# Patient Record
Sex: FEMALE | Race: WHITE | NOT HISPANIC OR LATINO | Employment: OTHER | URBAN - METROPOLITAN AREA
[De-identification: names, ages, dates, MRNs, and addresses within clinical notes are randomized per-mention and may not be internally consistent; named-entity substitution may affect disease eponyms.]

---

## 2017-03-29 ENCOUNTER — HISTORICAL (OUTPATIENT)
Dept: ADMINISTRATIVE | Facility: HOSPITAL | Age: 67
End: 2017-03-29

## 2017-03-29 LAB
BASOPHILS NFR BLD: 0.1 K/UL (ref 0–0.2)
BASOPHILS NFR BLD: 1.1 %
EOSINOPHIL NFR BLD: 0.1 K/UL (ref 0–0.7)
EOSINOPHIL NFR BLD: 1.8 %
ERYTHROCYTE [DISTWIDTH] IN BLOOD BY AUTOMATED COUNT: 12.5 % (ref 11.7–14.9)
GRAN #: 3.9 K/UL (ref 1.4–6.5)
GRAN%: 69.5 %
HCT VFR BLD AUTO: 38.3 % (ref 36–48)
HGB BLD-MCNC: 13 G/DL (ref 12–15)
IMMATURE GRANS (ABS): 0 K/UL (ref 0–1)
IMMATURE GRANULOCYTES: 0.2 %
LYMPH #: 1.1 K/UL (ref 1.2–3.4)
LYMPH%: 20 %
MCH RBC QN AUTO: 32.9 PG (ref 25–35)
MCHC RBC AUTO-ENTMCNC: 33.9 G/DL (ref 31–36)
MCV RBC AUTO: 97 FL (ref 79–98)
MONO #: 0.4 K/UL (ref 0.1–0.6)
MONO%: 7.4 %
NUCLEATED RBCS: 0 %
PLATELET # BLD AUTO: 233 K/UL (ref 140–440)
PMV BLD AUTO: 9.7 FL (ref 8.8–12.7)
RBC # BLD AUTO: 3.95 M/UL (ref 3.5–5.5)
WBC # BLD AUTO: 5.6 K/UL (ref 5–10)

## 2017-05-15 RX ORDER — METHOTREXATE 2.5 MG/1
TABLET ORAL
Qty: 84 TABLET | Refills: 2 | Status: SHIPPED | OUTPATIENT
Start: 2017-05-15 | End: 2017-11-16 | Stop reason: SDUPTHER

## 2017-07-10 RX ORDER — HYDROXYCHLOROQUINE SULFATE 200 MG/1
TABLET, FILM COATED ORAL
Qty: 180 TABLET | Refills: 1 | Status: SHIPPED | OUTPATIENT
Start: 2017-07-10 | End: 2017-11-16 | Stop reason: SDUPTHER

## 2017-07-10 RX ORDER — SALSALATE 500 MG/1
TABLET, FILM COATED ORAL
Qty: 180 TABLET | Refills: 0 | Status: SHIPPED | OUTPATIENT
Start: 2017-07-10 | End: 2017-11-16 | Stop reason: SDUPTHER

## 2017-07-11 ENCOUNTER — OFFICE VISIT (OUTPATIENT)
Dept: RHEUMATOLOGY | Facility: CLINIC | Age: 67
End: 2017-07-11
Payer: COMMERCIAL

## 2017-07-11 VITALS
SYSTOLIC BLOOD PRESSURE: 121 MMHG | BODY MASS INDEX: 24.91 KG/M2 | HEIGHT: 66 IN | WEIGHT: 155 LBS | DIASTOLIC BLOOD PRESSURE: 63 MMHG

## 2017-07-11 DIAGNOSIS — M05.739 RHEUMATOID ARTHRITIS INVOLVING WRIST WITH POSITIVE RHEUMATOID FACTOR, UNSPECIFIED LATERALITY: Primary | ICD-10-CM

## 2017-07-11 LAB
ALBUMIN SERPL-MCNC: 4.2 G/DL (ref 3.1–4.7)
ALP SERPL-CCNC: 59 IU/L (ref 40–104)
ALT (SGPT): 31 IU/L (ref 3–33)
AST SERPL-CCNC: 30 IU/L (ref 10–40)
BASOPHILS NFR BLD: 0.1 K/UL (ref 0–0.2)
BASOPHILS NFR BLD: 1.3 %
BILIRUB SERPL-MCNC: 0.5 MG/DL (ref 0.3–1)
BUN SERPL-MCNC: 15 MG/DL (ref 8–20)
CALCIUM SERPL-MCNC: 9 MG/DL (ref 7.7–10.4)
CHLORIDE: 103 MMOL/L (ref 98–110)
CO2 SERPL-SCNC: 28 MMOL/L (ref 22.8–31.6)
CREATININE: 0.77 MG/DL (ref 0.6–1.4)
CRP SERPL-MCNC: 0.22 MG/DL (ref 0–1.4)
EOSINOPHIL NFR BLD: 0.1 K/UL (ref 0–0.7)
EOSINOPHIL NFR BLD: 2 %
ERYTHROCYTE [DISTWIDTH] IN BLOOD BY AUTOMATED COUNT: 12.5 % (ref 11.7–14.9)
GLUCOSE: 86 MG/DL (ref 70–99)
GRAN #: 4.2 K/UL (ref 1.4–6.5)
GRAN%: 70.6 %
HCT VFR BLD AUTO: 39.2 % (ref 36–48)
HGB BLD-MCNC: 13.1 G/DL (ref 12–15)
IMMATURE GRANS (ABS): 0 K/UL (ref 0–1)
IMMATURE GRANULOCYTES: 0.2 %
LYMPH #: 1.1 K/UL (ref 1.2–3.4)
LYMPH%: 18.5 %
MCH RBC QN AUTO: 32.3 PG (ref 25–35)
MCHC RBC AUTO-ENTMCNC: 33.4 G/DL (ref 31–36)
MCV RBC AUTO: 96.8 FL (ref 79–98)
MONO #: 0.4 K/UL (ref 0.1–0.6)
MONO%: 7.4 %
NUCLEATED RBCS: 0 %
PLATELET # BLD AUTO: 237 K/UL (ref 140–440)
PMV BLD AUTO: 10 FL (ref 8.8–12.7)
POTASSIUM SERPL-SCNC: 3.9 MMOL/L (ref 3.5–5)
PROT SERPL-MCNC: 8.1 G/DL (ref 6–8.2)
RBC # BLD AUTO: 4.05 M/UL (ref 3.5–5.5)
SODIUM: 140 MMOL/L (ref 134–144)
WBC # BLD AUTO: 5.9 K/UL (ref 5–10)

## 2017-07-11 PROCEDURE — 99213 OFFICE O/P EST LOW 20 MIN: CPT | Mod: ,,, | Performed by: INTERNAL MEDICINE

## 2017-07-11 RX ORDER — METHYLPREDNISOLONE 4 MG/1
TABLET ORAL
Qty: 1 PACKAGE | Refills: 0 | Status: SHIPPED | OUTPATIENT
Start: 2017-07-11 | End: 2017-08-01

## 2017-07-11 RX ORDER — OSELTAMIVIR PHOSPHATE 75 MG/1
75 CAPSULE ORAL 2 TIMES DAILY
Qty: 10 CAPSULE | Refills: 0 | Status: SHIPPED | OUTPATIENT
Start: 2017-07-11 | End: 2017-07-16

## 2017-07-11 NOTE — PROGRESS NOTES
[facility] RHEUMATOLOGY            PROGRESS NOTE      Subjective:       Patient ID:   NAME: Aurelia Marlow : 1950     66 y.o. female    Referring Doc: No ref. provider found  Other Physicians:    Chief Complaint:  Rheumatoid Arthritis (follow-up )      History of Present Illness:     Patient returns today for a regularly scheduled follow-up visit for Rheumatoid arthritis      The patient denies any prolonged morning stiffness or fatigue. No joint swelling. Occasional arthralgias relieved with medications.  No GI complaints. no chest pains, cough or shortness of breath. She recovered from an upper  respiratory infection a few weeks ago.            ROS:   GEN:  No  fever, night sweats . weight is stable   No fatigue  SKIN: no rashes, no bruising, no ulcerations, no Raynaud's  HEENT: no HA's, No visual changes, no mucosal ulcers, no sicca symptoms,  CV:   no CP, SOB, PND, CHAUDHRY, no orthopnea, no palpitations  PULM: normal with no SOB, cough, hemoptysis, sputum or pleuritic pain  GI:  no abdominal pain, nausea, vomiting, constipation, diarrhea, melanotic stools, BRBPR, hematemesis, no dysphagia  :   no dysuria  NEURO: no paresthesias, headaches, visual disturbances, muscle weakness  MUSCULOSKELETAL:no joint swelling, prolonged AM stiffness, no back pain, no muscle pain  Allergies:  Review of patient's allergies indicates:  No Known Allergies    Medications:    Current Outpatient Prescriptions:     BIFIDOBACTERIUM INFANTIS (ALIGN ORAL), Take by mouth., Disp: , Rfl:     folic acid (FOLVITE) 1 MG tablet, Take 1 mg by mouth once daily., Disp: , Rfl:     hydroxychloroquine (PLAQUENIL) 200 mg tablet, TAKE 1 TABLET TWICE DAILY, Disp: 180 tablet, Rfl: 1    levothyroxine (SYNTHROID) 100 MCG tablet, Take 100 mcg by mouth once daily., Disp: , Rfl:     methotrexate 15 MG tablet, Take 15 mg by mouth., Disp: , Rfl:     methotrexate 2.5 MG Tab, TAKE 7 TABLET BY MOUTH ONCE A WEEK, Disp: 84 tablet, Rfl: 2    naproxen  "(EC NAPROSYN) 500 MG EC tablet, Take 500 mg by mouth 2 (two) times daily., Disp: , Rfl:     omeprazole (PRILOSEC) 10 MG capsule, Take 10 mg by mouth once daily., Disp: , Rfl:     salsalate (DISALCID) 500 MG Tab, TAKE 2 TABLETS BY MOUTH 3 TIMES A DAY WITH FOOD OR 3 TABLETS TWICE DAILY WITH FOOD AS NEEDED, Disp: 180 tablet, Rfl: 0    methylPREDNISolone (MEDROL DOSEPACK) 4 mg tablet, use as directed, Disp: 1 Package, Rfl: 0    oseltamivir (TAMIFLU) 75 MG capsule, Take 1 capsule (75 mg total) by mouth 2 (two) times daily., Disp: 10 capsule, Rfl: 0    PMHx/PSHx Updates:  Last Eye Exam  UTD        Objective:     Vitals:  Blood pressure 121/63, height 5' 6" (1.676 m), weight 70.3 kg (155 lb).    Physical Examination:   GEN: no apparent distress, comfortable; AAOx3  SKIN: no rashes,no ulceration, no Raynaud's, no petechiae, no SQ nodules,  HEAD: normal  EYES: no pallor, no icterus  ENT:  ,no mucosal dryness or ulcerations  NECK: no masses, thyroid normal, trachea midline, no LAD/LN's, supple  CV: RRR with no murmur; l S1 and S2 reg. ,no gallop no rubs,   CHEST: Normal respiratory effort; CTAB; normal breath sounds; no wheeze or crackles  ABDOM: nontender and nondistended; soft; no masses; no rebound/guarding  MUSC/Skeletal: ROM normal; no crepitus; joints without synovitis,  no deformities  No joint swelling or tenderness of PIP, MCP, wrist, elbow, shoulder, or knee joints  EXTREM: no clubbing, cyanosis, no edema,normal  pulses   NEURO: grossly intact; motor WNL; AAOx3; ,PSYCH: normal mood, affect and behavior  LYMPH: normal cervical, supraclavicular          Labs:   Lab Results   Component Value Date    WBC 5.6 03/29/2017    HGB 13.0 03/29/2017    HCT 38.3 03/29/2017    MCV 97.0 03/29/2017     03/29/2017    CMP  @LAST[facility](NA,K,CL,CO2,GLU,BUN,Creatinine,Calcium,PROT,Albumin,Bilitot,Alkphos,AST,ALT,CRP,ESR,RF,CCP,SONJA,SSA,CPK,uric acid) )@  I have reviewed all available lab results and radiology " reports.    Radiology/Diagnostic Studies:        Assessment/Plan:   (1) 66 y.o. female with diagnosis of Rheumatoid arthritis. She is doing well.  She requests a prescription for Tamiflu as she has been exposed to her grandchild with influenza. She will take it if she starts with symptoms.    CBC, CMP, CRP        Discussion:     I have explained all of the above in detail and the patient understands all of the current recommendation(s). I have answered all questions to the best of my ability and to their complete satisfaction.       The patient is to continue with the current management plan         RTC in  3 months  Electronically signed by Adrián Hernandez MD

## 2017-10-17 ENCOUNTER — OFFICE VISIT (OUTPATIENT)
Dept: RHEUMATOLOGY | Facility: CLINIC | Age: 67
End: 2017-10-17
Payer: MEDICARE

## 2017-10-17 VITALS
BODY MASS INDEX: 24.96 KG/M2 | HEIGHT: 66 IN | WEIGHT: 155.31 LBS | DIASTOLIC BLOOD PRESSURE: 78 MMHG | SYSTOLIC BLOOD PRESSURE: 152 MMHG

## 2017-10-17 DIAGNOSIS — M05.79 RHEUMATOID ARTHRITIS INVOLVING MULTIPLE SITES WITH POSITIVE RHEUMATOID FACTOR: Primary | ICD-10-CM

## 2017-10-17 LAB
ALBUMIN SERPL-MCNC: 4.1 G/DL (ref 3.1–4.7)
ALP SERPL-CCNC: 64 IU/L (ref 40–104)
ALT (SGPT): 17 IU/L (ref 3–33)
AST SERPL-CCNC: 19 IU/L (ref 10–40)
BASOPHILS NFR BLD: 0.1 K/UL (ref 0–0.2)
BASOPHILS NFR BLD: 1.1 %
BILIRUB SERPL-MCNC: 0.5 MG/DL (ref 0.3–1)
BUN SERPL-MCNC: 13 MG/DL (ref 8–20)
CALCIUM SERPL-MCNC: 9 MG/DL (ref 7.7–10.4)
CHLORIDE: 102 MMOL/L (ref 98–110)
CO2 SERPL-SCNC: 27.9 MMOL/L (ref 22.8–31.6)
CREATININE: 0.65 MG/DL (ref 0.6–1.4)
CRP SERPL-MCNC: 0.28 MG/DL (ref 0–1.4)
EOSINOPHIL NFR BLD: 0.1 K/UL (ref 0–0.7)
EOSINOPHIL NFR BLD: 2.1 %
ERYTHROCYTE [DISTWIDTH] IN BLOOD BY AUTOMATED COUNT: 12.6 % (ref 11.7–14.9)
GLUCOSE: 91 MG/DL (ref 70–99)
GRAN #: 3.5 K/UL (ref 1.4–6.5)
GRAN%: 65.5 %
HCT VFR BLD AUTO: 37.6 % (ref 36–48)
HGB BLD-MCNC: 12.7 G/DL (ref 12–15)
IMMATURE GRANS (ABS): 0 K/UL (ref 0–1)
IMMATURE GRANULOCYTES: 0.2 %
LYMPH #: 1.1 K/UL (ref 1.2–3.4)
LYMPH%: 21.3 %
MCH RBC QN AUTO: 32.5 PG (ref 25–35)
MCHC RBC AUTO-ENTMCNC: 33.8 G/DL (ref 31–36)
MCV RBC AUTO: 96.2 FL (ref 79–98)
MONO #: 0.5 K/UL (ref 0.1–0.6)
MONO%: 9.8 %
NUCLEATED RBCS: 0 %
PLATELET # BLD AUTO: 224 K/UL (ref 140–440)
PMV BLD AUTO: 9.3 FL (ref 8.8–12.7)
POTASSIUM SERPL-SCNC: 4 MMOL/L (ref 3.5–5)
PROT SERPL-MCNC: 7.6 G/DL (ref 6–8.2)
RBC # BLD AUTO: 3.91 M/UL (ref 3.5–5.5)
SODIUM: 138 MMOL/L (ref 134–144)
WBC # BLD AUTO: 5.3 K/UL (ref 5–10)

## 2017-10-17 PROCEDURE — 99213 OFFICE O/P EST LOW 20 MIN: CPT | Mod: ,,, | Performed by: INTERNAL MEDICINE

## 2017-10-17 NOTE — PROGRESS NOTES
[facility] RHEUMATOLOGY            PROGRESS NOTE      Subjective:       Patient ID:   NAME: Aurelia Marlow : 1950     67 y.o. female    Referring Doc: No ref. provider found  Other Physicians:    Chief Complaint:  Rheumatoid Arthritis      History of Present Illness:     Patient returns today for a regularly scheduled follow-up visit for Rheumatoid arthritis      The patient has experienced flareups involving shoulders and knees within the past month. She was seen by orthopedic surgeon who did MRI of the left knee. Changes described by radiologist of diffuse marrow signal intensity in both min bilateral femurs extended from femoral heads to greater and lesser trochanters femoral neck intertrochanteric region and entire bilateral femoral diaphyseal shafts  Patient is concerned about this changes. She denies any fevers night sweats. No cough or shortness of breath. No weight loss.  Joints have felt better within the past few weeks.        ROS:   GEN:  No  fever, night sweats . weight is stable   some fatigue  SKIN: no rashes, no bruising, no ulcerations, no Raynaud's  HEENT: no HA's, No visual changes, no mucosal ulcers, no sicca symptoms,  CV:   no CP, SOB, PND, CHAUDHRY, no orthopnea, no palpitations  PULM: normal with no SOB, cough, hemoptysis, sputum or pleuritic pain  GI:  no abdominal pain, nausea, vomiting, constipation, diarrhea, melanotic stools, BRBPR, hematemesis, no dysphagia  :   no dysuria  NEURO: no paresthesias, headaches, visual disturbances, muscle weakness  MUSCULOSKELETAL:no joint swelling, prolonged AM stiffness, no back pain, no muscle pain  Allergies:  Review of patient's allergies indicates:  No Known Allergies    Medications:    Current Outpatient Prescriptions:     BIFIDOBACTERIUM INFANTIS (ALIGN ORAL), Take by mouth., Disp: , Rfl:     folic acid (FOLVITE) 1 MG tablet, Take 1 mg by mouth once daily., Disp: , Rfl:     hydroxychloroquine (PLAQUENIL) 200 mg tablet, TAKE 1 TABLET TWICE  "DAILY, Disp: 180 tablet, Rfl: 1    levothyroxine (SYNTHROID) 100 MCG tablet, Take 100 mcg by mouth once daily., Disp: , Rfl:     methotrexate 2.5 MG Tab, TAKE 7 TABLET BY MOUTH ONCE A WEEK (Patient taking differently: TAKE 6 TABLET BY MOUTH ONCE A WEEK), Disp: 84 tablet, Rfl: 2    omeprazole (PRILOSEC) 10 MG capsule, Take 10 mg by mouth once daily., Disp: , Rfl:     salsalate (DISALCID) 500 MG Tab, TAKE 2 TABLETS BY MOUTH 3 TIMES A DAY WITH FOOD OR 3 TABLETS TWICE DAILY WITH FOOD AS NEEDED, Disp: 180 tablet, Rfl: 0    naproxen (EC NAPROSYN) 500 MG EC tablet, Take 500 mg by mouth 2 (two) times daily., Disp: , Rfl:     PMHx/PSHx Updates:        Objective:     Vitals:  Blood pressure (!) 152/78, height 5' 6" (1.676 m), weight 70.4 kg (155 lb 4.8 oz).    Physical Examination:   GEN: no apparent distress, comfortable; AAOx3  SKIN: no rashes,no ulceration, no Raynaud's, no petechiae, no SQ nodules,  HEAD: normal  EYES: no pallor, no icterus,   NECK: no masses, thyroid normal, trachea midline, no LAD/LN's, supple  CV: RRR with no murmur; l S1 and S2 reg. ,no gallop no rubs,   CHEST: Normal respiratory effort; CTAB; normal breath sounds; no wheeze or crackles  ABDOM: nontender and nondistended; soft; no masses; no rebound/guarding  MUSC/Skeletal: ROM normal; no crepitus; joints without synovitis,  no deformities  No joint swelling or tenderness of PIP, MCP, wrist, elbow, shoulder, or knee joints  EXTREM: no clubbing, cyanosis, no edema,normal  pulses   NEURO: grossly intact; motor WNL; AAOx3;   PSYCH: normal mood, affect and behavior  LYMPH: normal cervical, supraclavicular          Labs:   Lab Results   Component Value Date    WBC 5.9 07/11/2017    HGB 13.1 07/11/2017    HCT 39.2 07/11/2017    MCV 96.8 07/11/2017     07/11/2017    CMP  @[facility](NA,K,CL,CO2,GLU,BUN,Creatinine,Calcium,PROT,Albumin,Bilitot,Alkphos,AST,ALT,CRP,ESR,RF,CCP,SONJA,SSA,CPK,uric acid) )@  I have reviewed all available lab results and " radiology reports.    Radiology/Diagnostic Studies:        Assessment/Plan:   (1) 67 y.o. female with diagnosis of Rheumatoid arthritis. She has had some mild activity.  MRI changes described by radiology is suggestive of bone marrow replacement or expansion type of processes.( cee femurs)    Plan CBC CMP CRP sedimentation rate.. If blood tests are normal will refer to hematology   Discussion:     I have explained all of the above in detail and the patient understands all of the current recommendation(s). I have answered all questions to the best of my ability and to their complete satisfaction.       The patient is to continue with the current management plan         RTC in   1 month      Electronically signed by Adrián Hernandez MD

## 2017-11-16 ENCOUNTER — OFFICE VISIT (OUTPATIENT)
Dept: RHEUMATOLOGY | Facility: CLINIC | Age: 67
End: 2017-11-16
Payer: MEDICARE

## 2017-11-16 VITALS
WEIGHT: 155 LBS | DIASTOLIC BLOOD PRESSURE: 70 MMHG | SYSTOLIC BLOOD PRESSURE: 130 MMHG | BODY MASS INDEX: 24.91 KG/M2 | HEIGHT: 66 IN

## 2017-11-16 DIAGNOSIS — M05.79 RHEUMATOID ARTHRITIS INVOLVING MULTIPLE SITES WITH POSITIVE RHEUMATOID FACTOR: Primary | ICD-10-CM

## 2017-11-16 LAB
ALBUMIN SERPL-MCNC: 4 G/DL (ref 3.1–4.7)
ALP SERPL-CCNC: 64 IU/L (ref 40–104)
ALT (SGPT): 21 IU/L (ref 3–33)
AST SERPL-CCNC: 21 IU/L (ref 10–40)
BASOPHILS NFR BLD: 0.1 K/UL (ref 0–0.2)
BASOPHILS NFR BLD: 1 %
BILIRUB SERPL-MCNC: 0.5 MG/DL (ref 0.3–1)
BUN SERPL-MCNC: 14 MG/DL (ref 8–20)
CALCIUM SERPL-MCNC: 8.9 MG/DL (ref 7.7–10.4)
CHLORIDE: 103 MMOL/L (ref 98–110)
CO2 SERPL-SCNC: 28.2 MMOL/L (ref 22.8–31.6)
CREATININE: 0.74 MG/DL (ref 0.6–1.4)
CRP SERPL-MCNC: 0.27 MG/DL (ref 0–1.4)
EOSINOPHIL NFR BLD: 0.1 K/UL (ref 0–0.7)
EOSINOPHIL NFR BLD: 1.5 %
ERYTHROCYTE [DISTWIDTH] IN BLOOD BY AUTOMATED COUNT: 12.5 % (ref 11.7–14.9)
GLUCOSE: 91 MG/DL (ref 70–99)
GRAN #: 3.8 K/UL (ref 1.4–6.5)
GRAN%: 72.6 %
HCT VFR BLD AUTO: 38.4 % (ref 36–48)
HGB BLD-MCNC: 12.9 G/DL (ref 12–15)
IMMATURE GRANS (ABS): 0 K/UL (ref 0–1)
IMMATURE GRANULOCYTES: 0.4 %
LYMPH #: 0.9 K/UL (ref 1.2–3.4)
LYMPH%: 17.4 %
MCH RBC QN AUTO: 32 PG (ref 25–35)
MCHC RBC AUTO-ENTMCNC: 33.6 G/DL (ref 31–36)
MCV RBC AUTO: 95.3 FL (ref 79–98)
MONO #: 0.4 K/UL (ref 0.1–0.6)
MONO%: 7.1 %
NUCLEATED RBCS: 0 %
PLATELET # BLD AUTO: 235 K/UL (ref 140–440)
PMV BLD AUTO: 9.5 FL (ref 8.8–12.7)
POTASSIUM SERPL-SCNC: 4 MMOL/L (ref 3.5–5)
PROT SERPL-MCNC: 7.5 G/DL (ref 6–8.2)
RBC # BLD AUTO: 4.03 M/UL (ref 3.5–5.5)
SODIUM: 138 MMOL/L (ref 134–144)
WBC # BLD AUTO: 5.2 K/UL (ref 5–10)

## 2017-11-16 PROCEDURE — 99213 OFFICE O/P EST LOW 20 MIN: CPT | Mod: ,,, | Performed by: INTERNAL MEDICINE

## 2017-11-16 RX ORDER — SALSALATE 500 MG/1
TABLET, FILM COATED ORAL
Qty: 180 TABLET | Refills: 0 | Status: SHIPPED | OUTPATIENT
Start: 2017-11-16 | End: 2017-11-27 | Stop reason: SDUPTHER

## 2017-11-16 RX ORDER — METHOTREXATE 2.5 MG/1
TABLET ORAL
Qty: 32 TABLET | Refills: 2 | Status: SHIPPED | OUTPATIENT
Start: 2017-11-16 | End: 2018-03-05 | Stop reason: SDUPTHER

## 2017-11-16 RX ORDER — HYDROXYCHLOROQUINE SULFATE 200 MG/1
200 TABLET, FILM COATED ORAL 2 TIMES DAILY
Qty: 180 TABLET | Refills: 1 | Status: SHIPPED | OUTPATIENT
Start: 2017-11-16 | End: 2018-03-26 | Stop reason: SDUPTHER

## 2017-11-16 NOTE — PROGRESS NOTES
[facility] RHEUMATOLOGY            PROGRESS NOTE      Subjective:       Patient ID:   NAME: Aurelia Marlow : 1950     67 y.o. female    Referring Doc: No ref. provider found  Other Physicians:    Chief Complaint:  No chief complaint on file.      History of Present Illness:     Patient returns today for a regularly scheduled follow-up visit for Rheumatoid arthritis      The patient is doing better since methotrexate dose was increased to 17-1/2 mg a week. No fevers cough or shortness of breath. She is still experiencing some swelling on the right MTP joints. Occasional pain in her knees. Rein stiffness lasting less than one hour        ROS:   GEN:  No  fever, night sweats . weight is stable   No fatigue  SKIN: no rashes, no bruising, no ulcerations, no Raynaud's  HEENT: no HA's, No visual changes, no mucosal ulcers, no sicca symptoms,  CV:   no CP, SOB, PND, CHAUDHRY, no orthopnea, no palpitations  PULM: normal with no SOB, cough, hemoptysis, sputum or pleuritic pain  GI:  no abdominal pain, nausea, vomiting, constipation, diarrhea, melanotic stools, BRBPR, hematemesis, no dysphagia  :   no dysuria  NEURO: no paresthesias, headaches, visual disturbances, muscle weakness  MUSCULOSKELETAL:no joint swelling, prolonged AM stiffness, no back pain, no muscle pain  Allergies:  Review of patient's allergies indicates:  No Known Allergies    Medications:    Current Outpatient Prescriptions:     BIFIDOBACTERIUM INFANTIS (ALIGN ORAL), Take by mouth., Disp: , Rfl:     folic acid (FOLVITE) 1 MG tablet, Take 1 mg by mouth once daily., Disp: , Rfl:     hydroxychloroquine (PLAQUENIL) 200 mg tablet, Take 1 tablet (200 mg total) by mouth 2 (two) times daily., Disp: 180 tablet, Rfl: 1    levothyroxine (SYNTHROID) 100 MCG tablet, Take 100 mcg by mouth once daily., Disp: , Rfl:     methotrexate 2.5 MG Tab, TAKE 8 TABLET BY MOUTH ONCE A WEEK, Disp: 32 tablet, Rfl: 2    naproxen (EC NAPROSYN) 500 MG EC tablet, Take 500 mg by  "mouth 2 (two) times daily., Disp: , Rfl:     omeprazole (PRILOSEC) 10 MG capsule, Take 10 mg by mouth once daily., Disp: , Rfl:     salsalate (DISALCID) 500 MG Tab, TAKE 2 TABLETS BY MOUTH 3 TIMES A DAY WITH FOOD OR 3 TABLETS TWICE DAILY WITH FOOD AS NEEDED, Disp: 180 tablet, Rfl: 0    PMHx/PSHx Updates:    Objective:     Vitals:  Blood pressure 130/70, height 5' 6" (1.676 m), weight 70.3 kg (155 lb).    Physical Examination:   GEN: no apparent distress, comfortable; AAOx3  SKIN: no rashes,no ulceration, no Raynaud's, no petechiae, no SQ nodules,  HEAD: normal  EYES: no pallor, no icterus,  NECK: no masses, thyroid normal, trachea midline, no LAD/LN's, supple  CV: RRR with no murmur; l S1 and S2 reg. ,no gallop no rubs,   CHEST: Normal respiratory effort; CTAB; normal breath sounds; no wheeze or crackles  MUSC/Skeletal: ROM normal; no crepitus;   no deformities  No joint swelling or tenderness of PIP, MCP, wrist, elbow, shoulder, or knee joints Synovitis  right MTP joints.  EXTREM: no clubbing, cyanosis, no edema,normal  pulses   NEURO: grossly intact; motor WNL; AAOx3;   PSYCH: normal mood, affect and behavior  LYMPH: normal cervical, supraclavicular          Labs:   Lab Results   Component Value Date    WBC 5.3 10/17/2017    HGB 12.7 10/17/2017    HCT 37.6 10/17/2017    MCV 96.2 10/17/2017     10/17/2017    CMP  @[facility](NA,K,CL,CO2,GLU,BUN,Creatinine,Calcium,PROT,Albumin,Bilitot,Alkphos,AST,ALT,CRP,ESR,RF,CCP,SONJA,SSA,CPK,uric acid) )@  I have reviewed all available lab results and radiology reports.    Radiology/Diagnostic Studies:        Assessment/Plan:   (1) 67 y.o. female with diagnosis of Rheumatoid arthritis. She is has improved since the last office visit but there is still synovitis in  RMTP joints      Plan: increase methotrexate to 20 mg once a week.    CBC, CMP today with CRP. X-rays of hands and feet      Discussion:     I have explained all of the above in detail and the patient understands " all of the current recommendation(s). I have answered all questions to the best of my ability and to their complete satisfaction.       The patient is to continue with the current management plan         RTC in  1 month       Electronically signed by Adrián Hernandez MD

## 2017-11-27 RX ORDER — SALSALATE 500 MG/1
TABLET, FILM COATED ORAL
Qty: 180 TABLET | Refills: 0 | Status: SHIPPED | OUTPATIENT
Start: 2017-11-27 | End: 2017-12-19

## 2017-12-19 ENCOUNTER — OFFICE VISIT (OUTPATIENT)
Dept: RHEUMATOLOGY | Facility: CLINIC | Age: 67
End: 2017-12-19
Payer: MEDICARE

## 2017-12-19 VITALS
SYSTOLIC BLOOD PRESSURE: 116 MMHG | DIASTOLIC BLOOD PRESSURE: 68 MMHG | BODY MASS INDEX: 25.53 KG/M2 | WEIGHT: 158.19 LBS

## 2017-12-19 DIAGNOSIS — M05.79 RHEUMATOID ARTHRITIS INVOLVING MULTIPLE SITES WITH POSITIVE RHEUMATOID FACTOR: Primary | ICD-10-CM

## 2017-12-19 PROCEDURE — 99213 OFFICE O/P EST LOW 20 MIN: CPT | Mod: ,,, | Performed by: INTERNAL MEDICINE

## 2017-12-19 RX ORDER — NAPROXEN 500 MG/1
TABLET ORAL
Qty: 60 TABLET | Refills: 3 | Status: SHIPPED | OUTPATIENT
Start: 2017-12-19 | End: 2018-12-19 | Stop reason: SDUPTHER

## 2017-12-19 NOTE — PROGRESS NOTES
[facility] RHEUMATOLOGY            PROGRESS NOTE      Subjective:       Patient ID:   NAME: Aurelia Marlow : 1950     67 y.o. female    Referring Doc: No ref. provider found  Other Physicians:    Chief Complaint:  Rheumatoid Arthritis      History of Present Illness:     Patient returns today for a regularly scheduled follow-up visit for   Rheumatoid arthritis    The patient is doing better since methotrexate dose was increased last office visit. No fevers cough or shortness of breath. She accidentally took methotrexate for 2 days last week. No fevers cough or shortness of breath. No chest pains. No rashes.  She experienced severe pain in the right wrist for the past week but today is doing well.          ROS:   GEN:  No  fever, night sweats . weight is stable   No fatigue  SKIN: no rashes, no bruising, no ulcerations, no Raynaud's  HEENT: no HA's, No visual changes, no mucosal ulcers, no sicca symptoms,  CV:   no CP, SOB, PND, CHAUDHRY, no orthopnea, no palpitations  PULM: normal with no SOB, cough, hemoptysis, sputum or pleuritic pain  GI:  no abdominal pain, nausea, vomiting, constipation, diarrhea, melanotic stools, BRBPR, hematemesis, no dysphagia  :   no dysuria  NEURO: no paresthesias, headaches, visual disturbances, muscle weakness  MUSCULOSKELETAL:no joint swelling, prolonged AM stiffness, no back pain, no muscle pain  Allergies:  Review of patient's allergies indicates:  No Known Allergies    Medications:    Current Outpatient Prescriptions:     BIFIDOBACTERIUM INFANTIS (ALIGN ORAL), Take by mouth., Disp: , Rfl:     folic acid (FOLVITE) 1 MG tablet, Take 1 mg by mouth once daily., Disp: , Rfl:     hydroxychloroquine (PLAQUENIL) 200 mg tablet, Take 1 tablet (200 mg total) by mouth 2 (two) times daily., Disp: 180 tablet, Rfl: 1    levothyroxine (SYNTHROID) 100 MCG tablet, Take 100 mcg by mouth once daily., Disp: , Rfl:     methotrexate 2.5 MG Tab, TAKE 8 TABLET BY MOUTH ONCE A WEEK, Disp: 32  tablet, Rfl: 2    naproxen (EC NAPROSYN) 500 MG EC tablet, One po bid pc prn, Disp: 60 tablet, Rfl: 3    omeprazole (PRILOSEC) 10 MG capsule, Take 10 mg by mouth once daily., Disp: , Rfl:     PMHx/PSHx Updates    Objective:     Vitals:  Blood pressure 116/68, weight 71.8 kg (158 lb 3.2 oz).    Physical Examination:   GEN: no apparent distress, comfortable; AAOx3  SKIN: no rashes,no ulceration, no Raynaud's, no petechiae, no SQ nodules,  HEAD: normal  EYES: no pallor, no icterus  NECK: no masses, thyroid normal, trachea midline, no LAD/LN's, supple  CV: RRR with no murmur; l S1 and S2 reg. ,no gallop no rubs,   CHEST: Normal respiratory effort; CTAB; normal breath sounds; no wheeze or crackles  MUSC/Skeletal: ROM normal; no crepitus; joints without synovitis,  no deformities  No joint swelling or tenderness of PIP, MCP, wrist, elbow, shoulder, or knee joints  EXTREM: no clubbing, cyanosis, no edema,normal  pulses   NEURO: grossly intact; motor WNL; AAOx3; ,  PSYCH: normal mood, affect and behavior  LYMPH: normal cervical, supraclavicular          Labs:   Lab Results   Component Value Date    WBC 5.2 11/16/2017    HGB 12.9 11/16/2017    HCT 38.4 11/16/2017    MCV 95.3 11/16/2017     11/16/2017    CMP  @[facility](NA,K,CL,CO2,GLU,BUN,Creatinine,Calcium,PROT,Albumin,Bilitot,Alkphos,AST,ALT,CRP,ESR,RF,CCP,SONJA,SSA,CPK,uric acid) )@  I have reviewed all available lab results and radiology reports.    Radiology/Diagnostic Studies:        Assessment/Plan:   (1) 67 y.o. female with diagnosis of Rheumatoid arthritis. She is doing better since methotrexate dose was increased 3 weeks ago.  She experienced   severe pain in the right wrist or the last week but doing well today.  X-rays of her hands showed possible old fracture right scaphoid, with possible osteonecrosis  She accidentally took methotrexate twice a week last week  PLAN: CBC CMP and CRP in 2 weeks.  MRI of right wrist            Discussion:     I have explained  all of the above in detail and the patient understands all of the current recommendation(s). I have answered all questions to the best of my ability and to their complete satisfaction.       The patient is to continue with the current management plan         RTC in   3 months      Electronically signed by Adrián Hernandez MD

## 2018-01-10 DIAGNOSIS — M05.79 RHEUMATOID ARTHRITIS OF MULTIPLE SITES WITHOUT ORGAN OR SYSTEM INVOLVEMENT WITH POSITIVE RHEUMATOID FACTOR: Primary | ICD-10-CM

## 2018-01-10 DIAGNOSIS — Z79.899 ENCOUNTER FOR LONG-TERM (CURRENT) USE OF HIGH-RISK MEDICATION: ICD-10-CM

## 2018-01-10 LAB
ALBUMIN SERPL-MCNC: 4.2 G/DL (ref 3.1–4.7)
ALP SERPL-CCNC: 63 IU/L (ref 40–104)
ALT (SGPT): 25 IU/L (ref 3–33)
AST SERPL-CCNC: 27 IU/L (ref 10–40)
BASOPHILS NFR BLD: 0.1 K/UL (ref 0–0.2)
BASOPHILS NFR BLD: 1.5 %
BILIRUB SERPL-MCNC: 0.7 MG/DL (ref 0.3–1)
BUN SERPL-MCNC: 16 MG/DL (ref 8–20)
CALCIUM SERPL-MCNC: 9.2 MG/DL (ref 7.7–10.4)
CHLORIDE: 99 MMOL/L (ref 98–110)
CO2 SERPL-SCNC: 27.1 MMOL/L (ref 22.8–31.6)
CREATININE: 0.6 MG/DL (ref 0.6–1.4)
CRP SERPL-MCNC: 0.72 MG/DL (ref 0–1.4)
EOSINOPHIL NFR BLD: 0.1 K/UL (ref 0–0.7)
EOSINOPHIL NFR BLD: 1.7 %
ERYTHROCYTE [DISTWIDTH] IN BLOOD BY AUTOMATED COUNT: 12.4 % (ref 11.7–14.9)
GLUCOSE: 61 MG/DL (ref 70–99)
GRAN #: 3.3 K/UL (ref 1.4–6.5)
GRAN%: 70.9 %
HCT VFR BLD AUTO: 37.2 % (ref 36–48)
HGB BLD-MCNC: 12.8 G/DL (ref 12–15)
IMMATURE GRANS (ABS): 0 K/UL (ref 0–1)
IMMATURE GRANULOCYTES: 0.2 %
LYMPH #: 0.8 K/UL (ref 1.2–3.4)
LYMPH%: 16.3 %
MCH RBC QN AUTO: 32.5 PG (ref 25–35)
MCHC RBC AUTO-ENTMCNC: 34.4 G/DL (ref 31–36)
MCV RBC AUTO: 94.4 FL (ref 79–98)
MONO #: 0.4 K/UL (ref 0.1–0.6)
MONO%: 9.4 %
NUCLEATED RBCS: 0 %
PLATELET # BLD AUTO: 206 K/UL (ref 140–440)
PMV BLD AUTO: 10.1 FL (ref 8.8–12.7)
POTASSIUM SERPL-SCNC: 4.6 MMOL/L (ref 3.5–5)
PROT SERPL-MCNC: 7.9 G/DL (ref 6–8.2)
RBC # BLD AUTO: 3.94 M/UL (ref 3.5–5.5)
SODIUM: 136 MMOL/L (ref 134–144)
WBC # BLD AUTO: 4.7 K/UL (ref 5–10)

## 2018-03-05 RX ORDER — METHOTREXATE 2.5 MG/1
TABLET ORAL
Qty: 32 TABLET | Refills: 2 | Status: SHIPPED | OUTPATIENT
Start: 2018-03-05 | End: 2018-03-08 | Stop reason: SDUPTHER

## 2018-03-08 RX ORDER — METHOTREXATE 2.5 MG/1
TABLET ORAL
Qty: 32 TABLET | Refills: 2 | Status: SHIPPED | OUTPATIENT
Start: 2018-03-08 | End: 2019-01-24 | Stop reason: SDUPTHER

## 2018-03-26 ENCOUNTER — OFFICE VISIT (OUTPATIENT)
Dept: RHEUMATOLOGY | Facility: CLINIC | Age: 68
End: 2018-03-26
Payer: MEDICARE

## 2018-03-26 VITALS
SYSTOLIC BLOOD PRESSURE: 116 MMHG | BODY MASS INDEX: 23.79 KG/M2 | DIASTOLIC BLOOD PRESSURE: 62 MMHG | WEIGHT: 147.38 LBS

## 2018-03-26 DIAGNOSIS — M05.79 RHEUMATOID ARTHRITIS OF MULTIPLE SITES WITHOUT ORGAN OR SYSTEM INVOLVEMENT WITH POSITIVE RHEUMATOID FACTOR: Primary | ICD-10-CM

## 2018-03-26 LAB
ALBUMIN SERPL-MCNC: 4.2 G/DL (ref 3.1–4.7)
ALP SERPL-CCNC: 66 IU/L (ref 40–104)
ALT (SGPT): 29 IU/L (ref 3–33)
AST SERPL-CCNC: 26 IU/L (ref 10–40)
BASOPHILS NFR BLD: 0.1 K/UL (ref 0–0.2)
BASOPHILS NFR BLD: 1.3 %
BILIRUB SERPL-MCNC: 0.7 MG/DL (ref 0.3–1)
BUN SERPL-MCNC: 20 MG/DL (ref 8–20)
CALCIUM SERPL-MCNC: 9.2 MG/DL (ref 7.7–10.4)
CHLORIDE: 100 MMOL/L (ref 98–110)
CO2 SERPL-SCNC: 25.9 MMOL/L (ref 22.8–31.6)
CREATININE: 0.65 MG/DL (ref 0.6–1.4)
CRP SERPL-MCNC: 0.41 MG/DL (ref 0–1.4)
EOSINOPHIL NFR BLD: 0.2 K/UL (ref 0–0.7)
EOSINOPHIL NFR BLD: 3.7 %
ERYTHROCYTE [DISTWIDTH] IN BLOOD BY AUTOMATED COUNT: 13.3 % (ref 11.7–14.9)
GLUCOSE: 56 MG/DL (ref 70–99)
GRAN #: 3.7 K/UL (ref 1.4–6.5)
GRAN%: 69.8 %
HCT VFR BLD AUTO: 37.2 % (ref 36–48)
HGB BLD-MCNC: 12.4 G/DL (ref 12–15)
IMMATURE GRANS (ABS): 0 K/UL (ref 0–1)
IMMATURE GRANULOCYTES: 0.4 %
LYMPH #: 0.7 K/UL (ref 1.2–3.4)
LYMPH%: 13.8 %
MCH RBC QN AUTO: 32.7 PG (ref 25–35)
MCHC RBC AUTO-ENTMCNC: 33.3 G/DL (ref 31–36)
MCV RBC AUTO: 98.2 FL (ref 79–98)
MONO #: 0.6 K/UL (ref 0.1–0.6)
MONO%: 11 %
NUCLEATED RBCS: 0 %
PLATELET # BLD AUTO: 197 K/UL (ref 140–440)
PMV BLD AUTO: 10.1 FL (ref 8.8–12.7)
POTASSIUM SERPL-SCNC: 5.7 MMOL/L (ref 3.5–5)
PROT SERPL-MCNC: 7.5 G/DL (ref 6–8.2)
RBC # BLD AUTO: 3.79 M/UL (ref 3.5–5.5)
SODIUM: 134 MMOL/L (ref 134–144)
WBC # BLD AUTO: 5.4 K/UL (ref 5–10)

## 2018-03-26 PROCEDURE — 99212 OFFICE O/P EST SF 10 MIN: CPT | Mod: ,,, | Performed by: INTERNAL MEDICINE

## 2018-03-26 RX ORDER — FOLIC ACID 1 MG/1
1 TABLET ORAL DAILY
Qty: 30 TABLET | Refills: 5 | Status: SHIPPED | OUTPATIENT
Start: 2018-03-26 | End: 2018-12-04 | Stop reason: SDUPTHER

## 2018-03-26 RX ORDER — HYDROXYCHLOROQUINE SULFATE 200 MG/1
200 TABLET, FILM COATED ORAL 2 TIMES DAILY
Qty: 180 TABLET | Refills: 1 | Status: SHIPPED | OUTPATIENT
Start: 2018-03-26 | End: 2018-07-18 | Stop reason: SDUPTHER

## 2018-03-26 RX ORDER — METHOTREXATE 2.5 MG/1
TABLET ORAL
Qty: 32 TABLET | Refills: 2 | Status: SHIPPED | OUTPATIENT
Start: 2018-03-26 | End: 2018-08-30 | Stop reason: SDUPTHER

## 2018-03-27 ENCOUNTER — TELEPHONE (OUTPATIENT)
Dept: RHEUMATOLOGY | Facility: CLINIC | Age: 68
End: 2018-03-27

## 2018-03-27 DIAGNOSIS — E87.5 HYPERKALEMIA: Primary | ICD-10-CM

## 2018-03-27 LAB — POTASSIUM SERPL-SCNC: 3.9 MMOL/L (ref 3.5–5)

## 2018-03-27 NOTE — TELEPHONE ENCOUNTER
----- Message from Adrián Hernandez MD sent at 3/27/2018  9:05 AM CDT -----  Please call the patient regarding her abnormal result.stop any potassium supplements.Repeat potassium today

## 2018-05-10 ENCOUNTER — OFFICE VISIT (OUTPATIENT)
Dept: RHEUMATOLOGY | Facility: CLINIC | Age: 68
End: 2018-05-10
Payer: MEDICARE

## 2018-05-10 VITALS — DIASTOLIC BLOOD PRESSURE: 62 MMHG | BODY MASS INDEX: 23.73 KG/M2 | WEIGHT: 147 LBS | SYSTOLIC BLOOD PRESSURE: 118 MMHG

## 2018-05-10 DIAGNOSIS — M65.312 TRIGGER FINGER OF LEFT THUMB: Primary | ICD-10-CM

## 2018-05-10 PROCEDURE — 20600 DRAIN/INJ JOINT/BURSA W/O US: CPT | Mod: LT,,, | Performed by: INTERNAL MEDICINE

## 2018-05-10 PROCEDURE — 99213 OFFICE O/P EST LOW 20 MIN: CPT | Mod: 25,,, | Performed by: INTERNAL MEDICINE

## 2018-05-10 RX ORDER — TRIAMCINOLONE ACETONIDE 40 MG/ML
40 INJECTION, SUSPENSION INTRA-ARTICULAR; INTRAMUSCULAR
Status: DISCONTINUED | OUTPATIENT
Start: 2018-05-10 | End: 2018-05-10 | Stop reason: HOSPADM

## 2018-05-10 RX ORDER — DOXYCYCLINE 100 MG/1
CAPSULE ORAL
COMMUNITY
Start: 2018-04-30 | End: 2018-08-30

## 2018-05-10 RX ORDER — DEXAMETHASONE SODIUM PHOSPHATE 4 MG/ML
4 INJECTION, SOLUTION INTRA-ARTICULAR; INTRALESIONAL; INTRAMUSCULAR; INTRAVENOUS; SOFT TISSUE
Status: DISCONTINUED | OUTPATIENT
Start: 2018-05-10 | End: 2018-05-10 | Stop reason: HOSPADM

## 2018-05-10 RX ADMIN — DEXAMETHASONE SODIUM PHOSPHATE 4 MG: 4 INJECTION, SOLUTION INTRA-ARTICULAR; INTRALESIONAL; INTRAMUSCULAR; INTRAVENOUS; SOFT TISSUE at 02:05

## 2018-05-10 RX ADMIN — TRIAMCINOLONE ACETONIDE 40 MG: 40 INJECTION, SUSPENSION INTRA-ARTICULAR; INTRAMUSCULAR at 02:05

## 2018-05-10 NOTE — PROGRESS NOTES
[facility] RHEUMATOLOGY            PROGRESS NOTE      Subjective:       Patient ID:   NAME: Aurelia Marlow : 1950     67 y.o. female    Referring Doc: No ref. provider found  Other Physicians:    Chief Complaint:  Rheumatoid Arthritis (Right thumb pain)      History of Present Illness:     Patient returns today for a regularly scheduled follow-up visit for Rheumatoid arthritis. She has been experiencing increased pain and swelling in her right wrist and MCP joints due to not have been taken methotrexate for 2 weeks due to upper respiratory infection symptoms. She has less cough and no shortness of breath and no fevers now.      The patient is also complaining of trigger finger on the left thumb.            ROS:   GEN:  No  fever, night sweats . weight is stable   No fatigue  SKIN: no rashes, no bruising, no ulcerations, no Raynaud's  HEENT: no HA's, No visual changes, no mucosal ulcers, no sicca symptoms,  CV:   no CP, SOB, PND, CHAUDHRY, no orthopnea, no palpitations  PULM: normal with no SOB+, cough, nonproductive,No hemoptysis, sputum or pleuritic pain  GI:  no abdominal pain, nausea, vomiting, constipation, diarrhea, melanotic stools, BRBPR, hematemesis, no dysphagia  :   no dysuria  NEURO: no paresthesias, headaches, visual disturbances, muscle weakness  MUSCULOSKELETAL:no joint swelling, prolonged AM stiffness, no back pain, no muscle pain  Allergies:  Review of patient's allergies indicates:  No Known Allergies    Medications:    Current Outpatient Prescriptions:     BIFIDOBACTERIUM INFANTIS (ALIGN ORAL), Take by mouth., Disp: , Rfl:     doxycycline (VIBRAMYCIN) 100 MG Cap, , Disp: , Rfl:     folic acid (FOLVITE) 1 MG tablet, Take 1 tablet (1 mg total) by mouth once daily., Disp: 30 tablet, Rfl: 5    hydroxychloroquine (PLAQUENIL) 200 mg tablet, Take 1 tablet (200 mg total) by mouth 2 (two) times daily., Disp: 180 tablet, Rfl: 1    levothyroxine (SYNTHROID) 100 MCG tablet, Take 100 mcg by mouth  once daily., Disp: , Rfl:     methotrexate 2.5 MG Tab, TAKE 8 TABLET BY MOUTH ONCE A WEEK, Disp: 32 tablet, Rfl: 2    naproxen (EC NAPROSYN) 500 MG EC tablet, One po bid pc prn, Disp: 60 tablet, Rfl: 3    omeprazole (PRILOSEC) 10 MG capsule, Take 10 mg by mouth once daily., Disp: , Rfl:     PMHx/PSHx Updates:        Objective:     Vitals:  Blood pressure 118/62, weight 66.7 kg (147 lb).    Physical Examination:   GEN: no apparent distress, comfortable; AAOx3  SKIN: no rashes,no ulceration, no Raynaud's, no petechiae, no SQ nodules,  HEAD: normal  EYES: no pallor, no icterus,   NECK: no masses, thyroid normal, trachea midline, no LAD/LN's, supple  CV: RRR with no murmur; l S1 and S2 reg. ,no gallop no rubs,   CHEST: Normal respiratory effort; CTAB; normal breath sounds; no wheeze or crackles  ABDOM: nontender and nondistended; soft; no masses; no rebound/guarding  MUSC/Skeletal: ROM normal; no crepitus;Right second and third MCP joints with mild synovitis,  no deformities  No joint swelling or tenderness of PIP, MCP, wrist, elbow, shoulder, or knee joints. Trigger finger left thumb  EXTREM: no clubbing, cyanosis, no edema,normal  pulses   NEURO: grossly intact; motor WNL; AAOx3; ,c  PSYCH: normal mood, affect and behavior  LYMPH: normal cervical, supraclavicular          Labs:   Lab Results   Component Value Date    WBC 5.4 03/26/2018    HGB 12.4 03/26/2018    HCT 37.2 03/26/2018    MCV 98.2 (H) 03/26/2018     03/26/2018    CMP  @[facility](NA,K,CL,CO2,GLU,BUN,Creatinine,Calcium,PROT,Albumin,Bilitot,Alkphos,AST,ALT,CRP,ESR,RF,CCP,SONJA,SSA,CPK,uric acid) )@  I have reviewed all available lab results and radiology reports.    Radiology/Diagnostic Studies:        Assessment/Plan:   (1) 67 y.o. female with diagnosis of Rheumatoid arthritis. Slight activity due to not having taken Methotrexate for 2 weeks due to upper  respiratory infection. She restarted today after finishing antibiotics and experiencing  significant improvemeni in respiratory sxs..  2)Trigger finger left thumb joint. Injected as per procedure note            Discussion:     I have explained all of the above in detail and the patient understands all of the current recommendation(s). I have answered all questions to the best of my ability and to their complete satisfaction.       The patient is to continue with the current management plan         RTC in   3 months or before if needed      Electronically signed by Adrián Hernandez MD

## 2018-05-10 NOTE — PROCEDURES
Small Joint Aspiration/Injection  Date/Time: 5/10/2018 2:11 PM  Performed by: PHILLIP OLIVERA  Authorized by: PHILLIP OLIVERA     Consent Done?:  Not Needed  Indications:  Pain    Location:  Thumb  Site:  L thumb MCP  Medications:  4 mg dexamethasone 4 mg/mL; 40 mg triamcinolone acetonide 40 mg/mL  Patient tolerance:  Patient tolerated the procedure well with no immediate complications    Injected left thumb for trigger finger with 0.125 cc of Kenalog and 0.125 cc of dexamethasone with lidocaine.

## 2018-07-18 RX ORDER — HYDROXYCHLOROQUINE SULFATE 200 MG/1
TABLET, FILM COATED ORAL
Qty: 180 TABLET | Refills: 1 | Status: SHIPPED | OUTPATIENT
Start: 2018-07-18 | End: 2019-09-06 | Stop reason: SDUPTHER

## 2018-08-30 ENCOUNTER — OFFICE VISIT (OUTPATIENT)
Dept: RHEUMATOLOGY | Facility: CLINIC | Age: 68
End: 2018-08-30
Payer: MEDICARE

## 2018-08-30 VITALS
DIASTOLIC BLOOD PRESSURE: 78 MMHG | WEIGHT: 146.81 LBS | SYSTOLIC BLOOD PRESSURE: 108 MMHG | BODY MASS INDEX: 23.69 KG/M2

## 2018-08-30 DIAGNOSIS — M05.739 RHEUMATOID ARTHRITIS INVOLVING WRIST WITH POSITIVE RHEUMATOID FACTOR, UNSPECIFIED LATERALITY: Primary | ICD-10-CM

## 2018-08-30 DIAGNOSIS — D72.819 LEUKOPENIA, UNSPECIFIED TYPE: Primary | ICD-10-CM

## 2018-08-30 DIAGNOSIS — E87.6 HYPOKALEMIA: ICD-10-CM

## 2018-08-30 DIAGNOSIS — Z79.899 ENCOUNTER FOR LONG-TERM (CURRENT) USE OF MEDICATIONS: ICD-10-CM

## 2018-08-30 LAB
ALBUMIN SERPL-MCNC: 4 G/DL (ref 3.1–4.7)
ALP SERPL-CCNC: 65 IU/L (ref 40–104)
ALT (SGPT): 46 IU/L (ref 3–33)
AST SERPL-CCNC: 39 IU/L (ref 10–40)
BASOPHILS NFR BLD: 0.1 K/UL (ref 0–0.2)
BASOPHILS NFR BLD: 1.4 %
BILIRUB SERPL-MCNC: 1 MG/DL (ref 0.3–1)
BUN SERPL-MCNC: 14 MG/DL (ref 8–20)
CALCIUM SERPL-MCNC: 9.2 MG/DL (ref 7.7–10.4)
CHLORIDE: 102 MMOL/L (ref 98–110)
CO2 SERPL-SCNC: 29 MMOL/L (ref 22.8–31.6)
CREATININE: 0.55 MG/DL (ref 0.6–1.4)
CRP SERPL-MCNC: 0.6 MG/DL (ref 0–1.4)
EOSINOPHIL NFR BLD: 0.1 K/UL (ref 0–0.7)
EOSINOPHIL NFR BLD: 1.9 %
ERYTHROCYTE [DISTWIDTH] IN BLOOD BY AUTOMATED COUNT: 12.6 % (ref 11.7–14.9)
GLUCOSE: 84 MG/DL (ref 70–99)
GRAN #: 2.8 K/UL (ref 1.4–6.5)
GRAN%: 76.3 %
HCT VFR BLD AUTO: 38.1 % (ref 36–48)
HGB BLD-MCNC: 12.9 G/DL (ref 12–15)
IMMATURE GRANS (ABS): 0 K/UL (ref 0–1)
IMMATURE GRANULOCYTES: 0.3 %
LYMPH #: 0.5 K/UL (ref 1.2–3.4)
LYMPH%: 13.2 %
MCH RBC QN AUTO: 32.3 PG (ref 25–35)
MCHC RBC AUTO-ENTMCNC: 33.9 G/DL (ref 31–36)
MCV RBC AUTO: 95.3 FL (ref 79–98)
MONO #: 0.3 K/UL (ref 0.1–0.6)
MONO%: 6.9 %
NUCLEATED RBCS: 0 %
PLATELET # BLD AUTO: 228 K/UL (ref 140–440)
PMV BLD AUTO: 9.6 FL (ref 8.8–12.7)
POTASSIUM SERPL-SCNC: 5.1 MMOL/L (ref 3.5–5)
PROT SERPL-MCNC: 7.8 G/DL (ref 6–8.2)
RBC # BLD AUTO: 4 M/UL (ref 3.5–5.5)
SODIUM: 137 MMOL/L (ref 134–144)
WBC # BLD AUTO: 3.6 K/UL (ref 5–10)

## 2018-08-30 PROCEDURE — 99213 OFFICE O/P EST LOW 20 MIN: CPT | Mod: ,,, | Performed by: INTERNAL MEDICINE

## 2018-08-30 RX ORDER — METHOTREXATE 2.5 MG/1
TABLET ORAL
Qty: 32 TABLET | Refills: 2 | Status: SHIPPED | OUTPATIENT
Start: 2018-08-30 | End: 2019-09-06 | Stop reason: SDUPTHER

## 2018-08-30 RX ORDER — PHENAZOPYRIDINE HYDROCHLORIDE 100 MG/1
100 TABLET, FILM COATED ORAL 3 TIMES DAILY PRN
Qty: 30 TABLET | Refills: 0 | Status: SHIPPED | OUTPATIENT
Start: 2018-08-30 | End: 2018-09-09

## 2018-08-30 NOTE — PROGRESS NOTES
Patient notified of low wbc's. Instructed to hold her mtx. Repeat CBC in 2 weeks. Potassium is slightly elevated,stop any supplements. Repeat potassium level on Tuesday. Patient verbalizes understanding of all. Lab orders ready.

## 2018-08-30 NOTE — PROGRESS NOTES
[facility] RHEUMATOLOGY            PROGRESS NOTE      Subjective:       Patient ID:   NAME: Aurelia Marlow : 1950     68 y.o. female    Referring Doc: No ref. provider found  Other Physicians:    Chief Complaint:  Rheumatoid Arthritis (Needs refill on Methotrexate)      History of Present Illness:     Patient returns today for a regularly scheduled follow-up visit for  Rheumatoid  Arthritis   The patient is doing well.No prolonged AM stiffness.No significant arthralgias or joint swelling.  No chest pains cough or shortness of breath. No gastrointestinal complaints.  Had one episode o fdysuria treated with antibiotics        ROS:   GEN:  No  fever, night sweats . weight is stable   No fatigue  SKIN: no rashes, no bruising, no ulcerations, no Raynaud's  HEENT: no HA's, No visual changes, no mucosal ulcers, no sicca symptoms,  CV:   no CP, SOB, PND, CHAUDHRY, no orthopnea, no palpitations  PULM: normal with no SOB, cough, hemoptysis, sputum or pleuritic pain  GI:  no abdominal pain, nausea, vomiting, constipation, diarrhea, melanotic stools, BRBPR, hematemesis, no dysphagia  :   no dysuria  NEURO: no paresthesias, headaches, visual disturbances, muscle weakness  MUSCULOSKELETAL:no joint swelling, prolonged AM stiffness, no back pain, no muscle pain  Allergies:  Review of patient's allergies indicates:  No Known Allergies    Medications:    Current Outpatient Medications:     folic acid (FOLVITE) 1 MG tablet, Take 1 tablet (1 mg total) by mouth once daily., Disp: 30 tablet, Rfl: 5    hydroxychloroquine (PLAQUENIL) 200 mg tablet, TAKE ONE TABLET BY MOUTH TWICE DAILY, Disp: 180 tablet, Rfl: 1    levothyroxine (SYNTHROID) 100 MCG tablet, Take 100 mcg by mouth once daily., Disp: , Rfl:     methotrexate 2.5 MG Tab, TAKE 8 TABLET BY MOUTH ONCE A WEEK, Disp: 32 tablet, Rfl: 2    naproxen (EC NAPROSYN) 500 MG EC tablet, One po bid pc prn, Disp: 60 tablet, Rfl: 3    omeprazole (PRILOSEC) 10 MG capsule, Take 10 mg by  mouth once daily., Disp: , Rfl:     PMHx/PSHx Updates:    Last Eye Exam      Objective:     Vitals:  Blood pressure 108/78, weight 66.6 kg (146 lb 12.8 oz).    Physical Examination:   GEN: no apparent distress, comfortable; AAOx3  SKIN: no rashes,no ulceration, no Raynaud's, no petechiae, no SQ nodules,  HEAD: normal  EYES: no pallor, no icterus  NECK: no masses, thyroid normal, trachea midline, no LAD/LN's, supple  CV: RRR with no murmur; l S1 and S2 reg. ,no gallop no rubs,   CHEST: Normal respiratory effort; CTAB; normal breath sounds; no wheeze or crackles  MUSC/Skeletal: ROM normal; no crepitus; joints without synovitis,  no deformities  No joint swelling or tenderness of PIP, MCP, wrist, elbow, shoulder, or knee joints  EXTREM: no clubbing, cyanosis, no edema,normal  pulses   NEURO: grossly intact; motor WNL; AAOx3; ,   PSYCH: normal mood, affect and behavior  LYMPH: normal cervical, supraclavicular          Labs:   Lab Results   Component Value Date    WBC 5.4 03/26/2018    HGB 12.4 03/26/2018    HCT 37.2 03/26/2018    MCV 98.2 (H) 03/26/2018     03/26/2018    CMP  @LASTLAB(NA,K,CL,CO2,GLU,BUN,Creatinine,Calcium,PROT,Albumin,Bilitot,Alkphos,AST,ALT,CRP,ESR,RF,CCP,SONJA,SSA,CPK,uric acid) )@  I have reviewed all available lab results and radiology reports.    Radiology/Diagnostic Studies:        Assessment/Plan:   (1) 68 y.o. female with diagnosis of rheumatoid arthritis. She is doing well.    CBC CMP CRP            Discussion:     I have explained all of the above in detail and the patient understands all of the current recommendation(s). I have answered all questions to the best of my ability and to their complete satisfaction.       The patient is to continue with the current management plan         RTC in   3 months or before if needed      Electronically signed by Adrián Hernandez MD

## 2018-09-04 LAB
BUN SERPL-MCNC: 13 MG/DL (ref 8–20)
CALCIUM SERPL-MCNC: 9.2 MG/DL (ref 7.7–10.4)
CHLORIDE: 100 MMOL/L (ref 98–110)
CO2 SERPL-SCNC: 29.3 MMOL/L (ref 22.8–31.6)
CREATININE: 0.63 MG/DL (ref 0.6–1.4)
GLUCOSE: 85 MG/DL (ref 70–99)
POTASSIUM SERPL-SCNC: 4.4 MMOL/L (ref 3.5–5)
SODIUM: 137 MMOL/L (ref 134–144)

## 2018-09-17 LAB
BASOPHILS NFR BLD: 0.1 K/UL (ref 0–0.2)
BASOPHILS NFR BLD: 1.4 %
EOSINOPHIL NFR BLD: 0.1 K/UL (ref 0–0.7)
EOSINOPHIL NFR BLD: 2.7 %
ERYTHROCYTE [DISTWIDTH] IN BLOOD BY AUTOMATED COUNT: 12.5 % (ref 11.7–14.9)
GRAN #: 3.2 K/UL (ref 1.4–6.5)
GRAN%: 72.6 %
HCT VFR BLD AUTO: 38.2 % (ref 36–48)
HGB BLD-MCNC: 12.8 G/DL (ref 12–15)
IMMATURE GRANS (ABS): 0 K/UL (ref 0–1)
IMMATURE GRANULOCYTES: 0.2 %
LYMPH #: 0.7 K/UL (ref 1.2–3.4)
LYMPH%: 14.7 %
MCH RBC QN AUTO: 32 PG (ref 25–35)
MCHC RBC AUTO-ENTMCNC: 33.5 G/DL (ref 31–36)
MCV RBC AUTO: 95.5 FL (ref 79–98)
MONO #: 0.4 K/UL (ref 0.1–0.6)
MONO%: 8.4 %
NUCLEATED RBCS: 0 %
PLATELET # BLD AUTO: 205 K/UL (ref 140–440)
PMV BLD AUTO: 10.2 FL (ref 8.8–12.7)
RBC # BLD AUTO: 4 M/UL (ref 3.5–5.5)
WBC # BLD AUTO: 4.4 K/UL (ref 5–10)

## 2018-09-19 ENCOUNTER — TELEPHONE (OUTPATIENT)
Dept: RHEUMATOLOGY | Facility: CLINIC | Age: 68
End: 2018-09-19

## 2018-09-19 DIAGNOSIS — D72.819 LEUKOPENIA, UNSPECIFIED TYPE: Primary | ICD-10-CM

## 2018-09-19 DIAGNOSIS — Z79.899 ENCOUNTER FOR LONG-TERM (CURRENT) USE OF MEDICATIONS: ICD-10-CM

## 2018-09-19 NOTE — TELEPHONE ENCOUNTER
Pt's WBC's still low. Continue to hold MTX, repeat CBC next week. If normal will restart MTX at a lower dose.   Patient verbalizes understanding of Dr. CHAD Hernandez instructions.   Lab order ready.

## 2018-09-25 LAB
BASOPHILS NFR BLD: 0.1 K/UL (ref 0–0.2)
BASOPHILS NFR BLD: 1.2 %
EOSINOPHIL NFR BLD: 0.1 K/UL (ref 0–0.7)
EOSINOPHIL NFR BLD: 1.6 %
ERYTHROCYTE [DISTWIDTH] IN BLOOD BY AUTOMATED COUNT: 12 % (ref 11.7–14.9)
GRAN #: 4.1 K/UL (ref 1.4–6.5)
GRAN%: 72.6 %
HCT VFR BLD AUTO: 38.2 % (ref 36–48)
HGB BLD-MCNC: 12.7 G/DL (ref 12–15)
IMMATURE GRANS (ABS): 0 K/UL (ref 0–1)
IMMATURE GRANULOCYTES: 0.2 %
LYMPH #: 0.9 K/UL (ref 1.2–3.4)
LYMPH%: 15.6 %
MCH RBC QN AUTO: 31.5 PG (ref 25–35)
MCHC RBC AUTO-ENTMCNC: 33.2 G/DL (ref 31–36)
MCV RBC AUTO: 94.8 FL (ref 79–98)
MONO #: 0.5 K/UL (ref 0.1–0.6)
MONO%: 8.8 %
NUCLEATED RBCS: 0 %
PLATELET # BLD AUTO: 221 K/UL (ref 140–440)
PMV BLD AUTO: 10.6 FL (ref 8.8–12.7)
RBC # BLD AUTO: 4.03 M/UL (ref 3.5–5.5)
WBC # BLD AUTO: 5.7 K/UL (ref 5–10)

## 2018-09-26 ENCOUNTER — OFFICE VISIT (OUTPATIENT)
Dept: RHEUMATOLOGY | Facility: CLINIC | Age: 68
End: 2018-09-26
Payer: MEDICARE

## 2018-09-26 VITALS
BODY MASS INDEX: 24.16 KG/M2 | DIASTOLIC BLOOD PRESSURE: 82 MMHG | WEIGHT: 149.69 LBS | SYSTOLIC BLOOD PRESSURE: 128 MMHG

## 2018-09-26 DIAGNOSIS — M05.739 RHEUMATOID ARTHRITIS INVOLVING WRIST WITH POSITIVE RHEUMATOID FACTOR, UNSPECIFIED LATERALITY: Primary | ICD-10-CM

## 2018-09-26 PROCEDURE — 99213 OFFICE O/P EST LOW 20 MIN: CPT | Mod: ,,, | Performed by: INTERNAL MEDICINE

## 2018-09-26 RX ORDER — METHYLPREDNISOLONE 4 MG/1
TABLET ORAL
Qty: 1 PACKAGE | Refills: 1 | Status: SHIPPED | OUTPATIENT
Start: 2018-09-26 | End: 2018-10-17

## 2018-11-01 DIAGNOSIS — D72.819 LEUKOPENIA, UNSPECIFIED TYPE: ICD-10-CM

## 2018-11-01 DIAGNOSIS — Z79.899 ENCOUNTER FOR LONG-TERM (CURRENT) USE OF MEDICATIONS: Primary | ICD-10-CM

## 2018-11-01 DIAGNOSIS — M05.79 RHEUMATOID ARTHRITIS INVOLVING MULTIPLE SITES WITH POSITIVE RHEUMATOID FACTOR: ICD-10-CM

## 2018-11-01 LAB
BASOPHILS NFR BLD: 0.1 K/UL (ref 0–0.2)
BASOPHILS NFR BLD: 1 %
EOSINOPHIL NFR BLD: 0.1 K/UL (ref 0–0.7)
EOSINOPHIL NFR BLD: 1.3 %
ERYTHROCYTE [DISTWIDTH] IN BLOOD BY AUTOMATED COUNT: 12.2 % (ref 11.7–14.9)
GRAN #: 4.2 K/UL (ref 1.4–6.5)
GRAN%: 70.4 %
HCT VFR BLD AUTO: 36.1 % (ref 36–48)
HGB BLD-MCNC: 12 G/DL (ref 12–15)
IMMATURE GRANS (ABS): 0 K/UL (ref 0–1)
IMMATURE GRANULOCYTES: 0.2 %
LYMPH #: 1.1 K/UL (ref 1.2–3.4)
LYMPH%: 17.8 %
MCH RBC QN AUTO: 32 PG (ref 25–35)
MCHC RBC AUTO-ENTMCNC: 33.2 G/DL (ref 31–36)
MCV RBC AUTO: 96.3 FL (ref 79–98)
MONO #: 0.6 K/UL (ref 0.1–0.6)
MONO%: 9.3 %
NUCLEATED RBCS: 0 %
PLATELET # BLD AUTO: 271 K/UL (ref 140–440)
PMV BLD AUTO: 10.1 FL (ref 8.8–12.7)
RBC # BLD AUTO: 3.75 M/UL (ref 3.5–5.5)
WBC # BLD AUTO: 5.9 K/UL (ref 5–10)

## 2018-11-08 ENCOUNTER — OFFICE VISIT (OUTPATIENT)
Dept: RHEUMATOLOGY | Facility: CLINIC | Age: 68
End: 2018-11-08
Payer: MEDICARE

## 2018-11-08 VITALS
SYSTOLIC BLOOD PRESSURE: 124 MMHG | DIASTOLIC BLOOD PRESSURE: 81 MMHG | BODY MASS INDEX: 24.24 KG/M2 | WEIGHT: 150.19 LBS | HEART RATE: 73 BPM

## 2018-11-08 DIAGNOSIS — M05.79 RHEUMATOID ARTHRITIS INVOLVING MULTIPLE SITES WITH POSITIVE RHEUMATOID FACTOR: Primary | ICD-10-CM

## 2018-11-08 PROCEDURE — 99213 OFFICE O/P EST LOW 20 MIN: CPT | Mod: ,,, | Performed by: INTERNAL MEDICINE

## 2018-11-08 RX ORDER — ASPIRIN 81 MG/1
81 TABLET ORAL DAILY
COMMUNITY
End: 2019-09-06 | Stop reason: SDUPTHER

## 2018-11-08 NOTE — PROGRESS NOTES
[facility] RHEUMATOLOGY            PROGRESS NOTE      Subjective:       Patient ID:   NAME: Aurelia Marlow : 1950     68 y.o. female    Referring Doc: No ref. provider found  Other Physicians:    Chief Complaint:  Rheumatoid Arthritis      History of Present Illness:     Patient returns today for a regularly scheduled follow-up visit for  Rheumatoid arthritis     The patien thas been experiencing increased morning stiffness and pain and swelling in wrists and MCP joints. She has been on a lower dose of methotrexate due to leukopenia.  No fevers chest pains cough or shortness of breath. No GI complaints.          ROS:   GEN:  No  fever, night sweats . weight is stable   No fatigue  SKIN: no rashes, no bruising, no ulcerations, no Raynaud's  HEENT: no HA's, No visual changes, no mucosal ulcers, no sicca symptoms,  CV:   no CP, SOB, PND, CHAUDHRY, no orthopnea, no palpitations  PULM: normal with no SOB, cough, hemoptysis, sputum or pleuritic pain  GI:  no abdominal pain, nausea, vomiting, constipation, diarrhea, melanotic stools, BRBPR, hematemesis, no dysphagia  :   no dysuria  NEURO: no paresthesias, headaches, visual disturbances, muscle weakness  MUSCULOSKELETAL:no joint swelling, + on and offprolonged AM stiffness, no back pain, no muscle pain  Allergies:  Review of patient's allergies indicates:  No Known Allergies    Medications:    Current Outpatient Medications:     aspirin (ECOTRIN) 81 MG EC tablet, Take 81 mg by mouth once daily., Disp: , Rfl:     folic acid (FOLVITE) 1 MG tablet, Take 1 tablet (1 mg total) by mouth once daily., Disp: 30 tablet, Rfl: 5    hydroxychloroquine (PLAQUENIL) 200 mg tablet, TAKE ONE TABLET BY MOUTH TWICE DAILY, Disp: 180 tablet, Rfl: 1    levothyroxine (SYNTHROID) 100 MCG tablet, Take 100 mcg by mouth once daily., Disp: , Rfl:     methotrexate 2.5 MG Tab, TAKE 8 TABLET BY MOUTH ONCE A WEEK (Patient taking differently: TAKE 8 TABLET BY MOUTH ONCE A WEEK.), Disp: 32  tablet, Rfl: 2    naproxen (EC NAPROSYN) 500 MG EC tablet, One po bid pc prn, Disp: 60 tablet, Rfl: 3    PMHx/PSHx Updates:        Objective:     Vitals:  Blood pressure 124/81, pulse 73, weight 68.1 kg (150 lb 3.2 oz).    Physical Examination:   GEN: no apparent distress, comfortable; AAOx3  SKIN: no rashes,no ulceration, no Raynaud's, no petechiae, no SQ nodules,  HEAD: normal  EYES: no pallor, no icterus  NECK: no masses, thyroid normal, trachea midline, no LAD/LN's, supple  CV: RRR with no murmur; l S1 and S2 reg. ,no gallop no rubs,   CHEST: Normal respiratory effort; CTAB; normal breath sounds; no wheeze or crackl  MUSC/Skeletal: ROM normal; no crepitus; ,  no deformities  No joint swelling or tenderness of PIP, MCP, , elbow, shoulder, or knee joints. sl synovitis in both wrists.  EXTREM: no clubbing, cyanosis, no edema,normal  pulses   NEURO: grossly intact; motor WNL; AAOx  PSYCH: normal mood, affect and behavior  LYMPH: normal cervical, supraclavicular          Labs:   Lab Results   Component Value Date    WBC 5.9 11/01/2018    HGB 12.0 11/01/2018    HCT 36.1 11/01/2018    MCV 96.3 11/01/2018     11/01/2018    CMP  @LASTLAB(NA,K,CL,CO2,GLU,BUN,Creatinine,Calcium,PROT,Albumin,Bilitot,Alkphos,AST,ALT,CRP,ESR,RF,CCP,SONJA,SSA,CPK,uric acid) )@  I have reviewed all available lab results and radiology reports.    Radiology/Diagnostic Studies:    CBC and LFTs 1 week ago within normal limits    Assessment/Plan:   (1) 68 y.o. female with diagnosis of rheumatoid arthritis. This is still active.    Increase methotrexate to 15 mg a week  Recheck labs in 1 month  FU in 1 month          Discussion:     I have explained all of the above in detail and the patient understands all of the current recommendation(s). I have answered all questions to the best of my ability and to their complete satisfaction.       The patient is to continue with the current management plan         RTC in         Electronically signed by  Adrián Hernandez MD

## 2018-12-04 ENCOUNTER — OFFICE VISIT (OUTPATIENT)
Dept: RHEUMATOLOGY | Facility: CLINIC | Age: 68
End: 2018-12-04
Payer: MEDICARE

## 2018-12-04 VITALS
WEIGHT: 151.38 LBS | SYSTOLIC BLOOD PRESSURE: 137 MMHG | BODY MASS INDEX: 24.44 KG/M2 | DIASTOLIC BLOOD PRESSURE: 85 MMHG

## 2018-12-04 DIAGNOSIS — M05.79 RHEUMATOID ARTHRITIS INVOLVING MULTIPLE SITES WITH POSITIVE RHEUMATOID FACTOR: Primary | ICD-10-CM

## 2018-12-04 LAB
ALBUMIN SERPL-MCNC: 3.9 G/DL (ref 3.1–4.7)
ALP SERPL-CCNC: 76 IU/L (ref 40–104)
ALT (SGPT): 18 IU/L (ref 3–33)
AST SERPL-CCNC: 21 IU/L (ref 10–40)
BASOPHILS NFR BLD: 0.1 K/UL (ref 0–0.2)
BASOPHILS NFR BLD: 0.9 %
BILIRUB SERPL-MCNC: 0.9 MG/DL (ref 0.3–1)
BUN SERPL-MCNC: 19 MG/DL (ref 8–20)
CALCIUM SERPL-MCNC: 9 MG/DL (ref 7.7–10.4)
CHLORIDE: 100 MMOL/L (ref 98–110)
CO2 SERPL-SCNC: 28.4 MMOL/L (ref 22.8–31.6)
CREATININE: 0.71 MG/DL (ref 0.6–1.4)
EOSINOPHIL NFR BLD: 0.2 K/UL (ref 0–0.7)
EOSINOPHIL NFR BLD: 3 %
ERYTHROCYTE [DISTWIDTH] IN BLOOD BY AUTOMATED COUNT: 13 % (ref 11.7–14.9)
GLUCOSE: 88 MG/DL (ref 70–99)
GRAN #: 3.7 K/UL (ref 1.4–6.5)
GRAN%: 70.2 %
HCT VFR BLD AUTO: 34.6 % (ref 36–48)
HGB BLD-MCNC: 11.7 G/DL (ref 12–15)
IMMATURE GRANS (ABS): 0 K/UL (ref 0–1)
IMMATURE GRANULOCYTES: 0.6 %
LYMPH #: 0.9 K/UL (ref 1.2–3.4)
LYMPH%: 16.4 %
MCH RBC QN AUTO: 32 PG (ref 25–35)
MCHC RBC AUTO-ENTMCNC: 33.8 G/DL (ref 31–36)
MCV RBC AUTO: 94.5 FL (ref 79–98)
MONO #: 0.5 K/UL (ref 0.1–0.6)
MONO%: 8.9 %
NUCLEATED RBCS: 0 %
PLATELET # BLD AUTO: 210 K/UL (ref 140–440)
PMV BLD AUTO: 9.5 FL (ref 8.8–12.7)
POTASSIUM SERPL-SCNC: 4.7 MMOL/L (ref 3.5–5)
PROT SERPL-MCNC: 7.2 G/DL (ref 6–8.2)
RBC # BLD AUTO: 3.66 M/UL (ref 3.5–5.5)
SODIUM: 136 MMOL/L (ref 134–144)
WBC # BLD AUTO: 5.3 K/UL (ref 5–10)

## 2018-12-04 PROCEDURE — 99213 OFFICE O/P EST LOW 20 MIN: CPT | Mod: ,,, | Performed by: INTERNAL MEDICINE

## 2018-12-04 RX ORDER — FOLIC ACID 1 MG/1
1 TABLET ORAL DAILY
Qty: 30 TABLET | Refills: 5 | Status: SHIPPED | OUTPATIENT
Start: 2018-12-04 | End: 2019-07-15 | Stop reason: SDUPTHER

## 2018-12-04 NOTE — PROGRESS NOTES
[facility] RHEUMATOLOGY            PROGRESS NOTE      Subjective:       Patient ID:   NAME: Aurelia Marlow : 1950     68 y.o. female    Referring Doc: No ref. provider found  Other Physicians:    Chief Complaint:  Rheumatoid Arthritis      History of Present Illness:     Patient returns today for a regularly scheduled follow-up visit for rheumatoid arthritis      The patient is  doing better. No joint swelling or significant arthralgias. No prolonged morning stiffness. No chest pains, cough or shortness of breath.            ROS:   GEN:  No  fever, night sweats . weight is stable   No fatigue  SKIN: no rashes, no bruising, no ulcerations, no Raynaud's  HEENT: no HA's, No visual changes, no mucosal ulcers, no sicca symptoms,  CV:   no CP, SOB, PND, CHAUDHRY, no orthopnea, no palpitations  PULM: normal with no SOB, cough, hemoptysis, sputum or pleuritic pain  GI:  no abdominal pain, nausea, vomiting, constipation, diarrhea, melanotic stools, BRBPR, hematemesis, no dysphagia  :   no dysuria  NEURO: no paresthesias, headaches, visual disturbances, muscle weakness  MUSCULOSKELETAL:no joint swelling, prolonged AM stiffness, no back pain, no muscle pain  Allergies:  Review of patient's allergies indicates:  No Known Allergies    Medications:    Current Outpatient Medications:     aspirin (ECOTRIN) 81 MG EC tablet, Take 81 mg by mouth once daily., Disp: , Rfl:     folic acid (FOLVITE) 1 MG tablet, Take 1 tablet (1 mg total) by mouth once daily., Disp: 30 tablet, Rfl: 5    hydroxychloroquine (PLAQUENIL) 200 mg tablet, TAKE ONE TABLET BY MOUTH TWICE DAILY, Disp: 180 tablet, Rfl: 1    levothyroxine (SYNTHROID) 100 MCG tablet, Take 100 mcg by mouth once daily., Disp: , Rfl:     methotrexate 2.5 MG Tab, TAKE 8 TABLET BY MOUTH ONCE A WEEK (Patient taking differently: TAKE 8 TABLET BY MOUTH ONCE A WEEK.), Disp: 32 tablet, Rfl: 2    naproxen (EC NAPROSYN) 500 MG EC tablet, One po bid pc prn, Disp: 60 tablet, Rfl:  3    PMHx/PSHx Updates:        Objective:     Vitals:  Blood pressure 137/85, weight 68.7 kg (151 lb 6.4 oz).    Physical Examination:   GEN: no apparent distress, comfortable; AAOx3  SKIN: no rashes,no ulceration, no Raynaud's, no petechiae, no SQ nodules,  HEAD: normal  EYES: no pallor, no icterus  NECK: no masses, thyroid normal, trachea midline, no LAD/LN's, supple  CV: RRR with no murmur; l S1 and S2 reg. ,no gallop no rubs,   CHEST: Normal respiratory effort; CTAB; normal breath sounds; no wheeze or crackles  MUSC/Skeletal: ROM normal; no crepitus; joints without synovitis,  no deformities  No joint swelling or tenderness of PIP, MCP, wrist, elbow, shoulder, or knee joints  EXTREM: no clubbing, cyanosis, no edema,normal  pulses   NEURO: grossly intact; motor WNL; AAOx3  PSYCH: normal mood, affect and behavior  LYMPH: normal cervical, supraclavicular          Labs:   Lab Results   Component Value Date    WBC 5.9 11/01/2018    HGB 12.0 11/01/2018    HCT 36.1 11/01/2018    MCV 96.3 11/01/2018     11/01/2018    CMP  @LASTLAB(NA,K,CL,CO2,GLU,BUN,Creatinine,Calcium,PROT,Albumin,Bilitot,Alkphos,AST,ALT,CRP,ESR,RF,CCP,SONJA,SSA,CPK,uric acid) )@  I have reviewed all available lab results and radiology reports.    Radiology/Diagnostic Studies:        Assessment/Plan:   (1) 68 y.o. female with diagnosis of rheumatoid arthritis. She has improved with increased dose of methotrexate.        CBC CMP sedimentation rate        Discussion:     I have explained all of the above in detail and the patient understands all of the current recommendation(s). I have answered all questions to the best of my ability and to their complete satisfaction.       The patient is to continue with the current management plan         RTC in   3 months      Electronically signed by Adrián Hernandez MD

## 2018-12-05 DIAGNOSIS — Z79.899 ENCOUNTER FOR LONG-TERM (CURRENT) USE OF MEDICATIONS: ICD-10-CM

## 2018-12-05 DIAGNOSIS — D64.9 ANEMIA, UNSPECIFIED TYPE: Primary | ICD-10-CM

## 2018-12-19 LAB
BASOPHILS NFR BLD: 0.1 K/UL (ref 0–0.2)
BASOPHILS NFR BLD: 0.7 %
EOSINOPHIL NFR BLD: 0.2 K/UL (ref 0–0.7)
EOSINOPHIL NFR BLD: 2.4 %
ERYTHROCYTE [DISTWIDTH] IN BLOOD BY AUTOMATED COUNT: 13.3 % (ref 11.7–14.9)
GRAN #: 7.4 K/UL (ref 1.4–6.5)
GRAN%: 80.8 %
HCT VFR BLD AUTO: 36.3 % (ref 36–48)
HGB BLD-MCNC: 11.9 G/DL (ref 12–15)
IMMATURE GRANS (ABS): 0 K/UL (ref 0–1)
IMMATURE GRANULOCYTES: 0.4 %
LYMPH #: 0.8 K/UL (ref 1.2–3.4)
LYMPH%: 8.6 %
MCH RBC QN AUTO: 31.9 PG (ref 25–35)
MCHC RBC AUTO-ENTMCNC: 32.8 G/DL (ref 31–36)
MCV RBC AUTO: 97.3 FL (ref 79–98)
MONO #: 0.7 K/UL (ref 0.1–0.6)
MONO%: 7.1 %
NUCLEATED RBCS: 0 %
PLATELET # BLD AUTO: 243 K/UL (ref 140–440)
PMV BLD AUTO: 9.6 FL (ref 8.8–12.7)
RBC # BLD AUTO: 3.73 M/UL (ref 3.5–5.5)
WBC # BLD AUTO: 9.1 K/UL (ref 5–10)

## 2018-12-19 RX ORDER — NAPROXEN 500 MG/1
TABLET ORAL
Qty: 60 TABLET | Refills: 1 | Status: SHIPPED | OUTPATIENT
Start: 2018-12-19 | End: 2019-06-11 | Stop reason: SDUPTHER

## 2019-01-24 RX ORDER — METHOTREXATE 2.5 MG/1
TABLET ORAL
Qty: 32 TABLET | Refills: 2 | Status: SHIPPED | OUTPATIENT
Start: 2019-01-24 | End: 2019-05-20 | Stop reason: SDUPTHER

## 2019-03-12 ENCOUNTER — OFFICE VISIT (OUTPATIENT)
Dept: RHEUMATOLOGY | Facility: CLINIC | Age: 69
End: 2019-03-12
Payer: MEDICARE

## 2019-03-12 VITALS — SYSTOLIC BLOOD PRESSURE: 118 MMHG | DIASTOLIC BLOOD PRESSURE: 80 MMHG | BODY MASS INDEX: 24.94 KG/M2 | WEIGHT: 154.5 LBS

## 2019-03-12 DIAGNOSIS — M05.739 RHEUMATOID ARTHRITIS INVOLVING WRIST WITH POSITIVE RHEUMATOID FACTOR, UNSPECIFIED LATERALITY: Primary | ICD-10-CM

## 2019-03-12 LAB
ALBUMIN SERPL-MCNC: 4 G/DL (ref 3.1–4.7)
ALP SERPL-CCNC: 69 IU/L (ref 40–104)
ALT (SGPT): 21 IU/L (ref 3–33)
AST SERPL-CCNC: 27 IU/L (ref 10–40)
BASOPHILS NFR BLD: 0.1 K/UL (ref 0–0.2)
BASOPHILS NFR BLD: 1 %
BILIRUB SERPL-MCNC: 0.6 MG/DL (ref 0.3–1)
BUN SERPL-MCNC: 24 MG/DL (ref 8–20)
CALCIUM SERPL-MCNC: 9 MG/DL (ref 7.7–10.4)
CHLORIDE: 100 MMOL/L (ref 98–110)
CO2 SERPL-SCNC: 26.8 MMOL/L (ref 22.8–31.6)
CREATININE: 0.86 MG/DL (ref 0.6–1.4)
CRP SERPL-MCNC: 0.43 MG/DL (ref 0–1.4)
EOSINOPHIL NFR BLD: 0.1 K/UL (ref 0–0.7)
EOSINOPHIL NFR BLD: 1.9 %
ERYTHROCYTE [DISTWIDTH] IN BLOOD BY AUTOMATED COUNT: 12.3 % (ref 11.7–14.9)
GLUCOSE: 97 MG/DL (ref 70–99)
GRAN #: 4 K/UL (ref 1.4–6.5)
GRAN%: 76.6 %
HCT VFR BLD AUTO: 36.4 % (ref 36–48)
HGB BLD-MCNC: 12.3 G/DL (ref 12–15)
IMMATURE GRANS (ABS): 0 K/UL (ref 0–1)
IMMATURE GRANULOCYTES: 0.2 %
LYMPH #: 0.6 K/UL (ref 1.2–3.4)
LYMPH%: 12 %
MCH RBC QN AUTO: 32 PG (ref 25–35)
MCHC RBC AUTO-ENTMCNC: 33.8 G/DL (ref 31–36)
MCV RBC AUTO: 94.8 FL (ref 79–98)
MONO #: 0.4 K/UL (ref 0.1–0.6)
MONO%: 8.3 %
NUCLEATED RBCS: 0 %
PLATELET # BLD AUTO: 215 K/UL (ref 140–440)
PMV BLD AUTO: 9.6 FL (ref 8.8–12.7)
POTASSIUM SERPL-SCNC: 4.1 MMOL/L (ref 3.5–5)
PROT SERPL-MCNC: 7.6 G/DL (ref 6–8.2)
RBC # BLD AUTO: 3.84 M/UL (ref 3.5–5.5)
SODIUM: 134 MMOL/L (ref 134–144)
TSH SERPL DL<=0.005 MIU/L-ACNC: 0.84 ULU/ML (ref 0.3–5.6)
WBC # BLD AUTO: 5.2 K/UL (ref 5–10)

## 2019-03-12 PROCEDURE — 99213 OFFICE O/P EST LOW 20 MIN: CPT | Mod: ,,, | Performed by: INTERNAL MEDICINE

## 2019-03-12 PROCEDURE — 99213 PR OFFICE/OUTPT VISIT, EST, LEVL III, 20-29 MIN: ICD-10-PCS | Mod: ,,, | Performed by: INTERNAL MEDICINE

## 2019-03-12 NOTE — PROGRESS NOTES
[facility] RHEUMATOLOGY            PROGRESS NOTE      Subjective:       Patient ID:   NAME: Aurelia Marlow : 1950     68 y.o. female    Referring Doc: No ref. provider found  Other Physicians:    Chief Complaint:  Rheumatoid Arthritis      History of Present Illness:     Patient returns today for a regularly scheduled follow-up visit for   Rheumatoid arthritis    The patient   Well. No fevers cough or shortness of breath. An upper respiratory infection a few months ago. Doing well now. No prolonged morning stiffness. No significant arthralgias or joint swelling.            ROS:   GEN:  No  fever, night sweats . weight is stable   No fatigue  SKIN: no rashes, no bruising, no ulcerations, no Raynaud's  HEENT: no HA's, No visual changes, no mucosal ulcers, no sicca symptoms,  CV:   no CP, SOB, PND, CHAUDHRY, no orthopnea, no palpitations  PULM: normal with no SOB, cough, hemoptysis, sputum or pleuritic pain  GI:  no abdominal pain, nausea, vomiting, constipation, diarrhea, melanotic stools, BRBPR, hematemesis, no dysphagia  :   no dysuria  NEURO: no paresthesias, headaches, visual disturbances, muscle weakness  MUSCULOSKELETAL:no joint swelling, prolonged AM stiffness, no back pain, no muscle pain  Allergies:  Review of patient's allergies indicates:  No Known Allergies    Medications:    Current Outpatient Medications:     aspirin (ECOTRIN) 81 MG EC tablet, Take 81 mg by mouth once daily., Disp: , Rfl:     folic acid (FOLVITE) 1 MG tablet, Take 1 tablet (1 mg total) by mouth once daily., Disp: 30 tablet, Rfl: 5    hydroxychloroquine (PLAQUENIL) 200 mg tablet, TAKE ONE TABLET BY MOUTH TWICE DAILY, Disp: 180 tablet, Rfl: 1    levothyroxine (SYNTHROID) 100 MCG tablet, Take 100 mcg by mouth once daily., Disp: , Rfl:     methotrexate 2.5 MG Tab, TAKE 8 TABLET BY MOUTH ONCE A WEEK (Patient taking differently: TAKE 6 TABLET BY MOUTH ONCE A WEEK), Disp: 32 tablet, Rfl: 2    methotrexate 2.5 MG Tab, TAKE 8 TABLETS  BY MOUTH ONCE A WEEK (Patient taking differently: TAKE 6 TABLETS BY MOUTH ONCE A WEEK), Disp: 32 tablet, Rfl: 2    naproxen (EC NAPROSYN) 500 MG EC tablet, TAKE ONE TABLET BY MOUTH TWICE DAILY AFTER MEALS AS NEEDED, Disp: 60 tablet, Rfl: 1    PMHx/PSHx Updates:          Objective:     Vitals:  Blood pressure 118/80, weight 70.1 kg (154 lb 8 oz).    Physical Examination:   GEN: no apparent distress, comfortable; AAOx3  SKIN: no rashes,no ulceration, no Raynaud's, no petechiae, no SQ nodules,  HEAD: normal  EYES: no pallor, no icterus  NECK: no masses, thyroid normal, trachea midline, no LAD/LN's, supple  CV: RRR with no murmur; l S1 and S2 reg. ,no gallop no rubs,   CHEST: Normal respiratory effort; CTAB; normal breath sounds; no wheeze or crackles  MUSC/Skeletal: ROM normal; no crepitus; joints without synovitis,  no deformities  No joint swelling or tenderness of PIP, MCP, wrist, elbow, shoulder, or knee joints  EXTREM: no clubbing, cyanosis, no edema,normal  pulses   NEURO: grossly intact; motor WNL; AAOx3;  PSYCH: normal mood, affect and behavior  LYMPH: normal cervical, supraclavicular          Labs:   Lab Results   Component Value Date    WBC 9.1 12/19/2018    HGB 11.9 (L) 12/19/2018    HCT 36.3 12/19/2018    MCV 97.3 12/19/2018     12/19/2018    CMP  @LASTLAB(NA,K,CL,CO2,GLU,BUN,Creatinine,Calcium,PROT,Albumin,Bilitot,Alkphos,AST,ALT,CRP,ESR,RF,CCP,SONJA,SSA,CPK,uric acid) )@  I have reviewed all available lab results and radiology reports.    Radiology/Diagnostic Studies:        Assessment/Plan:   (1) 68 y.o. female with diagnosis of Rheumatoid arthritis. She is stable      CBC CMP CRP          Discussion:     I have explained all of the above in detail and the patient understands all of the current recommendation(s). I have answered all questions to the best of my ability and to their complete satisfaction.       The patient is to continue with the current management plan         RTC in   3  months      Electronically signed by Adrián Hernandez MD

## 2019-05-20 RX ORDER — HYDROXYCHLOROQUINE SULFATE 200 MG/1
TABLET, FILM COATED ORAL
Qty: 180 TABLET | Refills: 1 | Status: SHIPPED | OUTPATIENT
Start: 2019-05-20 | End: 2019-09-24 | Stop reason: SDUPTHER

## 2019-05-20 RX ORDER — METHOTREXATE 2.5 MG/1
TABLET ORAL
Qty: 32 TABLET | Refills: 2 | Status: SHIPPED | OUTPATIENT
Start: 2019-05-20 | End: 2019-06-11 | Stop reason: SDUPTHER

## 2019-06-11 ENCOUNTER — OFFICE VISIT (OUTPATIENT)
Dept: RHEUMATOLOGY | Facility: CLINIC | Age: 69
End: 2019-06-11
Payer: MEDICARE

## 2019-06-11 VITALS
WEIGHT: 155.19 LBS | SYSTOLIC BLOOD PRESSURE: 109 MMHG | BODY MASS INDEX: 25.05 KG/M2 | DIASTOLIC BLOOD PRESSURE: 70 MMHG

## 2019-06-11 DIAGNOSIS — M05.739 RHEUMATOID ARTHRITIS INVOLVING WRIST WITH POSITIVE RHEUMATOID FACTOR, UNSPECIFIED LATERALITY: Primary | ICD-10-CM

## 2019-06-11 LAB
ALBUMIN SERPL-MCNC: 3.8 G/DL (ref 3.1–4.7)
ALP SERPL-CCNC: 81 IU/L (ref 40–104)
ALT (SGPT): 16 IU/L (ref 3–33)
AST SERPL-CCNC: 21 IU/L (ref 10–40)
BASOPHILS NFR BLD: 0.1 K/UL (ref 0–0.2)
BASOPHILS NFR BLD: 1.1 %
BILIRUB SERPL-MCNC: 0.6 MG/DL (ref 0.3–1)
BUN SERPL-MCNC: 16 MG/DL (ref 8–20)
CALCIUM SERPL-MCNC: 8.9 MG/DL (ref 7.7–10.4)
CHLORIDE: 104 MMOL/L (ref 98–110)
CO2 SERPL-SCNC: 27.7 MMOL/L (ref 22.8–31.6)
CREATININE: 0.72 MG/DL (ref 0.6–1.4)
CRP SERPL-MCNC: 0.8 MG/DL (ref 0–1.4)
EOSINOPHIL NFR BLD: 0.2 K/UL (ref 0–0.7)
EOSINOPHIL NFR BLD: 3.5 %
ERYTHROCYTE [DISTWIDTH] IN BLOOD BY AUTOMATED COUNT: 13 % (ref 11.7–14.9)
GLUCOSE: 86 MG/DL (ref 70–99)
GRAN #: 3.3 K/UL (ref 1.4–6.5)
GRAN%: 70.8 %
HCT VFR BLD AUTO: 34 % (ref 36–48)
HGB BLD-MCNC: 11.6 G/DL (ref 12–15)
IMMATURE GRANS (ABS): 0 K/UL (ref 0–1)
IMMATURE GRANULOCYTES: 0.2 %
LYMPH #: 0.6 K/UL (ref 1.2–3.4)
LYMPH%: 12.7 %
MCH RBC QN AUTO: 31.8 PG (ref 25–35)
MCHC RBC AUTO-ENTMCNC: 34.1 G/DL (ref 31–36)
MCV RBC AUTO: 93.2 FL (ref 79–98)
MONO #: 0.5 K/UL (ref 0.1–0.6)
MONO%: 11.7 %
NUCLEATED RBCS: 0 %
PLATELET # BLD AUTO: 208 K/UL (ref 140–440)
PMV BLD AUTO: 9.3 FL (ref 8.8–12.7)
POTASSIUM SERPL-SCNC: 3.9 MMOL/L (ref 3.5–5)
PROT SERPL-MCNC: 7.3 G/DL (ref 6–8.2)
RBC # BLD AUTO: 3.65 M/UL (ref 3.5–5.5)
SODIUM: 139 MMOL/L (ref 134–144)
WBC # BLD AUTO: 4.6 K/UL (ref 5–10)

## 2019-06-11 PROCEDURE — 99213 PR OFFICE/OUTPT VISIT, EST, LEVL III, 20-29 MIN: ICD-10-PCS | Mod: ,,, | Performed by: INTERNAL MEDICINE

## 2019-06-11 PROCEDURE — 99213 OFFICE O/P EST LOW 20 MIN: CPT | Mod: ,,, | Performed by: INTERNAL MEDICINE

## 2019-06-11 RX ORDER — METHOTREXATE 2.5 MG/1
TABLET ORAL
Qty: 32 TABLET | Refills: 2 | Status: SHIPPED | OUTPATIENT
Start: 2019-06-11 | End: 2019-09-24

## 2019-06-11 RX ORDER — NAPROXEN 500 MG/1
TABLET ORAL
Qty: 60 TABLET | Refills: 1 | Status: SHIPPED | OUTPATIENT
Start: 2019-06-11 | End: 2020-08-31 | Stop reason: SDUPTHER

## 2019-06-11 NOTE — PROGRESS NOTES
[facility] RHEUMATOLOGY            PROGRESS NOTE      Subjective:       Patient ID:   NAME: Aurelia Marlow : 1950     68 y.o. female    Referring Doc: No ref. provider found  Other Physicians:    Chief Complaint:  Rheumatoid Arthritis (Needs refill son Methotrexate and Naproxen)      History of Present Illness:     Patient returns today for a regularly scheduled follow-up visit for rheumatoid arthritis    The patient is doing well. No fevers, cough or shortness of breath. Occasional arthralgias but no significant joint swelling; no prolonged morning stiffness. No GI complaints.             ROS:   GEN:  No  fever, night sweats . weight is stable   No fatigue  SKIN: no rashes, no bruising, no ulcerations, no Raynaud's  HEENT: no HA's, No visual changes, no mucosal ulcers, + sicca symptoms,  CV:   no CP, SOB, PND, CHAUDHRY, no orthopnea, no palpitations  PULM: normal with no SOB, cough, hemoptysis, sputum or pleuritic pain  GI:  no abdominal pain, nausea, vomiting, constipation, diarrhea, melanotic stools, BRBPR, hematemesis, no dysphagia  :   no dysuria  NEURO: no paresthesias, headaches, visual disturbances, muscle weakness  MUSCULOSKELETAL:no joint swelling, prolonged AM stiffness, no back pain, no muscle pain  Allergies:  Review of patient's allergies indicates:  No Known Allergies    Medications:    Current Outpatient Medications:     aspirin (ECOTRIN) 81 MG EC tablet, Take 81 mg by mouth once daily., Disp: , Rfl:     folic acid (FOLVITE) 1 MG tablet, Take 1 tablet (1 mg total) by mouth once daily., Disp: 30 tablet, Rfl: 5    hydroxychloroquine (PLAQUENIL) 200 mg tablet, TAKE ONE TABLET BY MOUTH TWICE DAILY, Disp: 180 tablet, Rfl: 1    hydroxychloroquine (PLAQUENIL) 200 mg tablet, TAKE 1 TABLET BY MOUTH TWICE DAILY, Disp: 180 tablet, Rfl: 1    levothyroxine (SYNTHROID) 100 MCG tablet, Take 100 mcg by mouth once daily., Disp: , Rfl:     methotrexate 2.5 MG Tab, TAKE 8 TABLET BY MOUTH ONCE A WEEK  (Patient taking differently: TAKE 6 TABLET BY MOUTH ONCE A WEEK), Disp: 32 tablet, Rfl: 2    methotrexate 2.5 MG Tab, TAKE 6 TABLETS BY MOUTH ONCE A WEEK, Disp: 32 tablet, Rfl: 2    naproxen (EC NAPROSYN) 500 MG EC tablet, TAKE ONE TABLET BY MOUTH TWICE DAILY AFTER MEALS AS NEEDED, Disp: 60 tablet, Rfl: 1    PMHx/PSHx Updates:        Last Eye Exam UTD      Objective:     Vitals:  Blood pressure 109/70, weight 70.4 kg (155 lb 3.2 oz).    Physical Examination:   GEN: no apparent distress, comfortable; AAOx3  SKIN: no rashes,no ulceration, no Raynaud's, no petechiae, no SQ nodules,  HEAD: normal  EYES: no pallor, no icterus  NECK: no masses, thyroid normal, trachea midline, no LAD/LN's, supple  CV: RRR with no murmur; l S1 and S2 reg. ,no gallop no rubs,   CHEST: Normal respiratory effort; CTAB; normal breath sounds; no wheeze or crackles  ABDOM: nontender and nondistended; soft; no masses; no rebound/guarding  MUSC/Skeletal: ROM normal; no crepitus; joints without synovitis,   No joint swelling or tenderness of PIP, MCP, wrist, elbow, shoulder, or knee joints  EXTREM: no clubbing, cyanosis, no edema,normal  pulses   NEURO: grossly intact; motor WNL; AAOx3; ,  PSYCH: normal mood, affect and behavior  LYMPH: normal cervical, supraclavicular          Labs:   Lab Results   Component Value Date    WBC 5.2 03/12/2019    HGB 12.3 03/12/2019    HCT 36.4 03/12/2019    MCV 94.8 03/12/2019     03/12/2019    CMP  @LASTLAB(NA,K,CL,CO2,GLU,BUN,Creatinine,Calcium,PROT,Albumin,Bilitot,Alkphos,AST,ALT,CRP,ESR,RF,CCP,SONJA,SSA,CPK,uric acid) )@  I have reviewed all available lab results and radiology reports.    Radiology/Diagnostic Studies:        Assessment/Plan:   (1) 68 y.o. female with diagnosis of rheumatoid arthritis. This is stable    Labs  FU in 3 months          Discussion:     I have explained all of the above in detail and the patient understands all of the current recommendation(s). I have answered all questions to  the best of my ability and to their complete satisfaction.       The patient is to continue with the current management plan         RTC in         Electronically signed by Adrián Hernandez MD

## 2019-06-12 ENCOUNTER — TELEPHONE (OUTPATIENT)
Dept: RHEUMATOLOGY | Facility: CLINIC | Age: 69
End: 2019-06-12

## 2019-06-12 DIAGNOSIS — D72.819 LEUKOPENIA, UNSPECIFIED TYPE: ICD-10-CM

## 2019-06-12 DIAGNOSIS — D64.9 ANEMIA, UNSPECIFIED TYPE: ICD-10-CM

## 2019-06-12 DIAGNOSIS — Z79.899 ENCOUNTER FOR LONG-TERM (CURRENT) USE OF MEDICATIONS: Primary | ICD-10-CM

## 2019-06-12 NOTE — TELEPHONE ENCOUNTER
----- Message from Adrián Hernandez MD sent at 6/11/2019  5:30 PM CDT -----  Please call the patient regarding her abnormal result.CBC showing sl anemia and sl low wbc.Repeat CBC in 2 wks

## 2019-06-28 LAB
BASOPHILS NFR BLD: 0.1 K/UL (ref 0–0.2)
BASOPHILS NFR BLD: 1.1 %
EOSINOPHIL NFR BLD: 0.2 K/UL (ref 0–0.7)
EOSINOPHIL NFR BLD: 4.4 %
ERYTHROCYTE [DISTWIDTH] IN BLOOD BY AUTOMATED COUNT: 13.7 % (ref 11.7–14.9)
GRAN #: 3.4 K/UL (ref 1.4–6.5)
GRAN%: 75.6 %
HCT VFR BLD AUTO: 37.9 % (ref 36–48)
HGB BLD-MCNC: 12.3 G/DL (ref 12–15)
IMMATURE GRANS (ABS): 0 K/UL (ref 0–1)
IMMATURE GRANULOCYTES: 0.2 %
LYMPH #: 0.5 K/UL (ref 1.2–3.4)
LYMPH%: 10.6 %
MCH RBC QN AUTO: 31.4 PG (ref 25–35)
MCHC RBC AUTO-ENTMCNC: 32.5 G/DL (ref 31–36)
MCV RBC AUTO: 96.7 FL (ref 79–98)
MONO #: 0.4 K/UL (ref 0.1–0.6)
MONO%: 8.1 %
NUCLEATED RBCS: 0 %
PLATELET # BLD AUTO: 213 K/UL (ref 140–440)
PMV BLD AUTO: 9.4 FL (ref 8.8–12.7)
RBC # BLD AUTO: 3.92 M/UL (ref 3.5–5.5)
WBC # BLD AUTO: 4.5 K/UL (ref 5–10)

## 2019-07-15 RX ORDER — FOLIC ACID 1 MG/1
TABLET ORAL
Qty: 30 TABLET | Refills: 5 | Status: SHIPPED | OUTPATIENT
Start: 2019-07-15 | End: 2019-10-01

## 2019-08-26 ENCOUNTER — TELEPHONE (OUTPATIENT)
Dept: RHEUMATOLOGY | Facility: CLINIC | Age: 69
End: 2019-08-26

## 2019-08-27 DIAGNOSIS — R22.42 MASS OF LOWER LEG, LEFT: Primary | ICD-10-CM

## 2019-08-28 ENCOUNTER — HOSPITAL ENCOUNTER (OUTPATIENT)
Dept: RADIOLOGY | Facility: HOSPITAL | Age: 69
Discharge: HOME | End: 2019-08-28
Attending: INTERNAL MEDICINE
Payer: MEDICARE

## 2019-08-28 DIAGNOSIS — R22.42 MASS OF LOWER LEG, LEFT: ICD-10-CM

## 2019-08-28 LAB
CREAT SERPL-MCNC: 0.6 MG/DL (ref 0.5–1.4)
SAMPLE: NORMAL

## 2019-08-28 PROCEDURE — 25500020 PHARM REV CODE 255: Mod: PO | Performed by: INTERNAL MEDICINE

## 2019-08-28 PROCEDURE — 73720 MRI LWR EXTREMITY W/O&W/DYE: CPT | Mod: TC,PO,LT

## 2019-08-28 PROCEDURE — A9585 GADOBUTROL INJECTION: HCPCS | Mod: PO | Performed by: INTERNAL MEDICINE

## 2019-08-28 RX ORDER — GADOBUTROL 604.72 MG/ML
7 INJECTION INTRAVENOUS
Status: COMPLETED | OUTPATIENT
Start: 2019-08-28 | End: 2019-08-28

## 2019-08-28 RX ADMIN — GADOBUTROL 7 ML: 604.72 INJECTION INTRAVENOUS at 03:08

## 2019-09-03 ENCOUNTER — LAB VISIT (OUTPATIENT)
Dept: LAB | Facility: HOSPITAL | Age: 69
End: 2019-09-03
Attending: INTERNAL MEDICINE
Payer: MEDICARE

## 2019-09-03 DIAGNOSIS — R59.0 BILATERAL HILAR ADENOPATHY SYNDROME: Primary | ICD-10-CM

## 2019-09-03 LAB
ALBUMIN SERPL BCP-MCNC: 4 G/DL (ref 3.5–5.2)
ALP SERPL-CCNC: 74 U/L (ref 55–135)
ALT SERPL W/O P-5'-P-CCNC: 13 U/L (ref 10–44)
ANION GAP SERPL CALC-SCNC: 8 MMOL/L (ref 8–16)
AST SERPL-CCNC: 20 U/L (ref 10–40)
BILIRUB SERPL-MCNC: 1 MG/DL (ref 0.1–1)
BUN SERPL-MCNC: 14 MG/DL (ref 8–23)
CALCIUM SERPL-MCNC: 9.2 MG/DL (ref 8.7–10.5)
CHLORIDE SERPL-SCNC: 102 MMOL/L (ref 95–110)
CO2 SERPL-SCNC: 28 MMOL/L (ref 23–29)
CREAT SERPL-MCNC: 0.7 MG/DL (ref 0.5–1.4)
CRP SERPL-MCNC: 0.23 MG/DL (ref 0–0.75)
ERYTHROCYTE [DISTWIDTH] IN BLOOD BY AUTOMATED COUNT: 13.1 % (ref 11.5–14.5)
ERYTHROCYTE [SEDIMENTATION RATE] IN BLOOD BY WESTERGREN METHOD: 42 MM/HR (ref 0–20)
EST. GFR  (AFRICAN AMERICAN): >60 ML/MIN/1.73 M^2
EST. GFR  (NON AFRICAN AMERICAN): >60 ML/MIN/1.73 M^2
GLUCOSE SERPL-MCNC: 88 MG/DL (ref 70–110)
HCT VFR BLD AUTO: 36.9 % (ref 37–48.5)
HGB BLD-MCNC: 12.1 G/DL (ref 12–16)
MCH RBC QN AUTO: 31.7 PG (ref 27–31)
MCHC RBC AUTO-ENTMCNC: 32.8 G/DL (ref 32–36)
MCV RBC AUTO: 97 FL (ref 82–98)
PLATELET # BLD AUTO: 206 K/UL (ref 150–350)
PMV BLD AUTO: 9.7 FL (ref 9.2–12.9)
POTASSIUM SERPL-SCNC: 4.2 MMOL/L (ref 3.5–5.1)
PROT SERPL-MCNC: 7.9 G/DL (ref 6–8.4)
RBC # BLD AUTO: 3.82 M/UL (ref 4–5.4)
SODIUM SERPL-SCNC: 138 MMOL/L (ref 136–145)
WBC # BLD AUTO: 4.53 K/UL (ref 3.9–12.7)

## 2019-09-03 PROCEDURE — 86140 C-REACTIVE PROTEIN: CPT

## 2019-09-03 PROCEDURE — 85027 COMPLETE CBC AUTOMATED: CPT

## 2019-09-03 PROCEDURE — 36415 COLL VENOUS BLD VENIPUNCTURE: CPT

## 2019-09-03 PROCEDURE — 85651 RBC SED RATE NONAUTOMATED: CPT

## 2019-09-03 PROCEDURE — 84165 PROTEIN E-PHORESIS SERUM: CPT

## 2019-09-03 PROCEDURE — 80053 COMPREHEN METABOLIC PANEL: CPT

## 2019-09-04 ENCOUNTER — HOSPITAL ENCOUNTER (OUTPATIENT)
Dept: RADIOLOGY | Facility: HOSPITAL | Age: 69
Discharge: HOME | End: 2019-09-04
Attending: INTERNAL MEDICINE
Payer: MEDICARE

## 2019-09-04 DIAGNOSIS — R59.0 BILATERAL HILAR ADENOPATHY SYNDROME: ICD-10-CM

## 2019-09-04 PROCEDURE — 25500020 PHARM REV CODE 255: Mod: PO | Performed by: INTERNAL MEDICINE

## 2019-09-04 PROCEDURE — 74177 CT ABD & PELVIS W/CONTRAST: CPT | Mod: TC,PO

## 2019-09-04 RX ADMIN — IOHEXOL 100 ML: 350 INJECTION, SOLUTION INTRAVENOUS at 03:09

## 2019-09-05 DIAGNOSIS — R19.09 GROIN SWELLING: Primary | ICD-10-CM

## 2019-09-05 LAB
ALBUMIN SERPL ELPH-MCNC: 3.9 G/DL (ref 2.9–4.4)
ALBUMIN/GLOB SERPL: 1 {RATIO} (ref 0.7–1.7)
ALPHA1 GLOB SERPL ELPH-MCNC: 0.3 G/DL (ref 0–0.4)
ALPHA2 GLOB SERPL ELPH-MCNC: 0.7 G/DL (ref 0.4–1)
B-GLOBULIN SERPL ELPH-MCNC: 1.1 G/DL (ref 0.7–1.3)
GAMMA GLOB SERPL ELPH-MCNC: 2 G/DL (ref 0.4–1.8)
GLOBULIN SER CALC-MCNC: 4 G/DL (ref 2.2–3.9)
LABORATORY COMMENT REPORT: ABNORMAL
M PROTEIN SERPL ELPH-MCNC: 1.5 G/DL
PROT SERPL-MCNC: 7.9 G/DL (ref 6–8.5)

## 2019-09-06 RX ORDER — PHENOL/SODIUM PHENOLATE
20 AEROSOL, SPRAY (ML) MUCOUS MEMBRANE NIGHTLY
COMMUNITY
Start: 2019-09-01 | End: 2021-07-30

## 2019-09-06 RX ORDER — CHOLECALCIFEROL (VITAMIN D3) 50 MCG
TABLET ORAL DAILY
COMMUNITY
Start: 2019-09-01

## 2019-09-09 ENCOUNTER — OFFICE VISIT (OUTPATIENT)
Dept: SURGERY | Facility: CLINIC | Age: 69
End: 2019-09-09
Payer: MEDICARE

## 2019-09-09 VITALS
DIASTOLIC BLOOD PRESSURE: 86 MMHG | BODY MASS INDEX: 24.91 KG/M2 | WEIGHT: 155 LBS | HEIGHT: 66 IN | HEART RATE: 91 BPM | TEMPERATURE: 98 F | SYSTOLIC BLOOD PRESSURE: 151 MMHG

## 2019-09-09 DIAGNOSIS — R59.0 INGUINAL ADENOPATHY: Primary | ICD-10-CM

## 2019-09-09 PROCEDURE — 99999 PR PBB SHADOW E&M-EST. PATIENT-LVL IV: ICD-10-PCS | Mod: PBBFAC,,, | Performed by: SURGERY

## 2019-09-09 PROCEDURE — 99214 OFFICE O/P EST MOD 30 MIN: CPT | Mod: PBBFAC | Performed by: SURGERY

## 2019-09-09 PROCEDURE — 99204 OFFICE O/P NEW MOD 45 MIN: CPT | Mod: S$PBB,,, | Performed by: SURGERY

## 2019-09-09 PROCEDURE — 99999 PR PBB SHADOW E&M-EST. PATIENT-LVL IV: CPT | Mod: PBBFAC,,, | Performed by: SURGERY

## 2019-09-09 PROCEDURE — 99204 PR OFFICE/OUTPT VISIT, NEW, LEVL IV, 45-59 MIN: ICD-10-PCS | Mod: S$PBB,,, | Performed by: SURGERY

## 2019-09-09 RX ORDER — FLUTICASONE PROPIONATE 50 MCG
SPRAY, SUSPENSION (ML) NASAL DAILY PRN
COMMUNITY
Start: 2019-09-07

## 2019-09-09 RX ORDER — LIDOCAINE HYDROCHLORIDE 10 MG/ML
1 INJECTION, SOLUTION EPIDURAL; INFILTRATION; INTRACAUDAL; PERINEURAL ONCE
Status: DISCONTINUED | OUTPATIENT
Start: 2019-09-09 | End: 2019-09-18 | Stop reason: CLARIF

## 2019-09-09 RX ORDER — SODIUM CHLORIDE 9 MG/ML
INJECTION, SOLUTION INTRAVENOUS CONTINUOUS
Status: CANCELLED | OUTPATIENT
Start: 2019-09-09

## 2019-09-09 NOTE — PROGRESS NOTES
Subjective:       Patient ID: Aurelia Marlow is a 69 y.o. female.    Chief Complaint: Other (Referred by  to al for poss LT Inguinal Lymph Node Biopsy)      HPI:  Patient presents for evaluation of adenopathy.  Has been present for a few months.  Incidentally found by patient herself.  Largest node is in left upper thigh.  Diagnosis confirmed by MRI and CT.  Patient also has an ovarian cyst.  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  This was deemed non neoplastic.    Past Medical History:   Diagnosis Date    Rheumatoid arthritis     Thyroid disease      Past Surgical History:   Procedure Laterality Date    EXCISION RHEUMATOID NODULE Left 2012    FOOT    THYROIDECTOMY  1990         Review of patient's allergies indicates:  No Known Allergies  Medication List with Changes/Refills   Current Medications    CHOLECALCIFEROL, VITAMIN D3, (VITAMIN D3) 2,000 UNIT TAB        FLUTICASONE PROPIONATE (FLONASE) 50 MCG/ACTUATION NASAL SPRAY        FOLIC ACID (FOLVITE) 1 MG TABLET    TAKE 1 TABLET BY MOUTH ONCE DAILY    HYDROXYCHLOROQUINE (PLAQUENIL) 200 MG TABLET    TAKE 1 TABLET BY MOUTH TWICE DAILY    LEVOTHYROXINE (SYNTHROID) 100 MCG TABLET    Take 100 mcg by mouth once daily.    METHOTREXATE 2.5 MG TAB    TAKE 6 TABLETS BY MOUTH ONCE A WEEK    NAPROXEN (EC NAPROSYN) 500 MG EC TABLET    TAKE ONE TABLET BY MOUTH TWICE DAILY AFTER MEALS AS NEEDED    OMEPRAZOLE 20 MG TBEC         Family History   Problem Relation Age of Onset    No Known Problems Mother     No Known Problems Father     No Known Problems Maternal Grandmother     No Known Problems Maternal Grandfather     No Known Problems Paternal Grandmother     No Known Problems Paternal Grandfather     Diabetes Neg Hx      Social History     Socioeconomic History    Marital status:      Spouse name: Not on file    Number of children: Not on file    Years of  education: Not on file    Highest education level: Not on file   Occupational History    Not on file   Social Needs    Financial resource strain: Not on file    Food insecurity:     Worry: Not on file     Inability: Not on file    Transportation needs:     Medical: Not on file     Non-medical: Not on file   Tobacco Use    Smoking status: Never Smoker    Smokeless tobacco: Never Used   Substance and Sexual Activity    Alcohol use: Yes    Drug use: No    Sexual activity: Not on file   Lifestyle    Physical activity:     Days per week: Not on file     Minutes per session: Not on file    Stress: Not on file   Relationships    Social connections:     Talks on phone: Not on file     Gets together: Not on file     Attends Sabianist service: Not on file     Active member of club or organization: Not on file     Attends meetings of clubs or organizations: Not on file     Relationship status: Not on file   Other Topics Concern    Not on file   Social History Narrative    Not on file         Review of Systems   Constitutional: Negative for appetite change, chills, fever and unexpected weight change.   HENT: Negative for hearing loss, rhinorrhea, sore throat and voice change.    Eyes: Negative for photophobia and visual disturbance.   Respiratory: Negative for cough, choking and shortness of breath.    Cardiovascular: Negative for chest pain, palpitations and leg swelling.   Gastrointestinal: Negative for abdominal pain, blood in stool, constipation, diarrhea, nausea and vomiting.   Endocrine: Negative for cold intolerance, heat intolerance and polyphagia.   Genitourinary: Negative for dysuria.   Musculoskeletal: Negative for arthralgias and back pain.   Skin: Negative for color change.   Neurological: Negative for dizziness, seizures, syncope and headaches.   Hematological: Negative for adenopathy. Does not bruise/bleed easily.       Objective:      Physical Exam   Constitutional: She appears well-developed and  well-nourished.  Non-toxic appearance. No distress.   HENT:   Head: Normocephalic and atraumatic. Head is without abrasion and without laceration.   Right Ear: External ear normal.   Left Ear: External ear normal.   Nose: Nose normal.   Mouth/Throat: Oropharynx is clear and moist.   Eyes: Pupils are equal, round, and reactive to light. EOM are normal.   Neck: Trachea normal. No tracheal deviation and normal range of motion present. No thyroid mass and no thyromegaly present.   Cardiovascular: Normal rate and regular rhythm.   Pulmonary/Chest: Effort normal. No accessory muscle usage. No tachypnea. No respiratory distress.   Abdominal: Soft. Normal appearance and bowel sounds are normal. She exhibits no distension and no mass. There is no hepatosplenomegaly. There is no tenderness. There is no tenderness at McBurney's point and negative Baxter's sign. No hernia.   Lymphadenopathy:     She has no cervical adenopathy.     She has no axillary adenopathy.        Right: Inguinal adenopathy present.        Left: Inguinal adenopathy present.   Largest node is quite prominent in left femoral location   Neurological: She is alert. Coordination and gait normal.   Skin: Skin is warm and intact.   Psychiatric: She has a normal mood and affect. Her speech is normal and behavior is normal.       Assessment/Plan:   Inguinal adenopathy  -     Full code; Standing  -     Insert peripheral IV; Standing  -     Case Request Operating Room: BIOPSY, LYMPH NODE, INGUINAL  -     Basic metabolic panel; Future; Expected date: 09/09/2019  -     CBC auto differential; Future; Expected date: 09/09/2019  -     EKG 12-lead; Future    Other orders  -     lidocaine (PF) 10 mg/ml (1%) injection 10 mg        Planned procedure:  Left inguinal (femoral) lymph node biopsy    Ancef 2 gm IV on call to OR unless allergic, if allergic use Vancomycin per pharmacy dosing    NPO past midnight    Iván cloth scrub per protocol    SCDs Bilateral Lower  Extremities    I discussed the proposed procedures the the patient including risks, benefits, indications, alternatives and special concerns.  The patient appears to understand and agrees to go ahead with surgery.  I have made no promises, warranties or verbal agreements beyond what was discussed above.    No follow-ups on file.

## 2019-09-09 NOTE — LETTER
September 9, 2019      Maurizio Saab MD  [address]  [place] [address]           [facility]  [address]  [place] [address]  Phone: [phone]  Fax: [phone]          Patient: Aurelia Marlow   MR Number: [id number]   YOB: 1950   Date of Visit: 9/9/2019       Dear Dr. Maurizio Saab:    Thank you for referring Aurelia Marlow to me for evaluation. Attached you will find relevant portions of my assessment and plan of care.    If you have questions, please do not hesitate to call me. I look forward to following Aurelia Marlow along with you.    Sincerely,    Kermit Martinez MD    Enclosure  CC:  No Recipients    If you would like to receive this communication electronically, please contact [email or web address] or [phone] to request more information on [employer] access.    For providers and/or their staff who would like to refer a patient to [facility], please contact us through our one-stop-shop provider referral line, [facility], at [phone].    If you feel you have received this communication in error or would no longer like to receive these types of communications, please e-mail [email or web address]

## 2019-09-09 NOTE — H&P (VIEW-ONLY)
Subjective:       Patient ID: Aurelia Marlow is a 69 y.o. female.    Chief Complaint: Other (Referred by  to al for poss LT Inguinal Lymph Node Biopsy)      HPI:  Patient presents for evaluation of adenopathy.  Has been present for a few months.  Incidentally found by patient herself.  Largest node is in left upper thigh.  Diagnosis confirmed by MRI and CT.  Patient also has an ovarian cyst.  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  This was deemed non neoplastic.    Past Medical History:   Diagnosis Date    Rheumatoid arthritis     Thyroid disease      Past Surgical History:   Procedure Laterality Date    EXCISION RHEUMATOID NODULE Left 2012    FOOT    THYROIDECTOMY  1990         Review of patient's allergies indicates:  No Known Allergies  Medication List with Changes/Refills   Current Medications    CHOLECALCIFEROL, VITAMIN D3, (VITAMIN D3) 2,000 UNIT TAB        FLUTICASONE PROPIONATE (FLONASE) 50 MCG/ACTUATION NASAL SPRAY        FOLIC ACID (FOLVITE) 1 MG TABLET    TAKE 1 TABLET BY MOUTH ONCE DAILY    HYDROXYCHLOROQUINE (PLAQUENIL) 200 MG TABLET    TAKE 1 TABLET BY MOUTH TWICE DAILY    LEVOTHYROXINE (SYNTHROID) 100 MCG TABLET    Take 100 mcg by mouth once daily.    METHOTREXATE 2.5 MG TAB    TAKE 6 TABLETS BY MOUTH ONCE A WEEK    NAPROXEN (EC NAPROSYN) 500 MG EC TABLET    TAKE ONE TABLET BY MOUTH TWICE DAILY AFTER MEALS AS NEEDED    OMEPRAZOLE 20 MG TBEC         Family History   Problem Relation Age of Onset    No Known Problems Mother     No Known Problems Father     No Known Problems Maternal Grandmother     No Known Problems Maternal Grandfather     No Known Problems Paternal Grandmother     No Known Problems Paternal Grandfather     Diabetes Neg Hx      Social History     Socioeconomic History    Marital status:      Spouse name: Not on file    Number of children: Not on file    Years of  education: Not on file    Highest education level: Not on file   Occupational History    Not on file   Social Needs    Financial resource strain: Not on file    Food insecurity:     Worry: Not on file     Inability: Not on file    Transportation needs:     Medical: Not on file     Non-medical: Not on file   Tobacco Use    Smoking status: Never Smoker    Smokeless tobacco: Never Used   Substance and Sexual Activity    Alcohol use: Yes    Drug use: No    Sexual activity: Not on file   Lifestyle    Physical activity:     Days per week: Not on file     Minutes per session: Not on file    Stress: Not on file   Relationships    Social connections:     Talks on phone: Not on file     Gets together: Not on file     Attends Uatsdin service: Not on file     Active member of club or organization: Not on file     Attends meetings of clubs or organizations: Not on file     Relationship status: Not on file   Other Topics Concern    Not on file   Social History Narrative    Not on file         Review of Systems   Constitutional: Negative for appetite change, chills, fever and unexpected weight change.   HENT: Negative for hearing loss, rhinorrhea, sore throat and voice change.    Eyes: Negative for photophobia and visual disturbance.   Respiratory: Negative for cough, choking and shortness of breath.    Cardiovascular: Negative for chest pain, palpitations and leg swelling.   Gastrointestinal: Negative for abdominal pain, blood in stool, constipation, diarrhea, nausea and vomiting.   Endocrine: Negative for cold intolerance, heat intolerance and polyphagia.   Genitourinary: Negative for dysuria.   Musculoskeletal: Negative for arthralgias and back pain.   Skin: Negative for color change.   Neurological: Negative for dizziness, seizures, syncope and headaches.   Hematological: Negative for adenopathy. Does not bruise/bleed easily.       Objective:      Physical Exam   Constitutional: She appears well-developed and  well-nourished.  Non-toxic appearance. No distress.   HENT:   Head: Normocephalic and atraumatic. Head is without abrasion and without laceration.   Right Ear: External ear normal.   Left Ear: External ear normal.   Nose: Nose normal.   Mouth/Throat: Oropharynx is clear and moist.   Eyes: Pupils are equal, round, and reactive to light. EOM are normal.   Neck: Trachea normal. No tracheal deviation and normal range of motion present. No thyroid mass and no thyromegaly present.   Cardiovascular: Normal rate and regular rhythm.   Pulmonary/Chest: Effort normal. No accessory muscle usage. No tachypnea. No respiratory distress.   Abdominal: Soft. Normal appearance and bowel sounds are normal. She exhibits no distension and no mass. There is no hepatosplenomegaly. There is no tenderness. There is no tenderness at McBurney's point and negative Baxter's sign. No hernia.   Lymphadenopathy:     She has no cervical adenopathy.     She has no axillary adenopathy.        Right: Inguinal adenopathy present.        Left: Inguinal adenopathy present.   Largest node is quite prominent in left femoral location   Neurological: She is alert. Coordination and gait normal.   Skin: Skin is warm and intact.   Psychiatric: She has a normal mood and affect. Her speech is normal and behavior is normal.       Assessment/Plan:   Inguinal adenopathy  -     Full code; Standing  -     Insert peripheral IV; Standing  -     Case Request Operating Room: BIOPSY, LYMPH NODE, INGUINAL  -     Basic metabolic panel; Future; Expected date: 09/09/2019  -     CBC auto differential; Future; Expected date: 09/09/2019  -     EKG 12-lead; Future    Other orders  -     lidocaine (PF) 10 mg/ml (1%) injection 10 mg        Planned procedure:  Left inguinal (femoral) lymph node biopsy    Ancef 2 gm IV on call to OR unless allergic, if allergic use Vancomycin per pharmacy dosing    NPO past midnight    Iván cloth scrub per protocol    SCDs Bilateral Lower  Extremities    I discussed the proposed procedures the the patient including risks, benefits, indications, alternatives and special concerns.  The patient appears to understand and agrees to go ahead with surgery.  I have made no promises, warranties or verbal agreements beyond what was discussed above.    No follow-ups on file.

## 2019-09-10 ENCOUNTER — HOSPITAL ENCOUNTER (OUTPATIENT)
Dept: RADIOLOGY | Facility: HOSPITAL | Age: 69
Discharge: HOME | End: 2019-09-10
Attending: INTERNAL MEDICINE
Payer: MEDICARE

## 2019-09-10 DIAGNOSIS — R19.09 GROIN SWELLING: ICD-10-CM

## 2019-09-10 PROCEDURE — 76830 TRANSVAGINAL US NON-OB: CPT | Mod: TC,PO

## 2019-09-11 ENCOUNTER — HOSPITAL ENCOUNTER (OUTPATIENT)
Dept: PREADMISSION TESTING | Facility: HOSPITAL | Age: 69
Discharge: HOME | End: 2019-09-11
Attending: SURGERY
Payer: MEDICARE

## 2019-09-11 VITALS
HEIGHT: 66 IN | RESPIRATION RATE: 14 BRPM | HEART RATE: 75 BPM | WEIGHT: 151.69 LBS | TEMPERATURE: 98 F | BODY MASS INDEX: 24.38 KG/M2 | OXYGEN SATURATION: 98 %

## 2019-09-11 DIAGNOSIS — M05.79 RHEUMATOID ARTHRITIS INVOLVING MULTIPLE SITES WITH POSITIVE RHEUMATOID FACTOR: Primary | ICD-10-CM

## 2019-09-11 DIAGNOSIS — R59.0 INGUINAL ADENOPATHY: ICD-10-CM

## 2019-09-11 PROCEDURE — 93005 ELECTROCARDIOGRAM TRACING: CPT

## 2019-09-11 RX ORDER — TRAMADOL HYDROCHLORIDE 50 MG/1
50 TABLET ORAL EVERY 12 HOURS PRN
COMMUNITY
End: 2019-09-11 | Stop reason: SDUPTHER

## 2019-09-11 RX ORDER — TRAMADOL HYDROCHLORIDE 50 MG/1
50 TABLET ORAL EVERY 12 HOURS PRN
Qty: 60 TABLET | Refills: 1 | Status: SHIPPED | OUTPATIENT
Start: 2019-09-11 | End: 2021-07-30

## 2019-09-11 RX ORDER — TRAMADOL HYDROCHLORIDE 100 MG/1
50 TABLET, EXTENDED RELEASE ORAL DAILY
Status: ON HOLD | COMMUNITY
End: 2019-09-18 | Stop reason: DRUGHIGH

## 2019-09-11 NOTE — DISCHARGE INSTRUCTIONS
To confirm, Your doctor has instructed you that surgery is scheduled for:     Please report to [facility] on [address] the morning of surgery.     Pre-Op will call the afternoon prior to surgery between 4:00 and 6:00 PM with the final arrival time.      PLEASE NOTE:  The surgery schedule has many variables which may affect the time of your surgery case.  Family members should be available if your surgery time changes.  Plan to be here the day of your procedure between 4-6 hours.    MEDICATIONS:  TAKE ONLY THESE MEDICATIONS WITH A SMALL SIP OF WATER THE MORNING OF YOUR PROCEDURE:      DO NOT TAKE THESE MEDICATIONS 5-7 DAYS PRIOR to your procedure or per your surgeon's request: ASPIRIN, ALEVE, ADVIL, IBUPROFEN, FISH OIL VITAMIN E, HERBALS  (May take Tylenol)    ONLY if you are prescribed any types of blood thinners such as:  Aspirin, Coumadin, Plavix, Pradaxa, Xarelto, Aggrenox, Effient, Eliquis, Savasya, Brilinta, or any other, ask your surgeon whether you should stop taking them and how long before surgery you should stop.  You may also need to verify with the prescribing physician if it is ok to stop your medication.      INSTRUCTIONS IMPORTANT!!  · Do not eat or drink anything between midnight and the time of your procedure- this includes gum, mints, and candy.  · ONLY if you are diabetic, check your sugar in the morning before your procedure.  · Do not smoke, vape or drink alcoholic beverages 24 hours prior to your procedure.  · Shower the night before AND the morning of your procedure with a Chlorhexidine wash such as Hibiclens or Dial antibacterial soap from the neck down.  Do not get it on your face or in your eyes.  You may use your own shampoo and face wash. This helps your skin to be as bacteria free as possible.    · If you wear contact lenses, dentures, hearing aids or glasses, bring a container to put them in during surgery and give to a family member for safe keeping.  Please leave all jewelry,  piercing's and valuables at home.   · DO NOT remove hair from the surgery site.  Do not shave the incision site unless you are given specific instructions to do so.    · ONLY if you have been diagnosed with sleep apnea please bring your C-PAP machine.  · ONLY if you wear home oxygen please bring your portable oxygen tank the day of your procedure.   · ONLY for patients requiring bowel prep, written instructions will be given by your doctor's office.  · ONLY if you have a neuro stimulator, please bring the controller with you the morning of surgery  · ONLY if a type and screen test is needed before surgery, please return:  · If your doctor has scheduled you for an overnight stay, bring a small overnight bag with any personal items you need.  · Make arrangements in advance for transportation home by a responsible adult.  · You must make arrangements for transportation, TAXI'S, UBER'S OR LYFTS ARE NOT ALLOWED.          If you have any questions about these instructions, call Pre-Op Admit  Nursing at [phone] or the Pre-Op Day Surgery Unit at [phone].

## 2019-09-18 ENCOUNTER — ANESTHESIA EVENT (OUTPATIENT)
Dept: SURGERY | Facility: HOSPITAL | Age: 69
End: 2019-09-18
Payer: MEDICARE

## 2019-09-18 ENCOUNTER — HOSPITAL ENCOUNTER (OUTPATIENT)
Facility: HOSPITAL | Age: 69
Discharge: HOME | End: 2019-09-18
Attending: SURGERY | Admitting: SURGERY
Payer: MEDICARE

## 2019-09-18 ENCOUNTER — ANESTHESIA (OUTPATIENT)
Dept: SURGERY | Facility: HOSPITAL | Age: 69
End: 2019-09-18
Payer: MEDICARE

## 2019-09-18 VITALS
TEMPERATURE: 98 F | DIASTOLIC BLOOD PRESSURE: 82 MMHG | WEIGHT: 151.69 LBS | HEART RATE: 73 BPM | BODY MASS INDEX: 24.38 KG/M2 | OXYGEN SATURATION: 96 % | RESPIRATION RATE: 19 BRPM | HEIGHT: 66 IN | SYSTOLIC BLOOD PRESSURE: 128 MMHG

## 2019-09-18 DIAGNOSIS — R59.0 INGUINAL ADENOPATHY: ICD-10-CM

## 2019-09-18 PROCEDURE — 71000033 HC RECOVERY, INTIAL HOUR: Performed by: SURGERY

## 2019-09-18 PROCEDURE — 27201107 HC STYLET, STANDARD: Performed by: ANESTHESIOLOGY

## 2019-09-18 PROCEDURE — 27000673 HC TUBING BLOOD Y: Performed by: ANESTHESIOLOGY

## 2019-09-18 PROCEDURE — 37000009 HC ANESTHESIA EA ADD 15 MINS: Performed by: SURGERY

## 2019-09-18 PROCEDURE — 37000008 HC ANESTHESIA 1ST 15 MINUTES: Performed by: SURGERY

## 2019-09-18 PROCEDURE — 63600175 PHARM REV CODE 636 W HCPCS: Performed by: SURGERY

## 2019-09-18 PROCEDURE — 25000003 PHARM REV CODE 250: Performed by: NURSE ANESTHETIST, CERTIFIED REGISTERED

## 2019-09-18 PROCEDURE — 36000706: Performed by: SURGERY

## 2019-09-18 PROCEDURE — 38500 PR BIOPSY/EXCISION, LYMPH NODE(S): ICD-10-PCS | Mod: LT,,, | Performed by: SURGERY

## 2019-09-18 PROCEDURE — 25000003 PHARM REV CODE 250: Performed by: SURGERY

## 2019-09-18 PROCEDURE — 71000015 HC POSTOP RECOV 1ST HR: Performed by: SURGERY

## 2019-09-18 PROCEDURE — 27202107 HC XP QUATRO SENSOR: Performed by: ANESTHESIOLOGY

## 2019-09-18 PROCEDURE — 38500 BIOPSY/REMOVAL LYMPH NODES: CPT | Mod: LT,,, | Performed by: SURGERY

## 2019-09-18 PROCEDURE — S0028 INJECTION, FAMOTIDINE, 20 MG: HCPCS | Performed by: NURSE ANESTHETIST, CERTIFIED REGISTERED

## 2019-09-18 PROCEDURE — 88307 TISSUE EXAM BY PATHOLOGIST: CPT | Mod: TC

## 2019-09-18 PROCEDURE — 27201423 OPTIME MED/SURG SUP & DEVICES STERILE SUPPLY: Performed by: SURGERY

## 2019-09-18 PROCEDURE — 63600175 PHARM REV CODE 636 W HCPCS: Performed by: NURSE ANESTHETIST, CERTIFIED REGISTERED

## 2019-09-18 PROCEDURE — 36000707: Performed by: SURGERY

## 2019-09-18 PROCEDURE — 27000671 HC TUBING MICROBORE EXT: Performed by: ANESTHESIOLOGY

## 2019-09-18 RX ORDER — GLYCOPYRROLATE 0.2 MG/ML
INJECTION INTRAMUSCULAR; INTRAVENOUS
Status: DISCONTINUED | OUTPATIENT
Start: 2019-09-18 | End: 2019-09-18

## 2019-09-18 RX ORDER — METHYLPREDNISOLONE SOD SUCC 125 MG
VIAL (EA) INJECTION
Status: DISCONTINUED | OUTPATIENT
Start: 2019-09-18 | End: 2019-09-18

## 2019-09-18 RX ORDER — OXYCODONE HYDROCHLORIDE 5 MG/1
5 TABLET ORAL
Status: DISCONTINUED | OUTPATIENT
Start: 2019-09-18 | End: 2019-09-18 | Stop reason: HOSPADM

## 2019-09-18 RX ORDER — SODIUM CHLORIDE, SODIUM LACTATE, POTASSIUM CHLORIDE, CALCIUM CHLORIDE 600; 310; 30; 20 MG/100ML; MG/100ML; MG/100ML; MG/100ML
INJECTION, SOLUTION INTRAVENOUS CONTINUOUS PRN
Status: DISCONTINUED | OUTPATIENT
Start: 2019-09-18 | End: 2019-09-18

## 2019-09-18 RX ORDER — BUPIVACAINE HYDROCHLORIDE AND EPINEPHRINE 2.5; 5 MG/ML; UG/ML
INJECTION, SOLUTION EPIDURAL; INFILTRATION; INTRACAUDAL; PERINEURAL
Status: DISCONTINUED | OUTPATIENT
Start: 2019-09-18 | End: 2019-09-18 | Stop reason: HOSPADM

## 2019-09-18 RX ORDER — LIDOCAINE HCL/PF 100 MG/5ML
SYRINGE (ML) INTRAVENOUS
Status: DISCONTINUED | OUTPATIENT
Start: 2019-09-18 | End: 2019-09-18

## 2019-09-18 RX ORDER — SODIUM CHLORIDE 0.9 % (FLUSH) 0.9 %
10 SYRINGE (ML) INJECTION
Status: DISCONTINUED | OUTPATIENT
Start: 2019-09-18 | End: 2019-09-18 | Stop reason: HOSPADM

## 2019-09-18 RX ORDER — ONDANSETRON 2 MG/ML
4 INJECTION INTRAMUSCULAR; INTRAVENOUS DAILY PRN
Status: DISCONTINUED | OUTPATIENT
Start: 2019-09-18 | End: 2019-09-18 | Stop reason: HOSPADM

## 2019-09-18 RX ORDER — PROPOFOL 10 MG/ML
VIAL (ML) INTRAVENOUS
Status: DISCONTINUED | OUTPATIENT
Start: 2019-09-18 | End: 2019-09-18

## 2019-09-18 RX ORDER — ROCURONIUM BROMIDE 10 MG/ML
INJECTION, SOLUTION INTRAVENOUS
Status: DISCONTINUED | OUTPATIENT
Start: 2019-09-18 | End: 2019-09-18

## 2019-09-18 RX ORDER — MIDAZOLAM HYDROCHLORIDE 1 MG/ML
INJECTION, SOLUTION INTRAMUSCULAR; INTRAVENOUS
Status: DISCONTINUED | OUTPATIENT
Start: 2019-09-18 | End: 2019-09-18

## 2019-09-18 RX ORDER — LIDOCAINE HYDROCHLORIDE 20 MG/ML
JELLY TOPICAL
Status: DISCONTINUED | OUTPATIENT
Start: 2019-09-18 | End: 2019-09-18

## 2019-09-18 RX ORDER — NEOSTIGMINE METHYLSULFATE 1 MG/ML
INJECTION, SOLUTION INTRAVENOUS
Status: DISCONTINUED | OUTPATIENT
Start: 2019-09-18 | End: 2019-09-18

## 2019-09-18 RX ORDER — ONDANSETRON 2 MG/ML
INJECTION INTRAMUSCULAR; INTRAVENOUS
Status: DISCONTINUED | OUTPATIENT
Start: 2019-09-18 | End: 2019-09-18

## 2019-09-18 RX ORDER — HYDROCODONE BITARTRATE AND ACETAMINOPHEN 7.5; 325 MG/1; MG/1
1 TABLET ORAL EVERY 6 HOURS PRN
Qty: 30 TABLET | Refills: 0 | Status: SHIPPED | OUTPATIENT
Start: 2019-09-18 | End: 2019-10-01

## 2019-09-18 RX ORDER — CEFAZOLIN SODIUM 2 G/50ML
2 SOLUTION INTRAVENOUS
Status: COMPLETED | OUTPATIENT
Start: 2019-09-18 | End: 2019-09-18

## 2019-09-18 RX ORDER — FENTANYL CITRATE 50 UG/ML
25 INJECTION, SOLUTION INTRAMUSCULAR; INTRAVENOUS EVERY 5 MIN PRN
Status: DISCONTINUED | OUTPATIENT
Start: 2019-09-18 | End: 2019-09-18 | Stop reason: HOSPADM

## 2019-09-18 RX ORDER — FAMOTIDINE 10 MG/ML
INJECTION INTRAVENOUS
Status: DISCONTINUED | OUTPATIENT
Start: 2019-09-18 | End: 2019-09-18

## 2019-09-18 RX ORDER — FENTANYL CITRATE 50 UG/ML
INJECTION, SOLUTION INTRAMUSCULAR; INTRAVENOUS
Status: DISCONTINUED | OUTPATIENT
Start: 2019-09-18 | End: 2019-09-18

## 2019-09-18 RX ORDER — SUCCINYLCHOLINE CHLORIDE 20 MG/ML
INJECTION INTRAMUSCULAR; INTRAVENOUS
Status: DISCONTINUED | OUTPATIENT
Start: 2019-09-18 | End: 2019-09-18

## 2019-09-18 RX ADMIN — LIDOCAINE HYDROCHLORIDE 3 ML: 20 JELLY TOPICAL at 07:09

## 2019-09-18 RX ADMIN — ROCURONIUM BROMIDE 20 MG: 10 INJECTION, SOLUTION INTRAVENOUS at 07:09

## 2019-09-18 RX ADMIN — MIDAZOLAM 2 MG: 1 INJECTION INTRAMUSCULAR; INTRAVENOUS at 07:09

## 2019-09-18 RX ADMIN — CEFAZOLIN SODIUM 2 G: 2 SOLUTION INTRAVENOUS at 07:09

## 2019-09-18 RX ADMIN — FENTANYL CITRATE 50 MCG: 50 INJECTION INTRAMUSCULAR; INTRAVENOUS at 07:09

## 2019-09-18 RX ADMIN — SODIUM CHLORIDE, SODIUM LACTATE, POTASSIUM CHLORIDE, AND CALCIUM CHLORIDE: .6; .31; .03; .02 INJECTION, SOLUTION INTRAVENOUS at 07:09

## 2019-09-18 RX ADMIN — NEOSTIGMINE METHYLSULFATE 5 MG: 1 INJECTION INTRAVENOUS at 08:09

## 2019-09-18 RX ADMIN — FAMOTIDINE 20 MG: 10 INJECTION, SOLUTION INTRAVENOUS at 07:09

## 2019-09-18 RX ADMIN — PROPOFOL 100 MG: 10 INJECTION, EMULSION INTRAVENOUS at 07:09

## 2019-09-18 RX ADMIN — LIDOCAINE HYDROCHLORIDE 70 MG: 20 INJECTION, SOLUTION INTRAVENOUS at 07:09

## 2019-09-18 RX ADMIN — ONDANSETRON 4 MG: 2 INJECTION INTRAMUSCULAR; INTRAVENOUS at 07:09

## 2019-09-18 RX ADMIN — METHYLPREDNISOLONE SODIUM SUCCINATE 125 MG: 125 INJECTION, POWDER, FOR SOLUTION INTRAMUSCULAR; INTRAVENOUS at 07:09

## 2019-09-18 RX ADMIN — ROCURONIUM BROMIDE 5 MG: 10 INJECTION, SOLUTION INTRAVENOUS at 07:09

## 2019-09-18 RX ADMIN — SUCCINYLCHOLINE CHLORIDE 120 MG: 20 INJECTION, SOLUTION INTRAMUSCULAR; INTRAVENOUS at 07:09

## 2019-09-18 RX ADMIN — GLYCOPYRROLATE 0.8 MG: 0.2 INJECTION INTRAMUSCULAR; INTRAVENOUS at 08:09

## 2019-09-18 RX ADMIN — SODIUM CHLORIDE, SODIUM LACTATE, POTASSIUM CHLORIDE, AND CALCIUM CHLORIDE: .6; .31; .03; .02 INJECTION, SOLUTION INTRAVENOUS at 08:09

## 2019-09-18 NOTE — TRANSFER OF CARE
"Anesthesia Transfer of Care Note    Patient: Aurelia Marlow    Procedure(s) Performed: Procedure(s) (LRB):  BIOPSY, LYMPH NODE, INGUINAL (Left)    Patient location: PACU    Anesthesia Type: general    Transport from OR: Transported from OR on room air with adequate spontaneous ventilation    Post pain: adequate analgesia    Post assessment: no apparent anesthetic complications    Post vital signs: stable    Level of consciousness: awake and alert    Nausea/Vomiting: no nausea/vomiting    Complications: none    Transfer of care protocol was followed      Last vitals:   Visit Vitals  /85 (BP Location: Left arm, Patient Position: Lying)   Pulse 85   Temp 36.4 °C (97.6 °F) (Oral)   Resp 16   Ht 5' 6" (1.676 m)   Wt 68.8 kg (151 lb 10.8 oz)   SpO2 98%   Breastfeeding? No   BMI 24.48 kg/m²     "

## 2019-09-18 NOTE — ANESTHESIA POSTPROCEDURE EVALUATION
Anesthesia Post Evaluation    Patient: Aurelia Marlow    Procedure(s) Performed: Procedure(s) (LRB):  BIOPSY, LYMPH NODE, INGUINAL (Left)    Final Anesthesia Type: general  Patient location during evaluation: PACU  Patient participation: Yes- Able to Participate  Level of consciousness: awake and alert  Post-procedure vital signs: reviewed and stable  Pain management: adequate  Airway patency: patent  PONV status at discharge: No PONV  Anesthetic complications: no      Cardiovascular status: hemodynamically stable  Respiratory status: unassisted, spontaneous ventilation and room air  Hydration status: euvolemic  Follow-up not needed.          Vitals Value Taken Time   /63 9/18/2019  9:00 AM   Temp 36.7 °C (98 °F) 9/18/2019  9:00 AM   Pulse 70 9/18/2019  9:06 AM   Resp 18 9/18/2019  9:00 AM   SpO2 98 % 9/18/2019  9:06 AM   Vitals shown include unvalidated device data.      No case tracking events are documented in the log.      Pain/Ambreen Score: Ambreen Score: 10 (9/18/2019  9:00 AM)

## 2019-09-18 NOTE — BRIEF OP NOTE
Critical access hospital  Brief Operative Note     SUMMARY     Surgery Date: 9/18/2019     Surgeon(s) and Role:     * Kermit Martinez MD - Primary    Assisting Surgeon: None    Pre-op Diagnosis:  Inguinal adenopathy [R59.0]    Post-op Diagnosis:  Post-Op Diagnosis Codes:     * Inguinal adenopathy [R59.0]    Procedure(s) (LRB):  BIOPSY, LYMPH NODE, INGUINAL (Left)    Anesthesia: General    Description of the findings of the procedure: see dictation See dictation     Findings/Key Components: see dictation    Estimated Blood Loss: 5 cc         Specimens:   Specimen (12h ago, onward)    Start     Ordered    09/18/19 0806  Specimen to Pathology - Surgery  Once     Comments:  Pre-op Diagnosis: Inguinal adenopathy [R59.0]Procedure(s):BIOPSY, LYMPH NODE, INGUINAL Number of specimens: 1Name of specimens: left inguinal/femoral lymph node biopsy  (fresh)     Start Status     09/18/19 0806 Collected (09/18/19 0807) Order ID: [id number]       09/18/19 0806          Discharge Note    SUMMARY     Admit Date: 9/18/2019    Discharge Date and Time:  09/18/2019 8:39 AM    Hospital Course (synopsis of major diagnoses, care, treatment, and services provided during the course of the hospital stay): x     Final Diagnosis: Post-Op Diagnosis Codes:     * Inguinal adenopathy [R59.0]    Disposition: Home or Self Care    Follow Up/Patient Instructions:     Medications:  Reconciled Home Medications:      Medication List      CONTINUE taking these medications    fluticasone propionate 50 mcg/actuation nasal spray  Commonly known as:  FLONASE     folic acid 1 MG tablet  Commonly known as:  FOLVITE  TAKE 1 TABLET BY MOUTH ONCE DAILY     hydroxychloroquine 200 mg tablet  Commonly known as:  PLAQUENIL  TAKE 1 TABLET BY MOUTH TWICE DAILY     levothyroxine 100 MCG tablet  Commonly known as:  SYNTHROID  Take 100 mcg by mouth once daily.     methotrexate 2.5 MG Tab  TAKE 6 TABLETS BY MOUTH ONCE A WEEK     naproxen 500 MG EC tablet  Commonly known as:   EC NAPROSYN  TAKE ONE TABLET BY MOUTH TWICE DAILY AFTER MEALS AS NEEDED     omeprazole 20 mg Tbec     traMADol 50 mg tablet  Commonly known as:  ULTRAM  Take 1 tablet (50 mg total) by mouth every 12 (twelve) hours as needed for Pain. Dispense generic     VITAMIN D3 2,000 unit Tab  Generic drug:  cholecalciferol (vitamin D3)          Discharge Procedure Orders   Diet general     Remove dressing in 48 hours     Activity as tolerated     Follow-up Information     Kermit Martinez MD. Schedule an appointment as soon as possible for a visit in 5 days.    Specialties:  General Surgery, Surgery  Contact information:  [address]  [address]  [facility] [id number] [phone]

## 2019-09-18 NOTE — ANESTHESIA PREPROCEDURE EVALUATION
09/18/2019  Aurelia Marlow is a 69 y.o., female.    Pre-op Assessment    I have reviewed the Patient Summary Reports.     I have reviewed the Nursing Notes.   I have reviewed the Medications.     Review of Systems  Anesthesia Hx:  No problems with previous Anesthesia    Social:  Former Smoker, No Alcohol Use    Hematology/Oncology:  Hematology Normal   Oncology Normal     EENT/Dental:EENT/Dental Normal   Cardiovascular:  Cardiovascular Normal     Pulmonary:  Pulmonary Normal    Renal/:  Renal/ Normal     Hepatic/GI:   GERD, well controlled    Musculoskeletal:   Arthritis (hands and feet)     Neurological:  Neurology Normal    Endocrine:   Possibly adrenal insufficient due to 2 medrol dose packs and 20 mg Prednisone x 5 days Nov 2018(all in the last 12 months).   Psych:  Psychiatric Normal           Physical Exam  General:  Well nourished    Airway/Jaw/Neck:  Airway Findings: Mouth Opening: Normal Tongue: Normal  General Airway Assessment: Adult  Mallampati: I  Improves to I with phonation.  TM Distance: Normal, at least 6 cm  Jaw/Neck Findings:     Neck ROM: Normal ROM  Neck Findings: Normal     Dental:  Dental Findings: In tact   Chest/Lungs:  Chest/Lungs Findings: Clear to auscultation, Normal Respiratory Rate     Heart/Vascular:  Heart Findings: Rate: Normal  Rhythm: Regular Rhythm  Sounds: Normal        Mental Status:  Mental Status Findings:  Cooperative, Alert and Oriented         Anesthesia Plan  Type of Anesthesia, risks & benefits discussed:  Anesthesia Type:  general  Patient's Preference:   Intra-op Monitoring Plan: standard ASA monitors  Intra-op Monitoring Plan Comments:   Post Op Pain Control Plan: per primary service following discharge from PACU  Post Op Pain Control Plan Comments:   Induction:   IV  Beta Blocker:  Patient is not currently on a Beta-Blocker (No further documentation  required).       Informed Consent: Patient understands risks and agrees with Anesthesia plan.  Questions answered. Anesthesia consent signed with patient.  ASA Score: 3     Day of Surgery Review of History & Physical:        Anesthesia Plan Notes: Solumedrol 100mg  Zofran 4 mg  Pepcid 20 mg

## 2019-09-18 NOTE — DISCHARGE INSTRUCTIONS
Discharge Instructions: Caring for Your Josh-Borja Drainage Tube  Your doctor discharges you with a Josh-Borja drainage tube. Doctors commonly leave this drain within the abdominal cavity after surgery. It helps drain and collect blood and body fluid after surgery. This can prevent swelling and reduces the risk for infection. The tube is held in place by a few stitches. It is covered with a bandage. Your doctor will remove the drain when he or she determines you no longer need it.  Home care  · Dont sleep on the same side as the tube.  · Secure the tube and bag inside your clothing with a safety pin. This helps keep the tube from being pulled out.  · Empty your drain at least twice a day. Empty it more often if the drain is full. Wash  and dry your hands before emptying the drain.  ¨ Lift the opening on the drain.  ¨ Drain the fluid into a measuring cup.  ¨ Record the amount of fluid each time you empty the drain. Include the date and time it was emptied. Share this information with your doctor on your next visit.  ¨ Squeeze the bulb with your hands until you hear air coming out of the bulb if your doctor has instructed you to do so (sometimes the bulb is used as a reservoir without suction). Check with your doctor about specific drain instructions.  ¨ Close the opening.  · Change the dressing around the tube every day.  ¨ Wash your hands.  ¨ Remove the old bandage.  ¨ Wash your hands again.  ¨ Wet a cotton swab and clean the skin around the incision and tube site. Use normal saline solution (salt and water). Or, you can use warm, soapy water.  ¨ Put a new bandage on the incision and tube site. Make the bandage large enough to cover the whole incision area.  ¨ Tape the bandage in place.  · Keep the bandage and tube site dry when you shower. Ask your healthcare provider about the best way to do this.  · Stripping the tube helps keep blood clots from blocking the tube. Ask your nurse how often you should  strip the tube. Stripping may not be needed, depending on where and why your doctor placed the tube. It may even be dangerous in some cases.   ¨ Hold the tubing where it leaves the skin, with one hand. This keeps it from pulling on the skin.  ¨ Pinch the tubing with the thumb and first finger of your other hand.  ¨ Slowly and firmly pull your thumb and first finger down the tubing. You may find it helpful to hold an alcohol swab between your fingers and the tube to lubricate the tubing.  ¨ If the pulling hurts or feels like its coming out of the skin, stop. Begin again more gently.  Follow-up care  Make a follow-up appointment as directed by our staff.     When to seek medical care  Call your healthcare provider right away if you have any of the following:  · New or increased pain around the tube  · Redness, swelling, or warmth around the incision or tube  · Drainage that is foul-smelling  · Vomiting  · Fever of 100.4°F (38°C)  · Fluid leaking around the tube  · Incision seems not to be healing  · Stitches become loose  · Tube falls out or breaks  · Drainage that changes from light pink to dark red  · Blood clots in the drainage bulb  · A sudden increase or decrease in the amount of drainage (over 30 mL)   Date Last Reviewed: 2/1/2017 © 2000-2017 [employer]. [address] [place] [address], [place] [address]. All rights reserved. This information is not intended as a substitute for professional medical care. Always follow your healthcare professional's instructions.      PATIENT INSTRUCTIONS  POST-ANESTHESIA    IMMEDIATELY FOLLOWING SURGERY:  Do not drive or operate machinery for the first twenty four hours after surgery.  Do not make any important decisions for twenty four hours after surgery or while taking narcotic pain medications or sedatives.  If you develop intractable nausea and vomiting or a severe headache please notify your doctor immediately.    FOLLOW-UP:  Please make an appointment with your  surgeon as instructed.

## 2019-09-18 NOTE — OP NOTE
Date of service:  09/18/2019    Preoperative diagnosis:  Left femoral adenopathy    Postoperative diagnosis same    Procedure:  Excisional biopsy of left femoral lymph node    Description technical procedure findings:    Patient placed in supine position.  Prepped and draped in usual sterile manner. Oblique incision was made over the palpable lymph node which was approximately 2 finger breaths caudad to the left inguinal ligament. Sharp dissection was carried onto the surface of the lymph node.  Lymph node was quite large around 4 cm.  It was sharply removed using a LigaSure device to seal the lymphatic channels and blood vessels. Liver was submitted to pathology. Closed suction drain was placed in the operative field and brought out through a separate stab incision. Wound was closed in multiple layers of 4 Monocryl sutures.

## 2019-09-18 NOTE — INTERVAL H&P NOTE
The patient has been examined and the H&P has been reviewed:    I concur with the findings and no changes have occurred since H&P was written.    Anesthesia/Surgery risks, benefits and alternative options discussed and understood by patient/family.          Active Hospital Problems    Diagnosis  POA    Inguinal adenopathy [R59.0]  Yes      Resolved Hospital Problems   No resolved problems to display.

## 2019-09-23 ENCOUNTER — OFFICE VISIT (OUTPATIENT)
Dept: SURGERY | Facility: CLINIC | Age: 69
End: 2019-09-23
Payer: COMMERCIAL

## 2019-09-23 ENCOUNTER — TELEPHONE (OUTPATIENT)
Dept: SURGERY | Facility: CLINIC | Age: 69
End: 2019-09-23

## 2019-09-23 VITALS
HEIGHT: 66 IN | HEART RATE: 96 BPM | SYSTOLIC BLOOD PRESSURE: 159 MMHG | BODY MASS INDEX: 24.27 KG/M2 | TEMPERATURE: 98 F | DIASTOLIC BLOOD PRESSURE: 91 MMHG | WEIGHT: 151 LBS

## 2019-09-23 DIAGNOSIS — R59.0 INGUINAL ADENOPATHY: Primary | ICD-10-CM

## 2019-09-23 PROCEDURE — 99999 PR PBB SHADOW E&M-EST. PATIENT-LVL III: ICD-10-PCS | Mod: PBBFAC,,, | Performed by: SURGERY

## 2019-09-23 PROCEDURE — 99024 PR POST-OP FOLLOW-UP VISIT: ICD-10-PCS | Mod: ,,, | Performed by: SURGERY

## 2019-09-23 PROCEDURE — 99024 POSTOP FOLLOW-UP VISIT: CPT | Mod: ,,, | Performed by: SURGERY

## 2019-09-23 PROCEDURE — 99999 PR PBB SHADOW E&M-EST. PATIENT-LVL III: CPT | Mod: PBBFAC,,, | Performed by: SURGERY

## 2019-09-23 NOTE — TELEPHONE ENCOUNTER
Called  office to sched pt an apt... However 's soonest available apt is a month out... Notified  & pt...     Pt would like to see ...   Apt Sched for pt to see Dr. Nieto 10/4 @ 10

## 2019-09-23 NOTE — PROGRESS NOTES
Subjective:       Patient ID: Aurelia Marlow is a 69 y.o. female.    Chief Complaint: Post-op Evaluation (FU DOS 9/18/19 Excisional BX of LT Femoral Lymph Node)      HPI:  Aurelia Marlow is here for post-op. Patient has no systemic complaints. Post operative   pain is under control.  Tolerating diet, no nausea/vomiting.  Having normal bowel movements.        Objective:      Physical Exam   Constitutional: She is oriented to person, place, and time. She is cooperative. No distress.   Neurological: She is alert and oriented to person, place, and time.   Skin:   Incisions are clean, dry and intact   There is no evidence of infection, hematoma or seroma.        Assessment/Plan:   Inguinal adenopathy      Doing well post op  Path pending      Follow up if symptoms worsen or fail to improve.

## 2019-09-23 NOTE — LETTER
September 23, 2019      Maurizio Saab MD  [address]  [place] [address]           [facility]  [address]  [place] [address]  Phone: [phone]  Fax: [phone]          Patient: Aurelia Marlow   MR Number: [id number]   YOB: 1950   Date of Visit: 9/23/2019       Dear Dr. Maurizio Saab:    Thank you for referring Aurelia Marlow to me for evaluation. Attached you will find relevant portions of my assessment and plan of care.    If you have questions, please do not hesitate to call me. I look forward to following Aurelia Marlow along with you.    Sincerely,    Kermit Martinez MD    Enclosure  CC:  No Recipients    If you would like to receive this communication electronically, please contact [email or web address] or [phone] to request more information on [facility] access.    For providers and/or their staff who would like to refer a patient to [facility], please contact us through our one-stop-shop provider referral line, [facility], at [phone].    If you feel you have received this communication in error or would no longer like to receive these types of communications, please e-mail [email or web address]

## 2019-09-24 ENCOUNTER — OFFICE VISIT (OUTPATIENT)
Dept: RHEUMATOLOGY | Facility: CLINIC | Age: 69
End: 2019-09-24
Payer: MEDICARE

## 2019-09-24 VITALS — BODY MASS INDEX: 24.1 KG/M2 | WEIGHT: 149.31 LBS | DIASTOLIC BLOOD PRESSURE: 79 MMHG | SYSTOLIC BLOOD PRESSURE: 120 MMHG

## 2019-09-24 DIAGNOSIS — M05.79 RHEUMATOID ARTHRITIS OF MULTIPLE SITES WITHOUT ORGAN OR SYSTEM INVOLVEMENT WITH POSITIVE RHEUMATOID FACTOR: Primary | ICD-10-CM

## 2019-09-24 PROCEDURE — 99213 OFFICE O/P EST LOW 20 MIN: CPT | Mod: S$GLB,,, | Performed by: INTERNAL MEDICINE

## 2019-09-24 PROCEDURE — 99213 PR OFFICE/OUTPT VISIT, EST, LEVL III, 20-29 MIN: ICD-10-PCS | Mod: S$GLB,,, | Performed by: INTERNAL MEDICINE

## 2019-09-24 RX ORDER — HYDROXYCHLOROQUINE SULFATE 200 MG/1
200 TABLET, FILM COATED ORAL 2 TIMES DAILY
Qty: 180 TABLET | Refills: 1 | Status: SHIPPED | OUTPATIENT
Start: 2019-09-24 | End: 2020-08-12 | Stop reason: SDUPTHER

## 2019-09-24 NOTE — PROGRESS NOTES
[facility] RHEUMATOLOGY            PROGRESS NOTE      Subjective:       Patient ID:   NAME: Aurelia Marlow : 1950     69 y.o. female    Referring Doc: No ref. provider found  Other Physicians:    Chief Complaint:  Rheumatoid Arthritis      History of Present Illness:     Patient returns today for a regularly scheduled follow-up visit for  RA     The patient has no joint complaints except some swelling and slight pain on left 3rd MCP joint.  She has been off methotrexate now for a few months .Initially for mild leukopenia ( resolved after being off MTX) then for enlarged l inguinal lymph node.  She had biopsy 6 days ago.  She was still preliminary looked like lymphoma.  She has no fatigue no fevers no night sweats.  No weight loss.  No abdominal  Pain.  Has some swelling on the left foot since surgery.  No calf tenderness.  No chest pains cough or shortness of breath.  Patient is only on Plaquenil for her rheumatoid.        ROS:   GEN:  No  fever, night sweats . weight is stable   No fatigue  SKIN: no rashes, no bruising, no ulcerations, no Raynaud's  HEENT: no HA's, No visual changes, no mucosal ulcers, no sicca symptoms,  CV:   no CP, SOB, PND, CHAUDHRY, no orthopnea, no palpitations  PULM: normal with no SOB, cough, hemoptysis, sputum or pleuritic pain  GI:  no abdominal pain, nausea, vomiting, constipation, diarrhea, melanotic stools, BRBPR, hematemesis, no dysphagia  :   no dysuria  NEURO: no paresthesias, headaches, visual disturbances, muscle weakness  MUSCULOSKELETAL:+ sl L 3 MCP joint swelling, prolonged AM stiffness, no back pain, no muscle pain  Allergies:  Review of patient's allergies indicates:  No Known Allergies    Medications:    Current Outpatient Medications:     cholecalciferol, vitamin D3, (VITAMIN D3) 2,000 unit Tab, , Disp: , Rfl:     fluticasone propionate (FLONASE) 50 mcg/actuation nasal spray, , Disp: , Rfl:     folic acid (FOLVITE) 1 MG tablet, TAKE 1 TABLET BY MOUTH ONCE DAILY,  Disp: 30 tablet, Rfl: 5    HYDROcodone-acetaminophen (NORCO) 7.5-325 mg per tablet, Take 1 tablet by mouth every 6 (six) hours as needed for Pain., Disp: 30 tablet, Rfl: 0    hydroxychloroquine (PLAQUENIL) 200 mg tablet, Take 1 tablet (200 mg total) by mouth 2 (two) times daily., Disp: 180 tablet, Rfl: 1    levothyroxine (SYNTHROID) 100 MCG tablet, Take 100 mcg by mouth once daily., Disp: , Rfl:     naproxen (EC NAPROSYN) 500 MG EC tablet, TAKE ONE TABLET BY MOUTH TWICE DAILY AFTER MEALS AS NEEDED, Disp: 60 tablet, Rfl: 1    omeprazole 20 mg TbEC, , Disp: , Rfl:     traMADol (ULTRAM) 50 mg tablet, Take 1 tablet (50 mg total) by mouth every 12 (twelve) hours as needed for Pain. Dispense generic, Disp: 60 tablet, Rfl: 1    PMHx/PSHx Updates:        Objective:     Vitals:  Blood pressure 120/79, weight 67.7 kg (149 lb 4.8 oz).    Physical Examination:   GEN: no apparent distress, comfortable; AAOx3  SKIN: no rashes,no ulceration, no Raynaud's, no petechiae, no SQ nodules,  HEAD: normal  EYES: no pallor, no icterus  NECK: no masses, thyroid normal, trachea midline, no LAD/LN's, supple  CV: RRR with no murmur; l S1 and S2 reg. ,no gallop no rubs,   CHEST: Normal respiratory effort; CTAB; normal breath sounds; no wheeze or crackles  MUSC/Skeletal: ROM normal; no crepitus; L 3rd MCP joints with synovitis,  no deformities  No joint swelling or tenderness of PIP, MCP, wrist, elbow, shoulder, or knee joints  EXTREM: no clubbing, cyanosis, + L foot edema,normal  pulses   NEURO: grossly intact; motor WNL; AAOx3; ,  PSYCH: normal mood, affect and behavior  LYMPH: normal cervical, supraclavicular          Labs:   Lab Results   Component Value Date    WBC 5.12 09/11/2019    HGB 12.4 09/11/2019    HCT 37.6 09/11/2019    MCV 98 09/11/2019     09/11/2019    CMP  @[facility](NA,K,CL,CO2,GLU,BUN,Creatinine,Calcium,PROT,Albumin,Bilitot,Alkphos,AST,ALT,CRP,ESR,RF,CCP,SONJA,SSA,CPK,uric acid) )@  I have reviewed all available lab  results and radiology reports.    Radiology/Diagnostic Studies:        Assessment/Plan:   (1) 69 y.o. female with diagnosis of rheumatoid arthritis.  This is stable except for slight synovitis in left 3rd MCP joint.  She has been off methotrexate due to mild leukopenia which resolved after being off for a few  Weeks.  Methotrexate was not restarted in view of enlarged inguinal  lymph node.( she noticed it in April)  She had biopsy 6 days ago.Hs had CT abdomen/pelvis and chest.No other adenopathy.Had enlarged cyst in ovary.  Talked to pathologist Dr Ni: Lymph node bx:  Diffuse Large B cell lymphoma.  She will see Dr Jha .  She had as serum protein electrophoresis which showed monoclonal spike.  Will add serum immunoglobulins an immunofixation  Urinalysis  Will stop Plaquenil when she starts tx for lymphoma            Discussion:     I have explained all of the above in detail and the patient understands all of the current recommendation(s). I have answered all questions to the best of my ability and to their complete satisfaction.       The patient is to continue with the current management plan         RTC in   3 months or before if needed      Electronically signed by Adrián Hernandez MD

## 2019-09-25 ENCOUNTER — TELEPHONE (OUTPATIENT)
Dept: SURGERY | Facility: CLINIC | Age: 69
End: 2019-09-25

## 2019-09-27 ENCOUNTER — OFFICE VISIT (OUTPATIENT)
Dept: HEMATOLOGY/ONCOLOGY | Facility: CLINIC | Age: 69
End: 2019-09-27
Payer: MEDICARE

## 2019-09-27 ENCOUNTER — TELEPHONE (OUTPATIENT)
Dept: RADIOLOGY | Facility: HOSPITAL | Age: 69
End: 2019-09-27

## 2019-09-27 VITALS
BODY MASS INDEX: 25.41 KG/M2 | WEIGHT: 148.81 LBS | DIASTOLIC BLOOD PRESSURE: 87 MMHG | TEMPERATURE: 98 F | RESPIRATION RATE: 19 BRPM | HEART RATE: 105 BPM | HEIGHT: 64 IN | SYSTOLIC BLOOD PRESSURE: 142 MMHG

## 2019-09-27 DIAGNOSIS — C83.35 DIFFUSE LARGE B-CELL LYMPHOMA OF LYMPH NODES OF INGUINAL REGION: ICD-10-CM

## 2019-09-27 PROCEDURE — 99205 OFFICE O/P NEW HI 60 MIN: CPT | Mod: S$GLB,,, | Performed by: INTERNAL MEDICINE

## 2019-09-27 PROCEDURE — 99205 PR OFFICE/OUTPT VISIT, NEW, LEVL V, 60-74 MIN: ICD-10-PCS | Mod: S$GLB,,, | Performed by: INTERNAL MEDICINE

## 2019-09-27 NOTE — ASSESSMENT & PLAN NOTE
Ms. Marlow is a 68yo female newly diagnosed with DLBCL-[facility] type.  I had a long discussion with her about this disease and the further diagnostic work up needed and treatment to come.  I also spoke to the pathologist who read the report to verify this was not an aggressive subtype as the myc testing was not done.  He stated that given the very low Ki-67 and the morphological appearance of the tumor, the myc was not needed as he felt sure this was not an aggressive type.       The treatment will employ chemotherapy with the R-CHOP regimen and I will begin preparations for this.  If she has stage I/II disease then I will have her seen by radiation oncology to see if she would qualify for limited R-CHOP x 3/IFRT.  If however she is more advanced then R-CHOP x 6 will be the approach.      Will arrange echo, port placement, bone marrow biopsy, PET scan and MRI of sacral area(abnormality seen on CT).      I discussed all this in great detail with her as well as the prognosis which will be further defined after the above staging studies are complete.

## 2019-09-27 NOTE — PROGRESS NOTES
INITIAL [facility] HEM/ONC CONSULTATION      Subjective:       Patient ID: Aurelia Marlow is a 69 y.o. female.    Chief Complaint: No chief complaint on file.  New Diagnosis of Lymphoma    Ms. Marlow is a 70yo female a PMH of RA x 6 years and has been on MTX and Plaquanil over the past few years.  2 years ago she developed a waxing and waning swelling of her left knee.  She saw Dr. Pierre who did an MRI which showed abnormal bone marrow in the femur.  This was observed since that time.  On August 21, 2019 she found a lump in left inguinal area which was biopsied and shows diffuse large B cell lymphoma.      CT of the chest/a/p showed bilateral inguinal adenopathy, an S1 non-specific lesion and a mass in the adnexa, seen as an ovarian cyst on subsequent U/s.      Patient has no pain, no fever, NS or wt. Loss.         Past Medical History:   Diagnosis Date    Rheumatoid arthritis 2013    Thyroid disease 1990    on synthroid       Past Surgical History:   Procedure Laterality Date    BIOPSY OF INGUINAL LYMPH NODE Left 9/18/2019    Procedure: BIOPSY, LYMPH NODE, INGUINAL;  Surgeon: Kermit Martinez MD;  Location: [facility] OR;  Service: General;  Laterality: Left;    EXCISION RHEUMATOID NODULE Left 2012    FOOT    THYROIDECTOMY  1990           Social History     Socioeconomic History    Marital status:      Spouse name: Not on file    Number of children: Not on file    Years of education: Not on file    Highest education level: Not on file   Occupational History    Not on file   Social Needs    Financial resource strain: Not on file    Food insecurity:     Worry: Not on file     Inability: Not on file    Transportation needs:     Medical: Not on file     Non-medical: Not on file   Tobacco Use    Smoking status: Never Smoker    Smokeless tobacco: Never Used   Substance and Sexual Activity    Alcohol use: Yes    Drug use: No    Sexual activity: Not on file   Lifestyle    Physical activity:      Days per week: Not on file     Minutes per session: Not on file    Stress: Not on file   Relationships    Social connections:     Talks on phone: Not on file     Gets together: Not on file     Attends Buddhist service: Not on file     Active member of club or organization: Not on file     Attends meetings of clubs or organizations: Not on file     Relationship status: Not on file   Other Topics Concern    Not on file   Social History Narrative    Not on file       Family History   Problem Relation Age of Onset    No Known Problems Mother     No Known Problems Father     No Known Problems Maternal Grandmother     No Known Problems Maternal Grandfather     No Known Problems Paternal Grandmother     No Known Problems Paternal Grandfather     Diabetes Neg Hx        Review of patient's allergies indicates:  No Known Allergies    Current Outpatient Medications:     cholecalciferol, vitamin D3, (VITAMIN D3) 2,000 unit Tab, , Disp: , Rfl:     fluticasone propionate (FLONASE) 50 mcg/actuation nasal spray, , Disp: , Rfl:     HYDROcodone-acetaminophen (NORCO) 7.5-325 mg per tablet, Take 1 tablet by mouth every 6 (six) hours as needed for Pain., Disp: 30 tablet, Rfl: 0    levothyroxine (SYNTHROID) 100 MCG tablet, Take 100 mcg by mouth once daily., Disp: , Rfl:     naproxen (EC NAPROSYN) 500 MG EC tablet, TAKE ONE TABLET BY MOUTH TWICE DAILY AFTER MEALS AS NEEDED, Disp: 60 tablet, Rfl: 1    omeprazole 20 mg TbEC, , Disp: , Rfl:     traMADol (ULTRAM) 50 mg tablet, Take 1 tablet (50 mg total) by mouth every 12 (twelve) hours as needed for Pain. Dispense generic, Disp: 60 tablet, Rfl: 1    folic acid (FOLVITE) 1 MG tablet, TAKE 1 TABLET BY MOUTH ONCE DAILY, Disp: 30 tablet, Rfl: 5    hydroxychloroquine (PLAQUENIL) 200 mg tablet, Take 1 tablet (200 mg total) by mouth 2 (two) times daily., Disp: 180 tablet, Rfl: 1    All medications and past history have been reviewed.    Review of Systems   Constitutional:  "Negative for activity change, appetite change, diaphoresis, fatigue, fever and unexpected weight change.   HENT: Negative for congestion and hearing loss.    Eyes: Negative for visual disturbance.   Respiratory: Negative for cough, chest tightness and shortness of breath.    Cardiovascular: Negative for chest pain and leg swelling.   Gastrointestinal: Positive for diarrhea. Negative for abdominal pain, blood in stool, nausea and vomiting.   Endocrine: Negative for cold intolerance and heat intolerance.   Genitourinary: Negative for difficulty urinating, dysuria, frequency and hematuria.   Musculoskeletal: Negative for back pain.   Skin: Negative for rash.   Neurological: Negative for dizziness and headaches.   Hematological: Positive for adenopathy. Does not bruise/bleed easily.   Psychiatric/Behavioral: Negative for behavioral problems. The patient is nervous/anxious.        Objective:        BP (!) 142/87   Pulse 105   Temp 97.9 °F (36.6 °C) (Oral)   Resp 19   Ht 5' 4" (1.626 m)   Wt 67.5 kg (148 lb 12.8 oz)   BMI 25.54 kg/m²     Physical Exam   Constitutional: She appears well-developed and well-nourished.   HENT:   Head: Normocephalic and atraumatic.   Right Ear: External ear normal.   Left Ear: External ear normal.   Mouth/Throat: Oropharynx is clear and moist.   Eyes: Pupils are equal, round, and reactive to light. Conjunctivae are normal.   Neck: No tracheal deviation present. No thyromegaly present.   Cardiovascular: Normal rate, regular rhythm and normal heart sounds.   Pulmonary/Chest: Effort normal and breath sounds normal.   Abdominal: Soft. Bowel sounds are normal. She exhibits no distension and no mass. There is no tenderness.   Musculoskeletal: She exhibits no edema.   Neurological:   Neuro intact througout   Skin: No rash noted.   Psychiatric: She has a normal mood and affect. Her behavior is normal. Judgment and thought content normal.         Lab  No results found for this or any previous " visit (from the past 336 hour(s)).  CMP  Sodium   Date Value Ref Range Status   09/11/2019 140 136 - 145 mmol/L Final   06/11/2019 139 134 - 144 mmol/L      Potassium   Date Value Ref Range Status   09/11/2019 4.8 3.5 - 5.1 mmol/L Final     Chloride   Date Value Ref Range Status   09/11/2019 103 95 - 110 mmol/L Final   06/11/2019 104 98 - 110 mmol/L      CO2   Date Value Ref Range Status   09/11/2019 29 23 - 29 mmol/L Final     Glucose   Date Value Ref Range Status   09/11/2019 83 70 - 110 mg/dL Final   06/11/2019 86 70 - 99 mg/dL      BUN, Bld   Date Value Ref Range Status   09/11/2019 17 8 - 23 mg/dL Final     Creatinine   Date Value Ref Range Status   09/11/2019 0.8 0.5 - 1.4 mg/dL Final   06/11/2019 0.72 0.60 - 1.40 mg/dL      Calcium   Date Value Ref Range Status   09/11/2019 9.5 8.7 - 10.5 mg/dL Final     Total Protein   Date Value Ref Range Status   09/03/2019 7.9 6.0 - 8.5 g/dL Final   09/03/2019 7.9 6.0 - 8.4 g/dL Final     Albumin   Date Value Ref Range Status   09/03/2019 4.0 3.5 - 5.2 g/dL Final   06/11/2019 3.8 3.1 - 4.7 g/dL      Total Bilirubin   Date Value Ref Range Status   09/03/2019 1.0 0.1 - 1.0 mg/dL Final     Comment:     For infants and newborns, interpretation of results should be based  on gestational age, weight and in agreement with clinical  observations.  Premature Infant recommended reference ranges:  Up to 24 hours.............<8.0 mg/dL  Up to 48 hours............<12.0 mg/dL  3-5 days..................<15.0 mg/dL  6-29 days.................<15.0 mg/dL       Alkaline Phosphatase   Date Value Ref Range Status   09/03/2019 74 55 - 135 U/L Final     AST   Date Value Ref Range Status   09/03/2019 20 10 - 40 U/L Final     ALT   Date Value Ref Range Status   09/03/2019 13 10 - 44 U/L Final     Anion Gap   Date Value Ref Range Status   09/11/2019 8 8 - 16 mmol/L Final     eGFR if    Date Value Ref Range Status   09/11/2019 >60.0 >60 mL/min/1.73 m^2 Final     eGFR if non     Date Value Ref Range Status   09/11/2019 >60.0 >60 mL/min/1.73 m^2 Final     Comment:     Calculation used to obtain the estimated glomerular filtration  rate (eGFR) is the CKD-EPI equation.            Specimen (12h ago, onward)    None                All lab results and imaging results have been reviewed and discussed with the patient.     Assessment:       1. Diffuse large B-cell lymphoma of lymph nodes of inguinal region-[facility]      Problem List Items Addressed This Visit     Diffuse large B-cell lymphoma of lymph nodes of inguinal region-[facility]     Ms. Marlow is a 68yo female newly diagnosed with DLBCL-[facility] type.  I had a long discussion with her about this disease and the further diagnostic work up needed and treatment to come.  I also spoke to the pathologist who read the report to verify this was not an aggressive subtype as the myc testing was not done.  He stated that given the very low Ki-67 and the morphological appearance of the tumor, the myc was not needed as he felt sure this was not an aggressive type.       The treatment will employ chemotherapy with the R-CHOP regimen and I will begin preparations for this.  If she has stage I/II disease then I will have her seen by radiation oncology to see if she would qualify for limited R-CHOP x 3/IFRT.  If however she is more advanced then R-CHOP x 6 will be the approach.      Will arrange echo, port placement, bone marrow biopsy, PET scan and MRI of sacral area(abnormality seen on CT).      I discussed all this in great detail with her as well as the prognosis which will be further defined after the above staging studies are complete.           Relevant Orders    CT Dx Bone Marrow Biopsy(ies) w/ Aspiration: Left(XPD)    Echo Color Flow Doppler? Yes    Ambulatory referral to General Surgery    MRI Pelvis W WO Contrast    Lactate dehydrogenase    Hepatitis B surface antigen    Hepatitis B core antibody, total    NM  PET CT Routine Skull to Mid Thigh        Cancer Staging  No matching staging information was found for the patient.      Plan:         Follow up in about 3 weeks (around 10/18/2019).       The plan was discussed with the patient and all questions/concerns have been answered to the patient's satisfaction.

## 2019-09-30 ENCOUNTER — OFFICE VISIT (OUTPATIENT)
Dept: SURGERY | Facility: CLINIC | Age: 69
End: 2019-09-30
Payer: MEDICARE

## 2019-09-30 ENCOUNTER — LAB VISIT (OUTPATIENT)
Dept: LAB | Facility: HOSPITAL | Age: 69
End: 2019-09-30
Attending: INTERNAL MEDICINE
Payer: MEDICARE

## 2019-09-30 ENCOUNTER — CLINICAL SUPPORT (OUTPATIENT)
Dept: CARDIOLOGY | Facility: HOSPITAL | Age: 69
End: 2019-09-30
Attending: INTERNAL MEDICINE
Payer: MEDICARE

## 2019-09-30 VITALS
HEART RATE: 79 BPM | BODY MASS INDEX: 25.27 KG/M2 | HEIGHT: 64 IN | DIASTOLIC BLOOD PRESSURE: 88 MMHG | SYSTOLIC BLOOD PRESSURE: 149 MMHG | WEIGHT: 148 LBS | TEMPERATURE: 98 F

## 2019-09-30 DIAGNOSIS — C83.35 DIFFUSE LARGE B-CELL LYMPHOMA OF LYMPH NODES OF INGUINAL REGION: ICD-10-CM

## 2019-09-30 DIAGNOSIS — M05.79 RHEUMATOID ARTHRITIS OF MULTIPLE SITES WITHOUT ORGAN OR SYSTEM INVOLVEMENT WITH POSITIVE RHEUMATOID FACTOR: ICD-10-CM

## 2019-09-30 DIAGNOSIS — C83.35 DIFFUSE LARGE B-CELL LYMPHOMA OF LYMPH NODES OF INGUINAL REGION: Primary | ICD-10-CM

## 2019-09-30 LAB
AORTIC ROOT ANNULUS: 2.14 CM
AORTIC VALVE CUSP SEPERATION: 1.36 CM
AV INDEX (PROSTH): 0.81
AV MEAN GRADIENT: 4 MMHG
AV PEAK GRADIENT: 7 MMHG
AV VALVE AREA: 2.62 CM2
AV VELOCITY RATIO: 88.07
BSA FOR ECHO PROCEDURE: 1.74 M2
CV ECHO LV RWT: 0.36 CM
DOP CALC AO PEAK VEL: 1.36 M/S
DOP CALC AO VTI: 25.98 CM
DOP CALC LVOT AREA: 3.2 CM2
DOP CALC LVOT DIAMETER: 2.03 CM
DOP CALC LVOT PEAK VEL: 119.78 M/S
DOP CALC LVOT STROKE VOLUME: 68.06 CM3
DOP CALCLVOT PEAK VEL VTI: 21.04 CM
E WAVE DECELERATION TIME: 178.19 MSEC
E/A RATIO: 0.72
E/E' RATIO: 8.1 M/S
ECHO LV POSTERIOR WALL: 0.81 CM (ref 0.6–1.1)
FRACTIONAL SHORTENING: 37 % (ref 28–44)
INTERVENTRICULAR SEPTUM: 0.81 CM (ref 0.6–1.1)
LDH SERPL L TO P-CCNC: 163 U/L (ref 110–260)
LEFT ATRIUM SIZE: 2.25 CM
LEFT INTERNAL DIMENSION IN SYSTOLE: 2.86 CM (ref 2.1–4)
LEFT VENTRICLE MASS INDEX: 68 G/M2
LEFT VENTRICULAR INTERNAL DIMENSION IN DIASTOLE: 4.54 CM (ref 3.5–6)
LEFT VENTRICULAR MASS: 117.22 G
LV LATERAL E/E' RATIO: 9 M/S
LV SEPTAL E/E' RATIO: 7.36 M/S
MV PEAK A VEL: 1.12 M/S
MV PEAK E VEL: 0.81 M/S
PISA TR MAX VEL: 2.97 M/S
PV PEAK VELOCITY: 108.53 CM/S
RA PRESSURE: 3 MMHG
TDI LATERAL: 0.09 M/S
TDI SEPTAL: 0.11 M/S
TDI: 0.1 M/S
TR MAX PG: 35 MMHG
TV REST PULMONARY ARTERY PRESSURE: 38 MMHG

## 2019-09-30 PROCEDURE — 86704 HEP B CORE ANTIBODY TOTAL: CPT

## 2019-09-30 PROCEDURE — 36415 COLL VENOUS BLD VENIPUNCTURE: CPT

## 2019-09-30 PROCEDURE — 99213 PR OFFICE/OUTPT VISIT, EST, LEVL III, 20-29 MIN: ICD-10-PCS | Mod: S$PBB,,, | Performed by: SURGERY

## 2019-09-30 PROCEDURE — 99214 OFFICE O/P EST MOD 30 MIN: CPT | Mod: PBBFAC | Performed by: SURGERY

## 2019-09-30 PROCEDURE — 82784 ASSAY IGA/IGD/IGG/IGM EACH: CPT | Mod: 91

## 2019-09-30 PROCEDURE — 93306 TTE W/DOPPLER COMPLETE: CPT

## 2019-09-30 PROCEDURE — 83615 LACTATE (LD) (LDH) ENZYME: CPT

## 2019-09-30 PROCEDURE — 99213 OFFICE O/P EST LOW 20 MIN: CPT | Mod: S$PBB,,, | Performed by: SURGERY

## 2019-09-30 PROCEDURE — 99999 PR PBB SHADOW E&M-EST. PATIENT-LVL IV: ICD-10-PCS | Mod: PBBFAC,,, | Performed by: SURGERY

## 2019-09-30 PROCEDURE — 99999 PR PBB SHADOW E&M-EST. PATIENT-LVL IV: CPT | Mod: PBBFAC,,, | Performed by: SURGERY

## 2019-09-30 PROCEDURE — 87340 HEPATITIS B SURFACE AG IA: CPT

## 2019-09-30 RX ORDER — SODIUM CHLORIDE 9 MG/ML
INJECTION, SOLUTION INTRAVENOUS CONTINUOUS
Status: CANCELLED | OUTPATIENT
Start: 2019-09-30

## 2019-09-30 RX ORDER — LIDOCAINE HYDROCHLORIDE 10 MG/ML
1 INJECTION, SOLUTION EPIDURAL; INFILTRATION; INTRACAUDAL; PERINEURAL ONCE
Status: DISCONTINUED | OUTPATIENT
Start: 2019-09-30 | End: 2023-02-13

## 2019-09-30 NOTE — LETTER
September 30, 2019      Pb Nieto MD  [address]  [address]  [place] [address]           [facility]  [address]  [place] [address]  Phone: [phone]  Fax: [phone]          Patient: Aurelia Marlow   MR Number: [id number]   YOB: 1950   Date of Visit: 9/30/2019       Dear Dr. Pb Nieto:    Thank you for referring Aurelia Marlow to me for evaluation. Attached you will find relevant portions of my assessment and plan of care.    If you have questions, please do not hesitate to call me. I look forward to following Aurelia Marlow along with you.    Sincerely,    Kermit Martinez MD    Enclosure  CC:  No Recipients    If you would like to receive this communication electronically, please contact [email or web address] or [phone] to request more information on [facility] access.    For providers and/or their staff who would like to refer a patient to [facility], please contact us through our one-stop-shop provider referral line, [facility], at [phone].    If you feel you have received this communication in error or would no longer like to receive these types of communications, please e-mail [email or web address]

## 2019-09-30 NOTE — PROGRESS NOTES
Subjective:       Patient ID: Aurelia Marlow is a 69 y.o. female.    Chief Complaint: Other (Eval for port placement)      HPI:  Requiring chemotherapy for lymphoma presents for port placement.    Past Medical History:   Diagnosis Date    Diffuse large B-cell lymphoma of lymph nodes of inguinal region 2019    Rheumatoid arthritis 2013    Thyroid disease 1990    on synthroid     Past Surgical History:   Procedure Laterality Date    BIOPSY OF INGUINAL LYMPH NODE Left 9/18/2019    Procedure: BIOPSY, LYMPH NODE, INGUINAL;  Surgeon: Kermit Martinez MD;  Location: [facility];  Service: General;  Laterality: Left;    EXCISION RHEUMATOID NODULE Left 2012    FOOT    THYROIDECTOMY  1990    partial    Dr. Harvey     Review of patient's allergies indicates:  No Known Allergies  Medication List with Changes/Refills   Current Medications    CHOLECALCIFEROL, VITAMIN D3, (VITAMIN D3) 2,000 UNIT TAB        FLUTICASONE PROPIONATE (FLONASE) 50 MCG/ACTUATION NASAL SPRAY        FOLIC ACID (FOLVITE) 1 MG TABLET    TAKE 1 TABLET BY MOUTH ONCE DAILY    HYDROCODONE-ACETAMINOPHEN (NORCO) 7.5-325 MG PER TABLET    Take 1 tablet by mouth every 6 (six) hours as needed for Pain.    HYDROXYCHLOROQUINE (PLAQUENIL) 200 MG TABLET    Take 1 tablet (200 mg total) by mouth 2 (two) times daily.    LEVOTHYROXINE (SYNTHROID) 100 MCG TABLET    Take 100 mcg by mouth once daily.    NAPROXEN (EC NAPROSYN) 500 MG EC TABLET    TAKE ONE TABLET BY MOUTH TWICE DAILY AFTER MEALS AS NEEDED    OMEPRAZOLE 20 MG TBEC        TRAMADOL (ULTRAM) 50 MG TABLET    Take 1 tablet (50 mg total) by mouth every 12 (twelve) hours as needed for Pain. Dispense generic     Family History   Problem Relation Age of Onset    No Known Problems Mother     No Known Problems Father     No Known Problems Maternal Grandmother     No Known Problems Maternal Grandfather     No Known Problems Paternal Grandmother     No Known Problems Paternal Grandfather     Diabetes Neg Hx       Social History     Socioeconomic History    Marital status:      Spouse name: Not on file    Number of children: Not on file    Years of education: Not on file    Highest education level: Not on file   Occupational History    Not on file   Social Needs    Financial resource strain: Not on file    Food insecurity:     Worry: Not on file     Inability: Not on file    Transportation needs:     Medical: Not on file     Non-medical: Not on file   Tobacco Use    Smoking status: Never Smoker    Smokeless tobacco: Never Used   Substance and Sexual Activity    Alcohol use: Yes    Drug use: No    Sexual activity: Not on file   Lifestyle    Physical activity:     Days per week: Not on file     Minutes per session: Not on file    Stress: Not on file   Relationships    Social connections:     Talks on phone: Not on file     Gets together: Not on file     Attends Druze service: Not on file     Active member of club or organization: Not on file     Attends meetings of clubs or organizations: Not on file     Relationship status: Not on file   Other Topics Concern    Not on file   Social History Narrative    Not on file         Review of Systems    Objective:      Physical Exam   Constitutional: She appears well-developed and well-nourished.  Non-toxic appearance. No distress.   HENT:   Head: Normocephalic and atraumatic. Head is without abrasion and without laceration.   Right Ear: External ear normal.   Left Ear: External ear normal.   Nose: Nose normal.   Mouth/Throat: Oropharynx is clear and moist.   Eyes: Pupils are equal, round, and reactive to light. EOM are normal.   Neck: Trachea normal. No tracheal deviation and normal range of motion present. No thyroid mass and no thyromegaly present.   Cardiovascular: Normal rate and regular rhythm.   Pulmonary/Chest: Effort normal. No accessory muscle usage. No tachypnea. No respiratory distress.   Abdominal: Soft. Normal appearance and bowel sounds are  normal. She exhibits no distension and no mass. There is no hepatosplenomegaly. There is no tenderness. There is no tenderness at McBurney's point and negative Baxter's sign. No hernia.   Lymphadenopathy:     She has no cervical adenopathy.     She has no axillary adenopathy.        Right: No inguinal adenopathy present.        Left: No inguinal adenopathy present.   Neurological: She is alert. Coordination and gait normal.   Skin: Skin is warm and intact.   Psychiatric: She has a normal mood and affect. Her speech is normal and behavior is normal.       Assessment/Plan:   Diffuse large B-cell lymphoma of lymph nodes of inguinal region[facility]  -     Full code; Standing  -     Insert peripheral IV; Standing  -     Case Request Operating Room: [id number]    Other orders  -     lidocaine (PF) 10 mg/ml (1%) injection 10 mg        Planned procedure: port on the left    Ancef 2 gm IV on call to OR unless allergic, if allergic use Vancomycin per pharmacy dosing    NPO past midnight    Iván cloth scrub per protocol    SCDs Bilateral Lower Extremities    I discussed the proposed procedures the the patient including risks, benefits, indications, alternatives and special concerns.  The patient appears to understand and agrees to go ahead with surgery.  I have made no promises, warranties or verbal agreements beyond what was discussed above.    No follow-ups on file.

## 2019-09-30 NOTE — H&P (VIEW-ONLY)
Subjective:       Patient ID: Aurelia Marlow is a 69 y.o. female.    Chief Complaint: Other (Eval for port placement)      HPI:  Requiring chemotherapy for lymphoma presents for port placement.    Past Medical History:   Diagnosis Date    Diffuse large B-cell lymphoma of lymph nodes of inguinal region 2019    Rheumatoid arthritis 2013    Thyroid disease 1990    on synthroid     Past Surgical History:   Procedure Laterality Date    BIOPSY OF INGUINAL LYMPH NODE Left 9/18/2019    Procedure: BIOPSY, LYMPH NODE, INGUINAL;  Surgeon: Kermit Martinez MD;  [facility];  Service: General;  Laterality: Left;    EXCISION RHEUMATOID NODULE Left 2012    FOOT    THYROIDECTOMY  1990    partial    Dr. Harvey     Review of patient's allergies indicates:  No Known Allergies  Medication List with Changes/Refills   Current Medications    CHOLECALCIFEROL, VITAMIN D3, (VITAMIN D3) 2,000 UNIT TAB        FLUTICASONE PROPIONATE (FLONASE) 50 MCG/ACTUATION NASAL SPRAY        FOLIC ACID (FOLVITE) 1 MG TABLET    TAKE 1 TABLET BY MOUTH ONCE DAILY    HYDROCODONE-ACETAMINOPHEN (NORCO) 7.5-325 MG PER TABLET    Take 1 tablet by mouth every 6 (six) hours as needed for Pain.    HYDROXYCHLOROQUINE (PLAQUENIL) 200 MG TABLET    Take 1 tablet (200 mg total) by mouth 2 (two) times daily.    LEVOTHYROXINE (SYNTHROID) 100 MCG TABLET    Take 100 mcg by mouth once daily.    NAPROXEN (EC NAPROSYN) 500 MG EC TABLET    TAKE ONE TABLET BY MOUTH TWICE DAILY AFTER MEALS AS NEEDED    OMEPRAZOLE 20 MG TBEC        TRAMADOL (ULTRAM) 50 MG TABLET    Take 1 tablet (50 mg total) by mouth every 12 (twelve) hours as needed for Pain. Dispense generic     Family History   Problem Relation Age of Onset    No Known Problems Mother     No Known Problems Father     No Known Problems Maternal Grandmother     No Known Problems Maternal Grandfather     No Known Problems Paternal Grandmother     No Known Problems Paternal Grandfather     Diabetes Neg Hx       Social History     Socioeconomic History    Marital status:      Spouse name: Not on file    Number of children: Not on file    Years of education: Not on file    Highest education level: Not on file   Occupational History    Not on file   Social Needs    Financial resource strain: Not on file    Food insecurity:     Worry: Not on file     Inability: Not on file    Transportation needs:     Medical: Not on file     Non-medical: Not on file   Tobacco Use    Smoking status: Never Smoker    Smokeless tobacco: Never Used   Substance and Sexual Activity    Alcohol use: Yes    Drug use: No    Sexual activity: Not on file   Lifestyle    Physical activity:     Days per week: Not on file     Minutes per session: Not on file    Stress: Not on file   Relationships    Social connections:     Talks on phone: Not on file     Gets together: Not on file     Attends Anglican service: Not on file     Active member of club or organization: Not on file     Attends meetings of clubs or organizations: Not on file     Relationship status: Not on file   Other Topics Concern    Not on file   Social History Narrative    Not on file         Review of Systems    Objective:      Physical Exam   Constitutional: She appears well-developed and well-nourished.  Non-toxic appearance. No distress.   HENT:   Head: Normocephalic and atraumatic. Head is without abrasion and without laceration.   Right Ear: External ear normal.   Left Ear: External ear normal.   Nose: Nose normal.   Mouth/Throat: Oropharynx is clear and moist.   Eyes: Pupils are equal, round, and reactive to light. EOM are normal.   Neck: Trachea normal. No tracheal deviation and normal range of motion present. No thyroid mass and no thyromegaly present.   Cardiovascular: Normal rate and regular rhythm.   Pulmonary/Chest: Effort normal. No accessory muscle usage. No tachypnea. No respiratory distress.   Abdominal: Soft. Normal appearance and bowel sounds are  normal. She exhibits no distension and no mass. There is no hepatosplenomegaly. There is no tenderness. There is no tenderness at McBurney's point and negative Baxter's sign. No hernia.   Lymphadenopathy:     She has no cervical adenopathy.     She has no axillary adenopathy.        Right: No inguinal adenopathy present.        Left: No inguinal adenopathy present.   Neurological: She is alert. Coordination and gait normal.   Skin: Skin is warm and intact.   Psychiatric: She has a normal mood and affect. Her speech is normal and behavior is normal.       Assessment/Plan:   Diffuse large B-cell lymphoma of lymph nodes of inguinal region[facility]  -     Full code; Standing  -     Insert peripheral IV; Standing  -     Case Request Operating Room: [id number]    Other orders  -     lidocaine (PF) 10 mg/ml (1%) injection 10 mg        Planned procedure: port on the left    Ancef 2 gm IV on call to OR unless allergic, if allergic use Vancomycin per pharmacy dosing    NPO past midnight    Iván cloth scrub per protocol    SCDs Bilateral Lower Extremities    I discussed the proposed procedures the the patient including risks, benefits, indications, alternatives and special concerns.  The patient appears to understand and agrees to go ahead with surgery.  I have made no promises, warranties or verbal agreements beyond what was discussed above.    No follow-ups on file.

## 2019-10-01 ENCOUNTER — HOSPITAL ENCOUNTER (OUTPATIENT)
Dept: PREADMISSION TESTING | Facility: HOSPITAL | Age: 69
Discharge: HOME | End: 2019-10-01
Attending: SURGERY
Payer: MEDICARE

## 2019-10-01 VITALS
WEIGHT: 152.25 LBS | TEMPERATURE: 97 F | BODY MASS INDEX: 24.47 KG/M2 | RESPIRATION RATE: 18 BRPM | DIASTOLIC BLOOD PRESSURE: 86 MMHG | HEIGHT: 66 IN | HEART RATE: 80 BPM | OXYGEN SATURATION: 97 % | SYSTOLIC BLOOD PRESSURE: 129 MMHG

## 2019-10-01 LAB
HBV CORE AB SERPL QL IA: NEGATIVE
HBV SURFACE AG SERPL QL IA: NEGATIVE

## 2019-10-02 ENCOUNTER — HOSPITAL ENCOUNTER (OUTPATIENT)
Facility: HOSPITAL | Age: 69
Discharge: HOME | End: 2019-10-02
Attending: SURGERY | Admitting: SURGERY
Payer: MEDICARE

## 2019-10-02 ENCOUNTER — HOSPITAL ENCOUNTER (OUTPATIENT)
Dept: RADIOLOGY | Facility: HOSPITAL | Age: 69
Discharge: HOME | End: 2019-10-02
Attending: SURGERY | Admitting: SURGERY
Payer: MEDICARE

## 2019-10-02 ENCOUNTER — ANESTHESIA EVENT (OUTPATIENT)
Dept: SURGERY | Facility: HOSPITAL | Age: 69
End: 2019-10-02
Payer: MEDICARE

## 2019-10-02 ENCOUNTER — ANESTHESIA (OUTPATIENT)
Dept: SURGERY | Facility: HOSPITAL | Age: 69
End: 2019-10-02
Payer: MEDICARE

## 2019-10-02 VITALS
BODY MASS INDEX: 24.45 KG/M2 | RESPIRATION RATE: 15 BRPM | SYSTOLIC BLOOD PRESSURE: 136 MMHG | HEIGHT: 66 IN | WEIGHT: 152.13 LBS | DIASTOLIC BLOOD PRESSURE: 90 MMHG | HEART RATE: 76 BPM | OXYGEN SATURATION: 98 % | TEMPERATURE: 98 F

## 2019-10-02 DIAGNOSIS — C83.35 DIFFUSE LARGE B-CELL LYMPHOMA OF LYMPH NODES OF INGUINAL REGION: ICD-10-CM

## 2019-10-02 DIAGNOSIS — Z45.2 ENCOUNTER FOR INSERTION OF VENOUS ACCESS PORT: ICD-10-CM

## 2019-10-02 LAB
IGA SERPL-MCNC: 161 MG/DL (ref 87–352)
IGG SERPL-MCNC: 1714 MG/DL (ref 700–1600)
IGM SERPL-MCNC: 81 MG/DL (ref 26–217)
PROT PATTERN SERPL IFE-IMP: ABNORMAL

## 2019-10-02 PROCEDURE — 27202103: Performed by: ANESTHESIOLOGY

## 2019-10-02 PROCEDURE — C1713 ANCHOR/SCREW BN/BN,TIS/BN: HCPCS | Performed by: SURGERY

## 2019-10-02 PROCEDURE — 63600175 PHARM REV CODE 636 W HCPCS: Performed by: NURSE ANESTHETIST, CERTIFIED REGISTERED

## 2019-10-02 PROCEDURE — 37000009 HC ANESTHESIA EA ADD 15 MINS: Performed by: SURGERY

## 2019-10-02 PROCEDURE — 36561 PR INSERT TUNNELED CV CATH WITH PORT: ICD-10-PCS | Mod: LT,,, | Performed by: SURGERY

## 2019-10-02 PROCEDURE — 36561 INSERT TUNNELED CV CATH: CPT | Mod: LT,,, | Performed by: SURGERY

## 2019-10-02 PROCEDURE — 63600175 PHARM REV CODE 636 W HCPCS: Performed by: SURGERY

## 2019-10-02 PROCEDURE — 36000706: Performed by: SURGERY

## 2019-10-02 PROCEDURE — 63600175 PHARM REV CODE 636 W HCPCS: Performed by: STUDENT IN AN ORGANIZED HEALTH CARE EDUCATION/TRAINING PROGRAM

## 2019-10-02 PROCEDURE — 71000016 HC POSTOP RECOV ADDL HR: Performed by: SURGERY

## 2019-10-02 PROCEDURE — 77001 FLUOROGUIDE FOR VEIN DEVICE: CPT | Mod: TC

## 2019-10-02 PROCEDURE — 77001 FLUOROGUIDE FOR VEIN DEVICE: CPT | Mod: 26,,, | Performed by: SURGERY

## 2019-10-02 PROCEDURE — 27000671 HC TUBING MICROBORE EXT: Performed by: ANESTHESIOLOGY

## 2019-10-02 PROCEDURE — 71000033 HC RECOVERY, INTIAL HOUR: Performed by: SURGERY

## 2019-10-02 PROCEDURE — 27201107 HC STYLET, STANDARD: Performed by: ANESTHESIOLOGY

## 2019-10-02 PROCEDURE — 27000654 HC CATH IV JELCO: Performed by: ANESTHESIOLOGY

## 2019-10-02 PROCEDURE — C1788 PORT, INDWELLING, IMP: HCPCS | Performed by: SURGERY

## 2019-10-02 PROCEDURE — 27000673 HC TUBING BLOOD Y: Performed by: ANESTHESIOLOGY

## 2019-10-02 PROCEDURE — 25000003 PHARM REV CODE 250: Performed by: NURSE ANESTHETIST, CERTIFIED REGISTERED

## 2019-10-02 PROCEDURE — 37000008 HC ANESTHESIA 1ST 15 MINUTES: Performed by: SURGERY

## 2019-10-02 PROCEDURE — 25000003 PHARM REV CODE 250: Performed by: SURGERY

## 2019-10-02 PROCEDURE — S0028 INJECTION, FAMOTIDINE, 20 MG: HCPCS | Performed by: NURSE ANESTHETIST, CERTIFIED REGISTERED

## 2019-10-02 PROCEDURE — 27202105 HC BIS BILATERAL SENSOR: Performed by: ANESTHESIOLOGY

## 2019-10-02 PROCEDURE — 77001 CHG FLUOROGUIDE CNTRL VEN ACCESS,PLACE,REPLACE,REMOVE: ICD-10-PCS | Mod: 26,,, | Performed by: SURGERY

## 2019-10-02 PROCEDURE — 71000015 HC POSTOP RECOV 1ST HR: Performed by: SURGERY

## 2019-10-02 PROCEDURE — 36000707: Performed by: SURGERY

## 2019-10-02 DEVICE — PORT PLASTIC MRI PREATTACHED 9.6FR: Type: IMPLANTABLE DEVICE | Site: SUBCLAVIAN | Status: FUNCTIONAL

## 2019-10-02 RX ORDER — HEPARIN SODIUM 1000 [USP'U]/ML
INJECTION, SOLUTION INTRAVENOUS; SUBCUTANEOUS
Status: DISCONTINUED | OUTPATIENT
Start: 2019-10-02 | End: 2019-10-02 | Stop reason: HOSPADM

## 2019-10-02 RX ORDER — LIDOCAINE HYDROCHLORIDE 10 MG/ML
INJECTION INFILTRATION; PERINEURAL
Status: DISCONTINUED | OUTPATIENT
Start: 2019-10-02 | End: 2019-10-02 | Stop reason: HOSPADM

## 2019-10-02 RX ORDER — OXYCODONE HYDROCHLORIDE 5 MG/1
5 TABLET ORAL
Status: DISCONTINUED | OUTPATIENT
Start: 2019-10-02 | End: 2019-10-02 | Stop reason: HOSPADM

## 2019-10-02 RX ORDER — PROPOFOL 10 MG/ML
VIAL (ML) INTRAVENOUS
Status: DISCONTINUED | OUTPATIENT
Start: 2019-10-02 | End: 2019-10-02

## 2019-10-02 RX ORDER — CEFAZOLIN SODIUM 1 G/3ML
INJECTION, POWDER, FOR SOLUTION INTRAMUSCULAR; INTRAVENOUS
Status: DISCONTINUED | OUTPATIENT
Start: 2019-10-02 | End: 2019-10-02 | Stop reason: HOSPADM

## 2019-10-02 RX ORDER — ROCURONIUM BROMIDE 10 MG/ML
INJECTION, SOLUTION INTRAVENOUS
Status: DISCONTINUED | OUTPATIENT
Start: 2019-10-02 | End: 2019-10-02

## 2019-10-02 RX ORDER — DIPHENHYDRAMINE HYDROCHLORIDE 50 MG/ML
25 INJECTION INTRAMUSCULAR; INTRAVENOUS EVERY 6 HOURS PRN
Status: DISCONTINUED | OUTPATIENT
Start: 2019-10-02 | End: 2019-10-02 | Stop reason: HOSPADM

## 2019-10-02 RX ORDER — SODIUM CHLORIDE 9 MG/ML
INJECTION, SOLUTION INTRAVENOUS CONTINUOUS
Status: COMPLETED | OUTPATIENT
Start: 2019-10-02 | End: 2019-10-02

## 2019-10-02 RX ORDER — SUCCINYLCHOLINE CHLORIDE 20 MG/ML
INJECTION INTRAMUSCULAR; INTRAVENOUS
Status: DISCONTINUED | OUTPATIENT
Start: 2019-10-02 | End: 2019-10-02

## 2019-10-02 RX ORDER — SODIUM CHLORIDE, SODIUM LACTATE, POTASSIUM CHLORIDE, CALCIUM CHLORIDE 600; 310; 30; 20 MG/100ML; MG/100ML; MG/100ML; MG/100ML
INJECTION, SOLUTION INTRAVENOUS CONTINUOUS PRN
Status: DISCONTINUED | OUTPATIENT
Start: 2019-10-02 | End: 2019-10-02

## 2019-10-02 RX ORDER — DEXAMETHASONE SODIUM PHOSPHATE 4 MG/ML
INJECTION, SOLUTION INTRA-ARTICULAR; INTRALESIONAL; INTRAMUSCULAR; INTRAVENOUS; SOFT TISSUE
Status: DISCONTINUED | OUTPATIENT
Start: 2019-10-02 | End: 2019-10-02

## 2019-10-02 RX ORDER — CEFAZOLIN SODIUM 2 G/50ML
2 SOLUTION INTRAVENOUS
Status: COMPLETED | OUTPATIENT
Start: 2019-10-02 | End: 2019-10-02

## 2019-10-02 RX ORDER — HYDROCODONE BITARTRATE AND ACETAMINOPHEN 7.5; 325 MG/1; MG/1
1 TABLET ORAL EVERY 4 HOURS PRN
Qty: 30 TABLET | Refills: 0 | Status: ON HOLD
Start: 2019-10-02 | End: 2019-10-10

## 2019-10-02 RX ORDER — HYDROMORPHONE HYDROCHLORIDE 1 MG/ML
0.2 INJECTION, SOLUTION INTRAMUSCULAR; INTRAVENOUS; SUBCUTANEOUS EVERY 5 MIN PRN
Status: DISCONTINUED | OUTPATIENT
Start: 2019-10-02 | End: 2019-10-02 | Stop reason: HOSPADM

## 2019-10-02 RX ORDER — FENTANYL CITRATE 50 UG/ML
INJECTION, SOLUTION INTRAMUSCULAR; INTRAVENOUS
Status: DISCONTINUED | OUTPATIENT
Start: 2019-10-02 | End: 2019-10-02

## 2019-10-02 RX ORDER — LIDOCAINE HYDROCHLORIDE 20 MG/ML
INJECTION, SOLUTION EPIDURAL; INFILTRATION; INTRACAUDAL; PERINEURAL
Status: DISCONTINUED | OUTPATIENT
Start: 2019-10-02 | End: 2019-10-02

## 2019-10-02 RX ORDER — MIDAZOLAM HYDROCHLORIDE 1 MG/ML
INJECTION INTRAMUSCULAR; INTRAVENOUS
Status: DISCONTINUED | OUTPATIENT
Start: 2019-10-02 | End: 2019-10-02

## 2019-10-02 RX ORDER — ONDANSETRON 2 MG/ML
4 INJECTION INTRAMUSCULAR; INTRAVENOUS DAILY PRN
Status: DISCONTINUED | OUTPATIENT
Start: 2019-10-02 | End: 2019-10-02 | Stop reason: HOSPADM

## 2019-10-02 RX ORDER — FAMOTIDINE 10 MG/ML
INJECTION INTRAVENOUS
Status: DISCONTINUED | OUTPATIENT
Start: 2019-10-02 | End: 2019-10-02

## 2019-10-02 RX ADMIN — SODIUM CHLORIDE, SODIUM LACTATE, POTASSIUM CHLORIDE, AND CALCIUM CHLORIDE: .6; .31; .03; .02 INJECTION, SOLUTION INTRAVENOUS at 08:10

## 2019-10-02 RX ADMIN — DEXAMETHASONE SODIUM PHOSPHATE 4 MG: 4 INJECTION, SOLUTION INTRAMUSCULAR; INTRAVENOUS at 09:10

## 2019-10-02 RX ADMIN — SUCCINYLCHOLINE CHLORIDE 100 MG: 20 INJECTION, SOLUTION INTRAMUSCULAR; INTRAVENOUS at 09:10

## 2019-10-02 RX ADMIN — FENTANYL CITRATE 25 MCG: 50 INJECTION INTRAMUSCULAR; INTRAVENOUS at 09:10

## 2019-10-02 RX ADMIN — PROPOFOL 20 MG: 10 INJECTION, EMULSION INTRAVENOUS at 09:10

## 2019-10-02 RX ADMIN — CEFAZOLIN SODIUM 2 G: 2 SOLUTION INTRAVENOUS at 09:10

## 2019-10-02 RX ADMIN — FAMOTIDINE 20 MG: 10 INJECTION, SOLUTION INTRAVENOUS at 09:10

## 2019-10-02 RX ADMIN — ONDANSETRON 4 MG: 2 INJECTION INTRAMUSCULAR; INTRAVENOUS at 09:10

## 2019-10-02 RX ADMIN — ROCURONIUM BROMIDE 5 MG: 10 INJECTION, SOLUTION INTRAVENOUS at 09:10

## 2019-10-02 RX ADMIN — PROPOFOL 100 MG: 10 INJECTION, EMULSION INTRAVENOUS at 09:10

## 2019-10-02 RX ADMIN — MIDAZOLAM HYDROCHLORIDE 1 MG: 1 INJECTION, SOLUTION INTRAMUSCULAR; INTRAVENOUS at 09:10

## 2019-10-02 RX ADMIN — LIDOCAINE HYDROCHLORIDE 70 MG: 20 INJECTION, SOLUTION EPIDURAL; INFILTRATION; INTRACAUDAL; PERINEURAL at 09:10

## 2019-10-02 NOTE — OP NOTE
Date of service is 10/02/2019    Preoperative diagnosis:  Lymphoma    Postoperative diagnosis:  Same    Procedure:  Port placement    Description technical procedure and findings:    Patient placed in Trendelenburg position. Prepped and draped in usual sterile manner. Left IJ vein was cannulated with a 19 gauge thin wall needle.  Guidewire placed into superior vena cava under direct fluoroscopic guidance.  Subcutaneous pocket was created in the left aspect of anterior chest wall. Pre attached port inserted pocket and secured in place with interrupted 2 0 silk sutures. Catheter portion of the Port-A-Cath was brought through a subcutaneous tunnel to the puncture site. Catheter was trimmed to appropriate length and placed into superior vena cava under direct fluoroscopic guidance via peel-away sheath introducer.  Port was aspirated obtaining excellent blood return.  Wounds were irrigated with antibiotic impregnated solution. Wounds were closed in multiple layers of 4 Monocryl sutures.

## 2019-10-02 NOTE — INTERVAL H&P NOTE
The patient has been examined and the H&P has been reviewed:    I concur with the findings and no changes have occurred since H&P was written.    Anesthesia/Surgery risks, benefits and alternative options discussed and understood by patient/family.          Active Hospital Problems    Diagnosis  POA    Diffuse large B-cell lymphoma of lymph nodes of inguinal region[facility] [C83.35]  Yes      Resolved Hospital Problems   No resolved problems to display.

## 2019-10-02 NOTE — ANESTHESIA POSTPROCEDURE EVALUATION
Anesthesia Post Evaluation    Patient: Aurelia Marlow    Procedure(s) Performed: Procedure(s) (LRB):  [id number] (Left)    Final Anesthesia Type: general  Patient location during evaluation: PACU  Patient participation: Yes- Able to Participate  Level of consciousness: awake and alert and oriented  Post-procedure vital signs: reviewed and stable  Pain management: adequate  Airway patency: patent  PONV status at discharge: No PONV  Anesthetic complications: no      Cardiovascular status: blood pressure returned to baseline and hemodynamically stable  Respiratory status: unassisted, spontaneous ventilation and room air  Hydration status: euvolemic  Follow-up not needed.          Vitals Value Taken Time   /71 10/2/2019 10:10 AM   Temp 36.4 °C (97.5 °F) 10/2/2019 10:10 AM   Pulse 75 10/2/2019 10:22 AM   Resp 22 10/2/2019 10:22 AM   SpO2 100 % 10/2/2019 10:22 AM   Vitals shown include unvalidated device data.      No case tracking events are documented in the log.      Pain/Ambreen Score: Ambreen Score: 9 (10/2/2019 10:10 AM)      CXR in PACU, no pneumothorax, line in good position.   Release to [facility].  [facility]

## 2019-10-02 NOTE — DISCHARGE INSTRUCTIONS
NO DRIVING, DRINKING ALCOHOL AND NO SIGNING LEGAL DOCUMENTS FOR 24 HOURS OR WHILE TAKING PAIN MEDICATIONS.  DO NOT SHOWER FOR 24 HOURS.  KEEP DRESSING DRY TO PREVENT INFECTION, REMOVE DRESSING AFTER 48 HOURS AND DO NOT REPLACE DRESSING ONCE IT IS REMOVED   NOTIFY YOUR DOCTOR FOR UNCONTROLLED PAIN, TEMPERATURE GREATER THAN 100.5 AND/OR UNCONTROLLED NAUSEA/VOMITING

## 2019-10-02 NOTE — BRIEF OP NOTE
[facility]  Brief Operative Note     SUMMARY     Surgery Date: 10/2/2019     Surgeon(s) and Role:     * Kermit Martinez MD - Primary    Assisting Surgeon: None    Pre-op Diagnosis:  Diffuse large B-cell lymphoma of lymph nodes of inguinal region [C83.35]    Post-op Diagnosis:  Post-Op Diagnosis Codes:     * Diffuse large B-cell lymphoma of lymph nodes of inguinal region [C83.35]    Procedure(s) (LRB):  [id number] (Left)    Anesthesia: General    Description of the findings of the procedure:  See dictation    Findings/Key Components:  See dictation    Estimated Blood Loss: * No values recorded between 10/2/2019  9:37 AM and 10/2/2019 10:03 AM *         Specimens:   Specimen (12h ago, onward)    None          Discharge Note    SUMMARY     Admit Date: 10/2/2019    Discharge Date and Time:  10/02/2019 10:04 AM    Hospital Course (synopsis of major diagnoses, care, treatment, and services provided during the course of the hospital stay): x     Final Diagnosis: Post-Op Diagnosis Codes:     * Diffuse large B-cell lymphoma of lymph nodes of inguinal region [C83.35]    Disposition: Home or Self Care    Follow Up/Patient Instructions:     Medications:  Reconciled Home Medications:      Medication List      CONTINUE taking these medications    fluticasone propionate 50 mcg/actuation nasal spray  Commonly known as:  FLONASE     hydroxychloroquine 200 mg tablet  Commonly known as:  PLAQUENIL  Take 1 tablet (200 mg total) by mouth 2 (two) times daily.     levothyroxine 100 MCG tablet  Commonly known as:  SYNTHROID  Take 100 mcg by mouth once daily.     naproxen 500 MG EC tablet  Commonly known as:  EC NAPROSYN  TAKE ONE TABLET BY MOUTH TWICE DAILY AFTER MEALS AS NEEDED     omeprazole 20 mg Tbec  Take 20 mg by mouth every evening.     traMADol 50 mg tablet  Commonly known as:  ULTRAM  Take 1 tablet (50 mg total) by mouth every 12 (twelve) hours as needed for Pain. Dispense generic     VITAMIN D3 2,000  unit Tab  Generic drug:  cholecalciferol (vitamin D3)          Discharge Procedure Orders   Diet general     Remove dressing in 48 hours     Activity as tolerated

## 2019-10-02 NOTE — PLAN OF CARE
Awake truong eq strength no co pain or sob , lt chest wall dressing dry and in tact , stat chest xray done and read bt dr schaffer states chest xray good

## 2019-10-02 NOTE — TRANSFER OF CARE
"Anesthesia Transfer of Care Note    Patient: Aurelia Marlow    Procedure(s) Performed: Procedure(s) (LRB):  [id number] (Left)    Patient location: PACU    Anesthesia Type: general    Transport from OR: Transported from OR on room air with adequate spontaneous ventilation    Post pain: adequate analgesia    Post assessment: no apparent anesthetic complications    Post vital signs: stable    Level of consciousness: awake, alert and oriented    Nausea/Vomiting: no nausea/vomiting    Complications: none    Transfer of care protocol was followed      Last vitals:   Visit Vitals  /74 (BP Location: Right arm, Patient Position: Lying)   Pulse 71   Temp 36.4 °C (97.5 °F) (Temporal)   Resp 16   Ht 5' 6" (1.676 m)   Wt 69 kg (152 lb 2 oz)   SpO2 100%   Breastfeeding? No   BMI 24.55 kg/m²     "

## 2019-10-02 NOTE — ANESTHESIA PREPROCEDURE EVALUATION
10/02/2019  Aurelia Marlow is a 69 y.o., female.  Patient Active Problem List   Diagnosis    Arthritis of left wrist    Inguinal adenopathy    Diffuse large B-cell lymphoma of lymph nodes of inguinal region[facility]       Past Surgical History:   Procedure Laterality Date    BIOPSY OF INGUINAL LYMPH NODE Left 2019    Procedure: BIOPSY, LYMPH NODE, INGUINAL;  Surgeon: Kermit Martinez MD;  Location: [facility];  Service: General;  Laterality: Left;    ECHOCARDIOGRAPHY  2019    EXCISION RHEUMATOID NODULE Left     FOOT    THYROIDECTOMY      partial    Dr. Harvey        Social History     Socioeconomic History    Marital status:      Spouse name: Not on file    Number of children: Not on file    Years of education: Not on file    Highest education level: Not on file   Occupational History    Not on file   Social Needs    Financial resource strain: Not on file    Food insecurity:     Worry: Not on file     Inability: Not on file    Transportation needs:     Medical: Not on file     Non-medical: Not on file   Tobacco Use    Smoking status: Former Smoker     Packs/day: 1.00     Years: 40.00     Pack years: 40.00     Last attempt to quit:      Years since quittin.7    Smokeless tobacco: Never Used   Substance and Sexual Activity    Alcohol use: Yes     Alcohol/week: 7.0 standard drinks     Types: 7 Glasses of wine per week     Comment: weekly    Drug use: No    Sexual activity: Not on file   Lifestyle    Physical activity:     Days per week: Not on file     Minutes per session: Not on file    Stress: Not on file   Relationships    Social connections:     Talks on phone: Not on file     Gets together: Not on file     Attends Sikhism service: Not on file     Active member of club or organization: Not on file     Attends meetings of clubs or  organizations: Not on file     Relationship status: Not on file   Other Topics Concern    Not on file   Social History Narrative    Not on file        Results for orders placed or performed during the hospital encounter of 09/11/19   EKG 12-lead    Collection Time: 09/11/19  2:59 PM    Narrative    Test Reason : R59.0,    Vent. Rate : 065 BPM     Atrial Rate : 065 BPM     P-R Int : 138 ms          QRS Dur : 082 ms      QT Int : 426 ms       P-R-T Axes : 038 033 053 degrees     QTc Int : 443 ms    Normal sinus rhythm  Normal ECG  No previous ECGs available  Confirmed by Pato Fregoso MD ([id number]) on 9/11/2019 9:31:32 PM    Referred By:  BONNY           Confirmed By:Pato Fregoso MD             Lab Results   Component Value Date    WBC 5.12 09/11/2019    HGB 12.4 09/11/2019    HCT 37.6 09/11/2019    MCV 98 09/11/2019     09/11/2019     BMP  Lab Results   Component Value Date     09/11/2019    K 4.8 09/11/2019     09/11/2019    CO2 29 09/11/2019    BUN 17 09/11/2019    CREATININE 0.8 09/11/2019    CALCIUM 9.5 09/11/2019    ANIONGAP 8 09/11/2019    ESTGFRAFRICA >60.0 09/11/2019    EGFRNONAA >60.0 09/11/2019         Anesthesia Evaluation    I have reviewed the Patient Summary Reports.     I have reviewed the Medications.     Review of Systems  Anesthesia Hx:  No problems with previous Anesthesia Denies Hx of Anesthetic complications  Denies Family Hx of Anesthesia complications.   Denies Personal Hx of Anesthesia complications.   Social:  Former Smoker    Hematology/Oncology:  Hematology Normal   Oncology Normal     EENT/Dental:EENT/Dental Normal   Cardiovascular:  Cardiovascular Normal     Pulmonary:  Pulmonary Normal    Renal/:  Renal/ Normal     Hepatic/GI:   GERD    Musculoskeletal:   Arthritis     Neurological:  Neurology Normal    Endocrine:  Endocrine Normal    Psych:  Psychiatric Normal              Anesthesia Plan  Type of Anesthesia, risks & benefits discussed:  Anesthesia Type:   general  Patient's Preference:   Intra-op Monitoring Plan: standard ASA monitors  Intra-op Monitoring Plan Comments:   Post Op Pain Control Plan:   Post Op Pain Control Plan Comments:   Induction:   IV  Beta Blocker:  Patient is not currently on a Beta-Blocker (No further documentation required).       Informed Consent: Patient understands risks and agrees with Anesthesia plan.  Questions answered. Anesthesia consent signed with patient.  ASA Score: 2     Day of Surgery Review of History & Physical:            Ready For Surgery From Anesthesia Perspective.

## 2019-10-03 ENCOUNTER — PATIENT MESSAGE (OUTPATIENT)
Dept: HEMATOLOGY/ONCOLOGY | Facility: CLINIC | Age: 69
End: 2019-10-03

## 2019-10-03 ENCOUNTER — TELEPHONE (OUTPATIENT)
Dept: SURGERY | Facility: CLINIC | Age: 69
End: 2019-10-03

## 2019-10-04 ENCOUNTER — PATIENT MESSAGE (OUTPATIENT)
Dept: HEMATOLOGY/ONCOLOGY | Facility: CLINIC | Age: 69
End: 2019-10-04

## 2019-10-04 ENCOUNTER — HOSPITAL ENCOUNTER (OUTPATIENT)
Dept: RADIOLOGY | Facility: HOSPITAL | Age: 69
Discharge: HOME | End: 2019-10-04
Attending: INTERNAL MEDICINE
Payer: MEDICARE

## 2019-10-04 DIAGNOSIS — C83.35 DIFFUSE LARGE B-CELL LYMPHOMA OF LYMPH NODES OF INGUINAL REGION: ICD-10-CM

## 2019-10-04 PROCEDURE — A9585 GADOBUTROL INJECTION: HCPCS | Mod: PO | Performed by: INTERNAL MEDICINE

## 2019-10-04 PROCEDURE — 25500020 PHARM REV CODE 255: Mod: PO | Performed by: INTERNAL MEDICINE

## 2019-10-04 PROCEDURE — 72197 MRI PELVIS W/O & W/DYE: CPT | Mod: TC,PO

## 2019-10-04 RX ORDER — GADOBUTROL 604.72 MG/ML
6.5 INJECTION INTRAVENOUS
Status: COMPLETED | OUTPATIENT
Start: 2019-10-04 | End: 2019-10-04

## 2019-10-04 RX ADMIN — GADOBUTROL 6.5 ML: 604.72 INJECTION INTRAVENOUS at 01:10

## 2019-10-06 ENCOUNTER — HOSPITAL ENCOUNTER (EMERGENCY)
Facility: HOSPITAL | Age: 69
Discharge: HOME | End: 2019-10-06
Attending: EMERGENCY MEDICINE
Payer: MEDICARE

## 2019-10-06 VITALS
RESPIRATION RATE: 16 BRPM | TEMPERATURE: 98 F | HEART RATE: 66 BPM | BODY MASS INDEX: 24.11 KG/M2 | SYSTOLIC BLOOD PRESSURE: 155 MMHG | WEIGHT: 150 LBS | HEIGHT: 66 IN | DIASTOLIC BLOOD PRESSURE: 76 MMHG | OXYGEN SATURATION: 100 %

## 2019-10-06 DIAGNOSIS — T78.3XXA ANGIOEDEMA, INITIAL ENCOUNTER: ICD-10-CM

## 2019-10-06 DIAGNOSIS — T78.40XA ALLERGIC REACTION, INITIAL ENCOUNTER: Primary | ICD-10-CM

## 2019-10-06 LAB
ALBUMIN SERPL BCP-MCNC: 3.5 G/DL (ref 3.5–5.2)
ALP SERPL-CCNC: 65 U/L (ref 55–135)
ALT SERPL W/O P-5'-P-CCNC: 11 U/L (ref 10–44)
ANION GAP SERPL CALC-SCNC: 7 MMOL/L (ref 8–16)
AST SERPL-CCNC: 15 U/L (ref 10–40)
BASOPHILS # BLD AUTO: 0.03 K/UL (ref 0–0.2)
BASOPHILS NFR BLD: 0.8 % (ref 0–1.9)
BILIRUB SERPL-MCNC: 0.8 MG/DL (ref 0.1–1)
BUN SERPL-MCNC: 13 MG/DL (ref 8–23)
CALCIUM SERPL-MCNC: 8.5 MG/DL (ref 8.7–10.5)
CHLORIDE SERPL-SCNC: 105 MMOL/L (ref 95–110)
CO2 SERPL-SCNC: 25 MMOL/L (ref 23–29)
CREAT SERPL-MCNC: 0.7 MG/DL (ref 0.5–1.4)
DIFFERENTIAL METHOD: ABNORMAL
EOSINOPHIL # BLD AUTO: 0.1 K/UL (ref 0–0.5)
EOSINOPHIL NFR BLD: 2.8 % (ref 0–8)
ERYTHROCYTE [DISTWIDTH] IN BLOOD BY AUTOMATED COUNT: 12.6 % (ref 11.5–14.5)
EST. GFR  (AFRICAN AMERICAN): >60 ML/MIN/1.73 M^2
EST. GFR  (NON AFRICAN AMERICAN): >60 ML/MIN/1.73 M^2
GLUCOSE SERPL-MCNC: 98 MG/DL (ref 70–110)
HCT VFR BLD AUTO: 35.3 % (ref 37–48.5)
HGB BLD-MCNC: 11.8 G/DL (ref 12–16)
IMM GRANULOCYTES # BLD AUTO: 0.01 K/UL (ref 0–0.04)
IMM GRANULOCYTES NFR BLD AUTO: 0.3 % (ref 0–0.5)
LYMPHOCYTES # BLD AUTO: 0.4 K/UL (ref 1–4.8)
LYMPHOCYTES NFR BLD: 10.5 % (ref 18–48)
MCH RBC QN AUTO: 31.9 PG (ref 27–31)
MCHC RBC AUTO-ENTMCNC: 33.4 G/DL (ref 32–36)
MCV RBC AUTO: 95 FL (ref 82–98)
MONOCYTES # BLD AUTO: 0.3 K/UL (ref 0.3–1)
MONOCYTES NFR BLD: 8.5 % (ref 4–15)
NEUTROPHILS # BLD AUTO: 3.1 K/UL (ref 1.8–7.7)
NEUTROPHILS NFR BLD: 77.1 % (ref 38–73)
NRBC BLD-RTO: 0 /100 WBC
PLATELET # BLD AUTO: 183 K/UL (ref 150–350)
PMV BLD AUTO: 9.3 FL (ref 9.2–12.9)
POTASSIUM SERPL-SCNC: 4.7 MMOL/L (ref 3.5–5.1)
PROT SERPL-MCNC: 7.1 G/DL (ref 6–8.4)
RBC # BLD AUTO: 3.7 M/UL (ref 4–5.4)
SODIUM SERPL-SCNC: 137 MMOL/L (ref 136–145)
WBC # BLD AUTO: 3.99 K/UL (ref 3.9–12.7)

## 2019-10-06 PROCEDURE — 96375 TX/PRO/DX INJ NEW DRUG ADDON: CPT

## 2019-10-06 PROCEDURE — 80053 COMPREHEN METABOLIC PANEL: CPT

## 2019-10-06 PROCEDURE — 85025 COMPLETE CBC W/AUTO DIFF WBC: CPT

## 2019-10-06 PROCEDURE — S0028 INJECTION, FAMOTIDINE, 20 MG: HCPCS | Performed by: EMERGENCY MEDICINE

## 2019-10-06 PROCEDURE — 96365 THER/PROPH/DIAG IV INF INIT: CPT

## 2019-10-06 PROCEDURE — 99284 EMERGENCY DEPT VISIT MOD MDM: CPT | Mod: 25

## 2019-10-06 PROCEDURE — 63600175 PHARM REV CODE 636 W HCPCS: Performed by: EMERGENCY MEDICINE

## 2019-10-06 PROCEDURE — 25000003 PHARM REV CODE 250: Performed by: EMERGENCY MEDICINE

## 2019-10-06 RX ORDER — DIPHENHYDRAMINE HCL 25 MG
25 CAPSULE ORAL EVERY 6 HOURS PRN
Qty: 20 CAPSULE | Refills: 0 | COMMUNITY
Start: 2019-10-06

## 2019-10-06 RX ORDER — FAMOTIDINE 20 MG/1
20 TABLET, FILM COATED ORAL 2 TIMES DAILY
Qty: 20 TABLET | Refills: 0 | Status: SHIPPED | OUTPATIENT
Start: 2019-10-06 | End: 2023-02-13

## 2019-10-06 RX ORDER — FAMOTIDINE 20 MG/50ML
20 INJECTION, SOLUTION INTRAVENOUS
Status: COMPLETED | OUTPATIENT
Start: 2019-10-06 | End: 2019-10-06

## 2019-10-06 RX ORDER — PREDNISONE 50 MG/1
50 TABLET ORAL DAILY
Qty: 10 TABLET | Refills: 0 | Status: SHIPPED | OUTPATIENT
Start: 2019-10-06 | End: 2019-10-11

## 2019-10-06 RX ORDER — METHYLPREDNISOLONE SOD SUCC 125 MG
125 VIAL (EA) INJECTION
Status: COMPLETED | OUTPATIENT
Start: 2019-10-06 | End: 2019-10-06

## 2019-10-06 RX ORDER — DIPHENHYDRAMINE HYDROCHLORIDE 50 MG/ML
25 INJECTION INTRAMUSCULAR; INTRAVENOUS
Status: COMPLETED | OUTPATIENT
Start: 2019-10-06 | End: 2019-10-06

## 2019-10-06 RX ADMIN — METHYLPREDNISOLONE SODIUM SUCCINATE 125 MG: 125 INJECTION, POWDER, FOR SOLUTION INTRAMUSCULAR; INTRAVENOUS at 07:10

## 2019-10-06 RX ADMIN — FAMOTIDINE 20 MG: 20 INJECTION, SOLUTION INTRAVENOUS at 08:10

## 2019-10-06 RX ADMIN — DIPHENHYDRAMINE HYDROCHLORIDE 25 MG: 50 INJECTION INTRAMUSCULAR; INTRAVENOUS at 08:10

## 2019-10-06 NOTE — DISCHARGE INSTRUCTIONS
Return to emergency department for worsening symptoms or any problems.  If you notice any swelling of the lips or any difficulty breathing you must return to emergency department immediately

## 2019-10-06 NOTE — ED NOTES
C/o rash, welts to legs, hips yesterday. Tx self with Benadryl with resolution of symptoms. They returned today with lower lip involvement. Took Claritin because she did not want to be sleepy. Symptoms have improved. Still with some upper and lower lip swelling. Resp regular unlabored

## 2019-10-06 NOTE — ED PROVIDER NOTES
Encounter Date: 10/6/2019       History     Chief Complaint   Patient presents with    Urticaria     INNER THIGHS. FLANK AND LIP AREAS ONSET YESTERDAY, NO SOB     69-year-old female presented emergency department complaining of having hives and itching all over the body.  Patient received MRI with gadolinium on Friday and patient started having hives yesterday.  Patient also felt her lips were slightly swollen this morning and took some Benadryl and patient states she is already feeling better.  Patient denies any shortness of breath or abdominal pain or weakness or numbness.  Patient was recently diagnosed of having a lymphoma and had surgery for lymph node biopsy recently.  Patient denies any chest pain or shortness of breath.  Denies any other complaints at this time.  Denies any sensation of throat swelling.        Review of patient's allergies indicates:  No Known Allergies  Past Medical History:   Diagnosis Date    Diffuse large B-cell lymphoma of lymph nodes of inguinal region 10/01/2019    Dr Martinez    GERD (gastroesophageal reflux disease) 2009    on meds    Rheumatoid arthritis 2013    Rheumatoid arthritis 2013    on meds    Thyroid disease 1990    on synthroid     Past Surgical History:   Procedure Laterality Date    BIOPSY OF INGUINAL LYMPH NODE Left 9/18/2019    Procedure: BIOPSY, LYMPH NODE, INGUINAL;  Surgeon: Kermit Martinez MD;  Location: [facility] OR;  Service: General;  Laterality: Left;    ECHOCARDIOGRAPHY  09/30/2019    EXCISION RHEUMATOID NODULE Left 2012    FOOT    INSERTION OF TUNNELED CENTRAL VENOUS CATHETER (CVC) WITH SUBCUTANEOUS PORT Left 10/2/2019    Procedure: [id number];  Surgeon: Kermit Martinez MD;  Location: [facility] OR;  Service: General;  Laterality: Left;    SAMPLING OF MULTIPLE LYMPH NODES      L INGUINAL     THYROIDECTOMY  1990    partial    Dr. Harvey     Family History   Problem Relation Age of Onset    No Known Problems Mother     No Known Problems  Father     No Known Problems Maternal Grandmother     No Known Problems Maternal Grandfather     No Known Problems Paternal Grandmother     No Known Problems Paternal Grandfather     Diabetes Neg Hx      Social History     Tobacco Use    Smoking status: Former Smoker     Packs/day: 1.00     Years: 40.00     Pack years: 40.00     Last attempt to quit:      Years since quittin.7    Smokeless tobacco: Never Used   Substance Use Topics    Alcohol use: Yes     Alcohol/week: 7.0 standard drinks     Types: 7 Glasses of wine per week     Comment: weekly    Drug use: No     Review of Systems   Constitutional: Negative.    HENT: Negative.         Mild swelling of the lips noted.   Eyes: Negative.    Respiratory: Negative.    Cardiovascular: Negative.  Negative for chest pain.   Gastrointestinal: Negative.    Endocrine: Negative.    Genitourinary: Negative.    Musculoskeletal: Negative.    Skin: Positive for rash.   Allergic/Immunologic: Negative.    Neurological: Negative.    Hematological: Positive for adenopathy.   Psychiatric/Behavioral: Negative.    All other systems reviewed and are negative.      Physical Exam     Initial Vitals [10/06/19 0659]   BP Pulse Resp Temp SpO2   (!) 157/92 85 16 97.9 °F (36.6 °C) 100 %      MAP       --         Physical Exam    Nursing note and vitals reviewed.  Constitutional: She appears well-developed and well-nourished.   HENT:   Head: Normocephalic and atraumatic.   Nose: Nose normal.   Mouth/Throat: Oropharynx is clear and moist.   Edema noted in the lips.  No posterior pharyngeal edema   Eyes: Conjunctivae and EOM are normal. Pupils are equal, round, and reactive to light.   Neck: Normal range of motion. Neck supple. No thyromegaly present. No tracheal deviation present. No JVD present.   Cardiovascular: Normal rate, regular rhythm, normal heart sounds and intact distal pulses.   No murmur heard.  Pulmonary/Chest: Breath sounds normal. No stridor. No respiratory  distress. She has no wheezes. She has no rales.   Abdominal: Soft. Bowel sounds are normal.   Musculoskeletal: Normal range of motion. She exhibits no edema.   Neurological: She is alert and oriented to person, place, and time.   Skin: Skin is warm. Capillary refill takes less than 2 seconds.   Diffuse hives noted in the upper thigh and in the buttock area   Psychiatric: She has a normal mood and affect. Thought content normal.         ED Course   Procedures  Labs Reviewed   CBC W/ AUTO DIFFERENTIAL - Abnormal; Notable for the following components:       Result Value    RBC 3.70 (*)     Hemoglobin 11.8 (*)     Hematocrit 35.3 (*)     Mean Corpuscular Hemoglobin 31.9 (*)     Lymph # 0.4 (*)     Gran% 77.1 (*)     Lymph% 10.5 (*)     All other components within normal limits   COMPREHENSIVE METABOLIC PANEL - Abnormal; Notable for the following components:    Calcium 8.5 (*)     Anion Gap 7 (*)     All other components within normal limits          Imaging Results    None          Medical Decision Making:   Differential Diagnosis:   69-year-old female with lymphoma presented to emergency department with rash and hives and angioedema of the lips.  No posterior pharyngeal edema noted.  Steroid and Benadryl given.  Patient's symptoms almost completely resolved.  Patient has complete resolution of hives.  Mild swelling noted in the lips which is barely noticeable at this time.  Patient was already feeling prior to arrival as she took Benadryl.  Since patient has significant improvement you will discharge with instructions and follow-up on steroids.  Patient's symptoms are likely secondary to recent use of gadolinium.  As patient feeling better discharged with instructions and follow-up  Clinical Tests:   Lab Tests: Reviewed                      Clinical Impression:       ICD-10-CM ICD-9-CM   1. Allergic reaction, initial encounter T78.40XA 995.3   2. Angioedema, initial encounter T78.3XXA 995.1                                 Nadia Gonzalez MD  10/06/19 0953

## 2019-10-07 ENCOUNTER — TELEPHONE (OUTPATIENT)
Dept: HEMATOLOGY/ONCOLOGY | Facility: CLINIC | Age: 69
End: 2019-10-07

## 2019-10-07 DIAGNOSIS — C83.35 DIFFUSE LARGE B-CELL LYMPHOMA OF LYMPH NODES OF INGUINAL REGION: Primary | ICD-10-CM

## 2019-10-07 NOTE — TELEPHONE ENCOUNTER
----- Message from Soni Morris sent at 10/7/2019  9:24 AM CDT -----  Pt called and stated that she had a CT scan w/ contrast on Friday.  On Saturday she developed hives.  On Sunday they got worse and went to ER.  They treated and released.  She is concerned because she has PET scheduled for the 15th.  She also has bone marrow biopsies scheduled for the 10th.  Please call pt back.    CB# [phone]        Spoke to interventional radiology and was told by Laine that no type of IV dye would be given during the biopsy. I then called and spoke to the imaging center about the up coming Pet CT. I was told by Josh/Tere that no iv dye would be given for the Pet. It was advised that she tell the tech that she had a reaction to the gadolinium that was given with the MRI.   I then spoke to Aurelia and let her know that she should be good having the upcoming  procedures done. I explained to her that in the further if us or another doctor orders MRI that she ask to be premedicated with benedryl and or a steroid prior to the scan. She voiced understanding

## 2019-10-07 NOTE — TELEPHONE ENCOUNTER
----- Message from Soni Morris sent at 10/7/2019  9:24 AM CDT -----  Pt called and stated that she had a CT scan w/ contrast on Friday.  On Saturday she developed hives.  On Sunday they got worse and went to ER.  They treated and released.  She is concerned because she has PET scheduled for the 15th.  She also has bone marrow biopsies scheduled for the 10th.  Please call pt back.    CB# [phone]

## 2019-10-10 ENCOUNTER — HOSPITAL ENCOUNTER (OUTPATIENT)
Facility: HOSPITAL | Age: 69
Discharge: HOME | End: 2019-10-10
Attending: INTERNAL MEDICINE | Admitting: INTERNAL MEDICINE
Payer: MEDICARE

## 2019-10-10 VITALS
TEMPERATURE: 98 F | HEART RATE: 58 BPM | HEIGHT: 66 IN | BODY MASS INDEX: 24.47 KG/M2 | WEIGHT: 152.25 LBS | RESPIRATION RATE: 16 BRPM | DIASTOLIC BLOOD PRESSURE: 82 MMHG | SYSTOLIC BLOOD PRESSURE: 148 MMHG | OXYGEN SATURATION: 99 %

## 2019-10-10 DIAGNOSIS — C83.35 DIFFUSE LARGE B-CELL LYMPHOMA OF LYMPH NODES OF INGUINAL REGION: ICD-10-CM

## 2019-10-10 LAB
APTT PPP: 23.8 SEC (ref 26.2–34.7)
BASOPHILS # BLD AUTO: 0.01 K/UL (ref 0–0.2)
BASOPHILS NFR BLD: 0.2 % (ref 0–1.9)
DIFFERENTIAL METHOD: ABNORMAL
EOSINOPHIL # BLD AUTO: 0 K/UL (ref 0–0.5)
EOSINOPHIL NFR BLD: 0.2 % (ref 0–8)
ERYTHROCYTE [DISTWIDTH] IN BLOOD BY AUTOMATED COUNT: 12.6 % (ref 11.5–14.5)
HCT VFR BLD AUTO: 37.6 % (ref 37–48.5)
HGB BLD-MCNC: 12.6 G/DL (ref 12–16)
IMM GRANULOCYTES # BLD AUTO: 0.01 K/UL (ref 0–0.04)
IMM GRANULOCYTES NFR BLD AUTO: 0.2 % (ref 0–0.5)
INR PPP: 1.1
LYMPHOCYTES # BLD AUTO: 1 K/UL (ref 1–4.8)
LYMPHOCYTES NFR BLD: 17.2 % (ref 18–48)
MCH RBC QN AUTO: 31.9 PG (ref 27–31)
MCHC RBC AUTO-ENTMCNC: 33.5 G/DL (ref 32–36)
MCV RBC AUTO: 95 FL (ref 82–98)
MONOCYTES # BLD AUTO: 0.6 K/UL (ref 0.3–1)
MONOCYTES NFR BLD: 11 % (ref 4–15)
NEUTROPHILS # BLD AUTO: 4 K/UL (ref 1.8–7.7)
NEUTROPHILS NFR BLD: 71.2 % (ref 38–73)
NRBC BLD-RTO: 0 /100 WBC
PLATELET # BLD AUTO: 205 K/UL (ref 150–350)
PMV BLD AUTO: 9 FL (ref 9.2–12.9)
PROTHROMBIN TIME: 13.5 SEC (ref 11.7–14)
RBC # BLD AUTO: 3.95 M/UL (ref 4–5.4)
WBC # BLD AUTO: 5.64 K/UL (ref 3.9–12.7)

## 2019-10-10 PROCEDURE — 88305 TISSUE EXAM BY PATHOLOGIST: CPT | Mod: TC,59

## 2019-10-10 PROCEDURE — 63600175 PHARM REV CODE 636 W HCPCS: Performed by: RADIOLOGY

## 2019-10-10 PROCEDURE — 85730 THROMBOPLASTIN TIME PARTIAL: CPT | Mod: GZ

## 2019-10-10 PROCEDURE — 71000015 HC POSTOP RECOV 1ST HR

## 2019-10-10 PROCEDURE — 71000016 HC POSTOP RECOV ADDL HR

## 2019-10-10 PROCEDURE — 85610 PROTHROMBIN TIME: CPT

## 2019-10-10 PROCEDURE — 25000003 PHARM REV CODE 250: Performed by: RADIOLOGY

## 2019-10-10 PROCEDURE — 85025 COMPLETE CBC W/AUTO DIFF WBC: CPT

## 2019-10-10 RX ORDER — LIDOCAINE HYDROCHLORIDE 10 MG/ML
INJECTION INFILTRATION; PERINEURAL CODE/TRAUMA/SEDATION MEDICATION
Status: COMPLETED | OUTPATIENT
Start: 2019-10-10 | End: 2019-10-10

## 2019-10-10 RX ORDER — MIDAZOLAM HYDROCHLORIDE 1 MG/ML
INJECTION INTRAMUSCULAR; INTRAVENOUS CODE/TRAUMA/SEDATION MEDICATION
Status: COMPLETED | OUTPATIENT
Start: 2019-10-10 | End: 2019-10-10

## 2019-10-10 RX ORDER — SODIUM CHLORIDE 9 MG/ML
INJECTION, SOLUTION INTRAVENOUS
Status: COMPLETED | OUTPATIENT
Start: 2019-10-10 | End: 2019-10-10

## 2019-10-10 RX ORDER — ACETAMINOPHEN 325 MG/1
650 TABLET ORAL EVERY 4 HOURS PRN
Status: DISCONTINUED | OUTPATIENT
Start: 2019-10-10 | End: 2019-10-10 | Stop reason: HOSPADM

## 2019-10-10 RX ORDER — FENTANYL CITRATE 50 UG/ML
INJECTION, SOLUTION INTRAMUSCULAR; INTRAVENOUS CODE/TRAUMA/SEDATION MEDICATION
Status: COMPLETED | OUTPATIENT
Start: 2019-10-10 | End: 2019-10-10

## 2019-10-10 RX ADMIN — LIDOCAINE HYDROCHLORIDE 8 ML: 10 INJECTION, SOLUTION INFILTRATION; PERINEURAL at 09:10

## 2019-10-10 RX ADMIN — MIDAZOLAM HYDROCHLORIDE 1 MG: 1 INJECTION, SOLUTION INTRAMUSCULAR; INTRAVENOUS at 09:10

## 2019-10-10 RX ADMIN — FENTANYL CITRATE 50 MCG: 50 INJECTION INTRAMUSCULAR; INTRAVENOUS at 09:10

## 2019-10-10 RX ADMIN — SODIUM CHLORIDE 500 ML/HR: 0.9 INJECTION, SOLUTION INTRAVENOUS at 09:10

## 2019-10-15 ENCOUNTER — HOSPITAL ENCOUNTER (OUTPATIENT)
Dept: RADIOLOGY | Facility: HOSPITAL | Age: 69
Discharge: HOME | End: 2019-10-15
Attending: INTERNAL MEDICINE
Payer: MEDICARE

## 2019-10-15 VITALS — BODY MASS INDEX: 23.78 KG/M2 | WEIGHT: 148 LBS | HEIGHT: 66 IN

## 2019-10-15 DIAGNOSIS — C83.35 DIFFUSE LARGE B-CELL LYMPHOMA OF LYMPH NODES OF INGUINAL REGION: ICD-10-CM

## 2019-10-15 LAB — GLUCOSE SERPL-MCNC: 68 MG/DL (ref 70–110)

## 2019-10-15 PROCEDURE — A9552 F18 FDG: HCPCS | Mod: PO

## 2019-10-15 PROCEDURE — 78815 PET IMAGE W/CT SKULL-THIGH: CPT | Mod: TC,PO,PI

## 2019-10-17 ENCOUNTER — OFFICE VISIT (OUTPATIENT)
Dept: HEMATOLOGY/ONCOLOGY | Facility: CLINIC | Age: 69
End: 2019-10-17
Payer: MEDICARE

## 2019-10-17 VITALS
WEIGHT: 149.13 LBS | DIASTOLIC BLOOD PRESSURE: 92 MMHG | TEMPERATURE: 98 F | HEART RATE: 87 BPM | BODY MASS INDEX: 24.07 KG/M2 | SYSTOLIC BLOOD PRESSURE: 160 MMHG | RESPIRATION RATE: 20 BRPM

## 2019-10-17 DIAGNOSIS — C83.35 DIFFUSE LARGE B-CELL LYMPHOMA OF LYMPH NODES OF INGUINAL REGION: ICD-10-CM

## 2019-10-17 DIAGNOSIS — D70.1 CHEMOTHERAPY-INDUCED NEUTROPENIA: ICD-10-CM

## 2019-10-17 DIAGNOSIS — T45.1X5A CHEMOTHERAPY-INDUCED NEUTROPENIA: ICD-10-CM

## 2019-10-17 PROCEDURE — 99214 PR OFFICE/OUTPT VISIT, EST, LEVL IV, 30-39 MIN: ICD-10-PCS | Mod: S$GLB,,, | Performed by: INTERNAL MEDICINE

## 2019-10-17 PROCEDURE — 99214 OFFICE O/P EST MOD 30 MIN: CPT | Mod: S$GLB,,, | Performed by: INTERNAL MEDICINE

## 2019-10-17 RX ORDER — LIDOCAINE AND PRILOCAINE 25; 25 MG/G; MG/G
CREAM TOPICAL
Qty: 25 G | Refills: 2 | Status: SHIPPED | OUTPATIENT
Start: 2019-10-17

## 2019-10-17 NOTE — ASSESSMENT & PLAN NOTE
I had a long discussion with Ms. Marlow about this new disease. Her bone marrow shows follicular NHL and according to the pathologist, this appears to be an evolution to DLBCL.  I discussed this in detail.  She does have stage IV disease and will recommend 6 cycles of chop/r with PET after cycle 3.  The use of maintenance Rituxan was also addressed and we will very likely add this medication as well.  Will arrange chemo education and plan to begin therapy next week.  Echo is good and port is in place.

## 2019-10-17 NOTE — PROGRESS NOTES
PROGRESS NOTE    Subjective:       Patient ID: Aurelia Marlow is a 69 y.o. female.    Chief Complaint:  No chief complaint on file.  lymphoma follow up    History of Present Illness:   Aurelia Marlow is a 69 y.o. female who presents for follow up of staging studies of lymphoma.  Excerpt from consult A/P:    Ms. Marlow is a 70yo female newly diagnosed with DLBCL-[facility] type.  I had a long discussion with her about this disease and the further diagnostic work up needed and treatment to come.  I also spoke to the pathologist who read the report to verify this was not an aggressive subtype as the myc testing was not done.  He stated that given the very low Ki-67 and the morphological appearance of the tumor, the myc was not needed as he felt sure this was not an aggressive type.       The treatment will employ chemotherapy with the R-CHOP regimen and I will begin preparations for this.  If she has stage I/II disease then I will have her seen by radiation oncology to see if she would qualify for limited R-CHOP x 3/IFRT.  If however she is more advanced then R-CHOP x 6 will be the approach.       Pet Impression 10/15/2019:  1. FDG avid enlarged right inguinal and left external lymph nodes are most consistent with known lymphoproliferative disorder.  2. Patchy FDG uptake throughout the axial and proximal appendicular skeleton intramedullary compartment suggests osseous involvement of lymphoproliferative sore as well.  3. Normal spleen size.  4. 57 mm left adnexal cyst.  This was best evaluated on pelvis MRI 10/04/2019.      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   - SEE COMMENT.     Comment:  Marrow shows involvement by a low grade B-cell lymphoma,   visualized mostly on the marrow biopsy.  There  are increased   paratrabecular lymphoid aggregates comprised mostly of small   lymphocytes.  The patient's history of diffuse large B-cell lymphoma   is noted.  That diffuse large B-cell lymphoma was of [facility]   cell type and likely represents a large cell transformational event in   an underlying low grade lymphoma which is represented in this bone   marrow. The neoplastic B cell population is not represented in the   aspirated flow cytometry sample, which likely represents a sampling   issue in the aspirate. Paratrabecular lymphoid aggregates are not   always aspirable.  Further follow up is suggested as clinically   indicated.        Echo 9/30/2019 EF 60%  CMP, CBC 10/6/2019 unremarkable    Family and Social history reviewed and is unchanged from 9/27/2019      ROS:  Review of Systems   Constitutional: Negative for fever.   Respiratory: Negative for shortness of breath.    Cardiovascular: Negative for chest pain and leg swelling.   Gastrointestinal: Negative for abdominal pain and blood in stool.   Genitourinary: Negative for hematuria.   Skin: Negative for rash.          Current Outpatient Medications:     cholecalciferol, vitamin D3, (VITAMIN D3) 2,000 unit Tab, , Disp: , Rfl:     diphenhydrAMINE (BENADRYL) 25 mg capsule, Take 1 capsule (25 mg total) by mouth every 6 (six) hours as needed for Itching or Allergies., Disp: 20 capsule, Rfl: 0    famotidine (PEPCID) 20 MG tablet, Take 1 tablet (20 mg total) by mouth 2 (two) times daily., Disp: 20 tablet, Rfl: 0    fluticasone propionate (FLONASE) 50 mcg/actuation nasal spray, , Disp: , Rfl:     hydroxychloroquine (PLAQUENIL) 200 mg tablet, Take 1 tablet (200 mg total) by mouth 2 (two) times daily., Disp: 180 tablet, Rfl: 1    levothyroxine (SYNTHROID) 100 MCG tablet, Take 100 mcg by mouth once daily., Disp: , Rfl:     naproxen (EC NAPROSYN) 500 MG EC tablet, TAKE ONE TABLET BY MOUTH TWICE DAILY AFTER MEALS AS NEEDED, Disp: 60 tablet, Rfl: 1     omeprazole 20 mg TbEC, Take 20 mg by mouth every evening. , Disp: , Rfl:     traMADol (ULTRAM) 50 mg tablet, Take 1 tablet (50 mg total) by mouth every 12 (twelve) hours as needed for Pain. Dispense generic, Disp: 60 tablet, Rfl: 1    Current Facility-Administered Medications:     lidocaine (PF) 10 mg/ml (1%) injection 10 mg, 1 mL, Intradermal, Once, Kermit Martinez MD        Objective:       Physical Examination:     BP (!) 160/92   Pulse 87   Temp 98.4 °F (36.9 °C) (Oral)   Resp 20   Wt 67.6 kg (149 lb 1.6 oz)   BMI 24.07 kg/m²     Physical Exam   Constitutional: She appears well-developed and well-nourished.   HENT:   Head: Normocephalic and atraumatic.   Right Ear: External ear normal.   Left Ear: External ear normal.   Mouth/Throat: Oropharynx is clear and moist.   Eyes: Pupils are equal, round, and reactive to light. Conjunctivae are normal.   Neck: No tracheal deviation present. No thyromegaly present.   Cardiovascular: Normal rate, regular rhythm and normal heart sounds.   Pulmonary/Chest: Effort normal and breath sounds normal.   Abdominal: Soft. Bowel sounds are normal. She exhibits no distension and no mass. There is no tenderness.   Musculoskeletal: She exhibits no edema.   Neurological:   Neuro intact througout   Skin: No rash noted.   Psychiatric: She has a normal mood and affect. Her behavior is normal. Judgment and thought content normal.       Labs:   Recent Results (from the past 336 hour(s))   CBC auto differential    Collection Time: 10/10/19  7:40 AM   Result Value Ref Range    WBC 5.64 3.90 - 12.70 K/uL    Hemoglobin 12.6 12.0 - 16.0 g/dL    Hematocrit 37.6 37.0 - 48.5 %    Platelets 205 150 - 350 K/uL   CBC auto differential    Collection Time: 10/06/19  7:39 AM   Result Value Ref Range    WBC 3.99 3.90 - 12.70 K/uL    Hemoglobin 11.8 (L) 12.0 - 16.0 g/dL    Hematocrit 35.3 (L) 37.0 - 48.5 %    Platelets 183 150 - 350 K/uL     CMP  Sodium   Date Value Ref Range Status   10/06/2019 137  136 - 145 mmol/L Final   06/11/2019 139 134 - 144 mmol/L      Potassium   Date Value Ref Range Status   10/06/2019 4.7 3.5 - 5.1 mmol/L Final     Chloride   Date Value Ref Range Status   10/06/2019 105 95 - 110 mmol/L Final   06/11/2019 104 98 - 110 mmol/L      CO2   Date Value Ref Range Status   10/06/2019 25 23 - 29 mmol/L Final     Glucose   Date Value Ref Range Status   10/06/2019 98 70 - 110 mg/dL Final   06/11/2019 86 70 - 99 mg/dL      BUN, Bld   Date Value Ref Range Status   10/06/2019 13 8 - 23 mg/dL Final     Creatinine   Date Value Ref Range Status   10/06/2019 0.7 0.5 - 1.4 mg/dL Final   06/11/2019 0.72 0.60 - 1.40 mg/dL      Calcium   Date Value Ref Range Status   10/06/2019 8.5 (L) 8.7 - 10.5 mg/dL Final     Total Protein   Date Value Ref Range Status   10/06/2019 7.1 6.0 - 8.4 g/dL Final     Albumin   Date Value Ref Range Status   10/06/2019 3.5 3.5 - 5.2 g/dL Final   06/11/2019 3.8 3.1 - 4.7 g/dL      Total Bilirubin   Date Value Ref Range Status   10/06/2019 0.8 0.1 - 1.0 mg/dL Final     Comment:     For infants and newborns, interpretation of results should be based  on gestational age, weight and in agreement with clinical  observations.  Premature Infant recommended reference ranges:  Up to 24 hours.............<8.0 mg/dL  Up to 48 hours............<12.0 mg/dL  3-5 days..................<15.0 mg/dL  6-29 days.................<15.0 mg/dL       Alkaline Phosphatase   Date Value Ref Range Status   10/06/2019 65 55 - 135 U/L Final     AST   Date Value Ref Range Status   10/06/2019 15 10 - 40 U/L Final     ALT   Date Value Ref Range Status   10/06/2019 11 10 - 44 U/L Final     Anion Gap   Date Value Ref Range Status   10/06/2019 7 (L) 8 - 16 mmol/L Final     eGFR if    Date Value Ref Range Status   10/06/2019 >60.0 >60 mL/min/1.73 m^2 Final     eGFR if non    Date Value Ref Range Status   10/06/2019 >60.0 >60 mL/min/1.73 m^2 Final     Comment:     Calculation used to  obtain the estimated glomerular filtration  rate (eGFR) is the CKD-EPI equation.        No results found for: CEA  No results found for: PSA        Assessment/Plan:     Problem List Items Addressed This Visit     Diffuse large B-cell lymphoma of lymph nodes of inguinal region-[facility]     I had a long discussion with Ms. Marlow about this new disease. Her bone marrow shows follicular NHL and according to the pathologist, this appears to be an evolution to DLBCL.  I discussed this in detail.  She does have stage IV disease and will recommend 6 cycles of chop/r with PET after cycle 3.  The use of maintenance Rituxan was also addressed and we will very likely add this medication as well.  Will arrange chemo education and plan to begin therapy next week.  Echo is good and port is in place.           Relevant Orders    CBC auto differential    Comprehensive metabolic panel    Ambulatory referral to [facility]          Discussion:     Follow up in about 3 weeks (around 11/7/2019).      Electronically signed by Pb Fry

## 2019-10-18 ENCOUNTER — LAB VISIT (OUTPATIENT)
Dept: LAB | Facility: HOSPITAL | Age: 69
End: 2019-10-18
Attending: INTERNAL MEDICINE
Payer: MEDICARE

## 2019-10-18 DIAGNOSIS — C83.35 DIFFUSE LARGE B-CELL LYMPHOMA OF LYMPH NODES OF INGUINAL REGION: ICD-10-CM

## 2019-10-18 LAB
ALBUMIN SERPL BCP-MCNC: 4.1 G/DL (ref 3.5–5.2)
ALP SERPL-CCNC: 71 U/L (ref 55–135)
ALT SERPL W/O P-5'-P-CCNC: 12 U/L (ref 10–44)
ANION GAP SERPL CALC-SCNC: 4 MMOL/L (ref 8–16)
AST SERPL-CCNC: 16 U/L (ref 10–40)
BASOPHILS # BLD AUTO: 0.04 K/UL (ref 0–0.2)
BASOPHILS NFR BLD: 0.5 % (ref 0–1.9)
BILIRUB SERPL-MCNC: 0.7 MG/DL (ref 0.1–1)
BUN SERPL-MCNC: 10 MG/DL (ref 8–23)
CALCIUM SERPL-MCNC: 9.1 MG/DL (ref 8.7–10.5)
CHLORIDE SERPL-SCNC: 103 MMOL/L (ref 95–110)
CO2 SERPL-SCNC: 29 MMOL/L (ref 23–29)
CREAT SERPL-MCNC: 0.6 MG/DL (ref 0.5–1.4)
DIFFERENTIAL METHOD: ABNORMAL
EOSINOPHIL # BLD AUTO: 0.1 K/UL (ref 0–0.5)
EOSINOPHIL NFR BLD: 0.8 % (ref 0–8)
ERYTHROCYTE [DISTWIDTH] IN BLOOD BY AUTOMATED COUNT: 12.6 % (ref 11.5–14.5)
EST. GFR  (AFRICAN AMERICAN): >60 ML/MIN/1.73 M^2
EST. GFR  (NON AFRICAN AMERICAN): >60 ML/MIN/1.73 M^2
GLUCOSE SERPL-MCNC: 94 MG/DL (ref 70–110)
HCT VFR BLD AUTO: 38 % (ref 37–48.5)
HGB BLD-MCNC: 12.8 G/DL (ref 12–16)
IMM GRANULOCYTES # BLD AUTO: 0.02 K/UL (ref 0–0.04)
IMM GRANULOCYTES NFR BLD AUTO: 0.2 % (ref 0–0.5)
LYMPHOCYTES # BLD AUTO: 0.9 K/UL (ref 1–4.8)
LYMPHOCYTES NFR BLD: 10.6 % (ref 18–48)
MCH RBC QN AUTO: 32.5 PG (ref 27–31)
MCHC RBC AUTO-ENTMCNC: 33.7 G/DL (ref 32–36)
MCV RBC AUTO: 96 FL (ref 82–98)
MONOCYTES # BLD AUTO: 0.5 K/UL (ref 0.3–1)
MONOCYTES NFR BLD: 6.3 % (ref 4–15)
NEUTROPHILS # BLD AUTO: 6.9 K/UL (ref 1.8–7.7)
NEUTROPHILS NFR BLD: 81.6 % (ref 38–73)
NRBC BLD-RTO: 0 /100 WBC
PLATELET # BLD AUTO: 180 K/UL (ref 150–350)
PMV BLD AUTO: 8.8 FL (ref 9.2–12.9)
POTASSIUM SERPL-SCNC: 5.2 MMOL/L (ref 3.5–5.1)
PROT SERPL-MCNC: 7.5 G/DL (ref 6–8.4)
RBC # BLD AUTO: 3.94 M/UL (ref 4–5.4)
SODIUM SERPL-SCNC: 136 MMOL/L (ref 136–145)
WBC # BLD AUTO: 8.43 K/UL (ref 3.9–12.7)

## 2019-10-18 PROCEDURE — 80053 COMPREHEN METABOLIC PANEL: CPT

## 2019-10-18 PROCEDURE — 85025 COMPLETE CBC W/AUTO DIFF WBC: CPT

## 2019-10-25 ENCOUNTER — LAB VISIT (OUTPATIENT)
Dept: LAB | Facility: HOSPITAL | Age: 69
End: 2019-10-25
Attending: INTERNAL MEDICINE
Payer: MEDICARE

## 2019-10-25 ENCOUNTER — CLINICAL SUPPORT (OUTPATIENT)
Dept: HEMATOLOGY/ONCOLOGY | Facility: CLINIC | Age: 69
End: 2019-10-25
Payer: MEDICARE

## 2019-10-25 DIAGNOSIS — C83.35 DIFFUSE LARGE B-CELL LYMPHOMA OF LYMPH NODES OF INGUINAL REGION: ICD-10-CM

## 2019-10-25 DIAGNOSIS — T45.1X5A CHEMOTHERAPY-INDUCED NEUTROPENIA: ICD-10-CM

## 2019-10-25 DIAGNOSIS — D70.1 CHEMOTHERAPY-INDUCED NEUTROPENIA: ICD-10-CM

## 2019-10-25 LAB
ALBUMIN SERPL BCP-MCNC: 4.1 G/DL (ref 3.5–5.2)
ALP SERPL-CCNC: 73 U/L (ref 55–135)
ALT SERPL W/O P-5'-P-CCNC: 12 U/L (ref 10–44)
ANION GAP SERPL CALC-SCNC: 5 MMOL/L (ref 8–16)
AST SERPL-CCNC: 14 U/L (ref 10–40)
BASOPHILS # BLD AUTO: 0.03 K/UL (ref 0–0.2)
BASOPHILS NFR BLD: 0.5 % (ref 0–1.9)
BILIRUB SERPL-MCNC: 1 MG/DL (ref 0.1–1)
BUN SERPL-MCNC: 14 MG/DL (ref 8–23)
CALCIUM SERPL-MCNC: 8.9 MG/DL (ref 8.7–10.5)
CHLORIDE SERPL-SCNC: 102 MMOL/L (ref 95–110)
CO2 SERPL-SCNC: 29 MMOL/L (ref 23–29)
CREAT SERPL-MCNC: 0.7 MG/DL (ref 0.5–1.4)
DIFFERENTIAL METHOD: ABNORMAL
EOSINOPHIL # BLD AUTO: 0.1 K/UL (ref 0–0.5)
EOSINOPHIL NFR BLD: 1.1 % (ref 0–8)
ERYTHROCYTE [DISTWIDTH] IN BLOOD BY AUTOMATED COUNT: 12.3 % (ref 11.5–14.5)
EST. GFR  (AFRICAN AMERICAN): >60 ML/MIN/1.73 M^2
EST. GFR  (NON AFRICAN AMERICAN): >60 ML/MIN/1.73 M^2
GLUCOSE SERPL-MCNC: 97 MG/DL (ref 70–110)
HCT VFR BLD AUTO: 38.2 % (ref 37–48.5)
HGB BLD-MCNC: 12.9 G/DL (ref 12–16)
IMM GRANULOCYTES # BLD AUTO: 0.02 K/UL (ref 0–0.04)
IMM GRANULOCYTES NFR BLD AUTO: 0.3 % (ref 0–0.5)
LYMPHOCYTES # BLD AUTO: 0.8 K/UL (ref 1–4.8)
LYMPHOCYTES NFR BLD: 12.3 % (ref 18–48)
MCH RBC QN AUTO: 32.1 PG (ref 27–31)
MCHC RBC AUTO-ENTMCNC: 33.8 G/DL (ref 32–36)
MCV RBC AUTO: 95 FL (ref 82–98)
MONOCYTES # BLD AUTO: 0.4 K/UL (ref 0.3–1)
MONOCYTES NFR BLD: 6.6 % (ref 4–15)
NEUTROPHILS # BLD AUTO: 4.9 K/UL (ref 1.8–7.7)
NEUTROPHILS NFR BLD: 79.2 % (ref 38–73)
NRBC BLD-RTO: 0 /100 WBC
PLATELET # BLD AUTO: 216 K/UL (ref 150–350)
PMV BLD AUTO: 8.8 FL (ref 9.2–12.9)
POTASSIUM SERPL-SCNC: 4.6 MMOL/L (ref 3.5–5.1)
PROT SERPL-MCNC: 7.5 G/DL (ref 6–8.4)
RBC # BLD AUTO: 4.02 M/UL (ref 4–5.4)
SODIUM SERPL-SCNC: 136 MMOL/L (ref 136–145)
WBC # BLD AUTO: 6.17 K/UL (ref 3.9–12.7)

## 2019-10-25 PROCEDURE — 80053 COMPREHEN METABOLIC PANEL: CPT

## 2019-10-25 PROCEDURE — 85025 COMPLETE CBC W/AUTO DIFF WBC: CPT

## 2019-10-25 NOTE — PROGRESS NOTES
I met with patient and her wife to complete new patient orientation and her NCCN distress screening; patient indicated a rating of 3.  Patient signed consent form to received information from the [employer] and information on obtaining a wig.  She was provided a TLC booklet which is provided by the [employer] along with a $75 coupon towards a wig in the booklet.  Referral form faxed to agency on patient's behalf.  She was also provided [facility]'s information; they also assist with purchasing wigs.  Patient stated that she ordered a wig and is waiting for it to arrive.  Patient denied needing psychosocial support at this time.  She was provided a [place]50 gift card to [employer] [place] and my contact information in the event she needs assistance in the future.

## 2019-10-25 NOTE — PROGRESS NOTES
Aurelia Marlow  [id number]    [facility]   Cancer Center    TITLE: PLAN OF CARE FOR THE CHEMOTHERAPY PATIENT / TEACHING PROTOCOL    PURPOSE: To involve the patient / significant other in the plan of care and to provide teaching to the significant other & patient receiving chemotherapy.    LEVEL: Independent.    CONTENT: The Plan of Care for the chemotherapy patient is individualized and appropriate to the patients needs, strengths, limitations, & goals.  Education includes information regarding chemotherapy side effects, the treatment itself, and self-care  Activities.    GOAL / OUTCOME STANDARDS    PHYSIOLOGIC: The client will remain free or experience minimal side effects or toxicities throughout the chemotherapy treatment period.     PSYCHOLOGIC: The client/significant others will demonstrate positive coping mechanisms in relation to chemotherapy and its side effects.      COGINITIVE: The client/significant others will verbalize understanding of self-care measure to avoid/minimize side effects of the chemotherapy regime.    EVALUATION / COMMENT KEY:    V = Audiovisual/Video  S = Successfully meets outcome  N = Needs further instruction  NA = Not applicable to the patient  P = Previous knowledge  U = Unable to comprehend  * = See progress notes          PLAN OF CARE  INFORMATION TO BE DELIVERED / NURSING INTERVENTIONS DATE EVALUATION   1. Assessment of client/caregiver,         knowledge of cancer diagnosis,         and chemotherapy as a treatment. 1a. Evaluate patient/caregiver learning ability    b. Plan teaching sessions with patient/caregiver according to needs and present anxiety level/ability to learn.    c. Provide Chemotherapy Education Packet,        Mouth Care Protocol,         Specific Patient Education Sheets. 10/25/2019 S   2. Individual chemotherapy treatment         plan. 2a. Review of Chemotherapy Education handout from [employer]            10/25/2019   S   3. Knowledge Deficit &  Self-Management of general side effects common to all chemotherapy:  a. Nausea/Vomiting  b.   Diarrhea  c. Mouth Care  d. Dental care  e. Constipation  f. Hair Loss  g. Potential for infection  h. Fatigue   3a. Reinforce that the majority of side effects from chemotherapy are reversible and are  controlled both in the hospital and at home        (blood counts recover, hair grows back).   b.  Refer to the following for reinforcement of         information post-treatment:  1. Mouth Care Protocol.  2. Bowel Protocol for constipation or diarrhea.  3.  Drug Specific Chemotherapy Information Sheets for each medication patient receiving.    10/25/2019     S     PLAN OF CARE  INFORMATION TO BE DELIVERED / NURSING INTERVENTIONS DATE EVALUATION   h. Potential for bleeding         i. Potential anemia/fatigue         j. Potential sunburn         k. Birth control measures  l. Safety measures post treatment 4.  Chemotherapy Home Care Instruction  and Safety Information Sheet.  A. patient/caregivers to thoroughly cook shellfish (shrimp, crab, etc) to decrease the chance of infection.    B.  Use sunscreen and protective clothing while in the sun.   10/25/2019      4. Knowledge deficit & Self Management of Drug Specific  Side Effects.    a. BLADDER EFFECTS        (Hemorrhagic Cystitis)                  Preventable with adequate hydration; occurs 2-3 days or more post treatment.   1.  Instruct patient to:  a.   Void at least every 2 hours; increase intake.  b.   DO NOT hold urine; go when urge is felt.  c.    Empty bladder at bedtime and on         awakening.  d.   Observe for color changes (red to tea           colored), amount and frequency changes.  e.   Notify oncologist of any abnormalities           in urine or voiding or if you cannot               drink adequate fluids.   10/25/2019   S   b.   CHANGES IN URINE        COLOR:      1.   Instruct patient:  a.   Most evident in first 2-3 voidings after            administration.  b. Lasts less than 24 hours.  c. If urine is discolored 2 or more days post- treatment, notify oncologist.      10/25/2019 S   c.    KIDNEY EFFECTS           (Nephrotoxicity)   1.  Instruct patient to:  a.   Drink 8-16 glasses of fluid/day the day   pre-treatment and 3-4 days post-treatment to maintain hydration; the best way to minimize kidney problems.  b.   Notify oncologist immediately if unable to drink fluids or if changes are noted in urinary elimination.     10/25/2019   S   a. PULMONARY TOXICITY    1. Instruct patient to report symptoms such as shortness of breath, chest pain, shallow breathing, or chest wall discomfort to physician.  2. Reinforce preventative measures used by the health care team.  a. Baseline and periodic PFT and chest x-ray.   10/25/2019   S     PLAN OF CARE INFORMATION TO BE DELIVERED / NURSING INTERVENTIONS DATE EVALUATION   b. NERVE & MUSCLE EFFECTS (neurotoxocity; neuropathy, possible visual/hearing changes)        3. Instruct patient to:    a. Report numbness or tingling of the hands/feet, loss of fine motor movement (buttoning shirt, tying shoelaces), or gait changes to your oncologist.  b. If numbness/tingling are present:  1. protect feet with shoes at all times.  2. Use gloves for washing dishes/gardening & potholders in kitchen.       10/25/2019   S   c. CARDIOTOXICITY  Decreased effectiveness of             cardiac function. Effective are                  cumulative and irreversible.                                    CARDIAC ARRYTHMIAS              4   Instruct:  a. Heart function may be tested before treatment and perdiocally during treatment.  b. Notify oncologist of irregular pulse, palpitations, shortness of breath, or swelling in lower extremities/feet.          Taxol and Taxotere can cause arrhythmias on infusion that resolve once infusion discontinued. Instruct nurse if any irregularity felt.    10/25/2019   S   d. EXTRAVASTION  Occurs when vesicants  leak outside of vein and cause damage to the skin and underlying tissues.   1. Reinforce preventive measures used to avoid complications.  a. Fresh IV site or central line monitored continuously with vesicant IVP.  b. Continuous infusion via central line site and blood return monitored periodically around the clock.  2. Instruct to:  a. Notify nurse of any discomfort, burning, stinging, etc. at IV site during chemotherapy administration.  b. Notify oncologist of any redness, pain, or swelling at IV site after discharge from hospital.   10/25/2019   S   e. HYPERSENSITIVITY can happen with any medication.   1. Instruct patient:  a. Nurse is with them during the initial part of treatment and will be close by to monitor.  b. Pre-medication ordered by the oncologist must be taken on time. If doses are missed, treatment will need to be re-scheduled.  c. Skin redness, itching, or hives appearing after discharge should be reported to oncologist. 10/25/2019   S       PLAN OF CARE INFORMATION TO BE DELIVERED / NURSING INTERVENTIONS DATE EVALUATION   f. FLU-LIKE SYNDROME      1. Instruct patient symptoms are hard to prevent and may include fever, shaking chills, muscle and body aches.  a. Taking prescribed medications from physician if needed.  b. Adequate fluids are important.    2. Reinforce the need to call if temperature is         elevated to 100.4 or more  10/25/2019   S   g. HAND-FOOT SYNDROME  causes painful, symmetric swelling and redness of palms and soles                  5. Instruct patient to report any numbness or tingling in the hands or feet.  6. Explain prevention techniques, such as     a. Use heavy moisturizers to lessen skin dryness and itching, but to avoid if skin is cracked or broken  b. Bathe in tepid water, use non-perfumed soap, and wash gently. Baths with oatmeal or diluted baking soda may be soothing.  c. Avoid tight fitting shoes and repetitive actions, such as rubbing hands or applying pressure to  hands/feet.  7. Review measures to take should syndrome occur:  a. Cold compresses and elevation for          edema  b. Pain medications and other measures as ordered by oncologist.   4.   Syndrome resolves few weeks after therapy. 10/25/2019   S   5. DISCHARGE PLANNING /        EDUCATION 1.    Explain importance of compliance with follow- up  tests (CBC, CMP).  2.    Verify patient/caregiver know:  a.    Oncologists office phone number.  b.    Dates of follow-up appointments.  c.    Prescriptions given for nausea  3.   Review side effects to monitor and notify          oncologist about.  4.   Reinforce the need for patient and caregivers to:  a.    Review information given.  b.    Call oncologists office with questions          or symptoms  5.   Provide [facility] [place] [facility] make referrals if needed for financial or .   10/25/2019   S     PROGRESS NOTES:    I met with the patient and spouse today for chemotherapy education. she will be starting treatment with Rituxan, Vincristine, Doxorubicin, Cytoxan . We discussed the mechanism of action, potential side effects of this treatment as well as ways she can manage them at home. Some of these side effects include but or not limited to fever, nausea, vomiting, decreased appetite, fatigue, weakness, cytopenias, myalgia/arthralgia, constipation, diarrhea, bleeding, headache, shortness of breath, nail changes, taste change, hair thinning/loss, mood disturbances, or edema. We also discussed dietary modifications she should make although this will be discussed in more detail with the dietician. she was provided with a copy of all of the information we discussed today as well as our contact information. Pt has anti-emetics per MD office. she will be provided a schedule on his first day of treatment. We will obtain labs on a weekly basis and the patient will follow-up with the physician for toxicity monitoring throughout treatment. All questions were  answered and an informed consent was obtained. she was reminded to certainly contact us sooner if needed.  Attached to the patients folder and discussed with the patient the 24 hour/ 7 days a week after hours telephone number for the physician.  Patient notified to call anytime 24/7 because their is a physician on call for any problems that may arise.  Patient also notified to report to [facility] if they can not get in touch with a physician after hours.  Discussed the five wishes booklet with the patient and their family.

## 2019-10-25 NOTE — PROGRESS NOTES
CHEMO SCHOOL- NUTRITION    Aurelia Marlow is a 69 y.o. female with lymphoma. Pt will be receiving [facility]. I met with pt & family member for chemo school today. Pt reported no nutrition-related issues at this time. Currently eating well. No unintentional wt loss reported. Pt inquired about taking OTC supplements- currently taking vitamin D daily & zinc prn to prevent colds.     CW: 149#.     Plan:   1. Reviewed chemo school packet & provided copy to pt.   2. Discussed importance of maintaining wt & staying hydrated.   3. Reviewed food safety guidelines recommended during treatment.   4. Recommended pt inform oncologist of all supplements she is currently taking, but I am OK with these supplements based on my research.   5. Provided RD contact info & encouraged pt to call with any questions/concerns.   6. Will f/u once pt begins treatment.       Electronically signed by: Lady Austin MS, RDN, LDN

## 2019-10-28 ENCOUNTER — INFUSION (OUTPATIENT)
Dept: INFUSION THERAPY | Facility: HOSPITAL | Age: 69
End: 2019-10-28
Attending: INTERNAL MEDICINE
Payer: MEDICARE

## 2019-10-28 ENCOUNTER — TELEPHONE (OUTPATIENT)
Dept: HEMATOLOGY/ONCOLOGY | Facility: CLINIC | Age: 69
End: 2019-10-28

## 2019-10-28 VITALS
HEIGHT: 66 IN | DIASTOLIC BLOOD PRESSURE: 83 MMHG | WEIGHT: 149.69 LBS | TEMPERATURE: 98 F | BODY MASS INDEX: 24.06 KG/M2 | OXYGEN SATURATION: 98 % | RESPIRATION RATE: 18 BRPM | SYSTOLIC BLOOD PRESSURE: 147 MMHG | HEART RATE: 87 BPM

## 2019-10-28 DIAGNOSIS — T45.1X5A CHEMOTHERAPY-INDUCED NEUTROPENIA: Primary | ICD-10-CM

## 2019-10-28 DIAGNOSIS — D70.1 CHEMOTHERAPY-INDUCED NEUTROPENIA: Primary | ICD-10-CM

## 2019-10-28 DIAGNOSIS — C83.35 DIFFUSE LARGE B-CELL LYMPHOMA OF LYMPH NODES OF INGUINAL REGION: ICD-10-CM

## 2019-10-28 PROCEDURE — 96417 CHEMO IV INFUS EACH ADDL SEQ: CPT

## 2019-10-28 PROCEDURE — 25000003 PHARM REV CODE 250: Performed by: INTERNAL MEDICINE

## 2019-10-28 PROCEDURE — 63600175 PHARM REV CODE 636 W HCPCS: Performed by: INTERNAL MEDICINE

## 2019-10-28 PROCEDURE — 96415 CHEMO IV INFUSION ADDL HR: CPT

## 2019-10-28 PROCEDURE — 96375 TX/PRO/DX INJ NEW DRUG ADDON: CPT

## 2019-10-28 PROCEDURE — 96413 CHEMO IV INFUSION 1 HR: CPT

## 2019-10-28 PROCEDURE — A4216 STERILE WATER/SALINE, 10 ML: HCPCS | Performed by: INTERNAL MEDICINE

## 2019-10-28 PROCEDURE — 96367 TX/PROPH/DG ADDL SEQ IV INF: CPT

## 2019-10-28 PROCEDURE — S0028 INJECTION, FAMOTIDINE, 20 MG: HCPCS | Performed by: INTERNAL MEDICINE

## 2019-10-28 PROCEDURE — 96411 CHEMO IV PUSH ADDL DRUG: CPT

## 2019-10-28 RX ORDER — HEPARIN 100 UNIT/ML
500 SYRINGE INTRAVENOUS
Status: CANCELLED | OUTPATIENT
Start: 2019-10-28

## 2019-10-28 RX ORDER — HEPARIN 100 UNIT/ML
500 SYRINGE INTRAVENOUS
Status: DISCONTINUED | OUTPATIENT
Start: 2019-10-28 | End: 2019-10-28 | Stop reason: HOSPADM

## 2019-10-28 RX ORDER — MEPERIDINE HYDROCHLORIDE 50 MG/ML
25 INJECTION INTRAMUSCULAR; INTRAVENOUS; SUBCUTANEOUS EVERY 30 MIN PRN
Status: CANCELLED | OUTPATIENT
Start: 2019-10-28

## 2019-10-28 RX ORDER — SODIUM CHLORIDE 0.9 % (FLUSH) 0.9 %
10 SYRINGE (ML) INJECTION
Status: CANCELLED | OUTPATIENT
Start: 2019-10-28

## 2019-10-28 RX ORDER — FAMOTIDINE 10 MG/ML
20 INJECTION INTRAVENOUS
Status: COMPLETED | OUTPATIENT
Start: 2019-10-28 | End: 2019-10-28

## 2019-10-28 RX ORDER — MEPERIDINE HYDROCHLORIDE 25 MG/ML
25 INJECTION INTRAMUSCULAR; INTRAVENOUS; SUBCUTANEOUS EVERY 30 MIN PRN
Status: DISCONTINUED | OUTPATIENT
Start: 2019-10-28 | End: 2019-10-28 | Stop reason: HOSPADM

## 2019-10-28 RX ORDER — DOXORUBICIN HYDROCHLORIDE 2 MG/ML
50 INJECTION, SOLUTION INTRAVENOUS
Status: COMPLETED | OUTPATIENT
Start: 2019-10-28 | End: 2019-10-28

## 2019-10-28 RX ORDER — ACETAMINOPHEN 325 MG/1
650 TABLET ORAL
Status: COMPLETED | OUTPATIENT
Start: 2019-10-28 | End: 2019-10-28

## 2019-10-28 RX ORDER — FAMOTIDINE 10 MG/ML
20 INJECTION INTRAVENOUS
Status: CANCELLED | OUTPATIENT
Start: 2019-10-28

## 2019-10-28 RX ORDER — SODIUM CHLORIDE 0.9 % (FLUSH) 0.9 %
10 SYRINGE (ML) INJECTION
Status: DISCONTINUED | OUTPATIENT
Start: 2019-10-28 | End: 2019-10-28 | Stop reason: HOSPADM

## 2019-10-28 RX ORDER — DOXORUBICIN HYDROCHLORIDE 2 MG/ML
50 INJECTION, SOLUTION INTRAVENOUS
Status: CANCELLED | OUTPATIENT
Start: 2019-10-28

## 2019-10-28 RX ORDER — ACETAMINOPHEN 325 MG/1
650 TABLET ORAL
Status: CANCELLED | OUTPATIENT
Start: 2019-10-28

## 2019-10-28 RX ADMIN — FAMOTIDINE 20 MG: 10 INJECTION, SOLUTION INTRAVENOUS at 07:10

## 2019-10-28 RX ADMIN — SODIUM CHLORIDE: 0.9 INJECTION, SOLUTION INTRAVENOUS at 07:10

## 2019-10-28 RX ADMIN — HEPARIN 500 UNITS: 100 SYRINGE at 02:10

## 2019-10-28 RX ADMIN — CYCLOPHOSPHAMIDE 1335 MG: 1 INJECTION, POWDER, FOR SOLUTION INTRAVENOUS; ORAL at 12:10

## 2019-10-28 RX ADMIN — VINCRISTINE SULFATE 2 MG: 1 INJECTION, SOLUTION INTRAVENOUS at 12:10

## 2019-10-28 RX ADMIN — DOXORUBICIN HYDROCHLORIDE 90 MG: 2 INJECTION, SOLUTION INTRAVENOUS at 12:10

## 2019-10-28 RX ADMIN — DEXAMETHASONE SODIUM PHOSPHATE: 4 INJECTION, SOLUTION INTRA-ARTICULAR; INTRALESIONAL; INTRAMUSCULAR; INTRAVENOUS; SOFT TISSUE at 08:10

## 2019-10-28 RX ADMIN — RITUXIMAB 668 MG: 10 INJECTION, SOLUTION INTRAVENOUS at 08:10

## 2019-10-28 RX ADMIN — ACETAMINOPHEN 650 MG: 325 TABLET ORAL at 07:10

## 2019-10-28 RX ADMIN — SODIUM CHLORIDE, PRESERVATIVE FREE 10 ML: 5 INJECTION INTRAVENOUS at 02:10

## 2019-10-28 RX ADMIN — DIPHENHYDRAMINE HYDROCHLORIDE 50 MG: 50 INJECTION INTRAMUSCULAR; INTRAVENOUS at 07:10

## 2019-10-28 NOTE — TELEPHONE ENCOUNTER
Spoke to Pharmacist at [facility] about the patient's need for 5 additional 20mg prednisone pills for Aurelia. The patient has 6 refills on the medicine at this time and so she said at the end if they need to give her an additional 5 pills due to the fact that she took 5 on day 1 and then ended up not getting chemo that week. She was moved back a week so she is now missing 5 pills. I explained to Aurelia to refill the prednisone as needed and then I will take care of reordering a day's worth when she needs it at the end. She voiced understanding.

## 2019-10-29 ENCOUNTER — INFUSION (OUTPATIENT)
Dept: INFUSION THERAPY | Facility: HOSPITAL | Age: 69
End: 2019-10-29
Attending: INTERNAL MEDICINE
Payer: MEDICARE

## 2019-10-29 VITALS
OXYGEN SATURATION: 100 % | HEART RATE: 82 BPM | SYSTOLIC BLOOD PRESSURE: 163 MMHG | TEMPERATURE: 98 F | BODY MASS INDEX: 25.78 KG/M2 | WEIGHT: 159.69 LBS | RESPIRATION RATE: 18 BRPM | DIASTOLIC BLOOD PRESSURE: 90 MMHG

## 2019-10-29 DIAGNOSIS — T45.1X5A CHEMOTHERAPY-INDUCED NEUTROPENIA: Primary | ICD-10-CM

## 2019-10-29 DIAGNOSIS — C83.35 DIFFUSE LARGE B-CELL LYMPHOMA OF LYMPH NODES OF INGUINAL REGION: ICD-10-CM

## 2019-10-29 DIAGNOSIS — D70.1 CHEMOTHERAPY-INDUCED NEUTROPENIA: Primary | ICD-10-CM

## 2019-10-29 PROCEDURE — 63600175 PHARM REV CODE 636 W HCPCS: Mod: JG | Performed by: INTERNAL MEDICINE

## 2019-10-29 PROCEDURE — 96372 THER/PROPH/DIAG INJ SC/IM: CPT

## 2019-10-29 RX ADMIN — PEGFILGRASTIM 6 MG: 6 INJECTION SUBCUTANEOUS at 11:10

## 2019-10-29 NOTE — PLAN OF CARE
Problem: Neutropenia  Goal: Absence of Infection  Outcome: Ongoing, Progressing  Intervention: Prevent Infection and Maximize Resistance  Flowsheets (Taken 10/29/2019 1133)  Infection Prevention: personal protective equipment utilized; equipment surfaces disinfected

## 2019-11-01 ENCOUNTER — LAB VISIT (OUTPATIENT)
Dept: LAB | Facility: HOSPITAL | Age: 69
End: 2019-11-01
Attending: INTERNAL MEDICINE
Payer: MEDICARE

## 2019-11-01 DIAGNOSIS — C83.35 DIFFUSE LARGE B-CELL LYMPHOMA OF LYMPH NODES OF INGUINAL REGION: ICD-10-CM

## 2019-11-01 LAB
ALBUMIN SERPL BCP-MCNC: 3.4 G/DL (ref 3.5–5.2)
ALP SERPL-CCNC: 89 U/L (ref 55–135)
ALT SERPL W/O P-5'-P-CCNC: 13 U/L (ref 10–44)
ANION GAP SERPL CALC-SCNC: 6 MMOL/L (ref 8–16)
AST SERPL-CCNC: 16 U/L (ref 10–40)
BASOPHILS NFR BLD: 0 % (ref 0–1.9)
BILIRUB SERPL-MCNC: 1 MG/DL (ref 0.1–1)
BUN SERPL-MCNC: 14 MG/DL (ref 8–23)
CALCIUM SERPL-MCNC: 8.4 MG/DL (ref 8.7–10.5)
CHLORIDE SERPL-SCNC: 101 MMOL/L (ref 95–110)
CO2 SERPL-SCNC: 29 MMOL/L (ref 23–29)
CREAT SERPL-MCNC: 0.6 MG/DL (ref 0.5–1.4)
DIFFERENTIAL METHOD: ABNORMAL
EOSINOPHIL NFR BLD: 1 % (ref 0–8)
ERYTHROCYTE [DISTWIDTH] IN BLOOD BY AUTOMATED COUNT: 12.3 % (ref 11.5–14.5)
EST. GFR  (AFRICAN AMERICAN): >60 ML/MIN/1.73 M^2
EST. GFR  (NON AFRICAN AMERICAN): >60 ML/MIN/1.73 M^2
GLUCOSE SERPL-MCNC: 86 MG/DL (ref 70–110)
HCT VFR BLD AUTO: 33.1 % (ref 37–48.5)
HGB BLD-MCNC: 11.2 G/DL (ref 12–16)
IMM GRANULOCYTES # BLD AUTO: ABNORMAL K/UL (ref 0–0.04)
IMM GRANULOCYTES NFR BLD AUTO: ABNORMAL % (ref 0–0.5)
LYMPHOCYTES NFR BLD: 11 % (ref 18–48)
MCH RBC QN AUTO: 31.8 PG (ref 27–31)
MCHC RBC AUTO-ENTMCNC: 33.8 G/DL (ref 32–36)
MCV RBC AUTO: 94 FL (ref 82–98)
MONOCYTES NFR BLD: 0 % (ref 4–15)
NEUTROPHILS NFR BLD: 81 % (ref 38–73)
NEUTS BAND NFR BLD MANUAL: 7 %
NRBC BLD-RTO: 0 /100 WBC
PLATELET # BLD AUTO: 114 K/UL (ref 150–350)
PMV BLD AUTO: 9.7 FL (ref 9.2–12.9)
POTASSIUM SERPL-SCNC: 3.8 MMOL/L (ref 3.5–5.1)
PROT SERPL-MCNC: 6.7 G/DL (ref 6–8.4)
RBC # BLD AUTO: 3.52 M/UL (ref 4–5.4)
SODIUM SERPL-SCNC: 136 MMOL/L (ref 136–145)
WBC # BLD AUTO: 17.31 K/UL (ref 3.9–12.7)

## 2019-11-01 PROCEDURE — 85007 BL SMEAR W/DIFF WBC COUNT: CPT

## 2019-11-01 PROCEDURE — 80053 COMPREHEN METABOLIC PANEL: CPT

## 2019-11-01 PROCEDURE — 85027 COMPLETE CBC AUTOMATED: CPT

## 2019-11-07 ENCOUNTER — OFFICE VISIT (OUTPATIENT)
Dept: HEMATOLOGY/ONCOLOGY | Facility: CLINIC | Age: 69
End: 2019-11-07
Payer: MEDICARE

## 2019-11-07 VITALS
HEART RATE: 86 BPM | WEIGHT: 149.88 LBS | RESPIRATION RATE: 18 BRPM | DIASTOLIC BLOOD PRESSURE: 77 MMHG | BODY MASS INDEX: 24.19 KG/M2 | TEMPERATURE: 97 F | SYSTOLIC BLOOD PRESSURE: 142 MMHG

## 2019-11-07 DIAGNOSIS — R03.0 ELEVATED BLOOD PRESSURE, SITUATIONAL: ICD-10-CM

## 2019-11-07 DIAGNOSIS — C83.35 DIFFUSE LARGE B-CELL LYMPHOMA OF LYMPH NODES OF INGUINAL REGION: ICD-10-CM

## 2019-11-07 PROCEDURE — 99214 PR OFFICE/OUTPT VISIT, EST, LEVL IV, 30-39 MIN: ICD-10-PCS | Mod: S$GLB,,, | Performed by: INTERNAL MEDICINE

## 2019-11-07 PROCEDURE — 99214 OFFICE O/P EST MOD 30 MIN: CPT | Mod: S$GLB,,, | Performed by: INTERNAL MEDICINE

## 2019-11-07 NOTE — ASSESSMENT & PLAN NOTE
Patient is doing well at this time and she has tolerated the chemotherapy cycle one very well.  Counts are down a bit but there is nothing in critical zones.  Will continue therapy labs allowing.      Patient will go to MD [facility]leg[facility] for second opinion this week coming.

## 2019-11-07 NOTE — ASSESSMENT & PLAN NOTE
Patient does not have htn and may have white coat syndrome.  Will continue to monitor this for now.  Discussed with patient.

## 2019-11-08 ENCOUNTER — LAB VISIT (OUTPATIENT)
Dept: LAB | Facility: HOSPITAL | Age: 69
End: 2019-11-08
Attending: INTERNAL MEDICINE
Payer: MEDICARE

## 2019-11-08 DIAGNOSIS — C83.35 DIFFUSE LARGE B-CELL LYMPHOMA OF LYMPH NODES OF INGUINAL REGION: ICD-10-CM

## 2019-11-08 LAB
ALBUMIN SERPL BCP-MCNC: 3.8 G/DL (ref 3.5–5.2)
ALP SERPL-CCNC: 83 U/L (ref 55–135)
ALT SERPL W/O P-5'-P-CCNC: 13 U/L (ref 10–44)
ANION GAP SERPL CALC-SCNC: 7 MMOL/L (ref 8–16)
ANISOCYTOSIS BLD QL SMEAR: SLIGHT
AST SERPL-CCNC: 13 U/L (ref 10–40)
BASOPHILS NFR BLD: 2 % (ref 0–1.9)
BILIRUB SERPL-MCNC: 0.5 MG/DL (ref 0.1–1)
BUN SERPL-MCNC: 9 MG/DL (ref 8–23)
CALCIUM SERPL-MCNC: 8.5 MG/DL (ref 8.7–10.5)
CHLORIDE SERPL-SCNC: 101 MMOL/L (ref 95–110)
CO2 SERPL-SCNC: 28 MMOL/L (ref 23–29)
CREAT SERPL-MCNC: 0.6 MG/DL (ref 0.5–1.4)
DIFFERENTIAL METHOD: ABNORMAL
EOSINOPHIL NFR BLD: 1 % (ref 0–8)
ERYTHROCYTE [DISTWIDTH] IN BLOOD BY AUTOMATED COUNT: 12.2 % (ref 11.5–14.5)
EST. GFR  (AFRICAN AMERICAN): >60 ML/MIN/1.73 M^2
EST. GFR  (NON AFRICAN AMERICAN): >60 ML/MIN/1.73 M^2
GLUCOSE SERPL-MCNC: 74 MG/DL (ref 70–110)
HCT VFR BLD AUTO: 36.5 % (ref 37–48.5)
HGB BLD-MCNC: 12 G/DL (ref 12–16)
IMM GRANULOCYTES # BLD AUTO: ABNORMAL K/UL (ref 0–0.04)
IMM GRANULOCYTES NFR BLD AUTO: ABNORMAL % (ref 0–0.5)
LYMPHOCYTES NFR BLD: 27 % (ref 18–48)
MCH RBC QN AUTO: 30.9 PG (ref 27–31)
MCHC RBC AUTO-ENTMCNC: 32.9 G/DL (ref 32–36)
MCV RBC AUTO: 94 FL (ref 82–98)
MONOCYTES NFR BLD: 7 % (ref 4–15)
NEUTROPHILS NFR BLD: 54 % (ref 38–73)
NEUTS BAND NFR BLD MANUAL: 9 %
NRBC BLD-RTO: 0 /100 WBC
PLATELET # BLD AUTO: 160 K/UL (ref 150–350)
PMV BLD AUTO: 9.5 FL (ref 9.2–12.9)
POTASSIUM SERPL-SCNC: 4.3 MMOL/L (ref 3.5–5.1)
PROT SERPL-MCNC: 7.2 G/DL (ref 6–8.4)
RBC # BLD AUTO: 3.88 M/UL (ref 4–5.4)
SODIUM SERPL-SCNC: 136 MMOL/L (ref 136–145)
WBC # BLD AUTO: 7.66 K/UL (ref 3.9–12.7)

## 2019-11-08 PROCEDURE — 80053 COMPREHEN METABOLIC PANEL: CPT

## 2019-11-08 PROCEDURE — 85007 BL SMEAR W/DIFF WBC COUNT: CPT

## 2019-11-08 PROCEDURE — 85027 COMPLETE CBC AUTOMATED: CPT

## 2019-11-14 ENCOUNTER — CLINICAL SUPPORT (OUTPATIENT)
Dept: REHABILITATION | Facility: HOSPITAL | Age: 69
End: 2019-11-14
Attending: INTERNAL MEDICINE
Payer: MEDICARE

## 2019-11-14 DIAGNOSIS — I89.0 LYMPHEDEMA OF LEFT LOWER EXTREMITY: Primary | ICD-10-CM

## 2019-11-14 PROCEDURE — 97140 MANUAL THERAPY 1/> REGIONS: CPT

## 2019-11-14 PROCEDURE — 97165 OT EVAL LOW COMPLEX 30 MIN: CPT

## 2019-11-14 NOTE — PLAN OF CARE
[facility] Occupational Therapy  Initial Evaluation for Lymphedema     Name: Aurelia Marlow  Clinic Number: [id number]    Therapy Diagnosis:   Encounter Diagnosis   Name Primary?    Lymphedema of left lower extremity Yes     Physician: Pb Nieto MD    Physician Orders: eval and treat  Medical Diagnosis: Lymphedema s/p lymph node removed from the left groin  Evaluation Date: 11/14/2019  Insurance Authorization period Expiration:10/07/2019-10/06/2020  Plan of Care Certification Period: 11/14/2019-02/20/2020    Visit # / Visits Authortized: 1/48   Time In:11:00  Time Out: 12:00  Total Billable Time: 60 minutes    Precautions: Standard    Subjective     Aurelia rates pain at a 3/10 where 0 = no pain and 10 = maximal pain.  Patient's goals are as follows: to reduce the swelling in the left foot to be able to wear her shoes more comfortably.    History of Present Illness: Patient was diagnosed with lymphoma September 27, 2019.  She had a lymph node in the left groin that was palpable.  The lymph node was removed September 2019.  She reports that swelling began in the left foot two days later.      Chemotherapy: Currently receiving.  She has had one treatment, next treatment is Monday 18th.   Previous Lymphedema Treatment: None    Current Level of Function: Independent but labored at times due to chemotherapy.  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    Aurelia Marlow  has a past surgical history that includes Thyroidectomy (1990); EXCISION RHEUMATOID NODULE (Left, 2012); Biopsy of inguinal lymph node (Left, 9/18/2019); Echocardiography (09/30/2019); Insertion of tunneled central venous catheter (CVC) with subcutaneous port (Left, 10/2/2019); Sampling of multiple lymph  nodes; and Bone marrow biopsy (N/A, 10/10/2019).    Aurelia has a current medication list which includes the following prescription(s): cholecalciferol (vitamin d3), diphenhydramine, famotidine, fluticasone propionate, hydroxychloroquine, levothyroxine, lidocaine-prilocaine, naproxen, omeprazole, and tramadol, and the following Facility-Administered Medications: lidocaine (pf) 10 mg/ml (1%).    Review of patient's allergies indicates:   Allergen Reactions    Contrast media Rash          Objective     Amount of Swelling: Mild  Location of Swelling: lower left leg and foot  Skin Integrity: Visible skin intact  Palpation/Texture: smooth, firm and irregular  Circulation: warm  Posture: Good    Range of Motion:WNL's  Strength: WNL's  Lower extremity Girth Measurements (in centimeters)  LANDMARK RLE LLE   SBP + 30 58 57.5   SBP + 20  47 48.5   SBP +10 38 38.8   Knee- SPB 34 35.2   40 cm  31 31   30 cm 32.5 32   20 cm  23.5 22.6   10 cm  19 20.5   Malleolus 22.2 23.7   Ankle- heel to dorsum of foot 28.4 29.5   Arch 21.3 22.5   Total Girth Measurement 354.9 361.8   Total Girth Difference 6.9    Total Girth Reduction     Bilateral Limb Involvement  Moderate- 10-cm-15 cm TGD or < 5 cm GGD  Moderate/Severe- 15.1-20 cm TGD or 5.1 cm -10 cm GGD  Severe => 20 cm TGD or >10 cm GGD    TGD- Total Girth Difference  GGD - Greatest Girth Difference SBP- Superior Border of Patella    Sensation: WFL's    Treatment     Treatment Time In (separate from total time) : 11:45  Treatment Time Out (separate from total time : 12:00    Aurelia participated in dynamic functional therapeutic activities to improve functional performance for 15 minutes, including:  MULTILAYERED BANDAGINcm, 8cm, rosidal rolls - bandaged from base of the toes to the popliteal fold as follows: stockinette, cotton cast padding and compression bandages applied in a figure 8 pattern,  To leave intact 12 -24 hours, dc with any problems,  Return rolled bandages next  session.     Patient/Family Education: role of OT, goals for OT, scheduling/cancellations - pt verbalized understanding. Discussed insurance limitations with patient. Pt was educated in lymphedema etiology and management plans.  Pt was provided with written  risk reductions and precautions for managing lymphedema.      Assessment     This patient presents s/p surgical removal of a lymph node in the groin  resulting in: lymphedema of the LLE, increased pain, increased stiffness in the left ankle as well as difficulty wearing her normal shoew , and placing the pt at higher risk of infection. Following medical record review it is determined that pt will benefit from occupational therapy services in order to maximize pain free and/or functional use of left LE. The following goals were discussed with the patient and patient is in agreement with them as to be addressed in the treatment plan. The patient's rehab potential is Good.     Anticipated barriers to occupational therapy: none  Pt has no cultural, educational or language barriers to learning provided.    Profile and History Assessment of Occupational Performance Level of Clinical Decision Making Complexity Score   Occupational Profile:   Aurelia Marlow is a 69 y.o. female who lives with their spouse and is currently retired. Aurelia Marlow has difficulty with    housework/household chores  affecting his/her daily functional abilities. His/her main goal for therapy is to reduce the swelling in her left foot to be able to wear her shoes.     Comorbidities:   history of cancer    Medical and Therapy History Review:   Brief               Performance Deficits    Physical:  Edema    Cognitive:  No Deficits    Psychosocial:    No Deficits     Clinical Decision Making:  high    Assessment Process:  Problem-Focused Assessments    Modification/Need for Assistance:  Not Necessary    Intervention Selection:  Several Treatment Options       low  Based on PMHX, co morbidities  , data from assessments and functional level of assistance required with task and clinical presentation directly impacting function.         Short Term Goals: (3 weeks)  Pt to be I and compliant with HEP.  Decrease girth in LLE by 6 cm for improved functional use of LLE.  Patient will demonstrate 100% knowledge of lymphedema precautions and signs of infection.   Patient will perform self-bandaging techniques.  Patient will perform self lymph drainage techniques.  Patient will tolerate daily activities with multilayered bandaging or compression stockings.    Long Term Goals: (6 weeks)  Decrease girth in LLE by 8 cm for improved functional use of LLE.  Patient will show reduction in girth to mild or less with improved contour of limb.  Patient to talib/doff compression garment with daily compliance.         Plan     Patient to be seen 2 x per week for 6 weeks for the medically necessary treatments to include: decongestive massage- Manual lymphatic drainage, intermittent compression pump, multi layered bandaging, education in lymphedema precautions, self massage, self bandaging, and assistance in obtaining appropriate compression garment.  Therex, postural correction, and progression of HEP.

## 2019-11-15 ENCOUNTER — LAB VISIT (OUTPATIENT)
Dept: LAB | Facility: HOSPITAL | Age: 69
End: 2019-11-15
Attending: INTERNAL MEDICINE
Payer: MEDICARE

## 2019-11-15 DIAGNOSIS — C83.35 DIFFUSE LARGE B-CELL LYMPHOMA OF LYMPH NODES OF INGUINAL REGION: ICD-10-CM

## 2019-11-15 LAB
ALBUMIN SERPL BCP-MCNC: 3.8 G/DL (ref 3.5–5.2)
ALP SERPL-CCNC: 71 U/L (ref 55–135)
ALT SERPL W/O P-5'-P-CCNC: 11 U/L (ref 10–44)
ANION GAP SERPL CALC-SCNC: 9 MMOL/L (ref 8–16)
AST SERPL-CCNC: 13 U/L (ref 10–40)
BASOPHILS # BLD AUTO: 0.09 K/UL (ref 0–0.2)
BASOPHILS NFR BLD: 1 % (ref 0–1.9)
BILIRUB SERPL-MCNC: 0.5 MG/DL (ref 0.1–1)
BUN SERPL-MCNC: 10 MG/DL (ref 8–23)
CALCIUM SERPL-MCNC: 9.3 MG/DL (ref 8.7–10.5)
CHLORIDE SERPL-SCNC: 103 MMOL/L (ref 95–110)
CO2 SERPL-SCNC: 28 MMOL/L (ref 23–29)
CREAT SERPL-MCNC: 0.6 MG/DL (ref 0.5–1.4)
DIFFERENTIAL METHOD: ABNORMAL
EOSINOPHIL # BLD AUTO: 0 K/UL (ref 0–0.5)
EOSINOPHIL NFR BLD: 0.2 % (ref 0–8)
ERYTHROCYTE [DISTWIDTH] IN BLOOD BY AUTOMATED COUNT: 12.7 % (ref 11.5–14.5)
EST. GFR  (AFRICAN AMERICAN): >60 ML/MIN/1.73 M^2
EST. GFR  (NON AFRICAN AMERICAN): >60 ML/MIN/1.73 M^2
GLUCOSE SERPL-MCNC: 130 MG/DL (ref 70–110)
HCT VFR BLD AUTO: 33.7 % (ref 37–48.5)
HGB BLD-MCNC: 11.3 G/DL (ref 12–16)
IMM GRANULOCYTES # BLD AUTO: 0.07 K/UL (ref 0–0.04)
IMM GRANULOCYTES NFR BLD AUTO: 0.8 % (ref 0–0.5)
LYMPHOCYTES # BLD AUTO: 1.1 K/UL (ref 1–4.8)
LYMPHOCYTES NFR BLD: 11.7 % (ref 18–48)
MCH RBC QN AUTO: 31.5 PG (ref 27–31)
MCHC RBC AUTO-ENTMCNC: 33.5 G/DL (ref 32–36)
MCV RBC AUTO: 94 FL (ref 82–98)
MONOCYTES # BLD AUTO: 0.5 K/UL (ref 0.3–1)
MONOCYTES NFR BLD: 5 % (ref 4–15)
NEUTROPHILS # BLD AUTO: 7.4 K/UL (ref 1.8–7.7)
NEUTROPHILS NFR BLD: 81.3 % (ref 38–73)
NRBC BLD-RTO: 0 /100 WBC
PLATELET # BLD AUTO: 322 K/UL (ref 150–350)
PMV BLD AUTO: 9 FL (ref 9.2–12.9)
POTASSIUM SERPL-SCNC: 4.5 MMOL/L (ref 3.5–5.1)
PROT SERPL-MCNC: 6.9 G/DL (ref 6–8.4)
RBC # BLD AUTO: 3.59 M/UL (ref 4–5.4)
SODIUM SERPL-SCNC: 140 MMOL/L (ref 136–145)
WBC # BLD AUTO: 9.09 K/UL (ref 3.9–12.7)

## 2019-11-15 PROCEDURE — 85025 COMPLETE CBC W/AUTO DIFF WBC: CPT

## 2019-11-15 PROCEDURE — 36415 COLL VENOUS BLD VENIPUNCTURE: CPT

## 2019-11-15 PROCEDURE — 80053 COMPREHEN METABOLIC PANEL: CPT

## 2019-11-15 RX ORDER — DOXORUBICIN HYDROCHLORIDE 2 MG/ML
50 INJECTION, SOLUTION INTRAVENOUS
Status: CANCELLED | OUTPATIENT
Start: 2019-11-18

## 2019-11-15 RX ORDER — SODIUM CHLORIDE 0.9 % (FLUSH) 0.9 %
10 SYRINGE (ML) INJECTION
Status: CANCELLED | OUTPATIENT
Start: 2019-11-18

## 2019-11-15 RX ORDER — ACETAMINOPHEN 325 MG/1
650 TABLET ORAL
Status: CANCELLED | OUTPATIENT
Start: 2019-11-18

## 2019-11-15 RX ORDER — MEPERIDINE HYDROCHLORIDE 50 MG/ML
25 INJECTION INTRAMUSCULAR; INTRAVENOUS; SUBCUTANEOUS EVERY 30 MIN PRN
Status: CANCELLED | OUTPATIENT
Start: 2019-11-18

## 2019-11-15 RX ORDER — FAMOTIDINE 10 MG/ML
20 INJECTION INTRAVENOUS
Status: CANCELLED | OUTPATIENT
Start: 2019-11-18

## 2019-11-15 RX ORDER — HEPARIN 100 UNIT/ML
500 SYRINGE INTRAVENOUS
Status: CANCELLED | OUTPATIENT
Start: 2019-11-18

## 2019-11-17 ENCOUNTER — LAB VISIT (OUTPATIENT)
Dept: LAB | Facility: HOSPITAL | Age: 69
End: 2019-11-17
Attending: INTERNAL MEDICINE
Payer: MEDICARE

## 2019-11-17 DIAGNOSIS — C83.35 DIFFUSE LARGE B-CELL LYMPHOMA OF LYMPH NODES OF INGUINAL REGION: ICD-10-CM

## 2019-11-17 LAB
BASOPHILS # BLD AUTO: 0.09 K/UL (ref 0–0.2)
BASOPHILS NFR BLD: 1 % (ref 0–1.9)
DIFFERENTIAL METHOD: ABNORMAL
EOSINOPHIL # BLD AUTO: 0 K/UL (ref 0–0.5)
EOSINOPHIL NFR BLD: 0.1 % (ref 0–8)
ERYTHROCYTE [DISTWIDTH] IN BLOOD BY AUTOMATED COUNT: 12.8 % (ref 11.5–14.5)
HCT VFR BLD AUTO: 35.4 % (ref 37–48.5)
HGB BLD-MCNC: 12 G/DL (ref 12–16)
IMM GRANULOCYTES # BLD AUTO: 0.04 K/UL (ref 0–0.04)
IMM GRANULOCYTES NFR BLD AUTO: 0.4 % (ref 0–0.5)
LYMPHOCYTES # BLD AUTO: 0.8 K/UL (ref 1–4.8)
LYMPHOCYTES NFR BLD: 8.9 % (ref 18–48)
MCH RBC QN AUTO: 31.7 PG (ref 27–31)
MCHC RBC AUTO-ENTMCNC: 33.9 G/DL (ref 32–36)
MCV RBC AUTO: 94 FL (ref 82–98)
MONOCYTES # BLD AUTO: 0.5 K/UL (ref 0.3–1)
MONOCYTES NFR BLD: 5.7 % (ref 4–15)
NEUTROPHILS # BLD AUTO: 7.6 K/UL (ref 1.8–7.7)
NEUTROPHILS NFR BLD: 83.9 % (ref 38–73)
NRBC BLD-RTO: 0 /100 WBC
PLATELET # BLD AUTO: 358 K/UL (ref 150–350)
PMV BLD AUTO: 9 FL (ref 9.2–12.9)
RBC # BLD AUTO: 3.78 M/UL (ref 4–5.4)
WBC # BLD AUTO: 9.01 K/UL (ref 3.9–12.7)

## 2019-11-17 PROCEDURE — 85025 COMPLETE CBC W/AUTO DIFF WBC: CPT

## 2019-11-17 PROCEDURE — 36415 COLL VENOUS BLD VENIPUNCTURE: CPT

## 2019-11-18 ENCOUNTER — INFUSION (OUTPATIENT)
Dept: INFUSION THERAPY | Facility: HOSPITAL | Age: 69
End: 2019-11-18
Attending: INTERNAL MEDICINE
Payer: MEDICARE

## 2019-11-18 VITALS
OXYGEN SATURATION: 97 % | HEART RATE: 80 BPM | RESPIRATION RATE: 18 BRPM | WEIGHT: 149.63 LBS | SYSTOLIC BLOOD PRESSURE: 117 MMHG | TEMPERATURE: 98 F | DIASTOLIC BLOOD PRESSURE: 69 MMHG | BODY MASS INDEX: 24.05 KG/M2 | HEIGHT: 66 IN

## 2019-11-18 DIAGNOSIS — C83.35 DIFFUSE LARGE B-CELL LYMPHOMA OF LYMPH NODES OF INGUINAL REGION: ICD-10-CM

## 2019-11-18 DIAGNOSIS — T45.1X5A CHEMOTHERAPY-INDUCED NEUTROPENIA: Primary | ICD-10-CM

## 2019-11-18 DIAGNOSIS — D70.1 CHEMOTHERAPY-INDUCED NEUTROPENIA: Primary | ICD-10-CM

## 2019-11-18 PROCEDURE — 96413 CHEMO IV INFUSION 1 HR: CPT

## 2019-11-18 PROCEDURE — 96415 CHEMO IV INFUSION ADDL HR: CPT

## 2019-11-18 PROCEDURE — A4216 STERILE WATER/SALINE, 10 ML: HCPCS | Performed by: INTERNAL MEDICINE

## 2019-11-18 PROCEDURE — 96375 TX/PRO/DX INJ NEW DRUG ADDON: CPT

## 2019-11-18 PROCEDURE — 96367 TX/PROPH/DG ADDL SEQ IV INF: CPT

## 2019-11-18 PROCEDURE — 96411 CHEMO IV PUSH ADDL DRUG: CPT

## 2019-11-18 PROCEDURE — S0028 INJECTION, FAMOTIDINE, 20 MG: HCPCS | Performed by: INTERNAL MEDICINE

## 2019-11-18 PROCEDURE — 96377 APPLICATON ON-BODY INJECTOR: CPT

## 2019-11-18 PROCEDURE — 63600175 PHARM REV CODE 636 W HCPCS: Performed by: INTERNAL MEDICINE

## 2019-11-18 PROCEDURE — 25000003 PHARM REV CODE 250: Performed by: INTERNAL MEDICINE

## 2019-11-18 PROCEDURE — 96417 CHEMO IV INFUS EACH ADDL SEQ: CPT

## 2019-11-18 RX ORDER — ACETAMINOPHEN 325 MG/1
650 TABLET ORAL
Status: COMPLETED | OUTPATIENT
Start: 2019-11-18 | End: 2019-11-18

## 2019-11-18 RX ORDER — HEPARIN 100 UNIT/ML
500 SYRINGE INTRAVENOUS
Status: DISCONTINUED | OUTPATIENT
Start: 2019-11-18 | End: 2019-11-18 | Stop reason: HOSPADM

## 2019-11-18 RX ORDER — ONDANSETRON HYDROCHLORIDE 8 MG/1
8 TABLET, FILM COATED ORAL EVERY 8 HOURS PRN
Refills: 6 | COMMUNITY
Start: 2019-10-18

## 2019-11-18 RX ORDER — PROMETHAZINE HYDROCHLORIDE 25 MG/1
25 TABLET ORAL EVERY 6 HOURS PRN
Refills: 6 | COMMUNITY
Start: 2019-10-18

## 2019-11-18 RX ORDER — FAMOTIDINE 10 MG/ML
20 INJECTION INTRAVENOUS
Status: COMPLETED | OUTPATIENT
Start: 2019-11-18 | End: 2019-11-18

## 2019-11-18 RX ORDER — MEPERIDINE HYDROCHLORIDE 25 MG/ML
25 INJECTION INTRAMUSCULAR; INTRAVENOUS; SUBCUTANEOUS EVERY 30 MIN PRN
Status: DISCONTINUED | OUTPATIENT
Start: 2019-11-18 | End: 2019-11-18 | Stop reason: HOSPADM

## 2019-11-18 RX ORDER — SODIUM CHLORIDE 0.9 % (FLUSH) 0.9 %
10 SYRINGE (ML) INJECTION
Status: DISCONTINUED | OUTPATIENT
Start: 2019-11-18 | End: 2019-11-18 | Stop reason: HOSPADM

## 2019-11-18 RX ORDER — DOXORUBICIN HYDROCHLORIDE 2 MG/ML
50 INJECTION, SOLUTION INTRAVENOUS
Status: COMPLETED | OUTPATIENT
Start: 2019-11-18 | End: 2019-11-18

## 2019-11-18 RX ORDER — PREDNISONE 20 MG/1
TABLET ORAL
Refills: 6 | COMMUNITY
Start: 2019-10-28 | End: 2021-07-30

## 2019-11-18 RX ADMIN — FAMOTIDINE 20 MG: 10 INJECTION, SOLUTION INTRAVENOUS at 08:11

## 2019-11-18 RX ADMIN — PEGFILGRASTIM 6 MG: KIT SUBCUTANEOUS at 02:11

## 2019-11-18 RX ADMIN — ACETAMINOPHEN 650 MG: 325 TABLET ORAL at 08:11

## 2019-11-18 RX ADMIN — SODIUM CHLORIDE, PRESERVATIVE FREE 10 ML: 5 INJECTION INTRAVENOUS at 02:11

## 2019-11-18 RX ADMIN — DIPHENHYDRAMINE HYDROCHLORIDE 50 MG: 50 INJECTION INTRAMUSCULAR; INTRAVENOUS at 08:11

## 2019-11-18 RX ADMIN — DOXORUBICIN HYDROCHLORIDE 90 MG: 2 INJECTION, SOLUTION INTRAVENOUS at 12:11

## 2019-11-18 RX ADMIN — PALONOSETRON HYDROCHLORIDE: 0.25 INJECTION, SOLUTION INTRAVENOUS at 11:11

## 2019-11-18 RX ADMIN — CYCLOPHOSPHAMIDE 1335 MG: 1 INJECTION, POWDER, FOR SOLUTION INTRAVENOUS; ORAL at 01:11

## 2019-11-18 RX ADMIN — HEPARIN 500 UNITS: 100 SYRINGE at 02:11

## 2019-11-18 RX ADMIN — RITUXIMAB 668 MG: 10 INJECTION, SOLUTION INTRAVENOUS at 09:11

## 2019-11-18 RX ADMIN — VINCRISTINE SULFATE 2 MG: 1 INJECTION, SOLUTION INTRAVENOUS at 12:11

## 2019-11-18 RX ADMIN — SODIUM CHLORIDE: 0.9 INJECTION, SOLUTION INTRAVENOUS at 08:11

## 2019-11-20 ENCOUNTER — CLINICAL SUPPORT (OUTPATIENT)
Dept: REHABILITATION | Facility: HOSPITAL | Age: 69
End: 2019-11-20
Payer: MEDICARE

## 2019-11-20 DIAGNOSIS — I89.0 LYMPHEDEMA OF LEFT LOWER EXTREMITY: Primary | ICD-10-CM

## 2019-11-20 PROCEDURE — 97140 MANUAL THERAPY 1/> REGIONS: CPT

## 2019-11-20 NOTE — PROGRESS NOTES
Occupational Therapy Daily Treatment Note     Name: Aurelia Marlow  Clinic Number: [id number]    Therapy Diagnosis:   Encounter Diagnosis   Name Primary?    Lymphedema of left lower extremity Yes       Physician: Pb Nieto MD     Physician Orders: eval and treat  Medical Diagnosis: Lymphedema s/p lymph node removed from the left groin  Evaluation Date: 11/14/2019  Insurance Authorization period Expiration:10/07/2019-10/06/2020  Plan of Care Certification Period: 11/14/2019-02/20/2020     Visit # / Visits Authortized: 2/48   Time In:3:00  Time Out: 4:00  Total Billable Time: 60 minutes     Precautions: Standard    Subjective     Pt reports: she was compliant with compression bandaging as recommended.  She reports no issues with being wrapped with compression bandages.  She reports that she can tell that her leg has reduced in size from the compression bandages.   Response to previous treatment:to had a significant reduction of swelling in the left leg.  Functional change: she is able to wear her old shoes again.    Pain: 0/10  Location: left LE      Jenna Moses received the following manual therapy techniques for 60 minutes:   -MANUAL LYMPHATIC DRAINAGE 45 minutes:  While supine manual lymph drainage was provided to the AI anastomosis, upper thigh, groin, lower leg and foot using 2 hand techniques to encourage the lymph fluid to flow to the trunk.  MLD utilized to increase lymph uptake and direct lymph flow.    Kinesio tape 15 minutes was applied to the Left upper thigh/groin area in a basketweave application with 25% tension from proximal to distal, to the medial ankle in a fan application extending down the dorsum of the foot to the toes in an area of congestion to increase lymph uptake and direct lymph flow.  A kinesio tape scar application was applied to the groin and the left foot to increase scar mobility.    She was measured and fit with a compression stockings 20-30  mmHG size II petite Open toe Medi plus.  She and her  were educated on how to talib and doff the compression stocking using the included satin slip.  The verbalized understanding of the above education.    Lower extremity Girth Measurements (in centimeters)  LANDMARK LLE   SBP + 30 57.5   SBP + 20  48   SBP +10 38   Knee- SPB 34.5   40 cm  29.5   30 cm 31.4   20 cm  21.6   10 cm  20   Malleolus 23.5   Ankle- heel to dorsum of foot 28.8   Arch 21.2   Total Girth Measurement 354   Total Girth Difference - 0.9   Total Girth Reduction  7.4   Bilateral Limb Involvement  Moderate- 10-cm-15 cm TGD or < 5 cm GGD  Moderate/Severe- 15.1-20 cm TGD or 5.1 cm -10 cm GGD  Severe => 20 cm TGD or >10 cm GGD     TGD- Total Girth Difference  GGD - Greatest Girth Difference SBP- Superior Border of Patella      Home Exercises and Education Provided     Education provided:   - Bandaging/Compression stocking wearing schedule,  Beginning of self massage,  Risk reduction and kinesio tape wearing schedule and to remove if she has ill effects.  - Progress towards goals     Aurelia demonstrated good  understanding of the education provided.        Assessment     Pt would continue to benefit from skilled OT.  Aurelia is progressing well towards her goals and there are no updates to goals at this time. Pt prognosis is Good. Pt will continue to benefit from skilled outpatient occupational therapy to address the deficits listed in the problem list on initial evalution provide pt/family education and to maximize pt's level of independence in the home and community environment. She responded well to the compression bandages.  She had a significant reduction of swelling since her last session.  Kinesio tape is being utilized to increase lymph uptake and direct lmph    Anticipated barriers to occupational therapy: currently on chemotherapy  Pt's spiritual, cultural and educational needs considered and pt agreeable to plan of care and goals.      Goals:     Short Term Goals: (3 weeks)  Pt to be I and compliant with HEP.  Decrease girth in LLE by 6 cm for improved functional use of LLE.  Patient will demonstrate 100% knowledge of lymphedema precautions and signs of infection.   Patient will perform self-bandaging techniques.  Patient will perform self lymph drainage techniques.  Patient will tolerate daily activities with multilayered bandaging or compression stockings.     Long Term Goals: (6 weeks)  Decrease girth in LLE by 8 cm for improved functional use of LLE.  Patient will show reduction in girth to mild or less with improved contour of limb.  Patient to talib/doff compression garment with daily compliance.    Plan   Continue with individualized established plan of care.   Discussed Plan of Care with patient: Yes  Updates/Grading for next session: Determine if the issued compression garment is going to keep the swelling down.  May place patient on hold next session.    Iman Herring, OT

## 2019-11-22 ENCOUNTER — CLINICAL SUPPORT (OUTPATIENT)
Dept: REHABILITATION | Facility: HOSPITAL | Age: 69
End: 2019-11-22
Payer: MEDICARE

## 2019-11-22 ENCOUNTER — LAB VISIT (OUTPATIENT)
Dept: LAB | Facility: HOSPITAL | Age: 69
End: 2019-11-22
Attending: INTERNAL MEDICINE
Payer: MEDICARE

## 2019-11-22 DIAGNOSIS — I89.0 LYMPHEDEMA OF LEFT LOWER EXTREMITY: Primary | ICD-10-CM

## 2019-11-22 DIAGNOSIS — C83.35 DIFFUSE LARGE B-CELL LYMPHOMA OF LYMPH NODES OF INGUINAL REGION: ICD-10-CM

## 2019-11-22 LAB
ALBUMIN SERPL BCP-MCNC: 3.7 G/DL (ref 3.5–5.2)
ALP SERPL-CCNC: 95 U/L (ref 55–135)
ALT SERPL W/O P-5'-P-CCNC: 13 U/L (ref 10–44)
ANION GAP SERPL CALC-SCNC: 7 MMOL/L (ref 8–16)
ANISOCYTOSIS BLD QL SMEAR: SLIGHT
AST SERPL-CCNC: 15 U/L (ref 10–40)
BASOPHILS # BLD AUTO: ABNORMAL K/UL (ref 0–0.2)
BASOPHILS NFR BLD: 1 % (ref 0–1.9)
BILIRUB SERPL-MCNC: 1 MG/DL (ref 0.1–1)
BUN SERPL-MCNC: 14 MG/DL (ref 8–23)
CALCIUM SERPL-MCNC: 8.5 MG/DL (ref 8.7–10.5)
CHLORIDE SERPL-SCNC: 100 MMOL/L (ref 95–110)
CO2 SERPL-SCNC: 29 MMOL/L (ref 23–29)
CREAT SERPL-MCNC: 0.6 MG/DL (ref 0.5–1.4)
DIFFERENTIAL METHOD: ABNORMAL
EOSINOPHIL # BLD AUTO: ABNORMAL K/UL (ref 0–0.5)
EOSINOPHIL NFR BLD: 0 % (ref 0–8)
ERYTHROCYTE [DISTWIDTH] IN BLOOD BY AUTOMATED COUNT: 13.3 % (ref 11.5–14.5)
EST. GFR  (AFRICAN AMERICAN): >60 ML/MIN/1.73 M^2
EST. GFR  (NON AFRICAN AMERICAN): >60 ML/MIN/1.73 M^2
GLUCOSE SERPL-MCNC: 92 MG/DL (ref 70–110)
HCT VFR BLD AUTO: 30.6 % (ref 37–48.5)
HGB BLD-MCNC: 10.5 G/DL (ref 12–16)
IMM GRANULOCYTES # BLD AUTO: ABNORMAL K/UL (ref 0–0.04)
IMM GRANULOCYTES NFR BLD AUTO: ABNORMAL % (ref 0–0.5)
LYMPHOCYTES # BLD AUTO: ABNORMAL K/UL (ref 1–4.8)
LYMPHOCYTES NFR BLD: 2 % (ref 18–48)
MCH RBC QN AUTO: 32.7 PG (ref 27–31)
MCHC RBC AUTO-ENTMCNC: 34.3 G/DL (ref 32–36)
MCV RBC AUTO: 95 FL (ref 82–98)
MONOCYTES # BLD AUTO: ABNORMAL K/UL (ref 0.3–1)
MONOCYTES NFR BLD: 2 % (ref 4–15)
NEUTROPHILS # BLD AUTO: ABNORMAL K/UL (ref 1.8–7.7)
NEUTROPHILS NFR BLD: 93 % (ref 38–73)
NEUTS BAND NFR BLD MANUAL: 2 %
NRBC BLD-RTO: 0 /100 WBC
PLATELET # BLD AUTO: 182 K/UL (ref 150–350)
PMV BLD AUTO: 9.5 FL (ref 9.2–12.9)
POIKILOCYTOSIS BLD QL SMEAR: SLIGHT
POTASSIUM SERPL-SCNC: 4.2 MMOL/L (ref 3.5–5.1)
PROT SERPL-MCNC: 6.6 G/DL (ref 6–8.4)
RBC # BLD AUTO: 3.21 M/UL (ref 4–5.4)
SODIUM SERPL-SCNC: 136 MMOL/L (ref 136–145)
WBC # BLD AUTO: 32.92 K/UL (ref 3.9–12.7)

## 2019-11-22 PROCEDURE — 85007 BL SMEAR W/DIFF WBC COUNT: CPT

## 2019-11-22 PROCEDURE — 36415 COLL VENOUS BLD VENIPUNCTURE: CPT

## 2019-11-22 PROCEDURE — 85027 COMPLETE CBC AUTOMATED: CPT

## 2019-11-22 PROCEDURE — 80053 COMPREHEN METABOLIC PANEL: CPT

## 2019-11-22 PROCEDURE — 97140 MANUAL THERAPY 1/> REGIONS: CPT

## 2019-11-22 NOTE — PROGRESS NOTES
Occupational Therapy Daily Treatment Note     Name: Aurelia Marlow  Clinic Number: [id number]    Therapy Diagnosis:   Encounter Diagnosis   Name Primary?    Lymphedema of left lower extremity Yes       Physician: Pb Nieto MD     Physician Orders: eval and treat  Medical Diagnosis: Lymphedema s/p lymph node removed from the left groin  Evaluation Date: 11/14/2019  Insurance Authorization period Expiration:10/07/2019-10/06/2020  Plan of Care Certification Period: 11/14/2019-02/20/2020     Visit # / Visits Authortized: 3/48   Time In:9:45  Time Out: 10:15  Total Billable Time: 30 minutes     Precautions: Standard    Subjective     Pt reports: she is so pleased with the reduction of swelling I the left lower leg.  She has some foot swelling but the swelling in the thigh and lower legs has resolved.  The scars on the upper thigh and on the dorsum of the foot are more mobile.    Response to previous treatment:to had a significant reduction of swelling in the left leg.  Functional change: she is able to wear her old shoes again.    Pain: 0/10  Location: left LE      Objective   Aurelia received the following manual therapy techniques for 60 minutes:   -MANUAL LYMPHATIC DRAINAGE 45 minutes:  While supine manual lymph drainage was provided to the AI anastomosis, upper thigh, groin, lower leg and foot using 2 hand techniques to encourage the lymph fluid to flow to the trunk.  MLD utilized to increase lymph uptake and direct lymph flow.    Kinesio tape 15 minutes was applied to the Left dorsum of the foot in a jellyfish application to increase lymph uptake and direct lymph flow.  She was measured and fit with a compression stockings 20-30 mmHG size II petite Open toe Medi plus.  She and her  were educated on how to talib and doff the compression stocking using the included satin slip.  The verbalized understanding of the above education.    Lower extremity Girth Measurements (in  centimeters)  LANDMARK LLE   SBP + 30 55.5   SBP + 20  45.5   SBP +10 38   Knee- SPB 33.7   40 cm  30.6   30 cm 31.2   20 cm  22.3   10 cm  20.9   Malleolus 23.8   Ankle- heel to dorsum of foot 29.2   Arch 20.5   Total Girth Measurement 351.2   Total Girth Difference - 0.9   Total Girth Reduction  10.6   Bilateral Limb Involvement  Moderate- 10-cm-15 cm TGD or < 5 cm GGD  Moderate/Severe- 15.1-20 cm TGD or 5.1 cm -10 cm GGD  Severe => 20 cm TGD or >10 cm GGD     TGD- Total Girth Difference  GGD - Greatest Girth Difference SBP- Superior Border of Patella      Home Exercises and Education Provided     Education provided:   - Bandaging/Compression stocking wearing schedule,  Beginning of self massage,  Risk reduction and kinesio tape wearing schedule and to remove if she has ill effects.  - Progress towards goals     Aurelia demonstrated good  understanding of the education provided.        Assessment     Pt would continue to benefit from skilled OT.  Aurelia is progressing well towards her goals and there are no updates to goals at this time. Pt prognosis is Good. Pt will continue to benefit from skilled outpatient occupational therapy to address the deficits listed in the problem list on initial evalution provide pt/family education and to maximize pt's level of independence in the home and community environment. She responded well to the compression bandages.  She had a significant reduction of swelling since her initial evaluation.  The left leg has a more normal shape.  Kinesio tape is being utilized to increase lymph uptake and direct lymph flow.    Anticipated barriers to occupational therapy: currently on chemotherapy  Pt's spiritual, cultural and educational needs considered and pt agreeable to plan of care and goals.     Goals:     Short Term Goals: (3 weeks)  Pt to be I and compliant with HEP.  Met  Decrease girth in LLE by 6 cm for improved functional use of LLE.  Patient will demonstrate 100% knowledge of  lymphedema precautions and signs of infection.   Patient will perform self-bandaging techniques.  Met  Patient will perform self lymph drainage techniques.  Met  Patient will tolerate daily activities with multilayered bandaging or compression stockings.     Long Term Goals: (6 weeks)  Met  Decrease girth in LLE by 8 cm for improved functional use of LLE.  Met  Patient will show reduction in girth to mild or less with improved contour of limb.  Met  Patient to talib/doff compression garment with daily compliance.    Plan   Continue with individualized established plan of care.   Discussed Plan of Care with patient: Yes  Updates/Grading for next session: Continue to address fluid on the left foot and continue education.     Iman Herring, OT

## 2019-11-29 ENCOUNTER — LAB VISIT (OUTPATIENT)
Dept: LAB | Facility: HOSPITAL | Age: 69
End: 2019-11-29
Attending: INTERNAL MEDICINE
Payer: MEDICARE

## 2019-11-29 DIAGNOSIS — C83.35 DIFFUSE LARGE B-CELL LYMPHOMA OF LYMPH NODES OF INGUINAL REGION: ICD-10-CM

## 2019-11-29 LAB
ALBUMIN SERPL BCP-MCNC: 4 G/DL (ref 3.5–5.2)
ALP SERPL-CCNC: 82 U/L (ref 55–135)
ALT SERPL W/O P-5'-P-CCNC: 18 U/L (ref 10–44)
ANION GAP SERPL CALC-SCNC: 7 MMOL/L (ref 8–16)
AST SERPL-CCNC: 13 U/L (ref 10–40)
BASOPHILS # BLD AUTO: 0.09 K/UL (ref 0–0.2)
BASOPHILS NFR BLD: 1.3 % (ref 0–1.9)
BILIRUB SERPL-MCNC: 0.4 MG/DL (ref 0.1–1)
BUN SERPL-MCNC: 11 MG/DL (ref 8–23)
CALCIUM SERPL-MCNC: 8.9 MG/DL (ref 8.7–10.5)
CHLORIDE SERPL-SCNC: 105 MMOL/L (ref 95–110)
CO2 SERPL-SCNC: 27 MMOL/L (ref 23–29)
CREAT SERPL-MCNC: 0.5 MG/DL (ref 0.5–1.4)
DIFFERENTIAL METHOD: ABNORMAL
EOSINOPHIL # BLD AUTO: 0.1 K/UL (ref 0–0.5)
EOSINOPHIL NFR BLD: 0.7 % (ref 0–8)
ERYTHROCYTE [DISTWIDTH] IN BLOOD BY AUTOMATED COUNT: 13.2 % (ref 11.5–14.5)
EST. GFR  (AFRICAN AMERICAN): >60 ML/MIN/1.73 M^2
EST. GFR  (NON AFRICAN AMERICAN): >60 ML/MIN/1.73 M^2
GLUCOSE SERPL-MCNC: 112 MG/DL (ref 70–110)
HCT VFR BLD AUTO: 32.1 % (ref 37–48.5)
HGB BLD-MCNC: 10.6 G/DL (ref 12–16)
IMM GRANULOCYTES # BLD AUTO: 0.26 K/UL (ref 0–0.04)
IMM GRANULOCYTES NFR BLD AUTO: 3.7 % (ref 0–0.5)
LYMPHOCYTES # BLD AUTO: 1 K/UL (ref 1–4.8)
LYMPHOCYTES NFR BLD: 13.6 % (ref 18–48)
MCH RBC QN AUTO: 31.3 PG (ref 27–31)
MCHC RBC AUTO-ENTMCNC: 33 G/DL (ref 32–36)
MCV RBC AUTO: 95 FL (ref 82–98)
MONOCYTES # BLD AUTO: 0.6 K/UL (ref 0.3–1)
MONOCYTES NFR BLD: 8.5 % (ref 4–15)
NEUTROPHILS # BLD AUTO: 5.1 K/UL (ref 1.8–7.7)
NEUTROPHILS NFR BLD: 72.2 % (ref 38–73)
NRBC BLD-RTO: 0 /100 WBC
PLATELET # BLD AUTO: 119 K/UL (ref 150–350)
PMV BLD AUTO: 10.7 FL (ref 9.2–12.9)
POTASSIUM SERPL-SCNC: 5 MMOL/L (ref 3.5–5.1)
PROT SERPL-MCNC: 7 G/DL (ref 6–8.4)
RBC # BLD AUTO: 3.39 M/UL (ref 4–5.4)
SODIUM SERPL-SCNC: 139 MMOL/L (ref 136–145)
WBC # BLD AUTO: 7.06 K/UL (ref 3.9–12.7)

## 2019-11-29 PROCEDURE — 36415 COLL VENOUS BLD VENIPUNCTURE: CPT

## 2019-11-29 PROCEDURE — 85025 COMPLETE CBC W/AUTO DIFF WBC: CPT

## 2019-11-29 PROCEDURE — 80053 COMPREHEN METABOLIC PANEL: CPT

## 2019-12-04 ENCOUNTER — CLINICAL SUPPORT (OUTPATIENT)
Dept: REHABILITATION | Facility: HOSPITAL | Age: 69
End: 2019-12-04
Payer: MEDICARE

## 2019-12-04 DIAGNOSIS — I89.0 LYMPHEDEMA OF LEFT LOWER EXTREMITY: Primary | ICD-10-CM

## 2019-12-04 PROCEDURE — 97140 MANUAL THERAPY 1/> REGIONS: CPT

## 2019-12-04 NOTE — PROGRESS NOTES
Occupational Therapy Daily Treatment Note     Name: Aurelia Marlow  Clinic Number: [id number]    Therapy Diagnosis:   Encounter Diagnosis   Name Primary?    Lymphedema of left lower extremity Yes       Physician: Pb Nieto MD     Physician Orders: eval and treat  Medical Diagnosis: Lymphedema s/p lymph node removed from the left groin  Evaluation Date: 11/14/2019  Insurance Authorization period Expiration:10/07/2019-10/06/2020  Plan of Care Certification Period: 11/14/2019-02/20/2020     Visit # / Visits Authortized: 4/48   Time In: 1:00   Time Out: 2:00  Total Billable Time: 60 minutes     Precautions: Standard    Subjective     Pt reports: she is so pleased with the reduction of swelling in the left lower leg. She is compliant with wearing the compression stockings. She has some foot swelling but the swelling in the thigh and lower legs has resolved.  The scars on the upper thigh and on the dorsum of the foot are more mobile.    Response to previous treatment:to had a significant reduction of swelling in the left leg.  Functional change: she is able to wear her old shoes again.    Pain: 0/10  Location: left LE      Objective   Aurelia received the following manual therapy techniques for 60 minutes:   -MANUAL LYMPHATIC DRAINAGE 60 minutes:  While supine manual lymph drainage was provided to the AI anastomosis, upper thigh, groin, lower leg and foot using 2 hand techniques to encourage the lymph fluid to flow to the trunk.  MLD utilized to increase lymph uptake and direct lymph flow.  Thumb circles were performed to the dorsum of the foot to break up the fibrotic fluid present.        Home Exercises and Education Provided     Education provided:   - Bandaging/Compression stocking wearing schedule,  Beginning of self massage,  Risk reduction and kinesio tape wearing schedule and to remove if she has ill effects.  - Progress towards goals     Aurelia demonstrated good  understanding  of the education provided.        Assessment     Pt would continue to benefit from skilled OT.  Aurelia is progressing well towards her goals and there are no updates to goals at this time. Pt prognosis is Good. Pt will continue to benefit from skilled outpatient occupational therapy to address the deficits listed in the problem list on initial evalution provide pt/family education and to maximize pt's level of independence in the home and community environment. She responded well to the compression bandages.  She had a significant reduction of swelling since her initial evaluation.  The left leg has a more normal shape.  Kinesio tape is being utilized to increase lymph uptake and direct lymph flow.  A chip bag was fabricated and is to be applied to the dorsum of the foot at night under the compression bandages applied by her .    Anticipated barriers to occupational therapy: currently on chemotherapy  Pt's spiritual, cultural and educational needs considered and pt agreeable to plan of care and goals.     Goals:     Short Term Goals: (3 weeks)  Pt to be I and compliant with HEP.  Met  Decrease girth in LLE by 6 cm for improved functional use of LLE.  Patient will demonstrate 100% knowledge of lymphedema precautions and signs of infection.   Patient will perform self-bandaging techniques.  Met  Patient will perform self lymph drainage techniques.  Met  Patient will tolerate daily activities with multilayered bandaging or compression stockings.     Long Term Goals: (6 weeks)  Met  Decrease girth in LLE by 8 cm for improved functional use of LLE.  Met  Patient will show reduction in girth to mild or less with improved contour of limb.  Met  Patient to talib/doff compression garment with daily compliance.    Plan   Continue with individualized established plan of care.   Discussed Plan of Care with patient: Yes  Updates/Grading for next session: Continue to address fluid on the left foot and continue  education.     Iman Herring, OT

## 2019-12-05 ENCOUNTER — OFFICE VISIT (OUTPATIENT)
Dept: HEMATOLOGY/ONCOLOGY | Facility: CLINIC | Age: 69
End: 2019-12-05
Payer: MEDICARE

## 2019-12-05 VITALS
HEART RATE: 87 BPM | WEIGHT: 150.38 LBS | SYSTOLIC BLOOD PRESSURE: 139 MMHG | RESPIRATION RATE: 18 BRPM | TEMPERATURE: 97 F | BODY MASS INDEX: 24.28 KG/M2 | DIASTOLIC BLOOD PRESSURE: 81 MMHG

## 2019-12-05 DIAGNOSIS — C83.35 DIFFUSE LARGE B-CELL LYMPHOMA OF LYMPH NODES OF INGUINAL REGION: ICD-10-CM

## 2019-12-05 DIAGNOSIS — D47.2 MGUS (MONOCLONAL GAMMOPATHY OF UNKNOWN SIGNIFICANCE): ICD-10-CM

## 2019-12-05 PROCEDURE — 99214 OFFICE O/P EST MOD 30 MIN: CPT | Mod: S$GLB,,, | Performed by: INTERNAL MEDICINE

## 2019-12-05 PROCEDURE — 99214 PR OFFICE/OUTPT VISIT, EST, LEVL IV, 30-39 MIN: ICD-10-PCS | Mod: S$GLB,,, | Performed by: INTERNAL MEDICINE

## 2019-12-05 PROCEDURE — 1126F PR PAIN SEVERITY QUANTIFIED, NO PAIN PRESENT: ICD-10-PCS | Mod: S$GLB,,, | Performed by: INTERNAL MEDICINE

## 2019-12-05 PROCEDURE — 1126F AMNT PAIN NOTED NONE PRSNT: CPT | Mod: S$GLB,,, | Performed by: INTERNAL MEDICINE

## 2019-12-05 PROCEDURE — 1159F PR MEDICATION LIST DOCUMENTED IN MEDICAL RECORD: ICD-10-PCS | Mod: S$GLB,,, | Performed by: INTERNAL MEDICINE

## 2019-12-05 PROCEDURE — 1159F MED LIST DOCD IN RCRD: CPT | Mod: S$GLB,,, | Performed by: INTERNAL MEDICINE

## 2019-12-05 RX ORDER — FAMOTIDINE 10 MG/ML
20 INJECTION INTRAVENOUS
Status: CANCELLED | OUTPATIENT
Start: 2019-12-09

## 2019-12-05 RX ORDER — DOXORUBICIN HYDROCHLORIDE 2 MG/ML
50 INJECTION, SOLUTION INTRAVENOUS
Status: CANCELLED | OUTPATIENT
Start: 2019-12-09

## 2019-12-05 RX ORDER — MEPERIDINE HYDROCHLORIDE 50 MG/ML
25 INJECTION INTRAMUSCULAR; INTRAVENOUS; SUBCUTANEOUS EVERY 30 MIN PRN
Status: CANCELLED | OUTPATIENT
Start: 2019-12-09

## 2019-12-05 RX ORDER — ACETAMINOPHEN 325 MG/1
650 TABLET ORAL
Status: CANCELLED | OUTPATIENT
Start: 2019-12-09

## 2019-12-05 RX ORDER — SODIUM CHLORIDE 0.9 % (FLUSH) 0.9 %
10 SYRINGE (ML) INJECTION
Status: CANCELLED | OUTPATIENT
Start: 2019-12-09

## 2019-12-05 RX ORDER — HEPARIN 100 UNIT/ML
500 SYRINGE INTRAVENOUS
Status: CANCELLED | OUTPATIENT
Start: 2019-12-09

## 2019-12-05 NOTE — PROGRESS NOTES
PROGRESS NOTE    Subjective:       Patient ID: Aurelia Marlow is a 69 y.o. female.    [facility]x [facility] type- 8/21/2019  Inguinal LN biopsy    PET with bone marrow uptake throughout appendicular and axial skeleton    Bone marrow biopsy positive for B-cell lymphoma    Stage ARELIS    Chief Complaint:  No chief complaint on file.  lymphoma follow up    History of Present Illness:   Aurelia Marlow is a 69 y.o. female who presents for follow up of lymphoma    On chemotherapy.  Is feeling well and tolerated this with only fatigue.      R-CHOP:  Cycle 1: 10/28/2019  Cycle 2: 11/18/2019  Cycle 3: 12/9/2019-due          Echo 9/30/2019 EF 60%  CMP, CBC 10/6/2019 unremarkable    Family and Social history reviewed and is unchanged from 9/27/2019      ROS:  Review of Systems   Constitutional: Negative for appetite change, fever and unexpected weight change.   Eyes: Negative for visual disturbance.   Respiratory: Negative for cough and shortness of breath.    Cardiovascular: Negative for chest pain and leg swelling.   Gastrointestinal: Negative for abdominal pain, blood in stool and diarrhea.   Genitourinary: Negative for frequency and hematuria.   Musculoskeletal: Negative for back pain.   Skin: Negative for rash.   Neurological: Negative for headaches.   Hematological: Negative for adenopathy.   Psychiatric/Behavioral: The patient is not nervous/anxious.           Current Outpatient Medications:     cholecalciferol, vitamin D3, (VITAMIN D3) 2,000 unit Tab, , Disp: , Rfl:     diphenhydrAMINE (BENADRYL) 25 mg capsule, Take 1 capsule (25 mg total) by mouth every 6 (six) hours as needed for Itching or Allergies., Disp: 20 capsule, Rfl: 0    famotidine (PEPCID) 20 MG tablet, Take 1 tablet (20 mg total) by mouth 2 (two) times daily., Disp: 20 tablet, Rfl: 0    fluticasone propionate (FLONASE) 50 mcg/actuation nasal spray, , Disp: , Rfl:     hydroxychloroquine  (PLAQUENIL) 200 mg tablet, Take 1 tablet (200 mg total) by mouth 2 (two) times daily., Disp: 180 tablet, Rfl: 1    levothyroxine (SYNTHROID) 100 MCG tablet, Take 100 mcg by mouth once daily., Disp: , Rfl:     lidocaine-prilocaine (EMLA) cream, Apply topically as needed., Disp: 25 g, Rfl: 2    naproxen (EC NAPROSYN) 500 MG EC tablet, TAKE ONE TABLET BY MOUTH TWICE DAILY AFTER MEALS AS NEEDED, Disp: 60 tablet, Rfl: 1    omeprazole 20 mg TbEC, Take 20 mg by mouth every evening. , Disp: , Rfl:     ondansetron (ZOFRAN) 8 MG tablet, Take 8 mg by mouth every 8 (eight) hours as needed., Disp: , Rfl: 6    predniSONE (DELTASONE) 20 MG tablet, TAKE 5 TABLETS DAILY DAY 1-5 OF CHEMO CYCLE, Disp: , Rfl: 6    promethazine (PHENERGAN) 25 MG tablet, Take 25 mg by mouth every 6 (six) hours as needed., Disp: , Rfl: 6    traMADol (ULTRAM) 50 mg tablet, Take 1 tablet (50 mg total) by mouth every 12 (twelve) hours as needed for Pain. Dispense generic, Disp: 60 tablet, Rfl: 1    Current Facility-Administered Medications:     lidocaine (PF) 10 mg/ml (1%) injection 10 mg, 1 mL, Intradermal, Once, Kermit Martinez MD        Objective:       Physical Examination:     /81   Pulse 87   Temp 97.4 °F (36.3 °C) (Oral)   Resp 18   Wt 68.2 kg (150 lb 6.4 oz)   BMI 24.28 kg/m²     Physical Exam   Constitutional: She appears well-developed and well-nourished.   HENT:   Head: Normocephalic and atraumatic.   Right Ear: External ear normal.   Left Ear: External ear normal.   Mouth/Throat: Oropharynx is clear and moist.   Eyes: Pupils are equal, round, and reactive to light. Conjunctivae are normal.   Neck: No tracheal deviation present. No thyromegaly present.   Cardiovascular: Normal rate, regular rhythm and normal heart sounds.   Pulmonary/Chest: Effort normal and breath sounds normal.   Abdominal: Soft. Bowel sounds are normal. She exhibits no distension and no mass. There is no tenderness.   Musculoskeletal: She exhibits no  edema.   Neurological:   Neuro intact througout   Skin: No rash noted.   Psychiatric: She has a normal mood and affect. Her behavior is normal. Judgment and thought content normal.       Labs:   Recent Results (from the past 336 hour(s))   CBC auto differential    Collection Time: 11/29/19  8:55 AM   Result Value Ref Range    WBC 7.06 3.90 - 12.70 K/uL    Hemoglobin 10.6 (L) 12.0 - 16.0 g/dL    Hematocrit 32.1 (L) 37.0 - 48.5 %    Platelets 119 (L) 150 - 350 K/uL   CBC auto differential    Collection Time: 11/22/19 10:28 AM   Result Value Ref Range    WBC 32.92 (H) 3.90 - 12.70 K/uL    Hemoglobin 10.5 (L) 12.0 - 16.0 g/dL    Hematocrit 30.6 (L) 37.0 - 48.5 %    Platelets 182 150 - 350 K/uL     CMP  Sodium   Date Value Ref Range Status   11/29/2019 139 136 - 145 mmol/L Final   06/11/2019 139 134 - 144 mmol/L      Potassium   Date Value Ref Range Status   11/29/2019 5.0 3.5 - 5.1 mmol/L Final     Chloride   Date Value Ref Range Status   11/29/2019 105 95 - 110 mmol/L Final   06/11/2019 104 98 - 110 mmol/L      CO2   Date Value Ref Range Status   11/29/2019 27 23 - 29 mmol/L Final     Glucose   Date Value Ref Range Status   11/29/2019 112 (H) 70 - 110 mg/dL Final   06/11/2019 86 70 - 99 mg/dL      BUN, Bld   Date Value Ref Range Status   11/29/2019 11 8 - 23 mg/dL Final     Creatinine   Date Value Ref Range Status   11/29/2019 0.5 0.5 - 1.4 mg/dL Final   06/11/2019 0.72 0.60 - 1.40 mg/dL      Calcium   Date Value Ref Range Status   11/29/2019 8.9 8.7 - 10.5 mg/dL Final     Total Protein   Date Value Ref Range Status   11/29/2019 7.0 6.0 - 8.4 g/dL Final     Albumin   Date Value Ref Range Status   11/29/2019 4.0 3.5 - 5.2 g/dL Final   06/11/2019 3.8 3.1 - 4.7 g/dL      Total Bilirubin   Date Value Ref Range Status   11/29/2019 0.4 0.1 - 1.0 mg/dL Final     Comment:     For infants and newborns, interpretation of results should be based  on gestational age, weight and in agreement with clinical  observations.  Premature  Infant recommended reference ranges:  Up to 24 hours.............<8.0 mg/dL  Up to 48 hours............<12.0 mg/dL  3-5 days..................<15.0 mg/dL  6-29 days.................<15.0 mg/dL       Alkaline Phosphatase   Date Value Ref Range Status   11/29/2019 82 55 - 135 U/L Final     AST   Date Value Ref Range Status   11/29/2019 13 10 - 40 U/L Final     ALT   Date Value Ref Range Status   11/29/2019 18 10 - 44 U/L Final     Anion Gap   Date Value Ref Range Status   11/29/2019 7 (L) 8 - 16 mmol/L Final     eGFR if    Date Value Ref Range Status   11/29/2019 >60.0 >60 mL/min/1.73 m^2 Final     eGFR if non    Date Value Ref Range Status   11/29/2019 >60.0 >60 mL/min/1.73 m^2 Final     Comment:     Calculation used to obtain the estimated glomerular filtration  rate (eGFR) is the CKD-EPI equation.        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  Patient is tolerating this therapy well and labs 12/2/2019 at [facility] look good for treatment next week.  Will arrange.           MGUS (monoclonal gammopathy of unknown significance)     Bone marrow done at [facility] negative for myeloma and bence junior negative.  I believe this is an incidental finding and will continue to follow along with the lymphoma.                 Discussion:     Follow up in about 3 weeks (around 12/26/2019).      Electronically signed by Pb Fry

## 2019-12-05 NOTE — ASSESSMENT & PLAN NOTE
Bone marrow done at [facility] [place] negative for myeloma and bence junior negative.  I believe this is an incidental finding and will continue to follow along with the lymphoma.

## 2019-12-05 NOTE — ASSESSMENT & PLAN NOTE
Patient has seen [facility] portillo again since last visit and underwent restaging bone marrow(which was completely negative for cancer) and PET scan which shows resolution of the prior adenopathy.  Patient is tolerating this therapy well and labs 12/2/2019 at [facility] look good for treatment next week.  Will arrange.

## 2019-12-09 ENCOUNTER — INFUSION (OUTPATIENT)
Dept: INFUSION THERAPY | Facility: HOSPITAL | Age: 69
End: 2019-12-09
Attending: INTERNAL MEDICINE
Payer: MEDICARE

## 2019-12-09 VITALS
WEIGHT: 151.25 LBS | HEART RATE: 73 BPM | TEMPERATURE: 98 F | SYSTOLIC BLOOD PRESSURE: 116 MMHG | HEIGHT: 66 IN | DIASTOLIC BLOOD PRESSURE: 72 MMHG | BODY MASS INDEX: 24.31 KG/M2 | OXYGEN SATURATION: 97 % | RESPIRATION RATE: 18 BRPM

## 2019-12-09 DIAGNOSIS — C83.35 DIFFUSE LARGE B-CELL LYMPHOMA OF LYMPH NODES OF INGUINAL REGION: ICD-10-CM

## 2019-12-09 DIAGNOSIS — D70.1 CHEMOTHERAPY-INDUCED NEUTROPENIA: Primary | ICD-10-CM

## 2019-12-09 DIAGNOSIS — T45.1X5A CHEMOTHERAPY-INDUCED NEUTROPENIA: Primary | ICD-10-CM

## 2019-12-09 PROCEDURE — A4216 STERILE WATER/SALINE, 10 ML: HCPCS | Performed by: INTERNAL MEDICINE

## 2019-12-09 PROCEDURE — S0028 INJECTION, FAMOTIDINE, 20 MG: HCPCS | Performed by: INTERNAL MEDICINE

## 2019-12-09 PROCEDURE — 96415 CHEMO IV INFUSION ADDL HR: CPT

## 2019-12-09 PROCEDURE — 25000003 PHARM REV CODE 250: Performed by: INTERNAL MEDICINE

## 2019-12-09 PROCEDURE — 63600175 PHARM REV CODE 636 W HCPCS: Mod: JG | Performed by: INTERNAL MEDICINE

## 2019-12-09 PROCEDURE — 96417 CHEMO IV INFUS EACH ADDL SEQ: CPT

## 2019-12-09 PROCEDURE — 96377 APPLICATON ON-BODY INJECTOR: CPT

## 2019-12-09 PROCEDURE — 96375 TX/PRO/DX INJ NEW DRUG ADDON: CPT

## 2019-12-09 PROCEDURE — 96411 CHEMO IV PUSH ADDL DRUG: CPT

## 2019-12-09 PROCEDURE — 96367 TX/PROPH/DG ADDL SEQ IV INF: CPT

## 2019-12-09 PROCEDURE — 96413 CHEMO IV INFUSION 1 HR: CPT

## 2019-12-09 RX ORDER — HEPARIN 100 UNIT/ML
500 SYRINGE INTRAVENOUS
Status: DISCONTINUED | OUTPATIENT
Start: 2019-12-09 | End: 2019-12-09 | Stop reason: HOSPADM

## 2019-12-09 RX ORDER — ACETAMINOPHEN 325 MG/1
650 TABLET ORAL
Status: COMPLETED | OUTPATIENT
Start: 2019-12-09 | End: 2019-12-09

## 2019-12-09 RX ORDER — MEPERIDINE HYDROCHLORIDE 25 MG/ML
25 INJECTION INTRAMUSCULAR; INTRAVENOUS; SUBCUTANEOUS EVERY 30 MIN PRN
Status: DISCONTINUED | OUTPATIENT
Start: 2019-12-09 | End: 2019-12-09 | Stop reason: HOSPADM

## 2019-12-09 RX ORDER — DOXORUBICIN HYDROCHLORIDE 2 MG/ML
50 INJECTION, SOLUTION INTRAVENOUS
Status: COMPLETED | OUTPATIENT
Start: 2019-12-09 | End: 2019-12-09

## 2019-12-09 RX ORDER — SODIUM CHLORIDE 0.9 % (FLUSH) 0.9 %
10 SYRINGE (ML) INJECTION
Status: DISCONTINUED | OUTPATIENT
Start: 2019-12-09 | End: 2019-12-09 | Stop reason: HOSPADM

## 2019-12-09 RX ORDER — FAMOTIDINE 10 MG/ML
20 INJECTION INTRAVENOUS
Status: COMPLETED | OUTPATIENT
Start: 2019-12-09 | End: 2019-12-09

## 2019-12-09 RX ADMIN — PEGFILGRASTIM 6 MG: KIT SUBCUTANEOUS at 12:12

## 2019-12-09 RX ADMIN — PALONOSETRON HYDROCHLORIDE: 0.25 INJECTION, SOLUTION INTRAVENOUS at 08:12

## 2019-12-09 RX ADMIN — HEPARIN 500 UNITS: 100 SYRINGE at 12:12

## 2019-12-09 RX ADMIN — CYCLOPHOSPHAMIDE 1335 MG: 1 INJECTION, POWDER, FOR SOLUTION INTRAVENOUS; ORAL at 11:12

## 2019-12-09 RX ADMIN — VINCRISTINE SULFATE 2 MG: 1 INJECTION, SOLUTION INTRAVENOUS at 11:12

## 2019-12-09 RX ADMIN — DIPHENHYDRAMINE HYDROCHLORIDE 50 MG: 50 INJECTION INTRAMUSCULAR; INTRAVENOUS at 07:12

## 2019-12-09 RX ADMIN — SODIUM CHLORIDE, PRESERVATIVE FREE 10 ML: 5 INJECTION INTRAVENOUS at 12:12

## 2019-12-09 RX ADMIN — ACETAMINOPHEN 650 MG: 325 TABLET ORAL at 07:12

## 2019-12-09 RX ADMIN — DOXORUBICIN HYDROCHLORIDE 90 MG: 2 INJECTION, SOLUTION INTRAVENOUS at 11:12

## 2019-12-09 RX ADMIN — RITUXIMAB 668 MG: 10 INJECTION, SOLUTION INTRAVENOUS at 08:12

## 2019-12-09 RX ADMIN — SODIUM CHLORIDE: 0.9 INJECTION, SOLUTION INTRAVENOUS at 07:12

## 2019-12-09 RX ADMIN — FAMOTIDINE 20 MG: 10 INJECTION, SOLUTION INTRAVENOUS at 07:12

## 2019-12-09 NOTE — PLAN OF CARE
Problem: Fatigue  Goal: Improved Activity Tolerance  Outcome: Ongoing, Progressing  Intervention: Promote Energy Conservation  Flowsheets (Taken 12/9/2019 7449)  Fatigue Management: fatigue-related activity identified; frequent rest breaks encouraged; paced activity encouraged  Sleep/Rest Enhancement: consistent schedule promoted; relaxation techniques promoted; regular sleep/rest pattern promoted; family presence promoted  Activity Management: activity encouraged

## 2019-12-11 ENCOUNTER — CLINICAL SUPPORT (OUTPATIENT)
Dept: REHABILITATION | Facility: HOSPITAL | Age: 69
End: 2019-12-11
Payer: MEDICARE

## 2019-12-11 DIAGNOSIS — I89.0 LYMPHEDEMA OF LEFT LOWER EXTREMITY: Primary | ICD-10-CM

## 2019-12-11 PROCEDURE — 97140 MANUAL THERAPY 1/> REGIONS: CPT

## 2019-12-11 NOTE — PROGRESS NOTES
Occupational Therapy Daily Treatment Note     Name: Aurelia Marlow  Clinic Number: [id number]    Therapy Diagnosis:   Encounter Diagnosis   Name Primary?    Lymphedema of left lower extremity Yes       Physician: Pb Nieto MD     Physician Orders: eval and treat  Medical Diagnosis: Lymphedema s/p lymph node removed from the left groin  Evaluation Date: 11/14/2019  Insurance Authorization period Expiration:10/07/2019-10/06/2020  Plan of Care Certification Period: 11/14/2019-02/20/2020     Visit # / Visits Authortized: 5/48   Time In: 1:00   Time Out: 2:00  Total Billable Time: 60 minutes     Precautions: Standard    Subjective     Pt reports: she is so pleased with the reduction of swelling in the left lower leg. She is compliant with wearing the compression stockings and wrapping her foot using the chip bag as recommended. She has some foot swelling but the swelling in the thigh and lower legs has resolved.  The scars on the upper thigh and on the dorsum of the foot are more mobile.    Response to previous treatment:to had a significant reduction of swelling in the left leg.  Functional change: she is able to wear her old shoes again.    Pain: 0/10  Location: left LE      Jenna Moses received the following manual therapy techniques for 60 minutes:   -MANUAL LYMPHATIC DRAINAGE 60 minutes:  While supine manual lymph drainage was provided to the AI anastomosis, upper thigh, groin, lower leg and foot using 2 hand techniques to encourage the lymph fluid to flow to the trunk.  MLD utilized to increase lymph uptake and direct lymph flow.  Thumb circles were performed to the dorsum of the foot to break up the fibrotic fluid present.        Home Exercises and Education Provided     Education provided:   - Bandaging/Compression stocking wearing schedule,  Beginning of self massage,  Risk reduction and kinesio tape wearing schedule and to remove if she has ill effects.  - Progress  towards goals     Aurelia demonstrated good  understanding of the education provided.        Assessment     Pt would continue to benefit from skilled OT.  Aurelia is progressing well towards her goals and there are no updates to goals at this time. Pt prognosis is Good. Pt will continue to benefit from skilled outpatient occupational therapy to address the deficits listed in the problem list on initial evalution provide pt/family education and to maximize pt's level of independence in the home and community environment. She responded well to the compression bandages.  She had a significant reduction of swelling since her initial evaluation.  The left leg has a more normal shape.  Kinesio tape is being utilized to increase lymph uptake and direct lymph flow.  A chip bag was fabricated last session and is to be applied to the dorsum of the foot at night under the compression bandages applied by her .  There is still some visible fluid on the dorsum of the foot.    Anticipated barriers to occupational therapy: currently on chemotherapy  Pt's spiritual, cultural and educational needs considered and pt agreeable to plan of care and goals.     Goals:     Short Term Goals: (3 weeks)  Pt to be I and compliant with HEP.  Met  Decrease girth in LLE by 6 cm for improved functional use of LLE.  Patient will demonstrate 100% knowledge of lymphedema precautions and signs of infection.   Patient will perform self-bandaging techniques.  Met  Patient will perform self lymph drainage techniques.  Met  Patient will tolerate daily activities with multilayered bandaging or compression stockings.     Long Term Goals: (6 weeks)  Met  Decrease girth in LLE by 8 cm for improved functional use of LLE.  Met  Patient will show reduction in girth to mild or less with improved contour of limb.  Met  Patient to talib/doff compression garment with daily compliance.    Plan   Continue with individualized established plan of care.   Discussed  Plan of Care with patient: Yes  Updates/Grading for next session: Continue to address fluid on the left foot and continue education.     Iman Herring, OT

## 2019-12-13 ENCOUNTER — LAB VISIT (OUTPATIENT)
Dept: LAB | Facility: HOSPITAL | Age: 69
End: 2019-12-13
Attending: INTERNAL MEDICINE
Payer: MEDICARE

## 2019-12-13 DIAGNOSIS — C83.35 DIFFUSE LARGE B-CELL LYMPHOMA OF LYMPH NODES OF INGUINAL REGION: ICD-10-CM

## 2019-12-13 LAB
ALBUMIN SERPL BCP-MCNC: 3.7 G/DL (ref 3.5–5.2)
ALP SERPL-CCNC: 102 U/L (ref 55–135)
ALT SERPL W/O P-5'-P-CCNC: 13 U/L (ref 10–44)
ANION GAP SERPL CALC-SCNC: 2 MMOL/L (ref 8–16)
ANISOCYTOSIS BLD QL SMEAR: ABNORMAL
AST SERPL-CCNC: 15 U/L (ref 10–40)
BASOPHILS NFR BLD: 0 % (ref 0–1.9)
BILIRUB SERPL-MCNC: 0.9 MG/DL (ref 0.1–1)
BUN SERPL-MCNC: 15 MG/DL (ref 8–23)
CALCIUM SERPL-MCNC: 8.7 MG/DL (ref 8.7–10.5)
CHLORIDE SERPL-SCNC: 104 MMOL/L (ref 95–110)
CO2 SERPL-SCNC: 34 MMOL/L (ref 23–29)
CREAT SERPL-MCNC: 0.5 MG/DL (ref 0.5–1.4)
DIFFERENTIAL METHOD: ABNORMAL
EOSINOPHIL NFR BLD: 0 % (ref 0–8)
ERYTHROCYTE [DISTWIDTH] IN BLOOD BY AUTOMATED COUNT: 14.9 % (ref 11.5–14.5)
EST. GFR  (AFRICAN AMERICAN): >60 ML/MIN/1.73 M^2
EST. GFR  (NON AFRICAN AMERICAN): >60 ML/MIN/1.73 M^2
GLUCOSE SERPL-MCNC: 73 MG/DL (ref 70–110)
HCT VFR BLD AUTO: 31.4 % (ref 37–48.5)
HGB BLD-MCNC: 10.5 G/DL (ref 12–16)
IMM GRANULOCYTES # BLD AUTO: ABNORMAL K/UL (ref 0–0.04)
IMM GRANULOCYTES NFR BLD AUTO: ABNORMAL % (ref 0–0.5)
LYMPHOCYTES NFR BLD: 4 % (ref 18–48)
MCH RBC QN AUTO: 32.3 PG (ref 27–31)
MCHC RBC AUTO-ENTMCNC: 33.4 G/DL (ref 32–36)
MCV RBC AUTO: 97 FL (ref 82–98)
MONOCYTES NFR BLD: 1 % (ref 4–15)
NEUTROPHILS NFR BLD: 80 % (ref 38–73)
NEUTS BAND NFR BLD MANUAL: 15 %
NRBC BLD-RTO: 0 /100 WBC
PLATELET # BLD AUTO: 176 K/UL (ref 150–350)
PLATELET BLD QL SMEAR: ABNORMAL
PMV BLD AUTO: 9.5 FL (ref 9.2–12.9)
POTASSIUM SERPL-SCNC: 4.2 MMOL/L (ref 3.5–5.1)
PROT SERPL-MCNC: 6.6 G/DL (ref 6–8.4)
RBC # BLD AUTO: 3.25 M/UL (ref 4–5.4)
SODIUM SERPL-SCNC: 140 MMOL/L (ref 136–145)
WBC # BLD AUTO: 43.31 K/UL (ref 3.9–12.7)

## 2019-12-13 PROCEDURE — 85007 BL SMEAR W/DIFF WBC COUNT: CPT

## 2019-12-13 PROCEDURE — 85027 COMPLETE CBC AUTOMATED: CPT

## 2019-12-13 PROCEDURE — 80053 COMPREHEN METABOLIC PANEL: CPT

## 2019-12-13 PROCEDURE — 36415 COLL VENOUS BLD VENIPUNCTURE: CPT

## 2019-12-20 ENCOUNTER — LAB VISIT (OUTPATIENT)
Dept: LAB | Facility: HOSPITAL | Age: 69
End: 2019-12-20
Attending: INTERNAL MEDICINE
Payer: MEDICARE

## 2019-12-20 DIAGNOSIS — C83.35 DIFFUSE LARGE B-CELL LYMPHOMA OF LYMPH NODES OF INGUINAL REGION: ICD-10-CM

## 2019-12-20 LAB
ALBUMIN SERPL BCP-MCNC: 3.9 G/DL (ref 3.5–5.2)
ALP SERPL-CCNC: 89 U/L (ref 55–135)
ALT SERPL W/O P-5'-P-CCNC: 22 U/L (ref 10–44)
ANION GAP SERPL CALC-SCNC: 5 MMOL/L (ref 8–16)
ANISOCYTOSIS BLD QL SMEAR: SLIGHT
AST SERPL-CCNC: 14 U/L (ref 10–40)
BASOPHILS NFR BLD: 0 % (ref 0–1.9)
BILIRUB SERPL-MCNC: 0.6 MG/DL (ref 0.1–1)
BUN SERPL-MCNC: 12 MG/DL (ref 8–23)
CALCIUM SERPL-MCNC: 9.1 MG/DL (ref 8.7–10.5)
CHLORIDE SERPL-SCNC: 105 MMOL/L (ref 95–110)
CO2 SERPL-SCNC: 29 MMOL/L (ref 23–29)
CREAT SERPL-MCNC: 0.7 MG/DL (ref 0.5–1.4)
DIFFERENTIAL METHOD: ABNORMAL
EOSINOPHIL NFR BLD: 0 % (ref 0–8)
ERYTHROCYTE [DISTWIDTH] IN BLOOD BY AUTOMATED COUNT: 15 % (ref 11.5–14.5)
EST. GFR  (AFRICAN AMERICAN): >60 ML/MIN/1.73 M^2
EST. GFR  (NON AFRICAN AMERICAN): >60 ML/MIN/1.73 M^2
GLUCOSE SERPL-MCNC: 69 MG/DL (ref 70–110)
HCT VFR BLD AUTO: 32.1 % (ref 37–48.5)
HGB BLD-MCNC: 10.4 G/DL (ref 12–16)
IMM GRANULOCYTES # BLD AUTO: ABNORMAL K/UL (ref 0–0.04)
IMM GRANULOCYTES NFR BLD AUTO: ABNORMAL % (ref 0–0.5)
LYMPHOCYTES NFR BLD: 15.2 % (ref 18–48)
MCH RBC QN AUTO: 31.5 PG (ref 27–31)
MCHC RBC AUTO-ENTMCNC: 32.4 G/DL (ref 32–36)
MCV RBC AUTO: 97 FL (ref 82–98)
MONOCYTES NFR BLD: 3 % (ref 4–15)
NEUTROPHILS NFR BLD: 80.8 % (ref 38–73)
NEUTS BAND NFR BLD MANUAL: 1 %
NRBC BLD-RTO: 0 /100 WBC
PLATELET # BLD AUTO: 125 K/UL (ref 150–350)
PLATELET BLD QL SMEAR: ABNORMAL
PMV BLD AUTO: 10.1 FL (ref 9.2–12.9)
POTASSIUM SERPL-SCNC: 4.6 MMOL/L (ref 3.5–5.1)
PROT SERPL-MCNC: 7.3 G/DL (ref 6–8.4)
RBC # BLD AUTO: 3.3 M/UL (ref 4–5.4)
SODIUM SERPL-SCNC: 139 MMOL/L (ref 136–145)
WBC # BLD AUTO: 8.1 K/UL (ref 3.9–12.7)

## 2019-12-20 PROCEDURE — 36415 COLL VENOUS BLD VENIPUNCTURE: CPT

## 2019-12-20 PROCEDURE — 85007 BL SMEAR W/DIFF WBC COUNT: CPT

## 2019-12-20 PROCEDURE — 80053 COMPREHEN METABOLIC PANEL: CPT

## 2019-12-20 PROCEDURE — 85027 COMPLETE CBC AUTOMATED: CPT

## 2019-12-23 ENCOUNTER — OFFICE VISIT (OUTPATIENT)
Dept: HEMATOLOGY/ONCOLOGY | Facility: CLINIC | Age: 69
End: 2019-12-23
Payer: MEDICARE

## 2019-12-23 VITALS
TEMPERATURE: 98 F | HEART RATE: 79 BPM | WEIGHT: 154 LBS | SYSTOLIC BLOOD PRESSURE: 144 MMHG | RESPIRATION RATE: 18 BRPM | BODY MASS INDEX: 24.86 KG/M2 | DIASTOLIC BLOOD PRESSURE: 81 MMHG

## 2019-12-23 DIAGNOSIS — M19.032 ARTHRITIS OF LEFT WRIST: ICD-10-CM

## 2019-12-23 DIAGNOSIS — C83.35 DIFFUSE LARGE B-CELL LYMPHOMA OF LYMPH NODES OF INGUINAL REGION: ICD-10-CM

## 2019-12-23 PROCEDURE — 99214 PR OFFICE/OUTPT VISIT, EST, LEVL IV, 30-39 MIN: ICD-10-PCS | Mod: S$GLB,,, | Performed by: INTERNAL MEDICINE

## 2019-12-23 PROCEDURE — 1159F PR MEDICATION LIST DOCUMENTED IN MEDICAL RECORD: ICD-10-PCS | Mod: S$GLB,,, | Performed by: INTERNAL MEDICINE

## 2019-12-23 PROCEDURE — 1159F MED LIST DOCD IN RCRD: CPT | Mod: S$GLB,,, | Performed by: INTERNAL MEDICINE

## 2019-12-23 PROCEDURE — 99214 OFFICE O/P EST MOD 30 MIN: CPT | Mod: S$GLB,,, | Performed by: INTERNAL MEDICINE

## 2019-12-23 RX ORDER — SULFAMETHOXAZOLE AND TRIMETHOPRIM 800; 160 MG/1; MG/1
TABLET ORAL
Refills: 2 | COMMUNITY
Start: 2019-12-03 | End: 2021-07-30

## 2019-12-23 RX ORDER — VALACYCLOVIR HYDROCHLORIDE 500 MG/1
500 TABLET, FILM COATED ORAL DAILY
Refills: 3 | COMMUNITY
Start: 2019-12-03 | End: 2021-07-30

## 2019-12-23 NOTE — PROGRESS NOTES
PROGRESS NOTE    Subjective:       Patient ID: Aurelia Marlow is a 69 y.o. female.    Dx [facility] type- 8/21/2019  Inguinal LN biopsy    PET with bone marrow uptake throughout appendicular and axial skeleton    Bone marrow biopsy positive for B-cell lymphoma    Stage ARELIS    Chief Complaint:  Follow-up and Results  lymphoma follow up    History of Present Illness:   Aurelia Marlow is a 69 y.o. female who presents for follow up of lymphoma    Patient is doing well on chemotherapy and is s/p three cycles.  Labs look good and patient is tolerating with low toxicity.      R-CHOP:  Cycle 1: 10/28/2019  Cycle 2: 11/18/2019  Cycle 3: 12/9/2019  Cycle 4: 12/30/2019due          Echo 9/30/2019 EF 60%  CMP, CBC 10/6/2019 unremarkable    Family and Social history reviewed and is unchanged from 9/27/2019      ROS:  Review of Systems   Constitutional: Negative for appetite change, fever and unexpected weight change.   Eyes: Negative for visual disturbance.   Respiratory: Negative for cough and shortness of breath.    Cardiovascular: Negative for chest pain and leg swelling.   Gastrointestinal: Negative for abdominal pain, blood in stool and diarrhea.   Genitourinary: Negative for frequency and hematuria.   Musculoskeletal: Negative for back pain.   Skin: Negative for rash.   Neurological: Negative for headaches.   Hematological: Negative for adenopathy.   Psychiatric/Behavioral: The patient is not nervous/anxious.           Current Outpatient Medications:     cholecalciferol, vitamin D3, (VITAMIN D3) 2,000 unit Tab, , Disp: , Rfl:     diphenhydrAMINE (BENADRYL) 25 mg capsule, Take 1 capsule (25 mg total) by mouth every 6 (six) hours as needed for Itching or Allergies., Disp: 20 capsule, Rfl: 0    famotidine (PEPCID) 20 MG tablet, Take 1 tablet (20 mg total) by mouth 2 (two) times daily., Disp: 20 tablet, Rfl: 0    fluticasone propionate (FLONASE) 50  mcg/actuation nasal spray, , Disp: , Rfl:     hydroxychloroquine (PLAQUENIL) 200 mg tablet, Take 1 tablet (200 mg total) by mouth 2 (two) times daily., Disp: 180 tablet, Rfl: 1    levothyroxine (SYNTHROID) 100 MCG tablet, Take 100 mcg by mouth once daily., Disp: , Rfl:     lidocaine-prilocaine (EMLA) cream, Apply topically as needed., Disp: 25 g, Rfl: 2    naproxen (EC NAPROSYN) 500 MG EC tablet, TAKE ONE TABLET BY MOUTH TWICE DAILY AFTER MEALS AS NEEDED, Disp: 60 tablet, Rfl: 1    omeprazole 20 mg TbEC, Take 20 mg by mouth every evening. , Disp: , Rfl:     ondansetron (ZOFRAN) 8 MG tablet, Take 8 mg by mouth every 8 (eight) hours as needed., Disp: , Rfl: 6    predniSONE (DELTASONE) 20 MG tablet, TAKE 5 TABLETS DAILY DAY 1-5 OF CHEMO CYCLE, Disp: , Rfl: 6    promethazine (PHENERGAN) 25 MG tablet, Take 25 mg by mouth every 6 (six) hours as needed., Disp: , Rfl: 6    sulfamethoxazole-trimethoprim 800-160mg (BACTRIM DS) 800-160 mg Tab, TAKE 1 TABLET BY MOUTH 3 (THREE) TIMES A WEEK MONDAY, WEDNESDAY AND FRIDAY., Disp: , Rfl: 2    traMADol (ULTRAM) 50 mg tablet, Take 1 tablet (50 mg total) by mouth every 12 (twelve) hours as needed for Pain. Dispense generic, Disp: 60 tablet, Rfl: 1    valACYclovir (VALTREX) 500 MG tablet, Take 500 mg by mouth once daily., Disp: , Rfl: 3    Current Facility-Administered Medications:     lidocaine (PF) 10 mg/ml (1%) injection 10 mg, 1 mL, Intradermal, Once, Kermit Martinez MD        Objective:       Physical Examination:     BP (!) 144/81   Pulse 79   Temp 97.6 °F (36.4 °C)   Resp 18   Wt 69.9 kg (154 lb)   BMI 24.86 kg/m²     Physical Exam   Constitutional: She appears well-developed and well-nourished.   HENT:   Head: Normocephalic and atraumatic.   Right Ear: External ear normal.   Left Ear: External ear normal.   Mouth/Throat: Oropharynx is clear and moist.   Eyes: Pupils are equal, round, and reactive to light. Conjunctivae are normal.   Neck: No tracheal deviation  present. No thyromegaly present.   Cardiovascular: Normal rate, regular rhythm and normal heart sounds.   Pulmonary/Chest: Effort normal and breath sounds normal.   Abdominal: Soft. Bowel sounds are normal. She exhibits no distension and no mass. There is no tenderness.   Musculoskeletal: She exhibits no edema.   Neurological:   Neuro intact througout   Skin: No rash noted.   Psychiatric: She has a normal mood and affect. Her behavior is normal. Judgment and thought content normal.        Labs:   Recent Results (from the past 336 hour(s))   CBC auto differential    Collection Time: 12/20/19  9:10 AM   Result Value Ref Range    WBC 8.10 3.90 - 12.70 K/uL    Hemoglobin 10.4 (L) 12.0 - 16.0 g/dL    Hematocrit 32.1 (L) 37.0 - 48.5 %    Platelets 125 (L) 150 - 350 K/uL   CBC auto differential    Collection Time: 12/13/19  8:56 AM   Result Value Ref Range    WBC 43.31 (H) 3.90 - 12.70 K/uL    Hemoglobin 10.5 (L) 12.0 - 16.0 g/dL    Hematocrit 31.4 (L) 37.0 - 48.5 %    Platelets 176 150 - 350 K/uL     CMP  Sodium   Date Value Ref Range Status   12/20/2019 139 136 - 145 mmol/L Final   06/11/2019 139 134 - 144 mmol/L      Potassium   Date Value Ref Range Status   12/20/2019 4.6 3.5 - 5.1 mmol/L Final     Chloride   Date Value Ref Range Status   12/20/2019 105 95 - 110 mmol/L Final   06/11/2019 104 98 - 110 mmol/L      CO2   Date Value Ref Range Status   12/20/2019 29 23 - 29 mmol/L Final     Glucose   Date Value Ref Range Status   12/20/2019 69 (L) 70 - 110 mg/dL Final   06/11/2019 86 70 - 99 mg/dL      BUN, Bld   Date Value Ref Range Status   12/20/2019 12 8 - 23 mg/dL Final     Creatinine   Date Value Ref Range Status   12/20/2019 0.7 0.5 - 1.4 mg/dL Final   06/11/2019 0.72 0.60 - 1.40 mg/dL      Calcium   Date Value Ref Range Status   12/20/2019 9.1 8.7 - 10.5 mg/dL Final     Total Protein   Date Value Ref Range Status   12/20/2019 7.3 6.0 - 8.4 g/dL Final     Albumin   Date Value Ref Range Status   12/20/2019 3.9 3.5 -  5.2 g/dL Final   06/11/2019 3.8 3.1 - 4.7 g/dL      Total Bilirubin   Date Value Ref Range Status   12/20/2019 0.6 0.1 - 1.0 mg/dL Final     Comment:     For infants and newborns, interpretation of results should be based  on gestational age, weight and in agreement with clinical  observations.  Premature Infant recommended reference ranges:  Up to 24 hours.............<8.0 mg/dL  Up to 48 hours............<12.0 mg/dL  3-5 days..................<15.0 mg/dL  6-29 days.................<15.0 mg/dL       Alkaline Phosphatase   Date Value Ref Range Status   12/20/2019 89 55 - 135 U/L Final     AST   Date Value Ref Range Status   12/20/2019 14 10 - 40 U/L Final     ALT   Date Value Ref Range Status   12/20/2019 22 10 - 44 U/L Final     Anion Gap   Date Value Ref Range Status   12/20/2019 5 (L) 8 - 16 mmol/L Final     eGFR if    Date Value Ref Range Status   12/20/2019 >60.0 >60 mL/min/1.73 m^2 Final     eGFR if non    Date Value Ref Range Status   12/20/2019 >60.0 >60 mL/min/1.73 m^2 Final     Comment:     Calculation used to obtain the estimated glomerular filtration  rate (eGFR) is the CKD-EPI equation.        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  She will be due for cycle 4 on 12/30 and this was discussed today.  I feel she can proceed, labs allowing and will have her back with me in three weeks.           Arthritis of left wrist     Patient has RA and her symptoms are greatly improved, likely by rituxan.  This is a good benefit from therapy.  Continue to watch.                Discussion:     Follow up in about 3 weeks (around 1/13/2020).      Electronically signed by Pb Fry

## 2019-12-23 NOTE — ASSESSMENT & PLAN NOTE
Patient has RA and her symptoms are greatly improved, likely by rituxan.  This is a good benefit from therapy.  Continue to watch.

## 2019-12-23 NOTE — ASSESSMENT & PLAN NOTE
Patient is doing well on chemotherapy and will continue with therapy.  She will be due for cycle 4 on 12/30 and this was discussed today.  I feel she can proceed, labs allowing and will have her back with me in three weeks.

## 2019-12-26 RX ORDER — SODIUM CHLORIDE 0.9 % (FLUSH) 0.9 %
10 SYRINGE (ML) INJECTION
Status: CANCELLED | OUTPATIENT
Start: 2019-12-30

## 2019-12-26 RX ORDER — FAMOTIDINE 10 MG/ML
20 INJECTION INTRAVENOUS
Status: CANCELLED | OUTPATIENT
Start: 2019-12-30

## 2019-12-26 RX ORDER — DOXORUBICIN HYDROCHLORIDE 2 MG/ML
50 INJECTION, SOLUTION INTRAVENOUS
Status: CANCELLED | OUTPATIENT
Start: 2019-12-30

## 2019-12-26 RX ORDER — ACETAMINOPHEN 325 MG/1
650 TABLET ORAL
Status: CANCELLED | OUTPATIENT
Start: 2019-12-30

## 2019-12-26 RX ORDER — MEPERIDINE HYDROCHLORIDE 50 MG/ML
25 INJECTION INTRAMUSCULAR; INTRAVENOUS; SUBCUTANEOUS EVERY 30 MIN PRN
Status: CANCELLED | OUTPATIENT
Start: 2019-12-30

## 2019-12-26 RX ORDER — HEPARIN 100 UNIT/ML
500 SYRINGE INTRAVENOUS
Status: CANCELLED | OUTPATIENT
Start: 2019-12-30

## 2019-12-27 ENCOUNTER — LAB VISIT (OUTPATIENT)
Dept: LAB | Facility: HOSPITAL | Age: 69
End: 2019-12-27
Attending: INTERNAL MEDICINE
Payer: MEDICARE

## 2019-12-27 DIAGNOSIS — C83.35 DIFFUSE LARGE B-CELL LYMPHOMA OF LYMPH NODES OF INGUINAL REGION: ICD-10-CM

## 2019-12-27 LAB
ALBUMIN SERPL BCP-MCNC: 3.9 G/DL (ref 3.5–5.2)
ALP SERPL-CCNC: 68 U/L (ref 55–135)
ALT SERPL W/O P-5'-P-CCNC: 12 U/L (ref 10–44)
ANION GAP SERPL CALC-SCNC: 5 MMOL/L (ref 8–16)
AST SERPL-CCNC: 13 U/L (ref 10–40)
BASOPHILS # BLD AUTO: 0.09 K/UL (ref 0–0.2)
BASOPHILS NFR BLD: 2.1 % (ref 0–1.9)
BILIRUB SERPL-MCNC: 0.6 MG/DL (ref 0.1–1)
BUN SERPL-MCNC: 14 MG/DL (ref 8–23)
CALCIUM SERPL-MCNC: 9.1 MG/DL (ref 8.7–10.5)
CHLORIDE SERPL-SCNC: 107 MMOL/L (ref 95–110)
CO2 SERPL-SCNC: 28 MMOL/L (ref 23–29)
CREAT SERPL-MCNC: 0.5 MG/DL (ref 0.5–1.4)
DIFFERENTIAL METHOD: ABNORMAL
EOSINOPHIL # BLD AUTO: 0.1 K/UL (ref 0–0.5)
EOSINOPHIL NFR BLD: 1.6 % (ref 0–8)
ERYTHROCYTE [DISTWIDTH] IN BLOOD BY AUTOMATED COUNT: 15.4 % (ref 11.5–14.5)
EST. GFR  (AFRICAN AMERICAN): >60 ML/MIN/1.73 M^2
EST. GFR  (NON AFRICAN AMERICAN): >60 ML/MIN/1.73 M^2
GLUCOSE SERPL-MCNC: 91 MG/DL (ref 70–110)
HCT VFR BLD AUTO: 32.2 % (ref 37–48.5)
HGB BLD-MCNC: 10.7 G/DL (ref 12–16)
IMM GRANULOCYTES # BLD AUTO: 0.02 K/UL (ref 0–0.04)
IMM GRANULOCYTES NFR BLD AUTO: 0.5 % (ref 0–0.5)
LYMPHOCYTES # BLD AUTO: 0.7 K/UL (ref 1–4.8)
LYMPHOCYTES NFR BLD: 16.6 % (ref 18–48)
MCH RBC QN AUTO: 32 PG (ref 27–31)
MCHC RBC AUTO-ENTMCNC: 33.2 G/DL (ref 32–36)
MCV RBC AUTO: 96 FL (ref 82–98)
MONOCYTES # BLD AUTO: 0.4 K/UL (ref 0.3–1)
MONOCYTES NFR BLD: 8.2 % (ref 4–15)
NEUTROPHILS # BLD AUTO: 3.1 K/UL (ref 1.8–7.7)
NEUTROPHILS NFR BLD: 71 % (ref 38–73)
NRBC BLD-RTO: 0 /100 WBC
PLATELET # BLD AUTO: 260 K/UL (ref 150–350)
PMV BLD AUTO: 9.6 FL (ref 9.2–12.9)
POTASSIUM SERPL-SCNC: 4.8 MMOL/L (ref 3.5–5.1)
PROT SERPL-MCNC: 6.8 G/DL (ref 6–8.4)
RBC # BLD AUTO: 3.34 M/UL (ref 4–5.4)
SODIUM SERPL-SCNC: 140 MMOL/L (ref 136–145)
WBC # BLD AUTO: 4.39 K/UL (ref 3.9–12.7)

## 2019-12-27 PROCEDURE — 85025 COMPLETE CBC W/AUTO DIFF WBC: CPT

## 2019-12-27 PROCEDURE — 80053 COMPREHEN METABOLIC PANEL: CPT

## 2019-12-27 PROCEDURE — 36415 COLL VENOUS BLD VENIPUNCTURE: CPT

## 2019-12-30 ENCOUNTER — INFUSION (OUTPATIENT)
Dept: INFUSION THERAPY | Facility: HOSPITAL | Age: 69
End: 2019-12-30
Attending: INTERNAL MEDICINE
Payer: MEDICARE

## 2019-12-30 VITALS
TEMPERATURE: 98 F | WEIGHT: 151 LBS | OXYGEN SATURATION: 95 % | HEIGHT: 66 IN | SYSTOLIC BLOOD PRESSURE: 114 MMHG | DIASTOLIC BLOOD PRESSURE: 75 MMHG | BODY MASS INDEX: 24.27 KG/M2 | RESPIRATION RATE: 20 BRPM | HEART RATE: 81 BPM

## 2019-12-30 DIAGNOSIS — T45.1X5A CHEMOTHERAPY-INDUCED NEUTROPENIA: Primary | ICD-10-CM

## 2019-12-30 DIAGNOSIS — D70.1 CHEMOTHERAPY-INDUCED NEUTROPENIA: Primary | ICD-10-CM

## 2019-12-30 DIAGNOSIS — C83.35 DIFFUSE LARGE B-CELL LYMPHOMA OF LYMPH NODES OF INGUINAL REGION: ICD-10-CM

## 2019-12-30 PROCEDURE — 25000003 PHARM REV CODE 250: Performed by: NURSE PRACTITIONER

## 2019-12-30 PROCEDURE — 96411 CHEMO IV PUSH ADDL DRUG: CPT

## 2019-12-30 PROCEDURE — 96377 APPLICATON ON-BODY INJECTOR: CPT

## 2019-12-30 PROCEDURE — 96367 TX/PROPH/DG ADDL SEQ IV INF: CPT

## 2019-12-30 PROCEDURE — S0028 INJECTION, FAMOTIDINE, 20 MG: HCPCS | Performed by: NURSE PRACTITIONER

## 2019-12-30 PROCEDURE — 96415 CHEMO IV INFUSION ADDL HR: CPT

## 2019-12-30 PROCEDURE — 96417 CHEMO IV INFUS EACH ADDL SEQ: CPT

## 2019-12-30 PROCEDURE — 96413 CHEMO IV INFUSION 1 HR: CPT

## 2019-12-30 PROCEDURE — 96375 TX/PRO/DX INJ NEW DRUG ADDON: CPT

## 2019-12-30 PROCEDURE — A4216 STERILE WATER/SALINE, 10 ML: HCPCS | Performed by: NURSE PRACTITIONER

## 2019-12-30 PROCEDURE — 63600175 PHARM REV CODE 636 W HCPCS: Performed by: NURSE PRACTITIONER

## 2019-12-30 RX ORDER — HEPARIN 100 UNIT/ML
500 SYRINGE INTRAVENOUS
Status: DISCONTINUED | OUTPATIENT
Start: 2019-12-30 | End: 2019-12-30 | Stop reason: HOSPADM

## 2019-12-30 RX ORDER — SODIUM CHLORIDE 0.9 % (FLUSH) 0.9 %
10 SYRINGE (ML) INJECTION
Status: DISCONTINUED | OUTPATIENT
Start: 2019-12-30 | End: 2019-12-30 | Stop reason: HOSPADM

## 2019-12-30 RX ORDER — ACETAMINOPHEN 325 MG/1
650 TABLET ORAL
Status: COMPLETED | OUTPATIENT
Start: 2019-12-30 | End: 2019-12-30

## 2019-12-30 RX ORDER — FAMOTIDINE 10 MG/ML
20 INJECTION INTRAVENOUS
Status: COMPLETED | OUTPATIENT
Start: 2019-12-30 | End: 2019-12-30

## 2019-12-30 RX ORDER — DOXORUBICIN HYDROCHLORIDE 2 MG/ML
50 INJECTION, SOLUTION INTRAVENOUS
Status: COMPLETED | OUTPATIENT
Start: 2019-12-30 | End: 2019-12-30

## 2019-12-30 RX ORDER — MEPERIDINE HYDROCHLORIDE 25 MG/ML
25 INJECTION INTRAMUSCULAR; INTRAVENOUS; SUBCUTANEOUS EVERY 30 MIN PRN
Status: DISCONTINUED | OUTPATIENT
Start: 2019-12-30 | End: 2019-12-30 | Stop reason: HOSPADM

## 2019-12-30 RX ADMIN — DIPHENHYDRAMINE HYDROCHLORIDE 50 MG: 50 INJECTION INTRAMUSCULAR; INTRAVENOUS at 07:12

## 2019-12-30 RX ADMIN — FAMOTIDINE 20 MG: 10 INJECTION, SOLUTION INTRAVENOUS at 07:12

## 2019-12-30 RX ADMIN — DOXORUBICIN HYDROCHLORIDE 90 MG: 2 INJECTION, SOLUTION INTRAVENOUS at 11:12

## 2019-12-30 RX ADMIN — SODIUM CHLORIDE, PRESERVATIVE FREE 10 ML: 5 INJECTION INTRAVENOUS at 12:12

## 2019-12-30 RX ADMIN — PEGFILGRASTIM 6 MG: KIT SUBCUTANEOUS at 12:12

## 2019-12-30 RX ADMIN — RITUXIMAB 668 MG: 10 INJECTION, SOLUTION INTRAVENOUS at 08:12

## 2019-12-30 RX ADMIN — CYCLOPHOSPHAMIDE 1335 MG: 1 INJECTION, POWDER, FOR SOLUTION INTRAVENOUS; ORAL at 11:12

## 2019-12-30 RX ADMIN — PALONOSETRON HYDROCHLORIDE: 0.25 INJECTION, SOLUTION INTRAVENOUS at 10:12

## 2019-12-30 RX ADMIN — HEPARIN 500 UNITS: 100 SYRINGE at 12:12

## 2019-12-30 RX ADMIN — SODIUM CHLORIDE: 0.9 INJECTION, SOLUTION INTRAVENOUS at 07:12

## 2019-12-30 RX ADMIN — VINCRISTINE SULFATE 2 MG: 1 INJECTION, SOLUTION INTRAVENOUS at 11:12

## 2019-12-30 RX ADMIN — ACETAMINOPHEN 650 MG: 325 TABLET ORAL at 07:12

## 2020-01-03 ENCOUNTER — LAB VISIT (OUTPATIENT)
Dept: LAB | Facility: HOSPITAL | Age: 70
End: 2020-01-03
Attending: INTERNAL MEDICINE
Payer: MEDICARE

## 2020-01-03 DIAGNOSIS — C83.35 DIFFUSE LARGE B-CELL LYMPHOMA OF LYMPH NODES OF INGUINAL REGION: ICD-10-CM

## 2020-01-03 LAB
ALBUMIN SERPL BCP-MCNC: 3.7 G/DL (ref 3.5–5.2)
ALP SERPL-CCNC: 98 U/L (ref 55–135)
ALT SERPL W/O P-5'-P-CCNC: 12 U/L (ref 10–44)
ANION GAP SERPL CALC-SCNC: 7 MMOL/L (ref 8–16)
ANISOCYTOSIS BLD QL SMEAR: SLIGHT
AST SERPL-CCNC: 15 U/L (ref 10–40)
BASOPHILS NFR BLD: 0 % (ref 0–1.9)
BILIRUB SERPL-MCNC: 1 MG/DL (ref 0.1–1)
BUN SERPL-MCNC: 16 MG/DL (ref 8–23)
CALCIUM SERPL-MCNC: 9.1 MG/DL (ref 8.7–10.5)
CHLORIDE SERPL-SCNC: 103 MMOL/L (ref 95–110)
CO2 SERPL-SCNC: 30 MMOL/L (ref 23–29)
CREAT SERPL-MCNC: 0.4 MG/DL (ref 0.5–1.4)
DACRYOCYTES BLD QL SMEAR: ABNORMAL
DIFFERENTIAL METHOD: ABNORMAL
EOSINOPHIL NFR BLD: 0 % (ref 0–8)
ERYTHROCYTE [DISTWIDTH] IN BLOOD BY AUTOMATED COUNT: 15.4 % (ref 11.5–14.5)
EST. GFR  (AFRICAN AMERICAN): >60 ML/MIN/1.73 M^2
EST. GFR  (NON AFRICAN AMERICAN): >60 ML/MIN/1.73 M^2
GLUCOSE SERPL-MCNC: 81 MG/DL (ref 70–110)
HCT VFR BLD AUTO: 30.6 % (ref 37–48.5)
HGB BLD-MCNC: 10.3 G/DL (ref 12–16)
IMM GRANULOCYTES # BLD AUTO: ABNORMAL K/UL (ref 0–0.04)
IMM GRANULOCYTES NFR BLD AUTO: ABNORMAL % (ref 0–0.5)
LYMPHOCYTES NFR BLD: 4 % (ref 18–48)
MCH RBC QN AUTO: 33.1 PG (ref 27–31)
MCHC RBC AUTO-ENTMCNC: 33.7 G/DL (ref 32–36)
MCV RBC AUTO: 98 FL (ref 82–98)
METAMYELOCYTES NFR BLD MANUAL: 1 %
MONOCYTES NFR BLD: 1 % (ref 4–15)
NEUTROPHILS NFR BLD: 86 % (ref 38–73)
NEUTS BAND NFR BLD MANUAL: 8 %
NRBC BLD-RTO: 0 /100 WBC
PLATELET # BLD AUTO: 116 K/UL (ref 150–350)
PLATELET BLD QL SMEAR: ABNORMAL
PMV BLD AUTO: 10.5 FL (ref 9.2–12.9)
POIKILOCYTOSIS BLD QL SMEAR: SLIGHT
POTASSIUM SERPL-SCNC: 3.9 MMOL/L (ref 3.5–5.1)
PROT SERPL-MCNC: 6.5 G/DL (ref 6–8.4)
RBC # BLD AUTO: 3.11 M/UL (ref 4–5.4)
SODIUM SERPL-SCNC: 140 MMOL/L (ref 136–145)
WBC # BLD AUTO: 39.16 K/UL (ref 3.9–12.7)

## 2020-01-03 PROCEDURE — 85027 COMPLETE CBC AUTOMATED: CPT

## 2020-01-03 PROCEDURE — 80053 COMPREHEN METABOLIC PANEL: CPT

## 2020-01-03 PROCEDURE — 36415 COLL VENOUS BLD VENIPUNCTURE: CPT

## 2020-01-03 PROCEDURE — 85007 BL SMEAR W/DIFF WBC COUNT: CPT

## 2020-01-09 ENCOUNTER — LAB VISIT (OUTPATIENT)
Dept: LAB | Facility: HOSPITAL | Age: 70
End: 2020-01-09
Attending: INTERNAL MEDICINE
Payer: MEDICARE

## 2020-01-09 DIAGNOSIS — C83.35 DIFFUSE LARGE B-CELL LYMPHOMA OF LYMPH NODES OF INGUINAL REGION: ICD-10-CM

## 2020-01-09 LAB
ALBUMIN SERPL BCP-MCNC: 3.9 G/DL (ref 3.5–5.2)
ALP SERPL-CCNC: 77 U/L (ref 55–135)
ALT SERPL W/O P-5'-P-CCNC: 13 U/L (ref 10–44)
ANION GAP SERPL CALC-SCNC: 7 MMOL/L (ref 8–16)
ANISOCYTOSIS BLD QL SMEAR: SLIGHT
AST SERPL-CCNC: 13 U/L (ref 10–40)
BASOPHILS NFR BLD: 1 % (ref 0–1.9)
BILIRUB SERPL-MCNC: 0.5 MG/DL (ref 0.1–1)
BUN SERPL-MCNC: 12 MG/DL (ref 8–23)
CALCIUM SERPL-MCNC: 9.2 MG/DL (ref 8.7–10.5)
CHLORIDE SERPL-SCNC: 105 MMOL/L (ref 95–110)
CO2 SERPL-SCNC: 28 MMOL/L (ref 23–29)
CREAT SERPL-MCNC: 0.7 MG/DL (ref 0.5–1.4)
DACRYOCYTES BLD QL SMEAR: ABNORMAL
DIFFERENTIAL METHOD: ABNORMAL
EOSINOPHIL NFR BLD: 1 % (ref 0–8)
ERYTHROCYTE [DISTWIDTH] IN BLOOD BY AUTOMATED COUNT: 14.5 % (ref 11.5–14.5)
EST. GFR  (AFRICAN AMERICAN): >60 ML/MIN/1.73 M^2
EST. GFR  (NON AFRICAN AMERICAN): >60 ML/MIN/1.73 M^2
GLUCOSE SERPL-MCNC: 92 MG/DL (ref 70–110)
HCT VFR BLD AUTO: 30.9 % (ref 37–48.5)
HGB BLD-MCNC: 10.3 G/DL (ref 12–16)
IMM GRANULOCYTES # BLD AUTO: ABNORMAL K/UL (ref 0–0.04)
IMM GRANULOCYTES NFR BLD AUTO: ABNORMAL % (ref 0–0.5)
LYMPHOCYTES NFR BLD: 17 % (ref 18–48)
MCH RBC QN AUTO: 32.7 PG (ref 27–31)
MCHC RBC AUTO-ENTMCNC: 33.3 G/DL (ref 32–36)
MCV RBC AUTO: 98 FL (ref 82–98)
METAMYELOCYTES NFR BLD MANUAL: 1 %
MONOCYTES NFR BLD: 11 % (ref 4–15)
NEUTROPHILS NFR BLD: 49 % (ref 38–73)
NEUTS BAND NFR BLD MANUAL: 20 %
NRBC BLD-RTO: 0 /100 WBC
OVALOCYTES BLD QL SMEAR: ABNORMAL
PLATELET # BLD AUTO: 111 K/UL (ref 150–350)
PLATELET BLD QL SMEAR: ABNORMAL
PMV BLD AUTO: 10.1 FL (ref 9.2–12.9)
POIKILOCYTOSIS BLD QL SMEAR: SLIGHT
POLYCHROMASIA BLD QL SMEAR: ABNORMAL
POTASSIUM SERPL-SCNC: 4.5 MMOL/L (ref 3.5–5.1)
PROT SERPL-MCNC: 7.1 G/DL (ref 6–8.4)
RBC # BLD AUTO: 3.15 M/UL (ref 4–5.4)
SODIUM SERPL-SCNC: 140 MMOL/L (ref 136–145)
WBC # BLD AUTO: 8.22 K/UL (ref 3.9–12.7)

## 2020-01-09 PROCEDURE — 36415 COLL VENOUS BLD VENIPUNCTURE: CPT

## 2020-01-09 PROCEDURE — 85007 BL SMEAR W/DIFF WBC COUNT: CPT

## 2020-01-09 PROCEDURE — 85027 COMPLETE CBC AUTOMATED: CPT

## 2020-01-09 PROCEDURE — 80053 COMPREHEN METABOLIC PANEL: CPT

## 2020-01-15 RX ORDER — HEPARIN 100 UNIT/ML
500 SYRINGE INTRAVENOUS
Status: CANCELLED | OUTPATIENT
Start: 2020-01-20

## 2020-01-15 RX ORDER — SODIUM CHLORIDE 0.9 % (FLUSH) 0.9 %
10 SYRINGE (ML) INJECTION
Status: CANCELLED | OUTPATIENT
Start: 2020-01-20

## 2020-01-15 RX ORDER — FAMOTIDINE 10 MG/ML
20 INJECTION INTRAVENOUS
Status: CANCELLED | OUTPATIENT
Start: 2020-01-20

## 2020-01-15 RX ORDER — ACETAMINOPHEN 325 MG/1
650 TABLET ORAL
Status: CANCELLED | OUTPATIENT
Start: 2020-01-20

## 2020-01-15 RX ORDER — DOXORUBICIN HYDROCHLORIDE 2 MG/ML
50 INJECTION, SOLUTION INTRAVENOUS
Status: CANCELLED | OUTPATIENT
Start: 2020-01-20

## 2020-01-15 RX ORDER — MEPERIDINE HYDROCHLORIDE 50 MG/ML
25 INJECTION INTRAMUSCULAR; INTRAVENOUS; SUBCUTANEOUS EVERY 30 MIN PRN
Status: CANCELLED | OUTPATIENT
Start: 2020-01-20

## 2020-01-16 ENCOUNTER — OFFICE VISIT (OUTPATIENT)
Dept: HEMATOLOGY/ONCOLOGY | Facility: CLINIC | Age: 70
End: 2020-01-16
Payer: MEDICARE

## 2020-01-16 VITALS
WEIGHT: 155.88 LBS | DIASTOLIC BLOOD PRESSURE: 83 MMHG | BODY MASS INDEX: 25.16 KG/M2 | RESPIRATION RATE: 18 BRPM | SYSTOLIC BLOOD PRESSURE: 154 MMHG | TEMPERATURE: 98 F | HEART RATE: 87 BPM

## 2020-01-16 DIAGNOSIS — C83.35 DIFFUSE LARGE B-CELL LYMPHOMA OF LYMPH NODES OF INGUINAL REGION: ICD-10-CM

## 2020-01-16 DIAGNOSIS — R03.0 ELEVATED BLOOD PRESSURE, SITUATIONAL: ICD-10-CM

## 2020-01-16 PROCEDURE — 1126F PR PAIN SEVERITY QUANTIFIED, NO PAIN PRESENT: ICD-10-PCS | Mod: S$GLB,,, | Performed by: INTERNAL MEDICINE

## 2020-01-16 PROCEDURE — 99214 PR OFFICE/OUTPT VISIT, EST, LEVL IV, 30-39 MIN: ICD-10-PCS | Mod: S$GLB,,, | Performed by: INTERNAL MEDICINE

## 2020-01-16 PROCEDURE — 99214 OFFICE O/P EST MOD 30 MIN: CPT | Mod: S$GLB,,, | Performed by: INTERNAL MEDICINE

## 2020-01-16 PROCEDURE — 1159F MED LIST DOCD IN RCRD: CPT | Mod: S$GLB,,, | Performed by: INTERNAL MEDICINE

## 2020-01-16 PROCEDURE — 1159F PR MEDICATION LIST DOCUMENTED IN MEDICAL RECORD: ICD-10-PCS | Mod: S$GLB,,, | Performed by: INTERNAL MEDICINE

## 2020-01-16 PROCEDURE — 1126F AMNT PAIN NOTED NONE PRSNT: CPT | Mod: S$GLB,,, | Performed by: INTERNAL MEDICINE

## 2020-01-16 NOTE — ASSESSMENT & PLAN NOTE
Continuing to monitor this.  Today is slightly up, no symptoms.  May be due to white coat syndrome but chemo may be impacting this.  Will observe for now.

## 2020-01-16 NOTE — ASSESSMENT & PLAN NOTE
Patient is doing well and is s/p four cycles of CHOP/R and is tolerating with expected toxicity.  She will be due for number five next week and labs look good so will proceed with therapy.  Orders have been signed.

## 2020-01-16 NOTE — PROGRESS NOTES
PROGRESS NOTE    Subjective:       Patient ID: Aurelia Marlow is a 69 y.o. female.    Dx [facility]CL-[facility] type- 8/21/2019  Inguinal LN biopsy    PET with bone marrow uptake throughout appendicular and axial skeleton    Bone marrow biopsy positive for B-cell lymphoma    Stage ARELIS    Chief Complaint:  No chief complaint on file.  lymphoma follow up    History of Present Illness:   Aurelia Marlow is a 69 y.o. female who presents for follow up of lymphoma    Patient is doing well on chemotherapy and is s/p three cycles.  Labs look good and patient is tolerating with low toxicity.      R-CHOP:  Cycle 1: 10/28/2019  Cycle 2: 11/18/2019  Cycle 3: 12/9/2019  Cycle 4: 12/30/2019  Cycle 5: 01/20/2020-due      Echo 9/30/2019 EF 60%  CMP, CBC 10/6/2019 unremarkable    Family and Social history reviewed and is unchanged from 9/27/2019      ROS:  Review of Systems   Constitutional: Negative for appetite change, fever and unexpected weight change.   Eyes: Negative for visual disturbance.   Respiratory: Negative for cough and shortness of breath.    Cardiovascular: Negative for chest pain and leg swelling.   Gastrointestinal: Negative for abdominal pain, blood in stool and diarrhea.   Genitourinary: Negative for frequency and hematuria.   Musculoskeletal: Negative for back pain.   Skin: Negative for rash.   Neurological: Negative for headaches.   Hematological: Negative for adenopathy.   Psychiatric/Behavioral: The patient is not nervous/anxious.           Current Outpatient Medications:     cholecalciferol, vitamin D3, (VITAMIN D3) 2,000 unit Tab, , Disp: , Rfl:     diphenhydrAMINE (BENADRYL) 25 mg capsule, Take 1 capsule (25 mg total) by mouth every 6 (six) hours as needed for Itching or Allergies., Disp: 20 capsule, Rfl: 0    famotidine (PEPCID) 20 MG tablet, Take 1 tablet (20 mg total) by mouth 2 (two) times daily., Disp: 20 tablet, Rfl: 0    fluticasone  propionate (FLONASE) 50 mcg/actuation nasal spray, , Disp: , Rfl:     hydroxychloroquine (PLAQUENIL) 200 mg tablet, Take 1 tablet (200 mg total) by mouth 2 (two) times daily., Disp: 180 tablet, Rfl: 1    levothyroxine (SYNTHROID) 100 MCG tablet, Take 100 mcg by mouth once daily., Disp: , Rfl:     lidocaine-prilocaine (EMLA) cream, Apply topically as needed., Disp: 25 g, Rfl: 2    naproxen (EC NAPROSYN) 500 MG EC tablet, TAKE ONE TABLET BY MOUTH TWICE DAILY AFTER MEALS AS NEEDED, Disp: 60 tablet, Rfl: 1    omeprazole 20 mg TbEC, Take 20 mg by mouth every evening. , Disp: , Rfl:     ondansetron (ZOFRAN) 8 MG tablet, Take 8 mg by mouth every 8 (eight) hours as needed., Disp: , Rfl: 6    predniSONE (DELTASONE) 20 MG tablet, TAKE 5 TABLETS DAILY DAY 1-5 OF CHEMO CYCLE, Disp: , Rfl: 6    promethazine (PHENERGAN) 25 MG tablet, Take 25 mg by mouth every 6 (six) hours as needed., Disp: , Rfl: 6    sulfamethoxazole-trimethoprim 800-160mg (BACTRIM DS) 800-160 mg Tab, TAKE 1 TABLET BY MOUTH 3 (THREE) TIMES A WEEK MONDAY, WEDNESDAY AND FRIDAY., Disp: , Rfl: 2    traMADol (ULTRAM) 50 mg tablet, Take 1 tablet (50 mg total) by mouth every 12 (twelve) hours as needed for Pain. Dispense generic, Disp: 60 tablet, Rfl: 1    valACYclovir (VALTREX) 500 MG tablet, Take 500 mg by mouth once daily., Disp: , Rfl: 3    Current Facility-Administered Medications:     lidocaine (PF) 10 mg/ml (1%) injection 10 mg, 1 mL, Intradermal, Once, Kermit Martinez MD        Objective:       Physical Examination:     BP (!) 154/83   Pulse 87   Temp 98.1 °F (36.7 °C) (Oral)   Resp 18   Wt 70.7 kg (155 lb 14.4 oz)   BMI 25.16 kg/m²     Physical Exam   Constitutional: She appears well-developed and well-nourished.   HENT:   Head: Normocephalic and atraumatic.   Right Ear: External ear normal.   Left Ear: External ear normal.   Mouth/Throat: Oropharynx is clear and moist.   Eyes: Pupils are equal, round, and reactive to light. Conjunctivae  are normal.   Neck: No tracheal deviation present. No thyromegaly present.   Cardiovascular: Normal rate, regular rhythm and normal heart sounds.   Pulmonary/Chest: Effort normal and breath sounds normal.   Abdominal: Soft. Bowel sounds are normal. She exhibits no distension and no mass. There is no tenderness.   Musculoskeletal: She exhibits no edema.   Neurological:   Neuro intact througout   Skin: No rash noted.   Psychiatric: She has a normal mood and affect. Her behavior is normal. Judgment and thought content normal.        Labs:   Recent Results (from the past 336 hour(s))   CBC auto differential    Collection Time: 01/09/20  8:47 AM   Result Value Ref Range    WBC 8.22 3.90 - 12.70 K/uL    Hemoglobin 10.3 (L) 12.0 - 16.0 g/dL    Hematocrit 30.9 (L) 37.0 - 48.5 %    Platelets 111 (L) 150 - 350 K/uL   CBC auto differential    Collection Time: 01/03/20  9:57 AM   Result Value Ref Range    WBC 39.16 (H) 3.90 - 12.70 K/uL    Hemoglobin 10.3 (L) 12.0 - 16.0 g/dL    Hematocrit 30.6 (L) 37.0 - 48.5 %    Platelets 116 (L) 150 - 350 K/uL     CMP  Sodium   Date Value Ref Range Status   01/09/2020 140 136 - 145 mmol/L Final   06/11/2019 139 134 - 144 mmol/L      Potassium   Date Value Ref Range Status   01/09/2020 4.5 3.5 - 5.1 mmol/L Final     Chloride   Date Value Ref Range Status   01/09/2020 105 95 - 110 mmol/L Final   06/11/2019 104 98 - 110 mmol/L      CO2   Date Value Ref Range Status   01/09/2020 28 23 - 29 mmol/L Final     Glucose   Date Value Ref Range Status   01/09/2020 92 70 - 110 mg/dL Final   06/11/2019 86 70 - 99 mg/dL      BUN, Bld   Date Value Ref Range Status   01/09/2020 12 8 - 23 mg/dL Final     Creatinine   Date Value Ref Range Status   01/09/2020 0.7 0.5 - 1.4 mg/dL Final   06/11/2019 0.72 0.60 - 1.40 mg/dL      Calcium   Date Value Ref Range Status   01/09/2020 9.2 8.7 - 10.5 mg/dL Final     Total Protein   Date Value Ref Range Status   01/09/2020 7.1 6.0 - 8.4 g/dL Final     Albumin   Date Value  Ref Range Status   01/09/2020 3.9 3.5 - 5.2 g/dL Final   06/11/2019 3.8 3.1 - 4.7 g/dL      Total Bilirubin   Date Value Ref Range Status   01/09/2020 0.5 0.1 - 1.0 mg/dL Final     Comment:     For infants and newborns, interpretation of results should be based  on gestational age, weight and in agreement with clinical  observations.  Premature Infant recommended reference ranges:  Up to 24 hours.............<8.0 mg/dL  Up to 48 hours............<12.0 mg/dL  3-5 days..................<15.0 mg/dL  6-29 days.................<15.0 mg/dL       Alkaline Phosphatase   Date Value Ref Range Status   01/09/2020 77 55 - 135 U/L Final     AST   Date Value Ref Range Status   01/09/2020 13 10 - 40 U/L Final     ALT   Date Value Ref Range Status   01/09/2020 13 10 - 44 U/L Final     Anion Gap   Date Value Ref Range Status   01/09/2020 7 (L) 8 - 16 mmol/L Final     eGFR if    Date Value Ref Range Status   01/09/2020 >60.0 >60 mL/min/1.73 m^2 Final     eGFR if non    Date Value Ref Range Status   01/09/2020 >60.0 >60 mL/min/1.73 m^2 Final     Comment:     Calculation used to obtain the estimated glomerular filtration  rate (eGFR) is the CKD-EPI equation.        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  She will be due for number five next week and labs look good so will proceed with therapy.  Orders have been signed.           Elevated blood pressure, situational     Continuing to monitor this.  Today is slightly up, no symptoms.  May be due to white coat syndrome but chemo may be impacting this.  Will observe for now.                Discussion:     Follow up in about 3 weeks (around 2/6/2020).      Electronically signed by Pb BOLTON  Lisandra

## 2020-01-17 ENCOUNTER — LAB VISIT (OUTPATIENT)
Dept: LAB | Facility: HOSPITAL | Age: 70
End: 2020-01-17
Attending: INTERNAL MEDICINE
Payer: MEDICARE

## 2020-01-17 DIAGNOSIS — C83.35 DIFFUSE LARGE B-CELL LYMPHOMA OF LYMPH NODES OF INGUINAL REGION: ICD-10-CM

## 2020-01-17 LAB
ALBUMIN SERPL BCP-MCNC: 3.7 G/DL (ref 3.5–5.2)
ALP SERPL-CCNC: 67 U/L (ref 55–135)
ALT SERPL W/O P-5'-P-CCNC: 12 U/L (ref 10–44)
ANION GAP SERPL CALC-SCNC: 6 MMOL/L (ref 8–16)
AST SERPL-CCNC: 15 U/L (ref 10–40)
BASOPHILS # BLD AUTO: 0.07 K/UL (ref 0–0.2)
BASOPHILS NFR BLD: 1.3 % (ref 0–1.9)
BILIRUB SERPL-MCNC: 0.7 MG/DL (ref 0.1–1)
BUN SERPL-MCNC: 13 MG/DL (ref 8–23)
CALCIUM SERPL-MCNC: 9.1 MG/DL (ref 8.7–10.5)
CHLORIDE SERPL-SCNC: 105 MMOL/L (ref 95–110)
CO2 SERPL-SCNC: 28 MMOL/L (ref 23–29)
CREAT SERPL-MCNC: 0.6 MG/DL (ref 0.5–1.4)
DIFFERENTIAL METHOD: ABNORMAL
EOSINOPHIL # BLD AUTO: 0 K/UL (ref 0–0.5)
EOSINOPHIL NFR BLD: 0.4 % (ref 0–8)
ERYTHROCYTE [DISTWIDTH] IN BLOOD BY AUTOMATED COUNT: 15.1 % (ref 11.5–14.5)
EST. GFR  (AFRICAN AMERICAN): >60 ML/MIN/1.73 M^2
EST. GFR  (NON AFRICAN AMERICAN): >60 ML/MIN/1.73 M^2
GLUCOSE SERPL-MCNC: 104 MG/DL (ref 70–110)
HCT VFR BLD AUTO: 33.3 % (ref 37–48.5)
HGB BLD-MCNC: 11 G/DL (ref 12–16)
IMM GRANULOCYTES # BLD AUTO: 0.02 K/UL (ref 0–0.04)
IMM GRANULOCYTES NFR BLD AUTO: 0.4 % (ref 0–0.5)
LYMPHOCYTES # BLD AUTO: 0.4 K/UL (ref 1–4.8)
LYMPHOCYTES NFR BLD: 7.9 % (ref 18–48)
MCH RBC QN AUTO: 32.4 PG (ref 27–31)
MCHC RBC AUTO-ENTMCNC: 33 G/DL (ref 32–36)
MCV RBC AUTO: 98 FL (ref 82–98)
MONOCYTES # BLD AUTO: 0.5 K/UL (ref 0.3–1)
MONOCYTES NFR BLD: 9.2 % (ref 4–15)
NEUTROPHILS # BLD AUTO: 4.5 K/UL (ref 1.8–7.7)
NEUTROPHILS NFR BLD: 80.8 % (ref 38–73)
NRBC BLD-RTO: 0 /100 WBC
PLATELET # BLD AUTO: 294 K/UL (ref 150–350)
PMV BLD AUTO: 9.2 FL (ref 9.2–12.9)
POTASSIUM SERPL-SCNC: 4.5 MMOL/L (ref 3.5–5.1)
PROT SERPL-MCNC: 7.1 G/DL (ref 6–8.4)
RBC # BLD AUTO: 3.39 M/UL (ref 4–5.4)
SODIUM SERPL-SCNC: 139 MMOL/L (ref 136–145)
WBC # BLD AUTO: 5.54 K/UL (ref 3.9–12.7)

## 2020-01-17 PROCEDURE — 80053 COMPREHEN METABOLIC PANEL: CPT

## 2020-01-17 PROCEDURE — 85025 COMPLETE CBC W/AUTO DIFF WBC: CPT

## 2020-01-17 PROCEDURE — 36415 COLL VENOUS BLD VENIPUNCTURE: CPT

## 2020-01-20 ENCOUNTER — INFUSION (OUTPATIENT)
Dept: INFUSION THERAPY | Facility: HOSPITAL | Age: 70
End: 2020-01-20
Attending: INTERNAL MEDICINE
Payer: MEDICARE

## 2020-01-20 VITALS
OXYGEN SATURATION: 98 % | HEIGHT: 66 IN | DIASTOLIC BLOOD PRESSURE: 75 MMHG | TEMPERATURE: 98 F | WEIGHT: 154.56 LBS | HEART RATE: 71 BPM | BODY MASS INDEX: 24.84 KG/M2 | RESPIRATION RATE: 18 BRPM | SYSTOLIC BLOOD PRESSURE: 114 MMHG

## 2020-01-20 DIAGNOSIS — C83.35 DIFFUSE LARGE B-CELL LYMPHOMA OF LYMPH NODES OF INGUINAL REGION: ICD-10-CM

## 2020-01-20 DIAGNOSIS — T45.1X5A CHEMOTHERAPY-INDUCED NEUTROPENIA: Primary | ICD-10-CM

## 2020-01-20 DIAGNOSIS — D70.1 CHEMOTHERAPY-INDUCED NEUTROPENIA: Primary | ICD-10-CM

## 2020-01-20 PROCEDURE — 63600175 PHARM REV CODE 636 W HCPCS: Performed by: INTERNAL MEDICINE

## 2020-01-20 PROCEDURE — 96417 CHEMO IV INFUS EACH ADDL SEQ: CPT

## 2020-01-20 PROCEDURE — 96415 CHEMO IV INFUSION ADDL HR: CPT

## 2020-01-20 PROCEDURE — 96375 TX/PRO/DX INJ NEW DRUG ADDON: CPT

## 2020-01-20 PROCEDURE — S0028 INJECTION, FAMOTIDINE, 20 MG: HCPCS | Performed by: INTERNAL MEDICINE

## 2020-01-20 PROCEDURE — 25000003 PHARM REV CODE 250: Performed by: INTERNAL MEDICINE

## 2020-01-20 PROCEDURE — 96413 CHEMO IV INFUSION 1 HR: CPT

## 2020-01-20 PROCEDURE — 96377 APPLICATON ON-BODY INJECTOR: CPT | Mod: 59

## 2020-01-20 PROCEDURE — A4216 STERILE WATER/SALINE, 10 ML: HCPCS | Performed by: INTERNAL MEDICINE

## 2020-01-20 PROCEDURE — 96411 CHEMO IV PUSH ADDL DRUG: CPT

## 2020-01-20 PROCEDURE — 96367 TX/PROPH/DG ADDL SEQ IV INF: CPT

## 2020-01-20 RX ORDER — HEPARIN 100 UNIT/ML
500 SYRINGE INTRAVENOUS
Status: DISCONTINUED | OUTPATIENT
Start: 2020-01-20 | End: 2020-01-20 | Stop reason: HOSPADM

## 2020-01-20 RX ORDER — SODIUM CHLORIDE 0.9 % (FLUSH) 0.9 %
10 SYRINGE (ML) INJECTION
Status: DISCONTINUED | OUTPATIENT
Start: 2020-01-20 | End: 2020-01-20 | Stop reason: HOSPADM

## 2020-01-20 RX ORDER — DOXORUBICIN HYDROCHLORIDE 2 MG/ML
50 INJECTION, SOLUTION INTRAVENOUS
Status: COMPLETED | OUTPATIENT
Start: 2020-01-20 | End: 2020-01-20

## 2020-01-20 RX ORDER — MEPERIDINE HYDROCHLORIDE 25 MG/ML
25 INJECTION INTRAMUSCULAR; INTRAVENOUS; SUBCUTANEOUS EVERY 30 MIN PRN
Status: DISCONTINUED | OUTPATIENT
Start: 2020-01-20 | End: 2020-01-20 | Stop reason: HOSPADM

## 2020-01-20 RX ORDER — FAMOTIDINE 10 MG/ML
20 INJECTION INTRAVENOUS
Status: COMPLETED | OUTPATIENT
Start: 2020-01-20 | End: 2020-01-20

## 2020-01-20 RX ORDER — ACETAMINOPHEN 325 MG/1
650 TABLET ORAL
Status: COMPLETED | OUTPATIENT
Start: 2020-01-20 | End: 2020-01-20

## 2020-01-20 RX ADMIN — CYCLOPHOSPHAMIDE 1335 MG: 1 INJECTION, POWDER, FOR SOLUTION INTRAVENOUS; ORAL at 11:01

## 2020-01-20 RX ADMIN — SODIUM CHLORIDE: 0.9 INJECTION, SOLUTION INTRAVENOUS at 07:01

## 2020-01-20 RX ADMIN — HEPARIN 500 UNITS: 100 SYRINGE at 12:01

## 2020-01-20 RX ADMIN — DOXORUBICIN HYDROCHLORIDE 90 MG: 2 INJECTION, SOLUTION INTRAVENOUS at 11:01

## 2020-01-20 RX ADMIN — VINCRISTINE SULFATE 2 MG: 1 INJECTION, SOLUTION INTRAVENOUS at 11:01

## 2020-01-20 RX ADMIN — FAMOTIDINE 20 MG: 10 INJECTION, SOLUTION INTRAVENOUS at 07:01

## 2020-01-20 RX ADMIN — PALONOSETRON HYDROCHLORIDE: 0.25 INJECTION, SOLUTION INTRAVENOUS at 10:01

## 2020-01-20 RX ADMIN — SODIUM CHLORIDE, PRESERVATIVE FREE 10 ML: 5 INJECTION INTRAVENOUS at 12:01

## 2020-01-20 RX ADMIN — RITUXIMAB 668 MG: 10 INJECTION, SOLUTION INTRAVENOUS at 08:01

## 2020-01-20 RX ADMIN — PEGFILGRASTIM 6 MG: KIT SUBCUTANEOUS at 12:01

## 2020-01-20 RX ADMIN — ACETAMINOPHEN 650 MG: 325 TABLET ORAL at 07:01

## 2020-01-20 RX ADMIN — DIPHENHYDRAMINE HYDROCHLORIDE 50 MG: 50 INJECTION INTRAMUSCULAR; INTRAVENOUS at 07:01

## 2020-01-20 NOTE — PLAN OF CARE
Problem: Fatigue  Goal: Improved Activity Tolerance  Outcome: Ongoing, Progressing  Intervention: Promote Energy Conservation  Flowsheets (Taken 1/20/2020 1967)  Fatigue Management: fatigue-related activity identified; paced activity encouraged  Sleep/Rest Enhancement: regular sleep/rest pattern promoted; consistent schedule promoted; family presence promoted  Activity Management: activity encouraged

## 2020-01-24 ENCOUNTER — LAB VISIT (OUTPATIENT)
Dept: LAB | Facility: HOSPITAL | Age: 70
End: 2020-01-24
Attending: INTERNAL MEDICINE
Payer: MEDICARE

## 2020-01-24 DIAGNOSIS — C83.35 DIFFUSE LARGE B-CELL LYMPHOMA OF LYMPH NODES OF INGUINAL REGION: ICD-10-CM

## 2020-01-24 LAB
ALBUMIN SERPL BCP-MCNC: 3.6 G/DL (ref 3.5–5.2)
ALP SERPL-CCNC: 97 U/L (ref 55–135)
ALT SERPL W/O P-5'-P-CCNC: 12 U/L (ref 10–44)
ANION GAP SERPL CALC-SCNC: 7 MMOL/L (ref 8–16)
ANISOCYTOSIS BLD QL SMEAR: SLIGHT
AST SERPL-CCNC: 16 U/L (ref 10–40)
BASOPHILS NFR BLD: 0 % (ref 0–1.9)
BILIRUB SERPL-MCNC: 0.9 MG/DL (ref 0.1–1)
BUN SERPL-MCNC: 18 MG/DL (ref 8–23)
CALCIUM SERPL-MCNC: 8.6 MG/DL (ref 8.7–10.5)
CHLORIDE SERPL-SCNC: 106 MMOL/L (ref 95–110)
CO2 SERPL-SCNC: 28 MMOL/L (ref 23–29)
CREAT SERPL-MCNC: 0.5 MG/DL (ref 0.5–1.4)
DACRYOCYTES BLD QL SMEAR: ABNORMAL
DIFFERENTIAL METHOD: ABNORMAL
EOSINOPHIL NFR BLD: 0 % (ref 0–8)
ERYTHROCYTE [DISTWIDTH] IN BLOOD BY AUTOMATED COUNT: 14.3 % (ref 11.5–14.5)
EST. GFR  (AFRICAN AMERICAN): >60 ML/MIN/1.73 M^2
EST. GFR  (NON AFRICAN AMERICAN): >60 ML/MIN/1.73 M^2
GLUCOSE SERPL-MCNC: 133 MG/DL (ref 70–110)
HCT VFR BLD AUTO: 31.4 % (ref 37–48.5)
HGB BLD-MCNC: 10.3 G/DL (ref 12–16)
IMM GRANULOCYTES # BLD AUTO: ABNORMAL K/UL (ref 0–0.04)
IMM GRANULOCYTES NFR BLD AUTO: ABNORMAL % (ref 0–0.5)
LYMPHOCYTES NFR BLD: 7 % (ref 18–48)
MCH RBC QN AUTO: 32.9 PG (ref 27–31)
MCHC RBC AUTO-ENTMCNC: 32.8 G/DL (ref 32–36)
MCV RBC AUTO: 100 FL (ref 82–98)
MONOCYTES NFR BLD: 0 % (ref 4–15)
NEUTROPHILS NFR BLD: 87 % (ref 38–73)
NEUTS BAND NFR BLD MANUAL: 6 %
NRBC BLD-RTO: 0 /100 WBC
PLATELET # BLD AUTO: 95 K/UL (ref 150–350)
PLATELET BLD QL SMEAR: ABNORMAL
PMV BLD AUTO: 10.4 FL (ref 9.2–12.9)
POIKILOCYTOSIS BLD QL SMEAR: SLIGHT
POTASSIUM SERPL-SCNC: 3.6 MMOL/L (ref 3.5–5.1)
PROT SERPL-MCNC: 6.4 G/DL (ref 6–8.4)
RBC # BLD AUTO: 3.13 M/UL (ref 4–5.4)
SODIUM SERPL-SCNC: 141 MMOL/L (ref 136–145)
WBC # BLD AUTO: 38.55 K/UL (ref 3.9–12.7)

## 2020-01-24 PROCEDURE — 85027 COMPLETE CBC AUTOMATED: CPT

## 2020-01-24 PROCEDURE — 36415 COLL VENOUS BLD VENIPUNCTURE: CPT

## 2020-01-24 PROCEDURE — 80053 COMPREHEN METABOLIC PANEL: CPT

## 2020-01-24 PROCEDURE — 85007 BL SMEAR W/DIFF WBC COUNT: CPT

## 2020-01-31 ENCOUNTER — LAB VISIT (OUTPATIENT)
Dept: LAB | Facility: HOSPITAL | Age: 70
End: 2020-01-31
Attending: INTERNAL MEDICINE
Payer: MEDICARE

## 2020-01-31 DIAGNOSIS — C83.35 DIFFUSE LARGE B-CELL LYMPHOMA OF LYMPH NODES OF INGUINAL REGION: ICD-10-CM

## 2020-01-31 LAB
ALBUMIN SERPL BCP-MCNC: 4 G/DL (ref 3.5–5.2)
ALP SERPL-CCNC: 66 U/L (ref 55–135)
ALT SERPL W/O P-5'-P-CCNC: 14 U/L (ref 10–44)
ANION GAP SERPL CALC-SCNC: 6 MMOL/L (ref 8–16)
AST SERPL-CCNC: 15 U/L (ref 10–40)
BASOPHILS # BLD AUTO: 0.08 K/UL (ref 0–0.2)
BASOPHILS NFR BLD: 1.3 % (ref 0–1.9)
BILIRUB SERPL-MCNC: 0.7 MG/DL (ref 0.1–1)
BUN SERPL-MCNC: 12 MG/DL (ref 8–23)
CALCIUM SERPL-MCNC: 9 MG/DL (ref 8.7–10.5)
CHLORIDE SERPL-SCNC: 101 MMOL/L (ref 95–110)
CO2 SERPL-SCNC: 27 MMOL/L (ref 23–29)
CREAT SERPL-MCNC: 0.6 MG/DL (ref 0.5–1.4)
DIFFERENTIAL METHOD: ABNORMAL
EOSINOPHIL # BLD AUTO: 0 K/UL (ref 0–0.5)
EOSINOPHIL NFR BLD: 0.7 % (ref 0–8)
ERYTHROCYTE [DISTWIDTH] IN BLOOD BY AUTOMATED COUNT: 13.7 % (ref 11.5–14.5)
EST. GFR  (AFRICAN AMERICAN): >60 ML/MIN/1.73 M^2
EST. GFR  (NON AFRICAN AMERICAN): >60 ML/MIN/1.73 M^2
GLUCOSE SERPL-MCNC: 87 MG/DL (ref 70–110)
HCT VFR BLD AUTO: 32.8 % (ref 37–48.5)
HGB BLD-MCNC: 10.8 G/DL (ref 12–16)
IMM GRANULOCYTES # BLD AUTO: 0.13 K/UL (ref 0–0.04)
IMM GRANULOCYTES NFR BLD AUTO: 2.1 % (ref 0–0.5)
LYMPHOCYTES # BLD AUTO: 1 K/UL (ref 1–4.8)
LYMPHOCYTES NFR BLD: 16.7 % (ref 18–48)
MCH RBC QN AUTO: 32 PG (ref 27–31)
MCHC RBC AUTO-ENTMCNC: 32.9 G/DL (ref 32–36)
MCV RBC AUTO: 97 FL (ref 82–98)
MONOCYTES # BLD AUTO: 0.7 K/UL (ref 0.3–1)
MONOCYTES NFR BLD: 11.4 % (ref 4–15)
NEUTROPHILS # BLD AUTO: 4.2 K/UL (ref 1.8–7.7)
NEUTROPHILS NFR BLD: 67.8 % (ref 38–73)
NRBC BLD-RTO: 0 /100 WBC
PLATELET # BLD AUTO: 139 K/UL (ref 150–350)
PMV BLD AUTO: 10.4 FL (ref 9.2–12.9)
POTASSIUM SERPL-SCNC: 4.8 MMOL/L (ref 3.5–5.1)
PROT SERPL-MCNC: 7.1 G/DL (ref 6–8.4)
RBC # BLD AUTO: 3.37 M/UL (ref 4–5.4)
SODIUM SERPL-SCNC: 134 MMOL/L (ref 136–145)
WBC # BLD AUTO: 6.12 K/UL (ref 3.9–12.7)

## 2020-01-31 PROCEDURE — 85025 COMPLETE CBC W/AUTO DIFF WBC: CPT

## 2020-01-31 PROCEDURE — 36415 COLL VENOUS BLD VENIPUNCTURE: CPT

## 2020-01-31 PROCEDURE — 80053 COMPREHEN METABOLIC PANEL: CPT

## 2020-02-06 ENCOUNTER — OFFICE VISIT (OUTPATIENT)
Dept: HEMATOLOGY/ONCOLOGY | Facility: CLINIC | Age: 70
End: 2020-02-06
Payer: MEDICARE

## 2020-02-06 VITALS
SYSTOLIC BLOOD PRESSURE: 136 MMHG | RESPIRATION RATE: 18 BRPM | BODY MASS INDEX: 24.94 KG/M2 | WEIGHT: 154.5 LBS | DIASTOLIC BLOOD PRESSURE: 88 MMHG | TEMPERATURE: 98 F | HEART RATE: 79 BPM

## 2020-02-06 DIAGNOSIS — D47.2 MGUS (MONOCLONAL GAMMOPATHY OF UNKNOWN SIGNIFICANCE): ICD-10-CM

## 2020-02-06 DIAGNOSIS — C83.35 DIFFUSE LARGE B-CELL LYMPHOMA OF LYMPH NODES OF INGUINAL REGION: ICD-10-CM

## 2020-02-06 PROCEDURE — 99214 OFFICE O/P EST MOD 30 MIN: CPT | Mod: S$GLB,,, | Performed by: INTERNAL MEDICINE

## 2020-02-06 PROCEDURE — 99214 PR OFFICE/OUTPT VISIT, EST, LEVL IV, 30-39 MIN: ICD-10-PCS | Mod: S$GLB,,, | Performed by: INTERNAL MEDICINE

## 2020-02-06 NOTE — ASSESSMENT & PLAN NOTE
Will continue to follow this with labs on a six month basis and discussed this today. Will check with next visit.

## 2020-02-06 NOTE — ASSESSMENT & PLAN NOTE
Patient is doing well and is tolerating the therapy well.  I will have her back with me in four weeks to see how she is doing and will plan PET scan at that time.  Will be complete after 6th cycle providing the PET is negative.  There is no role for maintenance therapy here.

## 2020-02-06 NOTE — PROGRESS NOTES
PROGRESS NOTE    Subjective:       Patient ID: Aurelia Marlow is a 69 y.o. female.    Dx [facility] type- 8/21/2019  Inguinal LN biopsy    PET with bone marrow uptake throughout appendicular and axial skeleton    Bone marrow biopsy positive for B-cell lymphoma    Stage ARELIS    Chief Complaint:  No chief complaint on file.  lymphoma follow up    History of Present Illness:   Aurelia Marlow is a 69 y.o. female who presents for follow up of lymphoma    Patient is doing well on chemotherapy and is s/p five cycles.     She has no new complaints and continues to tolerate the therapy well.      R-CHOP:  Cycle 1: 10/28/2019  Cycle 2: 11/18/2019  Cycle 3: 12/9/2019  Cycle 4: 12/30/2019  Cycle 5: 01/20/2020  Cycle 6: 02/10/2020-due(final)      Echo 9/30/2019 EF 60%  CMP, CBC 10/6/2019 unremarkable    Family and Social history reviewed and is unchanged from 9/27/2019      ROS:  Review of Systems   Constitutional: Negative for appetite change, fever and unexpected weight change.   Eyes: Negative for visual disturbance.   Respiratory: Negative for cough and shortness of breath.    Cardiovascular: Negative for chest pain and leg swelling.   Gastrointestinal: Negative for abdominal pain, blood in stool and diarrhea.   Genitourinary: Negative for frequency and hematuria.   Musculoskeletal: Negative for back pain.   Skin: Negative for rash.   Neurological: Negative for headaches.   Hematological: Negative for adenopathy.   Psychiatric/Behavioral: The patient is not nervous/anxious.           Current Outpatient Medications:     cholecalciferol, vitamin D3, (VITAMIN D3) 2,000 unit Tab, , Disp: , Rfl:     diphenhydrAMINE (BENADRYL) 25 mg capsule, Take 1 capsule (25 mg total) by mouth every 6 (six) hours as needed for Itching or Allergies., Disp: 20 capsule, Rfl: 0    famotidine (PEPCID) 20 MG tablet, Take 1 tablet (20 mg total) by mouth 2 (two) times daily., Disp:  20 tablet, Rfl: 0    fluticasone propionate (FLONASE) 50 mcg/actuation nasal spray, , Disp: , Rfl:     hydroxychloroquine (PLAQUENIL) 200 mg tablet, Take 1 tablet (200 mg total) by mouth 2 (two) times daily., Disp: 180 tablet, Rfl: 1    levothyroxine (SYNTHROID) 100 MCG tablet, Take 100 mcg by mouth once daily., Disp: , Rfl:     lidocaine-prilocaine (EMLA) cream, Apply topically as needed., Disp: 25 g, Rfl: 2    naproxen (EC NAPROSYN) 500 MG EC tablet, TAKE ONE TABLET BY MOUTH TWICE DAILY AFTER MEALS AS NEEDED, Disp: 60 tablet, Rfl: 1    omeprazole 20 mg TbEC, Take 20 mg by mouth every evening. , Disp: , Rfl:     ondansetron (ZOFRAN) 8 MG tablet, Take 8 mg by mouth every 8 (eight) hours as needed., Disp: , Rfl: 6    predniSONE (DELTASONE) 20 MG tablet, TAKE 5 TABLETS DAILY DAY 1-5 OF CHEMO CYCLE, Disp: , Rfl: 6    promethazine (PHENERGAN) 25 MG tablet, Take 25 mg by mouth every 6 (six) hours as needed., Disp: , Rfl: 6    sulfamethoxazole-trimethoprim 800-160mg (BACTRIM DS) 800-160 mg Tab, TAKE 1 TABLET BY MOUTH 3 (THREE) TIMES A WEEK MONDAY, WEDNESDAY AND FRIDAY., Disp: , Rfl: 2    traMADol (ULTRAM) 50 mg tablet, Take 1 tablet (50 mg total) by mouth every 12 (twelve) hours as needed for Pain. Dispense generic, Disp: 60 tablet, Rfl: 1    valACYclovir (VALTREX) 500 MG tablet, Take 500 mg by mouth once daily., Disp: , Rfl: 3    Current Facility-Administered Medications:     lidocaine (PF) 10 mg/ml (1%) injection 10 mg, 1 mL, Intradermal, Once, Kermit Martinez MD        Objective:       Physical Examination:     /88   Pulse 79   Temp 97.9 °F (36.6 °C) (Oral)   Resp 18   Wt 70.1 kg (154 lb 8 oz)   BMI 24.94 kg/m²     Physical Exam   Constitutional: She appears well-developed and well-nourished.   HENT:   Head: Normocephalic and atraumatic.   Right Ear: External ear normal.   Left Ear: External ear normal.   Mouth/Throat: Oropharynx is clear and moist.   Eyes: Pupils are equal, round, and  reactive to light. Conjunctivae are normal.   Neck: No tracheal deviation present. No thyromegaly present.   Cardiovascular: Normal rate, regular rhythm and normal heart sounds.   Pulmonary/Chest: Effort normal and breath sounds normal.   Abdominal: Soft. Bowel sounds are normal. She exhibits no distension and no mass. There is no tenderness.   Musculoskeletal: She exhibits no edema.   Neurological:   Neuro intact througout   Skin: No rash noted.   Psychiatric: She has a normal mood and affect. Her behavior is normal. Judgment and thought content normal.        Labs:   Recent Results (from the past 336 hour(s))   CBC auto differential    Collection Time: 01/31/20  9:05 AM   Result Value Ref Range    WBC 6.12 3.90 - 12.70 K/uL    Hemoglobin 10.8 (L) 12.0 - 16.0 g/dL    Hematocrit 32.8 (L) 37.0 - 48.5 %    Platelets 139 (L) 150 - 350 K/uL   CBC auto differential    Collection Time: 01/24/20  9:26 AM   Result Value Ref Range    WBC 38.55 (H) 3.90 - 12.70 K/uL    Hemoglobin 10.3 (L) 12.0 - 16.0 g/dL    Hematocrit 31.4 (L) 37.0 - 48.5 %    Platelets 95 (L) 150 - 350 K/uL     CMP  Sodium   Date Value Ref Range Status   01/31/2020 134 (L) 136 - 145 mmol/L Final   06/11/2019 139 134 - 144 mmol/L      Potassium   Date Value Ref Range Status   01/31/2020 4.8 3.5 - 5.1 mmol/L Final     Chloride   Date Value Ref Range Status   01/31/2020 101 95 - 110 mmol/L Final   06/11/2019 104 98 - 110 mmol/L      CO2   Date Value Ref Range Status   01/31/2020 27 23 - 29 mmol/L Final     Glucose   Date Value Ref Range Status   01/31/2020 87 70 - 110 mg/dL Final   06/11/2019 86 70 - 99 mg/dL      BUN, Bld   Date Value Ref Range Status   01/31/2020 12 8 - 23 mg/dL Final     Creatinine   Date Value Ref Range Status   01/31/2020 0.6 0.5 - 1.4 mg/dL Final   06/11/2019 0.72 0.60 - 1.40 mg/dL      Calcium   Date Value Ref Range Status   01/31/2020 9.0 8.7 - 10.5 mg/dL Final     Total Protein   Date Value Ref Range Status   01/31/2020 7.1 6.0 - 8.4  g/dL Final     Albumin   Date Value Ref Range Status   01/31/2020 4.0 3.5 - 5.2 g/dL Final   06/11/2019 3.8 3.1 - 4.7 g/dL      Total Bilirubin   Date Value Ref Range Status   01/31/2020 0.7 0.1 - 1.0 mg/dL Final     Comment:     For infants and newborns, interpretation of results should be based  on gestational age, weight and in agreement with clinical  observations.  Premature Infant recommended reference ranges:  Up to 24 hours.............<8.0 mg/dL  Up to 48 hours............<12.0 mg/dL  3-5 days..................<15.0 mg/dL  6-29 days.................<15.0 mg/dL       Alkaline Phosphatase   Date Value Ref Range Status   01/31/2020 66 55 - 135 U/L Final     AST   Date Value Ref Range Status   01/31/2020 15 10 - 40 U/L Final     ALT   Date Value Ref Range Status   01/31/2020 14 10 - 44 U/L Final     Anion Gap   Date Value Ref Range Status   01/31/2020 6 (L) 8 - 16 mmol/L Final     eGFR if    Date Value Ref Range Status   01/31/2020 >60.0 >60 mL/min/1.73 m^2 Final     eGFR if non    Date Value Ref Range Status   01/31/2020 >60.0 >60 mL/min/1.73 m^2 Final     Comment:     Calculation used to obtain the estimated glomerular filtration  rate (eGFR) is the CKD-EPI equation.        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  I will have her back with me in four weeks to see how she is doing and will plan PET scan at that time.  Will be complete after 6th cycle providing the PET is negative.  There is no role for maintenance therapy here.           MGUS (monoclonal gammopathy of unknown significance)     Will continue to follow this with labs on a six month basis and discussed this today. Will check with next visit.          Relevant Orders    Immunoglobulins (IgG,  IgA, IgM) Quantitative    Protein electrophoresis, serum          Discussion:     Follow up in about 4 weeks (around 3/5/2020).      Electronically signed by Pb Fry

## 2020-02-07 ENCOUNTER — LAB VISIT (OUTPATIENT)
Dept: LAB | Facility: HOSPITAL | Age: 70
End: 2020-02-07
Attending: INTERNAL MEDICINE
Payer: MEDICARE

## 2020-02-07 DIAGNOSIS — D47.2 MGUS (MONOCLONAL GAMMOPATHY OF UNKNOWN SIGNIFICANCE): ICD-10-CM

## 2020-02-07 DIAGNOSIS — C83.35 DIFFUSE LARGE B-CELL LYMPHOMA OF LYMPH NODES OF INGUINAL REGION: ICD-10-CM

## 2020-02-07 LAB
ALBUMIN SERPL BCP-MCNC: 4.2 G/DL (ref 3.5–5.2)
ALP SERPL-CCNC: 60 U/L (ref 55–135)
ALT SERPL W/O P-5'-P-CCNC: 13 U/L (ref 10–44)
ANION GAP SERPL CALC-SCNC: 7 MMOL/L (ref 8–16)
AST SERPL-CCNC: 16 U/L (ref 10–40)
BASOPHILS # BLD AUTO: 0.09 K/UL (ref 0–0.2)
BASOPHILS NFR BLD: 2 % (ref 0–1.9)
BILIRUB SERPL-MCNC: 0.7 MG/DL (ref 0.1–1)
BUN SERPL-MCNC: 15 MG/DL (ref 8–23)
CALCIUM SERPL-MCNC: 9.4 MG/DL (ref 8.7–10.5)
CHLORIDE SERPL-SCNC: 105 MMOL/L (ref 95–110)
CO2 SERPL-SCNC: 28 MMOL/L (ref 23–29)
CREAT SERPL-MCNC: 0.6 MG/DL (ref 0.5–1.4)
DIFFERENTIAL METHOD: ABNORMAL
EOSINOPHIL # BLD AUTO: 0 K/UL (ref 0–0.5)
EOSINOPHIL NFR BLD: 0.2 % (ref 0–8)
ERYTHROCYTE [DISTWIDTH] IN BLOOD BY AUTOMATED COUNT: 14.2 % (ref 11.5–14.5)
EST. GFR  (AFRICAN AMERICAN): >60 ML/MIN/1.73 M^2
EST. GFR  (NON AFRICAN AMERICAN): >60 ML/MIN/1.73 M^2
GLUCOSE SERPL-MCNC: 88 MG/DL (ref 70–110)
HCT VFR BLD AUTO: 33.1 % (ref 37–48.5)
HGB BLD-MCNC: 11.2 G/DL (ref 12–16)
IMM GRANULOCYTES # BLD AUTO: 0.01 K/UL (ref 0–0.04)
IMM GRANULOCYTES NFR BLD AUTO: 0.2 % (ref 0–0.5)
LYMPHOCYTES # BLD AUTO: 0.7 K/UL (ref 1–4.8)
LYMPHOCYTES NFR BLD: 14.4 % (ref 18–48)
MCH RBC QN AUTO: 32.7 PG (ref 27–31)
MCHC RBC AUTO-ENTMCNC: 33.8 G/DL (ref 32–36)
MCV RBC AUTO: 97 FL (ref 82–98)
MONOCYTES # BLD AUTO: 0.4 K/UL (ref 0.3–1)
MONOCYTES NFR BLD: 8.2 % (ref 4–15)
NEUTROPHILS # BLD AUTO: 3.4 K/UL (ref 1.8–7.7)
NEUTROPHILS NFR BLD: 75 % (ref 38–73)
NRBC BLD-RTO: 0 /100 WBC
PLATELET # BLD AUTO: 302 K/UL (ref 150–350)
PMV BLD AUTO: 9.2 FL (ref 9.2–12.9)
POTASSIUM SERPL-SCNC: 4.3 MMOL/L (ref 3.5–5.1)
PROT SERPL-MCNC: 7.1 G/DL (ref 6–8.4)
RBC # BLD AUTO: 3.42 M/UL (ref 4–5.4)
SODIUM SERPL-SCNC: 140 MMOL/L (ref 136–145)
WBC # BLD AUTO: 4.5 K/UL (ref 3.9–12.7)

## 2020-02-07 PROCEDURE — 36415 COLL VENOUS BLD VENIPUNCTURE: CPT

## 2020-02-07 PROCEDURE — 82784 ASSAY IGA/IGD/IGG/IGM EACH: CPT | Mod: 91

## 2020-02-07 PROCEDURE — 80053 COMPREHEN METABOLIC PANEL: CPT

## 2020-02-07 PROCEDURE — 85025 COMPLETE CBC W/AUTO DIFF WBC: CPT

## 2020-02-07 PROCEDURE — 84165 PROTEIN E-PHORESIS SERUM: CPT

## 2020-02-07 RX ORDER — ACETAMINOPHEN 325 MG/1
650 TABLET ORAL
Status: CANCELLED | OUTPATIENT
Start: 2020-02-10

## 2020-02-07 RX ORDER — MEPERIDINE HYDROCHLORIDE 50 MG/ML
25 INJECTION INTRAMUSCULAR; INTRAVENOUS; SUBCUTANEOUS EVERY 30 MIN PRN
Status: CANCELLED | OUTPATIENT
Start: 2020-02-10

## 2020-02-07 RX ORDER — DOXORUBICIN HYDROCHLORIDE 2 MG/ML
50 INJECTION, SOLUTION INTRAVENOUS
Status: CANCELLED | OUTPATIENT
Start: 2020-02-10

## 2020-02-07 RX ORDER — FAMOTIDINE 10 MG/ML
20 INJECTION INTRAVENOUS
Status: CANCELLED | OUTPATIENT
Start: 2020-02-10

## 2020-02-07 RX ORDER — SODIUM CHLORIDE 0.9 % (FLUSH) 0.9 %
10 SYRINGE (ML) INJECTION
Status: CANCELLED | OUTPATIENT
Start: 2020-02-10

## 2020-02-07 RX ORDER — HEPARIN 100 UNIT/ML
500 SYRINGE INTRAVENOUS
Status: CANCELLED | OUTPATIENT
Start: 2020-02-10

## 2020-02-08 LAB
IGA SERPL-MCNC: 98 MG/DL (ref 87–352)
IGG SERPL-MCNC: 1238 MG/DL (ref 700–1600)
IGM SERPL-MCNC: 48 MG/DL (ref 26–217)

## 2020-02-10 ENCOUNTER — INFUSION (OUTPATIENT)
Dept: INFUSION THERAPY | Facility: HOSPITAL | Age: 70
End: 2020-02-10
Attending: INTERNAL MEDICINE
Payer: MEDICARE

## 2020-02-10 VITALS
HEIGHT: 66 IN | SYSTOLIC BLOOD PRESSURE: 127 MMHG | RESPIRATION RATE: 18 BRPM | WEIGHT: 155.75 LBS | OXYGEN SATURATION: 99 % | DIASTOLIC BLOOD PRESSURE: 82 MMHG | TEMPERATURE: 98 F | HEART RATE: 73 BPM | BODY MASS INDEX: 25.03 KG/M2

## 2020-02-10 DIAGNOSIS — C83.35 DIFFUSE LARGE B-CELL LYMPHOMA OF LYMPH NODES OF INGUINAL REGION: ICD-10-CM

## 2020-02-10 DIAGNOSIS — T45.1X5A CHEMOTHERAPY-INDUCED NEUTROPENIA: Primary | ICD-10-CM

## 2020-02-10 DIAGNOSIS — D70.1 CHEMOTHERAPY-INDUCED NEUTROPENIA: Primary | ICD-10-CM

## 2020-02-10 LAB
ALBUMIN SERPL ELPH-MCNC: 3.7 G/DL (ref 2.9–4.4)
ALBUMIN/GLOB SERPL: 1.2 {RATIO} (ref 0.7–1.7)
ALPHA1 GLOB SERPL ELPH-MCNC: 0.3 G/DL (ref 0–0.4)
ALPHA2 GLOB SERPL ELPH-MCNC: 0.6 G/DL (ref 0.4–1)
B-GLOBULIN SERPL ELPH-MCNC: 1.1 G/DL (ref 0.7–1.3)
GAMMA GLOB SERPL ELPH-MCNC: 1.2 G/DL (ref 0.4–1.8)
GLOBULIN SER CALC-MCNC: 3.2 G/DL (ref 2.2–3.9)
LABORATORY COMMENT REPORT: ABNORMAL
M PROTEIN SERPL ELPH-MCNC: 1 G/DL
PROT SERPL-MCNC: 6.9 G/DL (ref 6–8.5)

## 2020-02-10 PROCEDURE — 96377 APPLICATON ON-BODY INJECTOR: CPT

## 2020-02-10 PROCEDURE — 96417 CHEMO IV INFUS EACH ADDL SEQ: CPT

## 2020-02-10 PROCEDURE — 96411 CHEMO IV PUSH ADDL DRUG: CPT

## 2020-02-10 PROCEDURE — 25000003 PHARM REV CODE 250: Performed by: NURSE PRACTITIONER

## 2020-02-10 PROCEDURE — 63600175 PHARM REV CODE 636 W HCPCS: Performed by: NURSE PRACTITIONER

## 2020-02-10 PROCEDURE — 96415 CHEMO IV INFUSION ADDL HR: CPT

## 2020-02-10 PROCEDURE — 96367 TX/PROPH/DG ADDL SEQ IV INF: CPT

## 2020-02-10 PROCEDURE — 96413 CHEMO IV INFUSION 1 HR: CPT

## 2020-02-10 PROCEDURE — 96375 TX/PRO/DX INJ NEW DRUG ADDON: CPT

## 2020-02-10 PROCEDURE — S0028 INJECTION, FAMOTIDINE, 20 MG: HCPCS | Performed by: NURSE PRACTITIONER

## 2020-02-10 PROCEDURE — A4216 STERILE WATER/SALINE, 10 ML: HCPCS | Performed by: NURSE PRACTITIONER

## 2020-02-10 RX ORDER — SODIUM CHLORIDE 0.9 % (FLUSH) 0.9 %
10 SYRINGE (ML) INJECTION
Status: DISCONTINUED | OUTPATIENT
Start: 2020-02-10 | End: 2020-02-10 | Stop reason: HOSPADM

## 2020-02-10 RX ORDER — FAMOTIDINE 10 MG/ML
20 INJECTION INTRAVENOUS
Status: COMPLETED | OUTPATIENT
Start: 2020-02-10 | End: 2020-02-10

## 2020-02-10 RX ORDER — HEPARIN 100 UNIT/ML
500 SYRINGE INTRAVENOUS
Status: DISCONTINUED | OUTPATIENT
Start: 2020-02-10 | End: 2020-02-10 | Stop reason: HOSPADM

## 2020-02-10 RX ORDER — ACETAMINOPHEN 325 MG/1
650 TABLET ORAL
Status: COMPLETED | OUTPATIENT
Start: 2020-02-10 | End: 2020-02-10

## 2020-02-10 RX ORDER — MEPERIDINE HYDROCHLORIDE 25 MG/ML
25 INJECTION INTRAMUSCULAR; INTRAVENOUS; SUBCUTANEOUS EVERY 30 MIN PRN
Status: DISCONTINUED | OUTPATIENT
Start: 2020-02-10 | End: 2020-02-10 | Stop reason: HOSPADM

## 2020-02-10 RX ORDER — DOXORUBICIN HYDROCHLORIDE 2 MG/ML
50 INJECTION, SOLUTION INTRAVENOUS
Status: COMPLETED | OUTPATIENT
Start: 2020-02-10 | End: 2020-02-10

## 2020-02-10 RX ADMIN — PEGFILGRASTIM 6 MG: KIT SUBCUTANEOUS at 12:02

## 2020-02-10 RX ADMIN — FAMOTIDINE 20 MG: 10 INJECTION, SOLUTION INTRAVENOUS at 07:02

## 2020-02-10 RX ADMIN — RITUXIMAB 668 MG: 10 INJECTION, SOLUTION INTRAVENOUS at 08:02

## 2020-02-10 RX ADMIN — PALONOSETRON HYDROCHLORIDE: 0.25 INJECTION INTRAVENOUS at 08:02

## 2020-02-10 RX ADMIN — CYCLOPHOSPHAMIDE 1335 MG: 1 INJECTION, POWDER, FOR SOLUTION INTRAVENOUS; ORAL at 11:02

## 2020-02-10 RX ADMIN — VINCRISTINE SULFATE 2 MG: 1 INJECTION, SOLUTION INTRAVENOUS at 11:02

## 2020-02-10 RX ADMIN — SODIUM CHLORIDE, PRESERVATIVE FREE 10 ML: 5 INJECTION INTRAVENOUS at 12:02

## 2020-02-10 RX ADMIN — HEPARIN 500 UNITS: 100 SYRINGE at 12:02

## 2020-02-10 RX ADMIN — DOXORUBICIN HYDROCHLORIDE 90 MG: 2 INJECTION, SOLUTION INTRAVENOUS at 11:02

## 2020-02-10 RX ADMIN — DIPHENHYDRAMINE HYDROCHLORIDE 50 MG: 50 INJECTION INTRAMUSCULAR; INTRAVENOUS at 07:02

## 2020-02-10 RX ADMIN — SODIUM CHLORIDE: 0.9 INJECTION, SOLUTION INTRAVENOUS at 07:02

## 2020-02-10 RX ADMIN — ACETAMINOPHEN 650 MG: 325 TABLET ORAL at 07:02

## 2020-02-14 ENCOUNTER — LAB VISIT (OUTPATIENT)
Dept: LAB | Facility: HOSPITAL | Age: 70
End: 2020-02-14
Attending: INTERNAL MEDICINE
Payer: MEDICARE

## 2020-02-14 DIAGNOSIS — C83.35 DIFFUSE LARGE B-CELL LYMPHOMA OF LYMPH NODES OF INGUINAL REGION: ICD-10-CM

## 2020-02-14 LAB
ALBUMIN SERPL BCP-MCNC: 3.7 G/DL (ref 3.5–5.2)
ALP SERPL-CCNC: 101 U/L (ref 55–135)
ALT SERPL W/O P-5'-P-CCNC: 13 U/L (ref 10–44)
ANION GAP SERPL CALC-SCNC: 10 MMOL/L (ref 8–16)
AST SERPL-CCNC: 15 U/L (ref 10–40)
BASOPHILS # BLD AUTO: 0.03 K/UL (ref 0–0.2)
BASOPHILS NFR BLD: 0.1 % (ref 0–1.9)
BILIRUB SERPL-MCNC: 0.5 MG/DL (ref 0.1–1)
BUN SERPL-MCNC: 14 MG/DL (ref 8–23)
CALCIUM SERPL-MCNC: 8.6 MG/DL (ref 8.7–10.5)
CHLORIDE SERPL-SCNC: 103 MMOL/L (ref 95–110)
CO2 SERPL-SCNC: 28 MMOL/L (ref 23–29)
CREAT SERPL-MCNC: 0.6 MG/DL (ref 0.5–1.4)
DIFFERENTIAL METHOD: ABNORMAL
EOSINOPHIL # BLD AUTO: 0 K/UL (ref 0–0.5)
EOSINOPHIL NFR BLD: 0 % (ref 0–8)
ERYTHROCYTE [DISTWIDTH] IN BLOOD BY AUTOMATED COUNT: 13.7 % (ref 11.5–14.5)
EST. GFR  (AFRICAN AMERICAN): >60 ML/MIN/1.73 M^2
EST. GFR  (NON AFRICAN AMERICAN): >60 ML/MIN/1.73 M^2
GLUCOSE SERPL-MCNC: 111 MG/DL (ref 70–110)
HCT VFR BLD AUTO: 32.3 % (ref 37–48.5)
HGB BLD-MCNC: 10.4 G/DL (ref 12–16)
IMM GRANULOCYTES # BLD AUTO: 9.04 K/UL (ref 0–0.04)
IMM GRANULOCYTES NFR BLD AUTO: 32.4 % (ref 0–0.5)
LYMPHOCYTES # BLD AUTO: 0.9 K/UL (ref 1–4.8)
LYMPHOCYTES NFR BLD: 3.1 % (ref 18–48)
MCH RBC QN AUTO: 32.2 PG (ref 27–31)
MCHC RBC AUTO-ENTMCNC: 32.2 G/DL (ref 32–36)
MCV RBC AUTO: 100 FL (ref 82–98)
MONOCYTES # BLD AUTO: 0.2 K/UL (ref 0.3–1)
MONOCYTES NFR BLD: 0.8 % (ref 4–15)
NEUTROPHILS # BLD AUTO: 17.7 K/UL (ref 1.8–7.7)
NEUTROPHILS NFR BLD: 63.6 % (ref 38–73)
NRBC BLD-RTO: 0 /100 WBC
PLATELET # BLD AUTO: 85 K/UL (ref 150–350)
PMV BLD AUTO: 11.1 FL (ref 9.2–12.9)
POTASSIUM SERPL-SCNC: 3.2 MMOL/L (ref 3.5–5.1)
PROT SERPL-MCNC: 6.5 G/DL (ref 6–8.4)
RBC # BLD AUTO: 3.23 M/UL (ref 4–5.4)
SODIUM SERPL-SCNC: 141 MMOL/L (ref 136–145)
WBC # BLD AUTO: 27.87 K/UL (ref 3.9–12.7)

## 2020-02-14 PROCEDURE — 80053 COMPREHEN METABOLIC PANEL: CPT

## 2020-02-14 PROCEDURE — 85027 COMPLETE CBC AUTOMATED: CPT

## 2020-02-14 PROCEDURE — 85007 BL SMEAR W/DIFF WBC COUNT: CPT

## 2020-02-14 PROCEDURE — 36415 COLL VENOUS BLD VENIPUNCTURE: CPT

## 2020-02-21 ENCOUNTER — PATIENT MESSAGE (OUTPATIENT)
Dept: HEMATOLOGY/ONCOLOGY | Facility: CLINIC | Age: 70
End: 2020-02-21

## 2020-02-21 ENCOUNTER — TELEPHONE (OUTPATIENT)
Dept: HEMATOLOGY/ONCOLOGY | Facility: CLINIC | Age: 70
End: 2020-02-21

## 2020-02-21 ENCOUNTER — LAB VISIT (OUTPATIENT)
Dept: LAB | Facility: HOSPITAL | Age: 70
End: 2020-02-21
Attending: INTERNAL MEDICINE
Payer: MEDICARE

## 2020-02-21 DIAGNOSIS — C83.35 DIFFUSE LARGE B-CELL LYMPHOMA OF LYMPH NODES OF INGUINAL REGION: ICD-10-CM

## 2020-02-21 LAB
ALBUMIN SERPL BCP-MCNC: 4 G/DL (ref 3.5–5.2)
ALP SERPL-CCNC: 91 U/L (ref 55–135)
ALT SERPL W/O P-5'-P-CCNC: 14 U/L (ref 10–44)
ANION GAP SERPL CALC-SCNC: 9 MMOL/L (ref 8–16)
AST SERPL-CCNC: 14 U/L (ref 10–40)
BASOPHILS # BLD AUTO: 0.05 K/UL (ref 0–0.2)
BASOPHILS NFR BLD: 0.7 % (ref 0–1.9)
BILIRUB SERPL-MCNC: 0.4 MG/DL (ref 0.1–1)
BUN SERPL-MCNC: 10 MG/DL (ref 8–23)
CALCIUM SERPL-MCNC: 9.1 MG/DL (ref 8.7–10.5)
CHLORIDE SERPL-SCNC: 104 MMOL/L (ref 95–110)
CO2 SERPL-SCNC: 27 MMOL/L (ref 23–29)
CREAT SERPL-MCNC: 0.6 MG/DL (ref 0.5–1.4)
DIFFERENTIAL METHOD: ABNORMAL
EOSINOPHIL # BLD AUTO: 0 K/UL (ref 0–0.5)
EOSINOPHIL NFR BLD: 0.3 % (ref 0–8)
ERYTHROCYTE [DISTWIDTH] IN BLOOD BY AUTOMATED COUNT: 13.4 % (ref 11.5–14.5)
EST. GFR  (AFRICAN AMERICAN): >60 ML/MIN/1.73 M^2
EST. GFR  (NON AFRICAN AMERICAN): >60 ML/MIN/1.73 M^2
GLUCOSE SERPL-MCNC: 77 MG/DL (ref 70–110)
HCT VFR BLD AUTO: 32.5 % (ref 37–48.5)
HGB BLD-MCNC: 10.7 G/DL (ref 12–16)
IMM GRANULOCYTES # BLD AUTO: 0.15 K/UL (ref 0–0.04)
IMM GRANULOCYTES NFR BLD AUTO: 2.1 % (ref 0–0.5)
LYMPHOCYTES # BLD AUTO: 1 K/UL (ref 1–4.8)
LYMPHOCYTES NFR BLD: 13.6 % (ref 18–48)
MCH RBC QN AUTO: 32.4 PG (ref 27–31)
MCHC RBC AUTO-ENTMCNC: 32.9 G/DL (ref 32–36)
MCV RBC AUTO: 99 FL (ref 82–98)
MONOCYTES # BLD AUTO: 0.7 K/UL (ref 0.3–1)
MONOCYTES NFR BLD: 9.1 % (ref 4–15)
NEUTROPHILS # BLD AUTO: 5.3 K/UL (ref 1.8–7.7)
NEUTROPHILS NFR BLD: 74.2 % (ref 38–73)
NRBC BLD-RTO: 0 /100 WBC
PLATELET # BLD AUTO: 125 K/UL (ref 150–350)
PMV BLD AUTO: 10.2 FL (ref 9.2–12.9)
POTASSIUM SERPL-SCNC: 4.5 MMOL/L (ref 3.5–5.1)
PROT SERPL-MCNC: 7 G/DL (ref 6–8.4)
RBC # BLD AUTO: 3.3 M/UL (ref 4–5.4)
SODIUM SERPL-SCNC: 140 MMOL/L (ref 136–145)
WBC # BLD AUTO: 7.15 K/UL (ref 3.9–12.7)

## 2020-02-21 PROCEDURE — 85025 COMPLETE CBC W/AUTO DIFF WBC: CPT

## 2020-02-21 PROCEDURE — 36415 COLL VENOUS BLD VENIPUNCTURE: CPT

## 2020-02-21 PROCEDURE — 80053 COMPREHEN METABOLIC PANEL: CPT

## 2020-02-28 ENCOUNTER — LAB VISIT (OUTPATIENT)
Dept: LAB | Facility: HOSPITAL | Age: 70
End: 2020-02-28
Attending: INTERNAL MEDICINE
Payer: MEDICARE

## 2020-02-28 DIAGNOSIS — C83.35 DIFFUSE LARGE B-CELL LYMPHOMA OF LYMPH NODES OF INGUINAL REGION: ICD-10-CM

## 2020-02-28 LAB
ALBUMIN SERPL BCP-MCNC: 3.9 G/DL (ref 3.5–5.2)
ALBUMIN SERPL BCP-MCNC: 3.9 G/DL (ref 3.5–5.2)
ALP SERPL-CCNC: 68 U/L (ref 55–135)
ALP SERPL-CCNC: 68 U/L (ref 55–135)
ALT SERPL W/O P-5'-P-CCNC: 12 U/L (ref 10–44)
ALT SERPL W/O P-5'-P-CCNC: 12 U/L (ref 10–44)
ANION GAP SERPL CALC-SCNC: 6 MMOL/L (ref 8–16)
ANION GAP SERPL CALC-SCNC: 6 MMOL/L (ref 8–16)
AST SERPL-CCNC: 14 U/L (ref 10–40)
AST SERPL-CCNC: 14 U/L (ref 10–40)
BASOPHILS # BLD AUTO: 0.09 K/UL (ref 0–0.2)
BASOPHILS # BLD AUTO: 0.09 K/UL (ref 0–0.2)
BASOPHILS NFR BLD: 1.7 % (ref 0–1.9)
BASOPHILS NFR BLD: 1.7 % (ref 0–1.9)
BILIRUB SERPL-MCNC: 0.6 MG/DL (ref 0.1–1)
BILIRUB SERPL-MCNC: 0.6 MG/DL (ref 0.1–1)
BUN SERPL-MCNC: 12 MG/DL (ref 8–23)
BUN SERPL-MCNC: 12 MG/DL (ref 8–23)
CALCIUM SERPL-MCNC: 9.3 MG/DL (ref 8.7–10.5)
CALCIUM SERPL-MCNC: 9.3 MG/DL (ref 8.7–10.5)
CHLORIDE SERPL-SCNC: 105 MMOL/L (ref 95–110)
CHLORIDE SERPL-SCNC: 105 MMOL/L (ref 95–110)
CO2 SERPL-SCNC: 27 MMOL/L (ref 23–29)
CO2 SERPL-SCNC: 27 MMOL/L (ref 23–29)
CREAT SERPL-MCNC: 0.5 MG/DL (ref 0.5–1.4)
CREAT SERPL-MCNC: 0.5 MG/DL (ref 0.5–1.4)
DIFFERENTIAL METHOD: ABNORMAL
DIFFERENTIAL METHOD: ABNORMAL
EOSINOPHIL # BLD AUTO: 0 K/UL (ref 0–0.5)
EOSINOPHIL # BLD AUTO: 0 K/UL (ref 0–0.5)
EOSINOPHIL NFR BLD: 0.6 % (ref 0–8)
EOSINOPHIL NFR BLD: 0.6 % (ref 0–8)
ERYTHROCYTE [DISTWIDTH] IN BLOOD BY AUTOMATED COUNT: 13.6 % (ref 11.5–14.5)
ERYTHROCYTE [DISTWIDTH] IN BLOOD BY AUTOMATED COUNT: 13.6 % (ref 11.5–14.5)
EST. GFR  (AFRICAN AMERICAN): >60 ML/MIN/1.73 M^2
EST. GFR  (AFRICAN AMERICAN): >60 ML/MIN/1.73 M^2
EST. GFR  (NON AFRICAN AMERICAN): >60 ML/MIN/1.73 M^2
EST. GFR  (NON AFRICAN AMERICAN): >60 ML/MIN/1.73 M^2
GLUCOSE SERPL-MCNC: 95 MG/DL (ref 70–110)
GLUCOSE SERPL-MCNC: 95 MG/DL (ref 70–110)
HCT VFR BLD AUTO: 34.7 % (ref 37–48.5)
HCT VFR BLD AUTO: 34.7 % (ref 37–48.5)
HGB BLD-MCNC: 11.5 G/DL (ref 12–16)
HGB BLD-MCNC: 11.5 G/DL (ref 12–16)
IMM GRANULOCYTES # BLD AUTO: 0.02 K/UL (ref 0–0.04)
IMM GRANULOCYTES # BLD AUTO: 0.02 K/UL (ref 0–0.04)
IMM GRANULOCYTES NFR BLD AUTO: 0.4 % (ref 0–0.5)
IMM GRANULOCYTES NFR BLD AUTO: 0.4 % (ref 0–0.5)
LYMPHOCYTES # BLD AUTO: 0.7 K/UL (ref 1–4.8)
LYMPHOCYTES # BLD AUTO: 0.7 K/UL (ref 1–4.8)
LYMPHOCYTES NFR BLD: 13.4 % (ref 18–48)
LYMPHOCYTES NFR BLD: 13.4 % (ref 18–48)
MCH RBC QN AUTO: 32.2 PG (ref 27–31)
MCH RBC QN AUTO: 32.2 PG (ref 27–31)
MCHC RBC AUTO-ENTMCNC: 33.1 G/DL (ref 32–36)
MCHC RBC AUTO-ENTMCNC: 33.1 G/DL (ref 32–36)
MCV RBC AUTO: 97 FL (ref 82–98)
MCV RBC AUTO: 97 FL (ref 82–98)
MONOCYTES # BLD AUTO: 0.6 K/UL (ref 0.3–1)
MONOCYTES # BLD AUTO: 0.6 K/UL (ref 0.3–1)
MONOCYTES NFR BLD: 10.1 % (ref 4–15)
MONOCYTES NFR BLD: 10.1 % (ref 4–15)
NEUTROPHILS # BLD AUTO: 4 K/UL (ref 1.8–7.7)
NEUTROPHILS # BLD AUTO: 4 K/UL (ref 1.8–7.7)
NEUTROPHILS NFR BLD: 73.8 % (ref 38–73)
NEUTROPHILS NFR BLD: 73.8 % (ref 38–73)
NRBC BLD-RTO: 0 /100 WBC
NRBC BLD-RTO: 0 /100 WBC
PLATELET # BLD AUTO: 297 K/UL (ref 150–350)
PLATELET # BLD AUTO: 297 K/UL (ref 150–350)
PMV BLD AUTO: 8.9 FL (ref 9.2–12.9)
PMV BLD AUTO: 8.9 FL (ref 9.2–12.9)
POTASSIUM SERPL-SCNC: 5 MMOL/L (ref 3.5–5.1)
POTASSIUM SERPL-SCNC: 5 MMOL/L (ref 3.5–5.1)
PROT SERPL-MCNC: 7 G/DL (ref 6–8.4)
PROT SERPL-MCNC: 7 G/DL (ref 6–8.4)
RBC # BLD AUTO: 3.57 M/UL (ref 4–5.4)
RBC # BLD AUTO: 3.57 M/UL (ref 4–5.4)
SODIUM SERPL-SCNC: 138 MMOL/L (ref 136–145)
SODIUM SERPL-SCNC: 138 MMOL/L (ref 136–145)
WBC # BLD AUTO: 5.43 K/UL (ref 3.9–12.7)
WBC # BLD AUTO: 5.43 K/UL (ref 3.9–12.7)

## 2020-02-28 PROCEDURE — 85025 COMPLETE CBC W/AUTO DIFF WBC: CPT

## 2020-02-28 PROCEDURE — 36415 COLL VENOUS BLD VENIPUNCTURE: CPT

## 2020-02-28 PROCEDURE — 80053 COMPREHEN METABOLIC PANEL: CPT

## 2020-03-06 ENCOUNTER — LAB VISIT (OUTPATIENT)
Dept: LAB | Facility: HOSPITAL | Age: 70
End: 2020-03-06
Attending: INTERNAL MEDICINE
Payer: MEDICARE

## 2020-03-06 DIAGNOSIS — C83.35 DIFFUSE LARGE B-CELL LYMPHOMA OF LYMPH NODES OF INGUINAL REGION: ICD-10-CM

## 2020-03-06 LAB
ALBUMIN SERPL BCP-MCNC: 3.9 G/DL (ref 3.5–5.2)
ALP SERPL-CCNC: 69 U/L (ref 55–135)
ALT SERPL W/O P-5'-P-CCNC: 10 U/L (ref 10–44)
ANION GAP SERPL CALC-SCNC: 4 MMOL/L (ref 8–16)
AST SERPL-CCNC: 15 U/L (ref 10–40)
BASOPHILS # BLD AUTO: 0.07 K/UL (ref 0–0.2)
BASOPHILS NFR BLD: 1.4 % (ref 0–1.9)
BILIRUB SERPL-MCNC: 0.8 MG/DL (ref 0.1–1)
BUN SERPL-MCNC: 15 MG/DL (ref 8–23)
CALCIUM SERPL-MCNC: 9.1 MG/DL (ref 8.7–10.5)
CHLORIDE SERPL-SCNC: 104 MMOL/L (ref 95–110)
CO2 SERPL-SCNC: 29 MMOL/L (ref 23–29)
CREAT SERPL-MCNC: 0.6 MG/DL (ref 0.5–1.4)
DIFFERENTIAL METHOD: ABNORMAL
EOSINOPHIL # BLD AUTO: 0 K/UL (ref 0–0.5)
EOSINOPHIL NFR BLD: 0.6 % (ref 0–8)
ERYTHROCYTE [DISTWIDTH] IN BLOOD BY AUTOMATED COUNT: 13.4 % (ref 11.5–14.5)
EST. GFR  (AFRICAN AMERICAN): >60 ML/MIN/1.73 M^2
EST. GFR  (NON AFRICAN AMERICAN): >60 ML/MIN/1.73 M^2
GLUCOSE SERPL-MCNC: 80 MG/DL (ref 70–110)
HCT VFR BLD AUTO: 38.1 % (ref 37–48.5)
HGB BLD-MCNC: 12.5 G/DL (ref 12–16)
IMM GRANULOCYTES # BLD AUTO: 0.01 K/UL (ref 0–0.04)
IMM GRANULOCYTES NFR BLD AUTO: 0.2 % (ref 0–0.5)
LYMPHOCYTES # BLD AUTO: 1 K/UL (ref 1–4.8)
LYMPHOCYTES NFR BLD: 21.2 % (ref 18–48)
MCH RBC QN AUTO: 32.1 PG (ref 27–31)
MCHC RBC AUTO-ENTMCNC: 32.8 G/DL (ref 32–36)
MCV RBC AUTO: 98 FL (ref 82–98)
MONOCYTES # BLD AUTO: 0.9 K/UL (ref 0.3–1)
MONOCYTES NFR BLD: 17.5 % (ref 4–15)
NEUTROPHILS # BLD AUTO: 2.9 K/UL (ref 1.8–7.7)
NEUTROPHILS NFR BLD: 59.1 % (ref 38–73)
NRBC BLD-RTO: 0 /100 WBC
PLATELET # BLD AUTO: 267 K/UL (ref 150–350)
PMV BLD AUTO: 9.4 FL (ref 9.2–12.9)
POTASSIUM SERPL-SCNC: 4.4 MMOL/L (ref 3.5–5.1)
PROT SERPL-MCNC: 7.3 G/DL (ref 6–8.4)
RBC # BLD AUTO: 3.89 M/UL (ref 4–5.4)
SODIUM SERPL-SCNC: 137 MMOL/L (ref 136–145)
WBC # BLD AUTO: 4.91 K/UL (ref 3.9–12.7)

## 2020-03-06 PROCEDURE — 80053 COMPREHEN METABOLIC PANEL: CPT

## 2020-03-06 PROCEDURE — 36415 COLL VENOUS BLD VENIPUNCTURE: CPT

## 2020-03-06 PROCEDURE — 85025 COMPLETE CBC W/AUTO DIFF WBC: CPT

## 2020-03-10 DIAGNOSIS — N83.201 BILATERAL OVARIAN CYSTS: Primary | ICD-10-CM

## 2020-03-10 DIAGNOSIS — N83.202 BILATERAL OVARIAN CYSTS: Primary | ICD-10-CM

## 2020-03-11 DIAGNOSIS — Z12.31 ENCOUNTER FOR SCREENING MAMMOGRAM FOR MALIGNANT NEOPLASM OF BREAST: Primary | ICD-10-CM

## 2020-03-11 DIAGNOSIS — N95.9 MENOPAUSAL PROBLEM: Primary | ICD-10-CM

## 2020-03-12 ENCOUNTER — OFFICE VISIT (OUTPATIENT)
Dept: HEMATOLOGY/ONCOLOGY | Facility: CLINIC | Age: 70
End: 2020-03-12
Payer: MEDICARE

## 2020-03-12 ENCOUNTER — PATIENT MESSAGE (OUTPATIENT)
Dept: HEMATOLOGY/ONCOLOGY | Facility: CLINIC | Age: 70
End: 2020-03-12

## 2020-03-12 ENCOUNTER — TELEPHONE (OUTPATIENT)
Dept: HEMATOLOGY/ONCOLOGY | Facility: CLINIC | Age: 70
End: 2020-03-12

## 2020-03-12 VITALS
RESPIRATION RATE: 16 BRPM | BODY MASS INDEX: 25.42 KG/M2 | DIASTOLIC BLOOD PRESSURE: 90 MMHG | WEIGHT: 157.5 LBS | TEMPERATURE: 98 F | SYSTOLIC BLOOD PRESSURE: 144 MMHG

## 2020-03-12 DIAGNOSIS — N83.202 CYST OF LEFT OVARY: ICD-10-CM

## 2020-03-12 DIAGNOSIS — C83.35 DIFFUSE LARGE B-CELL LYMPHOMA OF LYMPH NODES OF INGUINAL REGION: Primary | ICD-10-CM

## 2020-03-12 DIAGNOSIS — D47.2 MGUS (MONOCLONAL GAMMOPATHY OF UNKNOWN SIGNIFICANCE): ICD-10-CM

## 2020-03-12 DIAGNOSIS — C83.35 DIFFUSE LARGE B-CELL LYMPHOMA OF LYMPH NODES OF INGUINAL REGION: ICD-10-CM

## 2020-03-12 PROCEDURE — 99214 OFFICE O/P EST MOD 30 MIN: CPT | Mod: S$GLB,,, | Performed by: INTERNAL MEDICINE

## 2020-03-12 PROCEDURE — 99214 PR OFFICE/OUTPT VISIT, EST, LEVL IV, 30-39 MIN: ICD-10-PCS | Mod: S$GLB,,, | Performed by: INTERNAL MEDICINE

## 2020-03-12 RX ORDER — HEPARIN 100 UNIT/ML
500 SYRINGE INTRAVENOUS
Status: CANCELLED | OUTPATIENT
Start: 2020-03-12

## 2020-03-12 RX ORDER — SODIUM CHLORIDE 0.9 % (FLUSH) 0.9 %
10 SYRINGE (ML) INJECTION
Status: CANCELLED | OUTPATIENT
Start: 2020-03-12

## 2020-03-12 NOTE — ASSESSMENT & PLAN NOTE
Patient is doing well and her labs are stable at this time.  Will check these on a six month basis.

## 2020-03-12 NOTE — ASSESSMENT & PLAN NOTE
Patient is doing well and is in remission as per PET scan done at [facility] 3/9/2020.  She is feeling well and her labs are good.  Can hold weekly labs and will see her again in three months.  She will see [facility] in June as well and have CT imaging with them.  Will have her back after this visit.

## 2020-03-12 NOTE — PROGRESS NOTES
PROGRESS NOTE    Subjective:       Patient ID: Aurelia Marlow is a 69 y.o. female.    Dx [facility]CL-[facility]erminal center type- 8/21/2019  Inguinal LN biopsy    PET with bone marrow uptake throughout appendicular and axial skeleton    Bone marrow biopsy positive for B-cell lymphoma    Stage ARELIS    Chief Complaint:  No chief complaint on file.  lymphoma follow up    History of Present Illness:   Aurelia Marlow is a 69 y.o. female who presents for follow up of lymphoma    Patient is doing well on chemotherapy and is s/p five cycles.     She has no new complaints and continues to tolerate the therapy well.      R-CHOP:  Cycle 1: 10/28/2019  Cycle 2: 11/18/2019  Cycle 3: 12/9/2019  Cycle 4: 12/30/2019  Cycle 5: 01/20/2020  Cycle 6: 02/10/2020    PET scan negative 3/9/2020      Echo 9/30/2019 EF 60%  CMP, CBC 10/6/2019 unremarkable    Family and Social history reviewed and is unchanged from 9/27/2019      ROS:  Review of Systems   Constitutional: Negative for appetite change, fever and unexpected weight change.   Eyes: Negative for visual disturbance.   Respiratory: Negative for cough and shortness of breath.    Cardiovascular: Negative for chest pain and leg swelling.   Gastrointestinal: Negative for abdominal pain, blood in stool and diarrhea.   Genitourinary: Negative for frequency and hematuria.   Musculoskeletal: Negative for back pain.   Skin: Negative for rash.   Neurological: Negative for headaches.   Hematological: Negative for adenopathy.   Psychiatric/Behavioral: The patient is not nervous/anxious.           Current Outpatient Medications:     cholecalciferol, vitamin D3, (VITAMIN D3) 2,000 unit Tab, , Disp: , Rfl:     diphenhydrAMINE (BENADRYL) 25 mg capsule, Take 1 capsule (25 mg total) by mouth every 6 (six) hours as needed for Itching or Allergies., Disp: 20 capsule, Rfl: 0    duke's soln (benadryl 30 mL, mylanta 30 mL, lidocaine 30 mL, nystatin 30 mL)  120mL, Take 30 mLs by mouth 4 (four) times daily., Disp: , Rfl:     famotidine (PEPCID) 20 MG tablet, Take 1 tablet (20 mg total) by mouth 2 (two) times daily., Disp: 20 tablet, Rfl: 0    fluticasone propionate (FLONASE) 50 mcg/actuation nasal spray, , Disp: , Rfl:     hydroxychloroquine (PLAQUENIL) 200 mg tablet, Take 1 tablet (200 mg total) by mouth 2 (two) times daily., Disp: 180 tablet, Rfl: 1    levothyroxine (SYNTHROID) 100 MCG tablet, Take 100 mcg by mouth once daily., Disp: , Rfl:     lidocaine-prilocaine (EMLA) cream, Apply topically as needed., Disp: 25 g, Rfl: 2    naproxen (EC NAPROSYN) 500 MG EC tablet, TAKE ONE TABLET BY MOUTH TWICE DAILY AFTER MEALS AS NEEDED, Disp: 60 tablet, Rfl: 1    omeprazole 20 mg TbEC, Take 20 mg by mouth every evening. , Disp: , Rfl:     ondansetron (ZOFRAN) 8 MG tablet, Take 8 mg by mouth every 8 (eight) hours as needed., Disp: , Rfl: 6    predniSONE (DELTASONE) 20 MG tablet, TAKE 5 TABLETS DAILY DAY 1-5 OF CHEMO CYCLE, Disp: , Rfl: 6    promethazine (PHENERGAN) 25 MG tablet, Take 25 mg by mouth every 6 (six) hours as needed., Disp: , Rfl: 6    sulfamethoxazole-trimethoprim 800-160mg (BACTRIM DS) 800-160 mg Tab, TAKE 1 TABLET BY MOUTH 3 (THREE) TIMES A WEEK MONDAY, WEDNESDAY AND FRIDAY., Disp: , Rfl: 2    traMADol (ULTRAM) 50 mg tablet, Take 1 tablet (50 mg total) by mouth every 12 (twelve) hours as needed for Pain. Dispense generic, Disp: 60 tablet, Rfl: 1    valACYclovir (VALTREX) 500 MG tablet, Take 500 mg by mouth once daily., Disp: , Rfl: 3    Current Facility-Administered Medications:     lidocaine (PF) 10 mg/ml (1%) injection 10 mg, 1 mL, Intradermal, Once, Kermit Martinez MD        Objective:       Physical Examination:     There were no vitals taken for this visit.    Physical Exam   Constitutional: She appears well-developed and well-nourished.   HENT:   Head: Normocephalic and atraumatic.   Right Ear: External ear normal.   Left Ear: External ear  normal.   Mouth/Throat: Oropharynx is clear and moist.   Eyes: Pupils are equal, round, and reactive to light. Conjunctivae are normal.   Neck: No tracheal deviation present. No thyromegaly present.   Cardiovascular: Normal rate, regular rhythm and normal heart sounds.   Pulmonary/Chest: Effort normal and breath sounds normal.   Abdominal: Soft. Bowel sounds are normal. She exhibits no distension and no mass. There is no tenderness.   Musculoskeletal: She exhibits no edema.   Neurological:   Neuro intact througout   Skin: No rash noted.   Psychiatric: She has a normal mood and affect. Her behavior is normal. Judgment and thought content normal.        Labs:   Recent Results (from the past 336 hour(s))   CBC auto differential    Collection Time: 03/06/20 10:40 AM   Result Value Ref Range    WBC 4.91 3.90 - 12.70 K/uL    Hemoglobin 12.5 12.0 - 16.0 g/dL    Hematocrit 38.1 37.0 - 48.5 %    Platelets 267 150 - 350 K/uL   CBC auto differential    Collection Time: 02/28/20 11:02 AM   Result Value Ref Range    WBC 5.43 3.90 - 12.70 K/uL    Hemoglobin 11.5 (L) 12.0 - 16.0 g/dL    Hematocrit 34.7 (L) 37.0 - 48.5 %    Platelets 297 150 - 350 K/uL   CBC auto differential    Collection Time: 02/28/20 11:02 AM   Result Value Ref Range    WBC 5.43 3.90 - 12.70 K/uL    Hemoglobin 11.5 (L) 12.0 - 16.0 g/dL    Hematocrit 34.7 (L) 37.0 - 48.5 %    Platelets 297 150 - 350 K/uL     CMP  Sodium   Date Value Ref Range Status   03/06/2020 137 136 - 145 mmol/L Final   06/11/2019 139 134 - 144 mmol/L      Potassium   Date Value Ref Range Status   03/06/2020 4.4 3.5 - 5.1 mmol/L Final     Chloride   Date Value Ref Range Status   03/06/2020 104 95 - 110 mmol/L Final   06/11/2019 104 98 - 110 mmol/L      CO2   Date Value Ref Range Status   03/06/2020 29 23 - 29 mmol/L Final     Glucose   Date Value Ref Range Status   03/06/2020 80 70 - 110 mg/dL Final   06/11/2019 86 70 - 99 mg/dL      BUN, Bld   Date Value Ref Range Status   03/06/2020 15 8 -  23 mg/dL Final     Creatinine   Date Value Ref Range Status   03/06/2020 0.6 0.5 - 1.4 mg/dL Final   06/11/2019 0.72 0.60 - 1.40 mg/dL      Calcium   Date Value Ref Range Status   03/06/2020 9.1 8.7 - 10.5 mg/dL Final     Total Protein   Date Value Ref Range Status   03/06/2020 7.3 6.0 - 8.4 g/dL Final     Albumin   Date Value Ref Range Status   03/06/2020 3.9 3.5 - 5.2 g/dL Final   06/11/2019 3.8 3.1 - 4.7 g/dL      Total Bilirubin   Date Value Ref Range Status   03/06/2020 0.8 0.1 - 1.0 mg/dL Final     Comment:     For infants and newborns, interpretation of results should be based  on gestational age, weight and in agreement with clinical  observations.  Premature Infant recommended reference ranges:  Up to 24 hours.............<8.0 mg/dL  Up to 48 hours............<12.0 mg/dL  3-5 days..................<15.0 mg/dL  6-29 days.................<15.0 mg/dL       Alkaline Phosphatase   Date Value Ref Range Status   03/06/2020 69 55 - 135 U/L Final     AST   Date Value Ref Range Status   03/06/2020 15 10 - 40 U/L Final     ALT   Date Value Ref Range Status   03/06/2020 10 10 - 44 U/L Final     Anion Gap   Date Value Ref Range Status   03/06/2020 4 (L) 8 - 16 mmol/L Final     eGFR if    Date Value Ref Range Status   03/06/2020 >60.0 >60 mL/min/1.73 m^2 Final     eGFR if non    Date Value Ref Range Status   03/06/2020 >60.0 >60 mL/min/1.73 m^2 Final     Comment:     Calculation used to obtain the estimated glomerular filtration  rate (eGFR) is the CKD-EPI equation.        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  She is feeling well and her labs are good.  Can hold weekly labs and will see her again in three months.  She will see MD  bandar in June as well and have CT imaging with them.  Will have her back after this visit.           Cyst of left ovary     Will refer to Dr. Wolfe for possible surgery of this cyst.  Last MRI showed it as adnexal cyst in October of 2019.           MGUS (monoclonal gammopathy of unknown significance)     Patient is doing well and her labs are stable at this time.  Will check these on a six month basis.                 Discussion:     Follow up in about 3 months (around 6/12/2020).      Electronically signed by Pb Fry

## 2020-03-12 NOTE — ASSESSMENT & PLAN NOTE
Will refer to Dr. Wolfe for possible surgery of this cyst.  Last MRI showed it as adnexal cyst in October of 2019.

## 2020-03-16 ENCOUNTER — INFUSION (OUTPATIENT)
Dept: INFUSION THERAPY | Facility: HOSPITAL | Age: 70
End: 2020-03-16
Attending: INTERNAL MEDICINE
Payer: MEDICARE

## 2020-03-16 VITALS
OXYGEN SATURATION: 98 % | HEART RATE: 90 BPM | SYSTOLIC BLOOD PRESSURE: 142 MMHG | RESPIRATION RATE: 16 BRPM | TEMPERATURE: 98 F | BODY MASS INDEX: 25.29 KG/M2 | WEIGHT: 156.69 LBS | DIASTOLIC BLOOD PRESSURE: 83 MMHG

## 2020-03-16 DIAGNOSIS — C83.35 DIFFUSE LARGE B-CELL LYMPHOMA OF LYMPH NODES OF INGUINAL REGION: ICD-10-CM

## 2020-03-16 DIAGNOSIS — D47.2 MGUS (MONOCLONAL GAMMOPATHY OF UNKNOWN SIGNIFICANCE): Primary | ICD-10-CM

## 2020-03-16 PROCEDURE — 25000003 PHARM REV CODE 250: Performed by: INTERNAL MEDICINE

## 2020-03-16 PROCEDURE — 63600175 PHARM REV CODE 636 W HCPCS: Performed by: INTERNAL MEDICINE

## 2020-03-16 PROCEDURE — 96523 IRRIG DRUG DELIVERY DEVICE: CPT

## 2020-03-16 PROCEDURE — A4216 STERILE WATER/SALINE, 10 ML: HCPCS | Performed by: INTERNAL MEDICINE

## 2020-03-16 RX ORDER — HEPARIN 100 UNIT/ML
500 SYRINGE INTRAVENOUS
Status: CANCELLED | OUTPATIENT
Start: 2020-03-16

## 2020-03-16 RX ORDER — HEPARIN 100 UNIT/ML
500 SYRINGE INTRAVENOUS
Status: COMPLETED | OUTPATIENT
Start: 2020-03-16 | End: 2020-03-16

## 2020-03-16 RX ORDER — SODIUM CHLORIDE 0.9 % (FLUSH) 0.9 %
10 SYRINGE (ML) INJECTION
Status: CANCELLED | OUTPATIENT
Start: 2020-03-16

## 2020-03-16 RX ORDER — SODIUM CHLORIDE 0.9 % (FLUSH) 0.9 %
10 SYRINGE (ML) INJECTION
Status: COMPLETED | OUTPATIENT
Start: 2020-03-16 | End: 2020-03-16

## 2020-03-16 RX ADMIN — HEPARIN 500 UNITS: 100 SYRINGE at 04:03

## 2020-03-16 RX ADMIN — SODIUM CHLORIDE, PRESERVATIVE FREE 10 ML: 5 INJECTION INTRAVENOUS at 04:03

## 2020-03-16 NOTE — PLAN OF CARE
Problem: Fatigue  Goal: Improved Activity Tolerance  Outcome: Ongoing, Progressing  Intervention: Promote Energy Conservation  Flowsheets (Taken 3/16/2020 1616)  Fatigue Management: frequent rest breaks encouraged  Sleep/Rest Enhancement: regular sleep/rest pattern promoted  Activity Management: activity encouraged

## 2020-03-26 ENCOUNTER — TELEPHONE (OUTPATIENT)
Dept: HEMATOLOGY/ONCOLOGY | Facility: CLINIC | Age: 70
End: 2020-03-26

## 2020-03-26 NOTE — TELEPHONE ENCOUNTER
Called Ms. Moses this morning to check in with her. She does have an upcoming appt with Dr. Diana Wolfe on April 22,2020. She said she will call me after to let me know what was decided. Her follow up here is in June 2020.

## 2020-03-30 ENCOUNTER — TELEPHONE (OUTPATIENT)
Dept: HEMATOLOGY/ONCOLOGY | Facility: CLINIC | Age: 70
End: 2020-03-30

## 2020-03-30 ENCOUNTER — PATIENT MESSAGE (OUTPATIENT)
Dept: HEMATOLOGY/ONCOLOGY | Facility: CLINIC | Age: 70
End: 2020-03-30

## 2020-03-30 DIAGNOSIS — C83.35 DIFFUSE LARGE B-CELL LYMPHOMA OF LYMPH NODES OF INGUINAL REGION: Primary | ICD-10-CM

## 2020-03-30 DIAGNOSIS — D47.2 MGUS (MONOCLONAL GAMMOPATHY OF UNKNOWN SIGNIFICANCE): ICD-10-CM

## 2020-03-31 ENCOUNTER — LAB VISIT (OUTPATIENT)
Dept: LAB | Facility: HOSPITAL | Age: 70
End: 2020-03-31
Attending: INTERNAL MEDICINE
Payer: MEDICARE

## 2020-03-31 ENCOUNTER — PATIENT MESSAGE (OUTPATIENT)
Dept: HEMATOLOGY/ONCOLOGY | Facility: CLINIC | Age: 70
End: 2020-03-31

## 2020-03-31 DIAGNOSIS — D47.2 MGUS (MONOCLONAL GAMMOPATHY OF UNKNOWN SIGNIFICANCE): ICD-10-CM

## 2020-03-31 DIAGNOSIS — C83.35 DIFFUSE LARGE B-CELL LYMPHOMA OF LYMPH NODES OF INGUINAL REGION: ICD-10-CM

## 2020-03-31 LAB
ALBUMIN SERPL BCP-MCNC: 3.2 G/DL (ref 3.5–5.2)
ALP SERPL-CCNC: 54 U/L (ref 55–135)
ALT SERPL W/O P-5'-P-CCNC: 19 U/L (ref 10–44)
ANION GAP SERPL CALC-SCNC: 11 MMOL/L (ref 8–16)
AST SERPL-CCNC: 25 U/L (ref 10–40)
BASOPHILS # BLD AUTO: 0.01 K/UL (ref 0–0.2)
BASOPHILS NFR BLD: 0.2 % (ref 0–1.9)
BILIRUB SERPL-MCNC: 1 MG/DL (ref 0.1–1)
BUN SERPL-MCNC: 10 MG/DL (ref 8–23)
CALCIUM SERPL-MCNC: 8.6 MG/DL (ref 8.7–10.5)
CHLORIDE SERPL-SCNC: 94 MMOL/L (ref 95–110)
CO2 SERPL-SCNC: 27 MMOL/L (ref 23–29)
CREAT SERPL-MCNC: 0.5 MG/DL (ref 0.5–1.4)
DIFFERENTIAL METHOD: ABNORMAL
EOSINOPHIL # BLD AUTO: 0 K/UL (ref 0–0.5)
EOSINOPHIL NFR BLD: 0.9 % (ref 0–8)
ERYTHROCYTE [DISTWIDTH] IN BLOOD BY AUTOMATED COUNT: 12 % (ref 11.5–14.5)
EST. GFR  (AFRICAN AMERICAN): >60 ML/MIN/1.73 M^2
EST. GFR  (NON AFRICAN AMERICAN): >60 ML/MIN/1.73 M^2
GLUCOSE SERPL-MCNC: 91 MG/DL (ref 70–110)
HCT VFR BLD AUTO: 36.7 % (ref 37–48.5)
HGB BLD-MCNC: 12.2 G/DL (ref 12–16)
IMM GRANULOCYTES # BLD AUTO: 0.02 K/UL (ref 0–0.04)
IMM GRANULOCYTES NFR BLD AUTO: 0.5 % (ref 0–0.5)
LYMPHOCYTES # BLD AUTO: 0.5 K/UL (ref 1–4.8)
LYMPHOCYTES NFR BLD: 10.9 % (ref 18–48)
MCH RBC QN AUTO: 30.5 PG (ref 27–31)
MCHC RBC AUTO-ENTMCNC: 33.2 G/DL (ref 32–36)
MCV RBC AUTO: 92 FL (ref 82–98)
MONOCYTES # BLD AUTO: 0.4 K/UL (ref 0.3–1)
MONOCYTES NFR BLD: 8.2 % (ref 4–15)
NEUTROPHILS # BLD AUTO: 3.5 K/UL (ref 1.8–7.7)
NEUTROPHILS NFR BLD: 79.3 % (ref 38–73)
NRBC BLD-RTO: 0 /100 WBC
PLATELET # BLD AUTO: 243 K/UL (ref 150–350)
PMV BLD AUTO: 10.6 FL (ref 9.2–12.9)
POTASSIUM SERPL-SCNC: 3.6 MMOL/L (ref 3.5–5.1)
PROT SERPL-MCNC: 7.2 G/DL (ref 6–8.4)
RBC # BLD AUTO: 4 M/UL (ref 4–5.4)
SODIUM SERPL-SCNC: 132 MMOL/L (ref 136–145)
WBC # BLD AUTO: 4.39 K/UL (ref 3.9–12.7)

## 2020-03-31 PROCEDURE — 85025 COMPLETE CBC W/AUTO DIFF WBC: CPT

## 2020-03-31 PROCEDURE — 80053 COMPREHEN METABOLIC PANEL: CPT

## 2020-03-31 PROCEDURE — 36415 COLL VENOUS BLD VENIPUNCTURE: CPT

## 2020-04-01 ENCOUNTER — TELEPHONE (OUTPATIENT)
Dept: HEMATOLOGY/ONCOLOGY | Facility: CLINIC | Age: 70
End: 2020-04-01

## 2020-04-01 NOTE — TELEPHONE ENCOUNTER
----- Message from Pb Nieto MD sent at 4/1/2020  8:48 AM CDT -----  Please call the patient regarding her abnormal result. shes got a uti.  Put her on levaquin 250mg daily for 7 days.      Sent to [facility]. Aurelia was called and notified. She voiced understanding

## 2020-04-01 NOTE — TELEPHONE ENCOUNTER
----- Message from Pb Nieto MD sent at 4/1/2020  8:48 AM CDT -----  Please call the patient regarding her abnormal result. shes got a uti.  Put her on levaquin 250mg daily for 7 days.

## 2020-04-09 ENCOUNTER — PATIENT MESSAGE (OUTPATIENT)
Dept: HEMATOLOGY/ONCOLOGY | Facility: CLINIC | Age: 70
End: 2020-04-09

## 2020-04-09 ENCOUNTER — DOCUMENTATION ONLY (OUTPATIENT)
Dept: REHABILITATION | Facility: HOSPITAL | Age: 70
End: 2020-04-09

## 2020-04-09 PROBLEM — I89.0 LYMPHEDEMA OF LEFT LOWER EXTREMITY: Status: RESOLVED | Noted: 2019-11-14 | Resolved: 2020-04-09

## 2020-04-09 NOTE — PROGRESS NOTES
Occupational Therapy Discharge Note      Name: Aurelia Marlow  Clinic Number: [id number]     Therapy Diagnosis:        Encounter Diagnosis   Name Primary?    Lymphedema of left lower extremity Yes         Physician: Pb Nieto MD     Physician Orders: eval and treat  Medical Diagnosis: Lymphedema s/p lymph node removed from the left groin  Evaluation Date: 11/14/2019  Insurance Authorization period Expiration:10/07/2019-10/06/2020  Plan of Care Certification Period: 11/14/2019-02/20/2020     Visit # / Visits Authortized: 3/48   Time In:9:45  Time Out: 10:15  Total Billable Time: 30 minutes     Precautions: Standard     Subjective      Pt reports: she is so pleased with the reduction of swelling I the left lower leg.  She has some foot swelling but the swelling in the thigh and lower legs has resolved.  The scars on the upper thigh and on the dorsum of the foot are more mobile.    Response to previous treatment:to had a significant reduction of swelling in the left leg.  Functional change: she is able to wear her old shoes again.     Pain: 0/10  Location: left LE       Objective      Lower extremity Girth Measurements (in centimeters)  LANDMARK LLE   SBP + 30 55.5   SBP + 20  45.5   SBP +10 38   Knee- SPB 33.7   40 cm  30.6   30 cm 31.2   20 cm  22.3   10 cm  20.9   Malleolus 23.8   Ankle- heel to dorsum of foot 29.2   Arch 20.5   Total Girth Measurement 351.2   Total Girth Difference - 0.9   Total Girth Reduction  10.6   Bilateral Limb Involvement  Moderate- 10-cm-15 cm TGD or < 5 cm GGD  Moderate/Severe- 15.1-20 cm TGD or 5.1 cm -10 cm GGD  Severe => 20 cm TGD or >10 cm GGD     TGD- Total Girth Difference  GGD - Greatest Girth Difference SBP- Superior Border of Patella        Home Exercises and Education Provided      Education provided:   - Bandaging/Compression stocking wearing schedule,  Beginning of self massage,  Risk reduction and kinesio tape wearing schedule and to remove  if she has ill effects.  - Progress towards goals      Aurelia demonstrated good  understanding of the education provided.         Assessment      Pt would continue to benefit from skilled OT.  Aurelia is progressing well towards her goals and there are no updates to goals at this time. Pt prognosis is Good. Pt will continue to benefit from skilled outpatient occupational therapy to address the deficits listed in the problem list on initial evalution provide pt/family education and to maximize pt's level of independence in the home and community environment. She responded well to the compression bandages.  She had a significant reduction of swelling since her initial evaluation.  The left leg has a more normal shape.  Kinesio tape is being utilized to increase lymph uptake and direct lymph flow. Patient will be Discharged from Lymphedema with goals met.         Anticipated barriers to occupational therapy: currently on chemotherapy  Pt's spiritual, cultural and educational needs considered and pt agreeable to plan of care and goals.     Goals:     Short Term Goals: (3 weeks)  Pt to be I and compliant with HEP.  Met                  Decrease girth in LLE by 6 cm for improved functional use of LLE.  Patient will demonstrate 100% knowledge of lymphedema precautions and signs of infection.   Patient will perform self-bandaging techniques.  Met                  Patient will perform self lymph drainage techniques.  Met                  Patient will tolerate daily activities with multilayered bandaging or compression stockings.     Long Term Goals: (6 weeks)  Met                  Decrease girth in LLE by 8 cm for improved functional use of LLE.  Met                  Patient will show reduction in girth to mild or less with improved contour of limb.  Met                  Patient to talib/doff compression garment with daily compliance.     Plan   Patient will be Discharged from Lymphedema with goals met.          Iman  Nevaeh, OT

## 2020-04-22 DIAGNOSIS — R19.09 OTHER INTRA-ABDOMINAL AND PELVIC SWELLING, MASS AND LUMP: Primary | ICD-10-CM

## 2020-04-23 ENCOUNTER — HOSPITAL ENCOUNTER (OUTPATIENT)
Dept: RADIOLOGY | Facility: HOSPITAL | Age: 70
Discharge: HOME | End: 2020-04-23
Attending: OBSTETRICS & GYNECOLOGY
Payer: MEDICARE

## 2020-04-23 DIAGNOSIS — R19.09 OTHER INTRA-ABDOMINAL AND PELVIC SWELLING, MASS AND LUMP: ICD-10-CM

## 2020-04-23 PROCEDURE — 76830 TRANSVAGINAL US NON-OB: CPT | Mod: TC,PO

## 2020-04-27 ENCOUNTER — INFUSION (OUTPATIENT)
Dept: INFUSION THERAPY | Facility: HOSPITAL | Age: 70
End: 2020-04-27
Attending: INTERNAL MEDICINE
Payer: MEDICARE

## 2020-04-27 VITALS
WEIGHT: 156.94 LBS | SYSTOLIC BLOOD PRESSURE: 136 MMHG | TEMPERATURE: 99 F | BODY MASS INDEX: 25.34 KG/M2 | RESPIRATION RATE: 18 BRPM | DIASTOLIC BLOOD PRESSURE: 83 MMHG | HEART RATE: 78 BPM

## 2020-04-27 DIAGNOSIS — C83.35 DIFFUSE LARGE B-CELL LYMPHOMA OF LYMPH NODES OF INGUINAL REGION: ICD-10-CM

## 2020-04-27 DIAGNOSIS — D47.2 MGUS (MONOCLONAL GAMMOPATHY OF UNKNOWN SIGNIFICANCE): Primary | ICD-10-CM

## 2020-04-27 PROCEDURE — 96523 IRRIG DRUG DELIVERY DEVICE: CPT

## 2020-04-27 PROCEDURE — A4216 STERILE WATER/SALINE, 10 ML: HCPCS | Performed by: INTERNAL MEDICINE

## 2020-04-27 PROCEDURE — 25000003 PHARM REV CODE 250: Performed by: INTERNAL MEDICINE

## 2020-04-27 PROCEDURE — 63600175 PHARM REV CODE 636 W HCPCS: Performed by: INTERNAL MEDICINE

## 2020-04-27 RX ORDER — SODIUM CHLORIDE 0.9 % (FLUSH) 0.9 %
10 SYRINGE (ML) INJECTION
Status: COMPLETED | OUTPATIENT
Start: 2020-04-27 | End: 2020-04-27

## 2020-04-27 RX ORDER — HEPARIN 100 UNIT/ML
500 SYRINGE INTRAVENOUS
Status: CANCELLED | OUTPATIENT
Start: 2020-04-27

## 2020-04-27 RX ORDER — HEPARIN 100 UNIT/ML
500 SYRINGE INTRAVENOUS
Status: COMPLETED | OUTPATIENT
Start: 2020-04-27 | End: 2020-04-27

## 2020-04-27 RX ORDER — SODIUM CHLORIDE 0.9 % (FLUSH) 0.9 %
10 SYRINGE (ML) INJECTION
Status: CANCELLED | OUTPATIENT
Start: 2020-04-27

## 2020-04-27 RX ADMIN — HEPARIN 500 UNITS: 100 SYRINGE at 11:04

## 2020-04-27 RX ADMIN — SODIUM CHLORIDE 10 ML: 9 INJECTION INTRAMUSCULAR; INTRAVENOUS; SUBCUTANEOUS at 11:04

## 2020-05-08 ENCOUNTER — HOSPITAL ENCOUNTER (OUTPATIENT)
Dept: RADIOLOGY | Facility: HOSPITAL | Age: 70
Discharge: HOME | End: 2020-05-08
Attending: SPECIALIST
Payer: MEDICARE

## 2020-05-08 DIAGNOSIS — Z12.31 ENCOUNTER FOR SCREENING MAMMOGRAM FOR MALIGNANT NEOPLASM OF BREAST: ICD-10-CM

## 2020-05-08 PROCEDURE — 77067 SCR MAMMO BI INCL CAD: CPT | Mod: TC,PO

## 2020-05-14 ENCOUNTER — PATIENT MESSAGE (OUTPATIENT)
Dept: HEMATOLOGY/ONCOLOGY | Facility: CLINIC | Age: 70
End: 2020-05-14

## 2020-05-14 DIAGNOSIS — C83.35 DIFFUSE LARGE B-CELL LYMPHOMA OF LYMPH NODES OF INGUINAL REGION: Primary | ICD-10-CM

## 2020-06-03 DIAGNOSIS — Z03.818 ENCOUNTER FOR OBSERVATION FOR SUSPECTED EXPOSURE TO OTHER BIOLOGICAL AGENTS RULED OUT: Primary | ICD-10-CM

## 2020-06-04 ENCOUNTER — HOSPITAL ENCOUNTER (OUTPATIENT)
Dept: RADIOLOGY | Facility: HOSPITAL | Age: 70
Discharge: HOME | End: 2020-06-04
Attending: INTERNAL MEDICINE
Payer: MEDICARE

## 2020-06-04 VITALS — WEIGHT: 153 LBS | BODY MASS INDEX: 24.59 KG/M2 | HEIGHT: 66 IN

## 2020-06-04 DIAGNOSIS — C83.35 DIFFUSE LARGE B-CELL LYMPHOMA OF LYMPH NODES OF INGUINAL REGION: ICD-10-CM

## 2020-06-04 LAB — GLUCOSE SERPL-MCNC: 81 MG/DL (ref 70–110)

## 2020-06-04 PROCEDURE — A9552 F18 FDG: HCPCS | Mod: PO

## 2020-06-04 PROCEDURE — 78815 PET IMAGE W/CT SKULL-THIGH: CPT | Mod: TC,PO,PS

## 2020-06-08 ENCOUNTER — INFUSION (OUTPATIENT)
Dept: INFUSION THERAPY | Facility: HOSPITAL | Age: 70
End: 2020-06-08
Attending: INTERNAL MEDICINE
Payer: MEDICARE

## 2020-06-08 VITALS
SYSTOLIC BLOOD PRESSURE: 143 MMHG | TEMPERATURE: 97 F | WEIGHT: 158.88 LBS | DIASTOLIC BLOOD PRESSURE: 89 MMHG | RESPIRATION RATE: 16 BRPM | OXYGEN SATURATION: 97 % | BODY MASS INDEX: 25.65 KG/M2 | HEART RATE: 62 BPM

## 2020-06-08 DIAGNOSIS — C83.35 DIFFUSE LARGE B-CELL LYMPHOMA OF LYMPH NODES OF INGUINAL REGION: ICD-10-CM

## 2020-06-08 DIAGNOSIS — D47.2 MGUS (MONOCLONAL GAMMOPATHY OF UNKNOWN SIGNIFICANCE): Primary | ICD-10-CM

## 2020-06-08 PROCEDURE — 63600175 PHARM REV CODE 636 W HCPCS: Performed by: INTERNAL MEDICINE

## 2020-06-08 PROCEDURE — 96523 IRRIG DRUG DELIVERY DEVICE: CPT

## 2020-06-08 PROCEDURE — A4216 STERILE WATER/SALINE, 10 ML: HCPCS | Performed by: INTERNAL MEDICINE

## 2020-06-08 PROCEDURE — 25000003 PHARM REV CODE 250: Performed by: INTERNAL MEDICINE

## 2020-06-08 RX ORDER — SODIUM CHLORIDE 0.9 % (FLUSH) 0.9 %
10 SYRINGE (ML) INJECTION
Status: COMPLETED | OUTPATIENT
Start: 2020-06-08 | End: 2020-06-08

## 2020-06-08 RX ORDER — SODIUM CHLORIDE 0.9 % (FLUSH) 0.9 %
10 SYRINGE (ML) INJECTION
Status: CANCELLED | OUTPATIENT
Start: 2020-06-08

## 2020-06-08 RX ORDER — HEPARIN 100 UNIT/ML
500 SYRINGE INTRAVENOUS
Status: CANCELLED | OUTPATIENT
Start: 2020-06-08

## 2020-06-08 RX ORDER — HEPARIN 100 UNIT/ML
500 SYRINGE INTRAVENOUS
Status: COMPLETED | OUTPATIENT
Start: 2020-06-08 | End: 2020-06-08

## 2020-06-08 RX ADMIN — HEPARIN 500 UNITS: 100 SYRINGE at 11:06

## 2020-06-08 RX ADMIN — SODIUM CHLORIDE, PRESERVATIVE FREE 10 ML: 5 INJECTION INTRAVENOUS at 11:06

## 2020-06-16 ENCOUNTER — OFFICE VISIT (OUTPATIENT)
Dept: HEMATOLOGY/ONCOLOGY | Facility: CLINIC | Age: 70
End: 2020-06-16
Payer: MEDICARE

## 2020-06-16 VITALS
WEIGHT: 156.5 LBS | RESPIRATION RATE: 12 BRPM | BODY MASS INDEX: 25.26 KG/M2 | TEMPERATURE: 97 F | HEART RATE: 77 BPM | SYSTOLIC BLOOD PRESSURE: 154 MMHG | DIASTOLIC BLOOD PRESSURE: 93 MMHG

## 2020-06-16 DIAGNOSIS — D47.2 MGUS (MONOCLONAL GAMMOPATHY OF UNKNOWN SIGNIFICANCE): ICD-10-CM

## 2020-06-16 DIAGNOSIS — N83.202 CYST OF LEFT OVARY: ICD-10-CM

## 2020-06-16 DIAGNOSIS — C83.35 DIFFUSE LARGE B-CELL LYMPHOMA OF LYMPH NODES OF INGUINAL REGION: ICD-10-CM

## 2020-06-16 PROCEDURE — 99214 OFFICE O/P EST MOD 30 MIN: CPT | Mod: S$GLB,,, | Performed by: INTERNAL MEDICINE

## 2020-06-16 PROCEDURE — 99214 PR OFFICE/OUTPT VISIT, EST, LEVL IV, 30-39 MIN: ICD-10-PCS | Mod: S$GLB,,, | Performed by: INTERNAL MEDICINE

## 2020-06-16 NOTE — PROGRESS NOTES
PROGRESS NOTE    Subjective:       Patient ID: Aurelia Marlow is a 69 y.o. female.    Dx [facility] type- 8/21/2019  Inguinal LN biopsy    PET with bone marrow uptake throughout appendicular and axial skeleton    Bone marrow biopsy positive for B-cell lymphoma    Stage ARELIS    Chief Complaint:  No chief complaint on file.  lymphoma follow up    History of Present Illness:   Aurelia Marlow is a 69 y.o. female who presents for follow up of lymphoma    Patient has had a delay in the ovarian cyst follow up due to covid.     PET 6/4/2020:  1. Negative for FDG avid lymphoproliferative disorder.  2. Unchanged left adnexal cyst, previously evaluated with MRI pelvis 10/04/2019 and ultrasound 04/23/2020..      R-CHOP:  Cycle 1: 10/28/2019  Cycle 2: 11/18/2019  Cycle 3: 12/9/2019  Cycle 4: 12/30/2019  Cycle 5: 01/20/2020  Cycle 6: 02/10/2020      Echo 9/30/2019 EF 60%  CMP, CBC 10/6/2019 unremarkable    Family and Social history reviewed and is unchanged from 9/27/2019      ROS:  Review of Systems   Constitutional: Negative for appetite change, fever and unexpected weight change.   Eyes: Negative for visual disturbance.   Respiratory: Negative for cough and shortness of breath.    Cardiovascular: Negative for chest pain and leg swelling.   Gastrointestinal: Negative for abdominal pain, blood in stool and diarrhea.   Genitourinary: Negative for frequency and hematuria.   Musculoskeletal: Negative for back pain.   Skin: Negative for rash.   Neurological: Negative for headaches.   Hematological: Negative for adenopathy.   Psychiatric/Behavioral: The patient is not nervous/anxious.           Current Outpatient Medications:     cholecalciferol, vitamin D3, (VITAMIN D3) 2,000 unit Tab, , Disp: , Rfl:     diphenhydrAMINE (BENADRYL) 25 mg capsule, Take 1 capsule (25 mg total) by mouth every 6 (six) hours as needed for Itching or Allergies., Disp: 20 capsule, Rfl:  0    duke's soln (benadryl 30 mL, mylanta 30 mL, lidocaine 30 mL, nystatin 30 mL) 120mL, Take 30 mLs by mouth 4 (four) times daily., Disp: , Rfl:     famotidine (PEPCID) 20 MG tablet, Take 1 tablet (20 mg total) by mouth 2 (two) times daily., Disp: 20 tablet, Rfl: 0    fluticasone propionate (FLONASE) 50 mcg/actuation nasal spray, , Disp: , Rfl:     hydroxychloroquine (PLAQUENIL) 200 mg tablet, Take 1 tablet (200 mg total) by mouth 2 (two) times daily., Disp: 180 tablet, Rfl: 1    levothyroxine (SYNTHROID) 100 MCG tablet, Take 100 mcg by mouth once daily., Disp: , Rfl:     lidocaine-prilocaine (EMLA) cream, Apply topically as needed., Disp: 25 g, Rfl: 2    naproxen (EC NAPROSYN) 500 MG EC tablet, TAKE ONE TABLET BY MOUTH TWICE DAILY AFTER MEALS AS NEEDED, Disp: 60 tablet, Rfl: 1    omeprazole 20 mg TbEC, Take 20 mg by mouth every evening. , Disp: , Rfl:     ondansetron (ZOFRAN) 8 MG tablet, Take 8 mg by mouth every 8 (eight) hours as needed., Disp: , Rfl: 6    predniSONE (DELTASONE) 20 MG tablet, TAKE 5 TABLETS DAILY DAY 1-5 OF CHEMO CYCLE, Disp: , Rfl: 6    promethazine (PHENERGAN) 25 MG tablet, Take 25 mg by mouth every 6 (six) hours as needed., Disp: , Rfl: 6    sulfamethoxazole-trimethoprim 800-160mg (BACTRIM DS) 800-160 mg Tab, TAKE 1 TABLET BY MOUTH 3 (THREE) TIMES A WEEK MONDAY, WEDNESDAY AND FRIDAY., Disp: , Rfl: 2    traMADol (ULTRAM) 50 mg tablet, Take 1 tablet (50 mg total) by mouth every 12 (twelve) hours as needed for Pain. Dispense generic, Disp: 60 tablet, Rfl: 1    valACYclovir (VALTREX) 500 MG tablet, Take 500 mg by mouth once daily., Disp: , Rfl: 3    Current Facility-Administered Medications:     lidocaine (PF) 10 mg/ml (1%) injection 10 mg, 1 mL, Intradermal, Once, Kermit Martinez MD        Objective:       Physical Examination:     BP (!) 154/93 (BP Location: Left arm, Patient Position: Sitting)   Pulse 77   Temp 97.4 °F (36.3 °C) (Oral)   Resp 12   Wt 71 kg (156 lb 8 oz)    BMI 25.26 kg/m²     Physical Exam  Constitutional:       Appearance: She is well-developed.   HENT:      Head: Normocephalic and atraumatic.      Right Ear: External ear normal.      Left Ear: External ear normal.   Eyes:      Conjunctiva/sclera: Conjunctivae normal.      Pupils: Pupils are equal, round, and reactive to light.   Neck:      Thyroid: No thyromegaly.      Trachea: No tracheal deviation.   Cardiovascular:      Rate and Rhythm: Normal rate and regular rhythm.      Heart sounds: Normal heart sounds.   Pulmonary:      Effort: Pulmonary effort is normal.      Breath sounds: Normal breath sounds.   Abdominal:      General: Bowel sounds are normal. There is no distension.      Palpations: Abdomen is soft. There is no mass.      Tenderness: There is no abdominal tenderness.   Skin:     Findings: No rash.   Neurological:      Comments: Neuro intact througout   Psychiatric:         Behavior: Behavior normal.         Thought Content: Thought content normal.         Judgment: Judgment normal.          Labs:   No results found for this or any previous visit (from the past 336 hour(s)).  CMP  Sodium   Date Value Ref Range Status   03/31/2020 132 (L) 136 - 145 mmol/L Final   06/11/2019 139 134 - 144 mmol/L      Potassium   Date Value Ref Range Status   03/31/2020 3.6 3.5 - 5.1 mmol/L Final     Chloride   Date Value Ref Range Status   03/31/2020 94 (L) 95 - 110 mmol/L Final   06/11/2019 104 98 - 110 mmol/L      CO2   Date Value Ref Range Status   03/31/2020 27 23 - 29 mmol/L Final     Glucose   Date Value Ref Range Status   03/31/2020 91 70 - 110 mg/dL Final   06/11/2019 86 70 - 99 mg/dL      BUN, Bld   Date Value Ref Range Status   03/31/2020 10 8 - 23 mg/dL Final     Creatinine   Date Value Ref Range Status   03/31/2020 0.5 0.5 - 1.4 mg/dL Final   06/11/2019 0.72 0.60 - 1.40 mg/dL      Calcium   Date Value Ref Range Status   03/31/2020 8.6 (L) 8.7 - 10.5 mg/dL Final     Total Protein   Date Value Ref Range Status    03/31/2020 7.2 6.0 - 8.4 g/dL Final     Albumin   Date Value Ref Range Status   03/31/2020 3.2 (L) 3.5 - 5.2 g/dL Final   06/11/2019 3.8 3.1 - 4.7 g/dL      Total Bilirubin   Date Value Ref Range Status   03/31/2020 1.0 0.1 - 1.0 mg/dL Final     Comment:     For infants and newborns, interpretation of results should be based  on gestational age, weight and in agreement with clinical  observations.  Premature Infant recommended reference ranges:  Up to 24 hours.............<8.0 mg/dL  Up to 48 hours............<12.0 mg/dL  3-5 days..................<15.0 mg/dL  6-29 days.................<15.0 mg/dL       Alkaline Phosphatase   Date Value Ref Range Status   03/31/2020 54 (L) 55 - 135 U/L Final     AST   Date Value Ref Range Status   03/31/2020 25 10 - 40 U/L Final     ALT   Date Value Ref Range Status   03/31/2020 19 10 - 44 U/L Final     Anion Gap   Date Value Ref Range Status   03/31/2020 11 8 - 16 mmol/L Final     eGFR if    Date Value Ref Range Status   03/31/2020 >60.0 >60 mL/min/1.73 m^2 Final     eGFR if non    Date Value Ref Range Status   03/31/2020 >60.0 >60 mL/min/1.73 m^2 Final     Comment:     Calculation used to obtain the estimated glomerular filtration  rate (eGFR) is the CKD-EPI equation.        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  Will continue to scan her and next will be in four months.  Will have her back with me after.           Relevant Orders    CBC auto differential    Comprehensive metabolic panel    Immunoglobulins (IgG, IgA, IgM) Quantitative    Protein electrophoresis, serum    Immunoglobulin Free LT Chains Blood    NM PET CT Routine Skull to Mid Thigh    MGUS (monoclonal gammopathy of unknown significance)      Patient is doing well.  Will plan on review of new labs in four months.          Relevant Orders    CBC auto differential    Comprehensive metabolic panel    Immunoglobulins (IgG, IgA, IgM) Quantitative    Protein electrophoresis, serum    Immunoglobulin Free LT Chains Blood    NM PET CT Routine Skull to Mid Thigh    Cyst of left ovary     This has been delayed but patient did see Dr. Jhaveri via telemedicine and will continue to be followed for this for now.  She will continue to follow with OBGYN and will have another u/s in the next four to six months.          Relevant Orders    CBC auto differential    Comprehensive metabolic panel    Immunoglobulins (IgG, IgA, IgM) Quantitative    Protein electrophoresis, serum    Immunoglobulin Free LT Chains Blood    NM PET CT Routine Skull to Mid Thigh          Discussion:     Follow up in about 4 months (around 10/16/2020).      Electronically signed by Pb Fry

## 2020-06-16 NOTE — ASSESSMENT & PLAN NOTE
Patient is doing well at this time and her pet scan is again negative.  Will continue to scan her and next will be in four months.  Will have her back with me after.

## 2020-06-16 NOTE — ASSESSMENT & PLAN NOTE
This has been delayed but patient did see Dr. Jhaveri via telemedicine and will continue to be followed for this for now.  She will continue to follow with OBGYN and will have another u/s in the next four to six months.

## 2020-07-06 ENCOUNTER — OFFICE VISIT (OUTPATIENT)
Dept: PRIMARY CARE CLINIC | Facility: CLINIC | Age: 70
End: 2020-07-06
Payer: MEDICARE

## 2020-07-06 VITALS
HEART RATE: 97 BPM | RESPIRATION RATE: 18 BRPM | TEMPERATURE: 99 F | DIASTOLIC BLOOD PRESSURE: 81 MMHG | OXYGEN SATURATION: 99 % | SYSTOLIC BLOOD PRESSURE: 167 MMHG

## 2020-07-06 DIAGNOSIS — R43.9 PROBLEMS WITH SMELL AND TASTE: ICD-10-CM

## 2020-07-06 PROCEDURE — 99203 OFFICE O/P NEW LOW 30 MIN: CPT | Mod: S$GLB,,, | Performed by: EMERGENCY MEDICINE

## 2020-07-06 PROCEDURE — 99203 PR OFFICE/OUTPT VISIT, NEW, LEVL III, 30-44 MIN: ICD-10-PCS | Mod: S$GLB,,, | Performed by: EMERGENCY MEDICINE

## 2020-07-06 PROCEDURE — U0003 INFECTIOUS AGENT DETECTION BY NUCLEIC ACID (DNA OR RNA); SEVERE ACUTE RESPIRATORY SYNDROME CORONAVIRUS 2 (SARS-COV-2) (CORONAVIRUS DISEASE [COVID-19]), AMPLIFIED PROBE TECHNIQUE, MAKING USE OF HIGH THROUGHPUT TECHNOLOGIES AS DESCRIBED BY CMS-2020-01-R: HCPCS

## 2020-07-06 NOTE — PROGRESS NOTES
Subjective:      Patient is a 69 y.o. female who presents to the ED 07/06/2020 with a chief complaint of loss of taste and smell for 1 day. Patient has tested positive for COVID- 19 antibodies in the past. She had a similar course a few months ago where she lost her smell and taste.  She denies any other symptoms. She is not undergoing chemotherapy at this time. PMH includes lymphoma.    Review of Systems   Constitutional: Negative for activity change, appetite change, fatigue and fever.        Loss of smell and taste     HENT: Negative for congestion, rhinorrhea and sore throat.    Respiratory: Negative for cough, chest tightness, shortness of breath and wheezing.    Cardiovascular: Negative for chest pain and palpitations.   Gastrointestinal: Negative for diarrhea, nausea and vomiting.   Musculoskeletal: Negative for arthralgias and myalgias.   Skin: Negative for rash.   Neurological: Negative for weakness, light-headedness, numbness and headaches.       Objective:      Physical Exam  Vitals signs and nursing note reviewed.   Constitutional:       General: She is not in acute distress.     Appearance: She is well-developed. She is not diaphoretic.   HENT:      Head: Normocephalic and atraumatic.      Nose: Nose normal.   Eyes:      Conjunctiva/sclera: Conjunctivae normal.   Neck:      Musculoskeletal: Normal range of motion.   Cardiovascular:      Rate and Rhythm: Normal rate and regular rhythm.      Heart sounds: Normal heart sounds. No murmur.   Pulmonary:      Effort: No respiratory distress.      Breath sounds: Normal breath sounds. No wheezing.   Musculoskeletal: Normal range of motion.   Skin:     General: Skin is warm and dry.   Neurological:      Mental Status: She is alert and oriented to person, place, and time.         Assessment:       1. COVID- 19 Virus infection  Plan:       1. COVID- 19 Virus infection   - no signs of any acute bacterial infection such as bacterial sinusitis. I see no indication for  ABX at this time.   - COVID-19 Routine Screening    2. Discharge home and await results.   3. Return to clinic or ED for new or worsening symptoms.   4. Follow-up with PCP as needed.     Scribe Attestation:   I

## 2020-07-06 NOTE — PATIENT INSTRUCTIONS

## 2020-07-08 LAB — SARS-COV-2 RNA RESP QL NAA+PROBE: NOT DETECTED

## 2020-07-20 ENCOUNTER — INFUSION (OUTPATIENT)
Dept: INFUSION THERAPY | Facility: HOSPITAL | Age: 70
End: 2020-07-20
Attending: INTERNAL MEDICINE
Payer: MEDICARE

## 2020-07-20 VITALS
DIASTOLIC BLOOD PRESSURE: 92 MMHG | RESPIRATION RATE: 20 BRPM | BODY MASS INDEX: 25.79 KG/M2 | SYSTOLIC BLOOD PRESSURE: 142 MMHG | TEMPERATURE: 97 F | WEIGHT: 160.5 LBS | HEART RATE: 80 BPM | HEIGHT: 66 IN

## 2020-07-20 DIAGNOSIS — D47.2 MGUS (MONOCLONAL GAMMOPATHY OF UNKNOWN SIGNIFICANCE): Primary | ICD-10-CM

## 2020-07-20 DIAGNOSIS — C83.35 DIFFUSE LARGE B-CELL LYMPHOMA OF LYMPH NODES OF INGUINAL REGION: ICD-10-CM

## 2020-07-20 PROCEDURE — 96523 IRRIG DRUG DELIVERY DEVICE: CPT

## 2020-07-20 PROCEDURE — 63600175 PHARM REV CODE 636 W HCPCS: Performed by: INTERNAL MEDICINE

## 2020-07-20 RX ORDER — HEPARIN 100 UNIT/ML
500 SYRINGE INTRAVENOUS
Status: COMPLETED | OUTPATIENT
Start: 2020-07-20 | End: 2020-07-20

## 2020-07-20 RX ORDER — SODIUM CHLORIDE 0.9 % (FLUSH) 0.9 %
10 SYRINGE (ML) INJECTION
Status: DISCONTINUED | OUTPATIENT
Start: 2020-07-20 | End: 2020-07-20 | Stop reason: HOSPADM

## 2020-07-20 RX ORDER — HEPARIN 100 UNIT/ML
500 SYRINGE INTRAVENOUS
Status: CANCELLED | OUTPATIENT
Start: 2020-07-20

## 2020-07-20 RX ORDER — SODIUM CHLORIDE 0.9 % (FLUSH) 0.9 %
10 SYRINGE (ML) INJECTION
Status: CANCELLED | OUTPATIENT
Start: 2020-07-20

## 2020-07-20 RX ADMIN — HEPARIN 500 UNITS: 100 SYRINGE at 12:07

## 2020-07-20 NOTE — PLAN OF CARE
Problem: Fatigue  Goal: Improved Activity Tolerance  Outcome: Ongoing, Progressing  Intervention: Promote Energy Conservation  Flowsheets (Taken 7/20/2020 1214)  Fatigue Management: frequent rest breaks encouraged  Sleep/Rest Enhancement: regular sleep/rest pattern promoted  Activity Management:   ambulated - L4   activity encouraged

## 2020-08-12 ENCOUNTER — OFFICE VISIT (OUTPATIENT)
Dept: RHEUMATOLOGY | Facility: CLINIC | Age: 70
End: 2020-08-12
Payer: MEDICARE

## 2020-08-12 VITALS
TEMPERATURE: 97 F | WEIGHT: 151.13 LBS | BODY MASS INDEX: 24.39 KG/M2 | DIASTOLIC BLOOD PRESSURE: 82 MMHG | SYSTOLIC BLOOD PRESSURE: 123 MMHG

## 2020-08-12 DIAGNOSIS — M05.79 RHEUMATOID ARTHRITIS OF MULTIPLE SITES WITHOUT ORGAN OR SYSTEM INVOLVEMENT WITH POSITIVE RHEUMATOID FACTOR: Primary | ICD-10-CM

## 2020-08-12 PROCEDURE — 99214 PR OFFICE/OUTPT VISIT, EST, LEVL IV, 30-39 MIN: ICD-10-PCS | Mod: S$GLB,,, | Performed by: INTERNAL MEDICINE

## 2020-08-12 PROCEDURE — 99214 OFFICE O/P EST MOD 30 MIN: CPT | Mod: S$GLB,,, | Performed by: INTERNAL MEDICINE

## 2020-08-12 RX ORDER — HYDROXYCHLOROQUINE SULFATE 200 MG/1
200 TABLET, FILM COATED ORAL 2 TIMES DAILY
Qty: 180 TABLET | Refills: 1 | Status: SHIPPED | OUTPATIENT
Start: 2020-08-12 | End: 2021-02-08

## 2020-08-12 NOTE — PROGRESS NOTES
[facility] RHEUMATOLOGY            PROGRESS NOTE      Subjective:       Patient ID:   NAME: Aurelia Marlow : 1950     69 y.o. female    Referring Doc: No ref. provider found  Other Physicians:    Chief Complaint:  Rheumatoid Arthritis      History of Present Illness:     Patient returns today for a regularly scheduled follow-up visit for  rheumatoid arthritis    The patient last seen in 2019.  At that time she was diagnosed with large cell lymphoma involvement left inguinal and no.  Since then she has been on chemotherapy with Cytoxan ,Adriamycin,  vincristine and Rituxan every 22 days with the last dose being in 2020.  Her arthritis has been doing well.  No prolonged morning stiffness joint swelling.  Recently started with some arthralgias in PIP and D IP joints without swelling.  Patient states  her oncologist and rheum at [facility] suggested Rituxan treatment if active disease.  She restarted Plaquenil 4-5 weeks ago.  No fevers ,cough  Or shortness of breath.  No chest pains.  Slight fatigue.  Weight is stable.  No joint swelling.          ROS:   GEN:  No  fever, night sweats . weight is stable   + sl  fatigue  SKIN: no rashes, no bruising, no ulcerations, no Raynaud's  HEENT: no HA's, No visual changes, no mucosal ulcers, no sicca symptoms,  CV:   no CP, SOB, PND, CHAUDHRY, no orthopnea, no palpitations  PULM: normal with no SOB, cough, hemoptysis, sputum or pleuritic pain  GI:  no abdominal pain, nausea, vomiting, constipation, diarrhea, melanotic stools, BRBPR, hematemesis, no dysphagia  :   no dysuria  NEURO: no paresthesias, headaches, visual disturbances, muscle weakness  MUSCULOSKELETAL:no joint swelling, prolonged AM stiffness, no back pain, no muscle pain  Allergies:  Review of patient's allergies indicates:   Allergen Reactions    Contrast media Rash    Gadobutrol Hives, Itching and Swelling       Medications:    Current Outpatient Medications:     cholecalciferol,  vitamin D3, (VITAMIN D3) 2,000 unit Tab, , Disp: , Rfl:     hydrOXYchloroQUINE (PLAQUENIL) 200 mg tablet, Take 1 tablet (200 mg total) by mouth 2 (two) times daily., Disp: 180 tablet, Rfl: 1    levothyroxine (SYNTHROID) 100 MCG tablet, Take 100 mcg by mouth once daily., Disp: , Rfl:     diphenhydrAMINE (BENADRYL) 25 mg capsule, Take 1 capsule (25 mg total) by mouth every 6 (six) hours as needed for Itching or Allergies. (Patient not taking: Reported on 8/12/2020), Disp: 20 capsule, Rfl: 0    duke's soln (benadryl 30 mL, mylanta 30 mL, lidocaine 30 mL, nystatin 30 mL) 120mL, Take 30 mLs by mouth 4 (four) times daily., Disp: , Rfl:     famotidine (PEPCID) 20 MG tablet, Take 1 tablet (20 mg total) by mouth 2 (two) times daily. (Patient not taking: Reported on 8/12/2020), Disp: 20 tablet, Rfl: 0    fluticasone propionate (FLONASE) 50 mcg/actuation nasal spray, , Disp: , Rfl:     lidocaine-prilocaine (EMLA) cream, Apply topically as needed. (Patient not taking: Reported on 8/12/2020), Disp: 25 g, Rfl: 2    naproxen (EC NAPROSYN) 500 MG EC tablet, TAKE ONE TABLET BY MOUTH TWICE DAILY AFTER MEALS AS NEEDED (Patient not taking: Reported on 8/12/2020), Disp: 60 tablet, Rfl: 1    omeprazole 20 mg TbEC, Take 20 mg by mouth every evening. , Disp: , Rfl:     ondansetron (ZOFRAN) 8 MG tablet, Take 8 mg by mouth every 8 (eight) hours as needed., Disp: , Rfl: 6    predniSONE (DELTASONE) 20 MG tablet, TAKE 5 TABLETS DAILY DAY 1-5 OF CHEMO CYCLE, Disp: , Rfl: 6    promethazine (PHENERGAN) 25 MG tablet, Take 25 mg by mouth every 6 (six) hours as needed., Disp: , Rfl: 6    sulfamethoxazole-trimethoprim 800-160mg (BACTRIM DS) 800-160 mg Tab, TAKE 1 TABLET BY MOUTH 3 (THREE) TIMES A WEEK MONDAY, WEDNESDAY AND FRIDAY., Disp: , Rfl: 2    traMADol (ULTRAM) 50 mg tablet, Take 1 tablet (50 mg total) by mouth every 12 (twelve) hours as needed for Pain. Dispense generic (Patient not taking: Reported on 8/12/2020), Disp: 60 tablet,  Rfl: 1    valACYclovir (VALTREX) 500 MG tablet, Take 500 mg by mouth once daily., Disp: , Rfl: 3    Current Facility-Administered Medications:     lidocaine (PF) 10 mg/ml (1%) injection 10 mg, 1 mL, Intradermal, Once, Kermit Martinez MD    PMHx/PSHx Updates:      Last Eye Exam UTD      Objective:     Vitals:  Blood pressure 123/82, temperature 97.3 °F (36.3 °C), weight 68.5 kg (151 lb 1.6 oz).    Physical Examination:   GEN: no apparent distress, comfortable; AAOx3  SKIN: no rashes,no ulceration, no Raynaud's, no petechiae, no SQ nodules,  HEAD: normal  EYES: no pallor, no icterus  NECK: no masses, thyroid normal, trachea midline, no LAD/LN's, supple  CV: RRR with no murmur; l S1 and S2 reg. ,no gallop no rubs,   CHEST: Normal respiratory effort; CTAB; normal breath sounds; no wheeze or crackles  MUSC/Skeletal: ROM normal; no crepitus; joints without synovitis,  no deformities  No joint swelling or tenderness of PIP, MCP, wrist, elbow, shoulder, or knee joints  EXTREM: no clubbing, cyanosis, no edema,normal  pulses   NEURO: grossly intact; motor WNL; AAOx3; ,  PSYCH: normal mood, affect and behavior  LYMPH: normal cervical, supraclavicular          Labs:   Lab Results   Component Value Date    WBC 4.39 03/31/2020    HGB 12.2 03/31/2020    HCT 36.7 (L) 03/31/2020    MCV 92 03/31/2020     03/31/2020    CMP  @LASTLAB(NA,K,CL,CO2,GLU,BUN,Creatinine,Calcium,PROT,Albumin,Bilitot,Alkphos,AST,ALT,CRP,ESR,RF,CCP,SONJA,SSA,CPK,uric acid) )@  I have reviewed all available lab results and radiology reports.    Radiology/Diagnostic Studies:        Assessment/Plan:   (1) 69 y.o. female with diagnosis of seropositive rheumatoid arthritis.  This seems stable.  I do not think she needs to restart Rituxan at this time.( last Rituxan in Feb)  She can continue with Plaquenil.  Explained to her  Plaquenil can take 2-4 months to be effective.    Will monitor her closely for any activity of RA.  Can give Rituxan at that  time.  CBC CMP CRP           Discussion:     I have explained all of the above in detail and the patient understands all of the current recommendation(s). I have answered all questions to the best of my ability and to their complete satisfaction.       The patient is to continue with the current management plan         RTC in  3 months or before if needed      Electronically signed by Adrián Hernandez MD

## 2020-08-31 ENCOUNTER — INFUSION (OUTPATIENT)
Dept: INFUSION THERAPY | Facility: HOSPITAL | Age: 70
End: 2020-08-31
Attending: INTERNAL MEDICINE
Payer: MEDICARE

## 2020-08-31 VITALS
BODY MASS INDEX: 24.87 KG/M2 | HEART RATE: 73 BPM | RESPIRATION RATE: 18 BRPM | OXYGEN SATURATION: 97 % | WEIGHT: 154.13 LBS | SYSTOLIC BLOOD PRESSURE: 128 MMHG | DIASTOLIC BLOOD PRESSURE: 66 MMHG | TEMPERATURE: 97 F

## 2020-08-31 DIAGNOSIS — D47.2 MGUS (MONOCLONAL GAMMOPATHY OF UNKNOWN SIGNIFICANCE): Primary | ICD-10-CM

## 2020-08-31 DIAGNOSIS — C83.35 DIFFUSE LARGE B-CELL LYMPHOMA OF LYMPH NODES OF INGUINAL REGION: ICD-10-CM

## 2020-08-31 PROCEDURE — 63600175 PHARM REV CODE 636 W HCPCS: Performed by: INTERNAL MEDICINE

## 2020-08-31 PROCEDURE — 96523 IRRIG DRUG DELIVERY DEVICE: CPT

## 2020-08-31 PROCEDURE — A4216 STERILE WATER/SALINE, 10 ML: HCPCS | Performed by: INTERNAL MEDICINE

## 2020-08-31 PROCEDURE — 25000003 PHARM REV CODE 250: Performed by: INTERNAL MEDICINE

## 2020-08-31 RX ORDER — HEPARIN 100 UNIT/ML
500 SYRINGE INTRAVENOUS
Status: COMPLETED | OUTPATIENT
Start: 2020-08-31 | End: 2020-08-31

## 2020-08-31 RX ORDER — SODIUM CHLORIDE 0.9 % (FLUSH) 0.9 %
10 SYRINGE (ML) INJECTION
Status: CANCELLED | OUTPATIENT
Start: 2020-08-31

## 2020-08-31 RX ORDER — HEPARIN 100 UNIT/ML
500 SYRINGE INTRAVENOUS
Status: CANCELLED | OUTPATIENT
Start: 2020-08-31

## 2020-08-31 RX ORDER — SODIUM CHLORIDE 0.9 % (FLUSH) 0.9 %
10 SYRINGE (ML) INJECTION
Status: COMPLETED | OUTPATIENT
Start: 2020-08-31 | End: 2020-08-31

## 2020-08-31 RX ADMIN — HEPARIN 500 UNITS: 100 SYRINGE at 11:08

## 2020-08-31 RX ADMIN — SODIUM CHLORIDE 10 ML: 9 INJECTION INTRAMUSCULAR; INTRAVENOUS; SUBCUTANEOUS at 11:08

## 2020-09-16 DIAGNOSIS — E89.0 POSTSURGICAL HYPOTHYROIDISM: Primary | ICD-10-CM

## 2020-09-16 DIAGNOSIS — E04.2 NONTOXIC MULTINODULAR GOITER: Primary | ICD-10-CM

## 2020-09-21 ENCOUNTER — HOSPITAL ENCOUNTER (OUTPATIENT)
Dept: RADIOLOGY | Facility: HOSPITAL | Age: 70
Discharge: HOME | End: 2020-09-21
Attending: INTERNAL MEDICINE
Payer: MEDICARE

## 2020-09-21 DIAGNOSIS — E04.2 NONTOXIC MULTINODULAR GOITER: ICD-10-CM

## 2020-09-21 PROCEDURE — 76536 US EXAM OF HEAD AND NECK: CPT | Mod: TC,PO

## 2020-10-06 ENCOUNTER — HOSPITAL ENCOUNTER (OUTPATIENT)
Dept: RADIOLOGY | Facility: HOSPITAL | Age: 70
Discharge: HOME | End: 2020-10-06
Attending: INTERNAL MEDICINE
Payer: MEDICARE

## 2020-10-06 VITALS — WEIGHT: 147 LBS | HEIGHT: 66 IN | BODY MASS INDEX: 23.63 KG/M2

## 2020-10-06 DIAGNOSIS — C83.35 DIFFUSE LARGE B-CELL LYMPHOMA OF LYMPH NODES OF INGUINAL REGION: ICD-10-CM

## 2020-10-06 DIAGNOSIS — N83.202 CYST OF LEFT OVARY: ICD-10-CM

## 2020-10-06 DIAGNOSIS — D47.2 MGUS (MONOCLONAL GAMMOPATHY OF UNKNOWN SIGNIFICANCE): ICD-10-CM

## 2020-10-06 LAB — GLUCOSE SERPL-MCNC: 98 MG/DL (ref 70–110)

## 2020-10-06 PROCEDURE — 78815 PET IMAGE W/CT SKULL-THIGH: CPT | Mod: TC,PO,PS

## 2020-10-06 PROCEDURE — 82962 GLUCOSE BLOOD TEST: CPT | Mod: PO

## 2020-10-12 ENCOUNTER — INFUSION (OUTPATIENT)
Dept: INFUSION THERAPY | Facility: HOSPITAL | Age: 70
End: 2020-10-12
Attending: INTERNAL MEDICINE
Payer: MEDICARE

## 2020-10-12 VITALS
SYSTOLIC BLOOD PRESSURE: 133 MMHG | DIASTOLIC BLOOD PRESSURE: 88 MMHG | BODY MASS INDEX: 24.19 KG/M2 | HEART RATE: 78 BPM | HEIGHT: 66 IN | TEMPERATURE: 98 F | WEIGHT: 150.5 LBS | OXYGEN SATURATION: 96 % | RESPIRATION RATE: 20 BRPM

## 2020-10-12 DIAGNOSIS — D47.2 MGUS (MONOCLONAL GAMMOPATHY OF UNKNOWN SIGNIFICANCE): Primary | ICD-10-CM

## 2020-10-12 DIAGNOSIS — C83.35 DIFFUSE LARGE B-CELL LYMPHOMA OF LYMPH NODES OF INGUINAL REGION: ICD-10-CM

## 2020-10-12 PROCEDURE — 63600175 PHARM REV CODE 636 W HCPCS: Performed by: INTERNAL MEDICINE

## 2020-10-12 PROCEDURE — A4216 STERILE WATER/SALINE, 10 ML: HCPCS | Performed by: INTERNAL MEDICINE

## 2020-10-12 PROCEDURE — 96523 IRRIG DRUG DELIVERY DEVICE: CPT

## 2020-10-12 PROCEDURE — 25000003 PHARM REV CODE 250: Performed by: INTERNAL MEDICINE

## 2020-10-12 RX ORDER — HEPARIN 100 UNIT/ML
500 SYRINGE INTRAVENOUS
Status: CANCELLED | OUTPATIENT
Start: 2020-10-12

## 2020-10-12 RX ORDER — SODIUM CHLORIDE 0.9 % (FLUSH) 0.9 %
10 SYRINGE (ML) INJECTION
Status: COMPLETED | OUTPATIENT
Start: 2020-10-12 | End: 2020-10-12

## 2020-10-12 RX ORDER — HEPARIN 100 UNIT/ML
500 SYRINGE INTRAVENOUS
Status: COMPLETED | OUTPATIENT
Start: 2020-10-12 | End: 2020-10-12

## 2020-10-12 RX ORDER — SODIUM CHLORIDE 0.9 % (FLUSH) 0.9 %
10 SYRINGE (ML) INJECTION
Status: CANCELLED | OUTPATIENT
Start: 2020-10-12

## 2020-10-12 RX ADMIN — SODIUM CHLORIDE 10 ML: 9 INJECTION INTRAMUSCULAR; INTRAVENOUS; SUBCUTANEOUS at 11:10

## 2020-10-12 RX ADMIN — HEPARIN 500 UNITS: 100 SYRINGE at 11:10

## 2020-10-12 NOTE — PLAN OF CARE
Problem: Infection  Goal: Infection Symptom Resolution  Outcome: Ongoing, Progressing  Intervention: Prevent or Manage Infection  Flowsheets (Taken 10/12/2020 1130)  Infection Management: aseptic technique maintained  Isolation Precautions:   protective environment initiated   protective environment maintained

## 2020-10-13 ENCOUNTER — OFFICE VISIT (OUTPATIENT)
Dept: HEMATOLOGY/ONCOLOGY | Facility: CLINIC | Age: 70
End: 2020-10-13
Payer: MEDICARE

## 2020-10-13 VITALS
TEMPERATURE: 98 F | RESPIRATION RATE: 18 BRPM | DIASTOLIC BLOOD PRESSURE: 83 MMHG | BODY MASS INDEX: 24.34 KG/M2 | HEART RATE: 82 BPM | WEIGHT: 150.81 LBS | SYSTOLIC BLOOD PRESSURE: 140 MMHG

## 2020-10-13 DIAGNOSIS — D47.2 MGUS (MONOCLONAL GAMMOPATHY OF UNKNOWN SIGNIFICANCE): ICD-10-CM

## 2020-10-13 DIAGNOSIS — C83.35 DIFFUSE LARGE B-CELL LYMPHOMA OF LYMPH NODES OF INGUINAL REGION: ICD-10-CM

## 2020-10-13 DIAGNOSIS — N83.202 CYST OF LEFT OVARY: ICD-10-CM

## 2020-10-13 PROCEDURE — 99214 OFFICE O/P EST MOD 30 MIN: CPT | Mod: S$GLB,,, | Performed by: INTERNAL MEDICINE

## 2020-10-13 PROCEDURE — 99214 PR OFFICE/OUTPT VISIT, EST, LEVL IV, 30-39 MIN: ICD-10-PCS | Mod: S$GLB,,, | Performed by: INTERNAL MEDICINE

## 2020-10-13 NOTE — ASSESSMENT & PLAN NOTE
PET scan is negative and patient is doing very well.  She is feeling great.  Plan on PET again in about six months.  Will see her again in four months.

## 2020-10-13 NOTE — ASSESSMENT & PLAN NOTE
Patient is doing well at this time.  She has seen Dr. Jhaveri and this is being observed currently.

## 2020-10-13 NOTE — ASSESSMENT & PLAN NOTE
Labs appear stable at this time. Will continue to monitor this every four months for now then every six.

## 2020-10-13 NOTE — PROGRESS NOTES
PROGRESS NOTE    Subjective:       Patient ID: Aurelia Marlow is a 70 y.o. female.    Dx [facility] type- 8/21/2019  Inguinal LN biopsy    PET with bone marrow uptake throughout appendicular and axial skeleton    Bone marrow biopsy positive for B-cell lymphoma    Stage ARELIS    R-CHOP x 6  10/28/2019-2/10/2020    10/6/2020:  PET negative    Chief Complaint:  No chief complaint on file.  lymphoma and MGUS follow up    History of Present Illness:   Aurelia Marlow is a 70 y.o. female who presents for follow up of lymphoma    Patient is feeling well at this time.  No new complaints.        Family and Social history reviewed and is unchanged from 9/27/2019      ROS:  Review of Systems   Constitutional: Negative for appetite change, fever and unexpected weight change.   Eyes: Negative for visual disturbance.   Respiratory: Negative for cough and shortness of breath.    Cardiovascular: Negative for chest pain and leg swelling.   Gastrointestinal: Negative for abdominal pain, blood in stool and diarrhea.   Genitourinary: Negative for frequency and hematuria.   Musculoskeletal: Negative for back pain.   Skin: Negative for rash.   Neurological: Negative for headaches.   Hematological: Negative for adenopathy.   Psychiatric/Behavioral: The patient is not nervous/anxious.           Current Outpatient Medications:     cholecalciferol, vitamin D3, (VITAMIN D3) 2,000 unit Tab, , Disp: , Rfl:     diphenhydrAMINE (BENADRYL) 25 mg capsule, Take 1 capsule (25 mg total) by mouth every 6 (six) hours as needed for Itching or Allergies. (Patient not taking: Reported on 8/12/2020), Disp: 20 capsule, Rfl: 0    duke's soln (benadryl 30 mL, mylanta 30 mL, lidocaine 30 mL, nystatin 30 mL) 120mL, Take 30 mLs by mouth 4 (four) times daily., Disp: , Rfl:     famotidine (PEPCID) 20 MG tablet, Take 1 tablet (20 mg total) by mouth 2 (two) times daily. (Patient not taking:  Reported on 8/12/2020), Disp: 20 tablet, Rfl: 0    fluticasone propionate (FLONASE) 50 mcg/actuation nasal spray, , Disp: , Rfl:     hydrOXYchloroQUINE (PLAQUENIL) 200 mg tablet, Take 1 tablet (200 mg total) by mouth 2 (two) times daily., Disp: 180 tablet, Rfl: 1    levothyroxine (SYNTHROID) 100 MCG tablet, Take 100 mcg by mouth once daily., Disp: , Rfl:     lidocaine-prilocaine (EMLA) cream, Apply topically as needed. (Patient not taking: Reported on 8/12/2020), Disp: 25 g, Rfl: 2    naproxen (EC NAPROSYN) 500 MG EC tablet, TAKE 1 TABLET BY MOUTH TWICE A DAY AFTER MEALS AS NEEDED, Disp: 60 tablet, Rfl: 0    omeprazole 20 mg TbEC, Take 20 mg by mouth every evening. , Disp: , Rfl:     ondansetron (ZOFRAN) 8 MG tablet, Take 8 mg by mouth every 8 (eight) hours as needed., Disp: , Rfl: 6    predniSONE (DELTASONE) 20 MG tablet, TAKE 5 TABLETS DAILY DAY 1-5 OF CHEMO CYCLE, Disp: , Rfl: 6    promethazine (PHENERGAN) 25 MG tablet, Take 25 mg by mouth every 6 (six) hours as needed., Disp: , Rfl: 6    sulfamethoxazole-trimethoprim 800-160mg (BACTRIM DS) 800-160 mg Tab, TAKE 1 TABLET BY MOUTH 3 (THREE) TIMES A WEEK MONDAY, WEDNESDAY AND FRIDAY., Disp: , Rfl: 2    traMADol (ULTRAM) 50 mg tablet, Take 1 tablet (50 mg total) by mouth every 12 (twelve) hours as needed for Pain. Dispense generic (Patient not taking: Reported on 8/12/2020), Disp: 60 tablet, Rfl: 1    valACYclovir (VALTREX) 500 MG tablet, Take 500 mg by mouth once daily., Disp: , Rfl: 3    Current Facility-Administered Medications:     lidocaine (PF) 10 mg/ml (1%) injection 10 mg, 1 mL, Intradermal, Once, Kermit Martinez MD        Objective:       Physical Examination:     There were no vitals taken for this visit.    Physical Exam  Constitutional:       Appearance: She is well-developed.   HENT:      Head: Normocephalic and atraumatic.      Right Ear: External ear normal.      Left Ear: External ear normal.   Eyes:      Conjunctiva/sclera:  Conjunctivae normal.      Pupils: Pupils are equal, round, and reactive to light.   Neck:      Thyroid: No thyromegaly.      Trachea: No tracheal deviation.   Cardiovascular:      Rate and Rhythm: Normal rate and regular rhythm.      Heart sounds: Normal heart sounds.   Pulmonary:      Effort: Pulmonary effort is normal.      Breath sounds: Normal breath sounds.   Abdominal:      General: Bowel sounds are normal. There is no distension.      Palpations: Abdomen is soft. There is no mass.      Tenderness: There is no abdominal tenderness.   Skin:     Findings: No rash.   Neurological:      Comments: Neuro intact througout   Psychiatric:         Behavior: Behavior normal.         Thought Content: Thought content normal.         Judgment: Judgment normal.          Labs:   No results found for this or any previous visit (from the past 336 hour(s)).  CMP  Sodium   Date Value Ref Range Status   09/21/2020 139 136 - 145 mmol/L Final   06/11/2019 139 134 - 144 mmol/L      Potassium   Date Value Ref Range Status   09/21/2020 4.1 3.5 - 5.1 mmol/L Final     Chloride   Date Value Ref Range Status   09/21/2020 103 95 - 110 mmol/L Final   06/11/2019 104 98 - 110 mmol/L      CO2   Date Value Ref Range Status   09/21/2020 28 23 - 29 mmol/L Final     Glucose   Date Value Ref Range Status   09/21/2020 89 70 - 110 mg/dL Final   06/11/2019 86 70 - 99 mg/dL      BUN, Bld   Date Value Ref Range Status   09/21/2020 15 8 - 23 mg/dL Final     Creatinine   Date Value Ref Range Status   09/21/2020 0.6 0.5 - 1.4 mg/dL Final   06/11/2019 0.72 0.60 - 1.40 mg/dL      Calcium   Date Value Ref Range Status   09/21/2020 9.5 8.7 - 10.5 mg/dL Final     Total Protein   Date Value Ref Range Status   09/21/2020 7.9 6.0 - 8.4 g/dL Final   09/21/2020 7.2 6.0 - 8.5 g/dL Final     Albumin   Date Value Ref Range Status   09/21/2020 4.4 3.5 - 5.2 g/dL Final   06/11/2019 3.8 3.1 - 4.7 g/dL      Total Bilirubin   Date Value Ref Range Status   09/21/2020 1.0 0.1  - 1.0 mg/dL Final     Comment:     For infants and newborns, interpretation of results should be based  on gestational age, weight and in agreement with clinical  observations.  Premature Infant recommended reference ranges:  Up to 24 hours.............<8.0 mg/dL  Up to 48 hours............<12.0 mg/dL  3-5 days..................<15.0 mg/dL  6-29 days.................<15.0 mg/dL       Alkaline Phosphatase   Date Value Ref Range Status   09/21/2020 69 55 - 135 U/L Final     AST   Date Value Ref Range Status   09/21/2020 18 10 - 40 U/L Final     ALT   Date Value Ref Range Status   09/21/2020 15 10 - 44 U/L Final     Anion Gap   Date Value Ref Range Status   09/21/2020 8 8 - 16 mmol/L Final     eGFR if    Date Value Ref Range Status   09/21/2020 >60.0 >60 mL/min/1.73 m^2 Final     eGFR if non    Date Value Ref Range Status   09/21/2020 >60.0 >60 mL/min/1.73 m^2 Final     Comment:     Calculation used to obtain the estimated glomerular filtration  rate (eGFR) is the CKD-EPI equation.        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  She is feeling great.  Plan on PET again in about six months.  Will see her again in four months.          Relevant Orders    CBC auto differential    Comprehensive Metabolic Panel    Immunoglobulin Free LT Chains Blood    Protein Electrophoresis, Serum    Immunoglobulins (IgG, IgA, IgM) Quantitative    MGUS (monoclonal gammopathy of unknown significance)     Labs appear stable at this time. Will continue to monitor this every four months for now then every six.           Relevant Orders    CBC auto differential    Comprehensive Metabolic Panel    Immunoglobulin Free LT Chains Blood    Protein Electrophoresis, Serum    Immunoglobulins (IgG, IgA,  IgM) Quantitative    Cyst of left ovary     Patient is doing well at this time.  She has seen Dr. Jhaveri and this is being observed currently.                 Discussion:     Follow up in about 4 months (around 2/13/2021).      Electronically signed by Pb Fry

## 2020-10-19 ENCOUNTER — OFFICE VISIT (OUTPATIENT)
Dept: RHEUMATOLOGY | Facility: CLINIC | Age: 70
End: 2020-10-19
Payer: MEDICARE

## 2020-10-19 VITALS
DIASTOLIC BLOOD PRESSURE: 78 MMHG | WEIGHT: 150.31 LBS | SYSTOLIC BLOOD PRESSURE: 140 MMHG | TEMPERATURE: 97 F | BODY MASS INDEX: 24.26 KG/M2

## 2020-10-19 DIAGNOSIS — M05.79 RHEUMATOID ARTHRITIS OF MULTIPLE SITES WITHOUT ORGAN OR SYSTEM INVOLVEMENT WITH POSITIVE RHEUMATOID FACTOR: Primary | ICD-10-CM

## 2020-10-19 PROCEDURE — 99213 PR OFFICE/OUTPT VISIT, EST, LEVL III, 20-29 MIN: ICD-10-PCS | Mod: S$GLB,,, | Performed by: INTERNAL MEDICINE

## 2020-10-19 PROCEDURE — 99213 OFFICE O/P EST LOW 20 MIN: CPT | Mod: S$GLB,,, | Performed by: INTERNAL MEDICINE

## 2020-10-19 NOTE — PROGRESS NOTES
[facility] [facility]            PROGRESS NOTE      Subjective:       Patient ID:   NAME: Aurelia Marlow : 1950     70 y.o. female    Referring Doc: No ref. provider found  Other Physicians:    Chief Complaint:  Rheumatoid Arthritis      History of Present Illness:     Patient returns today for a regularly scheduled follow-up visit for  rheumatoid arthritis     The patient is doing well.  No fevers cough or shortness of breath.  No chest pains.  Following CDC guidelines to avoid COVID-19 infection.  No prolonged morning stiffness severe arthralgias or joint swelling.            ROS:   GEN:  No  fever, night sweats . weight is stable   No fatigue  SKIN: no rashes, no bruising, no ulcerations, no Raynaud's  HEENT: no HA's, No visual changes, no mucosal ulcers, no sicca symptoms,  CV:   no CP, SOB, PND, CHAUDHRY, no orthopnea, no palpitations  PULM: normal with no SOB, cough, hemoptysis, sputum or pleuritic pain  GI:  no abdominal pain, nausea, vomiting, constipation, diarrhea, melanotic stools, BRBPR, hematemesis, no dysphagia  :   no dysuria  NEURO: no paresthesias, headaches, visual disturbances, muscle weakness  MUSCULOSKELETAL:no joint swelling, prolonged AM stiffness, no back pain, no muscle pain  Allergies:  Review of patient's allergies indicates:   Allergen Reactions    Contrast media Rash    Gadobutrol Hives, Itching and Swelling       Medications:    Current Outpatient Medications:     cholecalciferol, vitamin D3, (VITAMIN D3) 2,000 unit Tab, , Disp: , Rfl:     diphenhydrAMINE (BENADRYL) 25 mg capsule, Take 1 capsule (25 mg total) by mouth every 6 (six) hours as needed for Itching or Allergies., Disp: 20 capsule, Rfl: 0    duke's soln (benadryl 30 mL, mylanta 30 mL, lidocaine 30 mL, nystatin 30 mL) 120mL, Take 30 mLs by mouth 4 (four) times daily., Disp: , Rfl:     fluticasone propionate (FLONASE) 50 mcg/actuation nasal spray, , Disp: , Rfl:     hydrOXYchloroQUINE (PLAQUENIL) 200 mg tablet,  Take 1 tablet (200 mg total) by mouth 2 (two) times daily., Disp: 180 tablet, Rfl: 1    levothyroxine (SYNTHROID) 100 MCG tablet, Take 100 mcg by mouth once daily., Disp: , Rfl:     lidocaine-prilocaine (EMLA) cream, Apply topically as needed., Disp: 25 g, Rfl: 2    naproxen (EC NAPROSYN) 500 MG EC tablet, TAKE 1 TABLET BY MOUTH TWICE A DAY AFTER MEALS AS NEEDED, Disp: 60 tablet, Rfl: 0    omeprazole 20 mg TbEC, Take 20 mg by mouth every evening. , Disp: , Rfl:     ondansetron (ZOFRAN) 8 MG tablet, Take 8 mg by mouth every 8 (eight) hours as needed., Disp: , Rfl: 6    predniSONE (DELTASONE) 20 MG tablet, TAKE 5 TABLETS DAILY DAY 1-5 OF CHEMO CYCLE, Disp: , Rfl: 6    promethazine (PHENERGAN) 25 MG tablet, Take 25 mg by mouth every 6 (six) hours as needed., Disp: , Rfl: 6    sulfamethoxazole-trimethoprim 800-160mg (BACTRIM DS) 800-160 mg Tab, TAKE 1 TABLET BY MOUTH 3 (THREE) TIMES A WEEK MONDAY, WEDNESDAY AND FRIDAY., Disp: , Rfl: 2    traMADol (ULTRAM) 50 mg tablet, Take 1 tablet (50 mg total) by mouth every 12 (twelve) hours as needed for Pain. Dispense generic, Disp: 60 tablet, Rfl: 1    valACYclovir (VALTREX) 500 MG tablet, Take 500 mg by mouth once daily., Disp: , Rfl: 3    famotidine (PEPCID) 20 MG tablet, Take 1 tablet (20 mg total) by mouth 2 (two) times daily. (Patient not taking: Reported on 8/12/2020), Disp: 20 tablet, Rfl: 0    Current Facility-Administered Medications:     lidocaine (PF) 10 mg/ml (1%) injection 10 mg, 1 mL, Intradermal, Once, Kermit Martinez MD    PMHx/PSHx Updates:      Last Eye Exam 1 year ago.      Objective:     Vitals:  Blood pressure (!) 140/78, temperature 97.3 °F (36.3 °C), weight 68.2 kg (150 lb 4.8 oz).    Physical Examination:   GEN: no apparent distress, comfortable; AAOx3  SKIN: no rashes,no ulceration, no Raynaud's, no petechiae, no SQ nodules,  HEAD: normal  EYES: no pallor, no icterus  NECK: no masses, thyroid normal, trachea midline, no LAD/LN's, supple  CV:  RRR with no murmur; l S1 and S2 reg. ,no gallop no rubs,   CHEST: Normal respiratory effort; CTAB; normal breath sounds; no wheeze or crackles  MUSC/Skeletal: ROM normal; no crepitus; joints without synovitis,  no deformities  No joint swelling or tenderness of PIP, MCP, wrist, elbow, shoulder, or knee joints  EXTREM: no clubbing, cyanosis, l foot edema,normal  pulses   NEURO: grossly intact; motor WNL; AAOx3; ,   PSYCH: normal mood, affect and behavior  LYMPH: normal cervical, supraclavicular          Labs:   Lab Results   Component Value Date    WBC 4.50 09/21/2020    HGB 14.2 09/21/2020    HCT 41.7 09/21/2020    MCV 93 09/21/2020     09/21/2020    CMP  @LASTLAB(NA,K,CL,CO2,GLU,BUN,Creatinine,Calcium,PROT,Albumin,Bilitot,Alkphos,AST,ALT,CRP,ESR,RF,CCP,SONJA,SSA,CPK,uric acid) )@  I have reviewed all available lab results and radiology reports.    Radiology/Diagnostic Studies:        Assessment/Plan:   (1) 70 y.o. female with diagnosis of rheumatoid arthritis.  She is doing well  History of diffuse large cell lymphoma/MGUS;       Eye exam        Discussion:     I have explained all of the above in detail and the patient understands all of the current recommendation(s). I have answered all questions to the best of my ability and to their complete satisfaction.       The patient is to continue with the current management plan         RTC in   4 months      Electronically signed by Adrián Hernandez MD

## 2020-10-27 DIAGNOSIS — N83.202 LEFT OVARIAN CYST: Primary | ICD-10-CM

## 2020-11-05 ENCOUNTER — HOSPITAL ENCOUNTER (OUTPATIENT)
Dept: RADIOLOGY | Facility: HOSPITAL | Age: 70
Discharge: HOME | End: 2020-11-05
Attending: SPECIALIST
Payer: MEDICARE

## 2020-11-05 DIAGNOSIS — N83.202 LEFT OVARIAN CYST: ICD-10-CM

## 2020-11-05 PROCEDURE — 76830 TRANSVAGINAL US NON-OB: CPT | Mod: TC,PO

## 2020-11-05 NOTE — PROGRESS NOTES
[facility] RHEUMATOLOGY            PROGRESS NOTE      Subjective:       Patient ID:   NAME: Aurelia Marlow : 1950     67 y.o. female    Referring Doc: No ref. provider found  Other Physicians:    Chief Complaint:  Rheumatoid Arthritis      History of Present Illness:     Patient returns today for a regularly scheduled follow-up visit for  Rheumatoid arthritis     The patient doing better with an increased dose of methotrexate. No prolonged morning stiffness or joint swelling. No chest pains, cough or shortness of breath        ROS:   GEN:  No  fever, night sweats . weight is stable   No fatigue  SKIN: no rashes, no bruising, no ulcerations, no Raynaud's  HEENT: no HA's, No visual changes, no mucosal ulcers, no sicca symptoms,  CV:   no CP, SOB, PND, CHAUDHRY, no orthopnea, no palpitations  PULM: normal with no SOB, cough, hemoptysis, sputum or pleuritic pain  GI:  no abdominal pain, nausea, vomiting, constipation, diarrhea, melanotic stools, BRBPR, hematemesis, no dysphagia  :   no dysuria  NEURO: no paresthesias, headaches, visual disturbances, muscle weakness  MUSCULOSKELETAL:no joint swelling, prolonged AM stiffness, no back pain, no muscle pain  Allergies:  Review of patient's allergies indicates:  No Known Allergies    Medications:    Current Outpatient Prescriptions:     BIFIDOBACTERIUM INFANTIS (ALIGN ORAL), Take by mouth., Disp: , Rfl:     folic acid (FOLVITE) 1 MG tablet, Take 1 mg by mouth once daily., Disp: , Rfl:     hydroxychloroquine (PLAQUENIL) 200 mg tablet, Take 1 tablet (200 mg total) by mouth 2 (two) times daily., Disp: 180 tablet, Rfl: 1    levothyroxine (SYNTHROID) 100 MCG tablet, Take 100 mcg by mouth once daily., Disp: , Rfl:     methotrexate 2.5 MG Tab, TAKE 8 TABLET BY MOUTH ONCE A WEEK, Disp: 32 tablet, Rfl: 2    naproxen (EC NAPROSYN) 500 MG EC tablet, One po bid pc prn, Disp: 60 tablet, Rfl: 3    omeprazole (PRILOSEC) 10 MG capsule, Take 10 mg by mouth once daily., Disp: ,  Rfl:     PMHx/PSHx Updates:      Objective:     Vitals:  Blood pressure 116/62, weight 66.9 kg (147 lb 6.4 oz).    Physical Examination:   GEN: no apparent distress, comfortable; AAOx3  SKIN: no rashes,no ulceration, no Raynaud's, no petechiae, no SQ nodules,  HEAD: normal  EYES: no pallor, no icterus,  NECK: no masses, thyroid normal, trachea midline, no LAD/LN's, supple  CV: RRR with no murmur; l S1 and S2 reg. ,no gallop no rubs,   CHEST: Normal respiratory effort; CTAB; normal breath sounds; no wheeze or crackles  MUSC/Skeletal: ROM normal; no crepitus; joints without synovitis,  no deformities  No joint swelling or tenderness of PIP, MCP, wrist, elbow, shoulder, or knee joints  EXTREM: no clubbing, cyanosis, no edema,normal  pulses   NEURO: grossly intact; motor WNL; AAOx3; ,   PSYCH: normal mood, affect and behavior  LYMPH: normal cervical, supraclavicular          Labs:   Lab Results   Component Value Date    WBC 4.7 (L) 01/10/2018    HGB 12.8 01/10/2018    HCT 37.2 01/10/2018    MCV 94.4 01/10/2018     01/10/2018    CMP  @LASTLAB(NA,K,CL,CO2,GLU,BUN,Creatinine,Calcium,PROT,Albumin,Bilitot,Alkphos,AST,ALT,CRP,ESR,RF,CCP,SONJA,SSA,CPK,uric acid) )@  I have reviewed all available lab results and radiology reports.    Radiology/Diagnostic Studies:        Assessment/Plan:   (1) 67 y.o. female with diagnosis of Rheumatoid arthritis. Patient is doing well.        CBC CMP CRP        Discussion:     I have explained all of the above in detail and the patient understands all of the current recommendation(s). I have answered all questions to the best of my ability and to their complete satisfaction.       The patient is to continue with the current management plan         RTC in   3 months      Electronically signed by Adrián Hernandez MD         no

## 2020-11-18 ENCOUNTER — INFUSION (OUTPATIENT)
Dept: INFUSION THERAPY | Facility: HOSPITAL | Age: 70
End: 2020-11-18
Attending: INTERNAL MEDICINE
Payer: MEDICARE

## 2020-11-18 VITALS
RESPIRATION RATE: 17 BRPM | OXYGEN SATURATION: 98 % | SYSTOLIC BLOOD PRESSURE: 141 MMHG | WEIGHT: 150.38 LBS | TEMPERATURE: 98 F | HEART RATE: 69 BPM | DIASTOLIC BLOOD PRESSURE: 86 MMHG | BODY MASS INDEX: 24.28 KG/M2

## 2020-11-18 DIAGNOSIS — C83.35 DIFFUSE LARGE B-CELL LYMPHOMA OF LYMPH NODES OF INGUINAL REGION: ICD-10-CM

## 2020-11-18 DIAGNOSIS — D47.2 MGUS (MONOCLONAL GAMMOPATHY OF UNKNOWN SIGNIFICANCE): Primary | ICD-10-CM

## 2020-11-18 PROCEDURE — 96523 IRRIG DRUG DELIVERY DEVICE: CPT

## 2020-11-18 PROCEDURE — A4216 STERILE WATER/SALINE, 10 ML: HCPCS | Performed by: INTERNAL MEDICINE

## 2020-11-18 PROCEDURE — 63600175 PHARM REV CODE 636 W HCPCS: Performed by: INTERNAL MEDICINE

## 2020-11-18 PROCEDURE — 25000003 PHARM REV CODE 250: Performed by: INTERNAL MEDICINE

## 2020-11-18 RX ORDER — SODIUM CHLORIDE 0.9 % (FLUSH) 0.9 %
10 SYRINGE (ML) INJECTION
Status: COMPLETED | OUTPATIENT
Start: 2020-11-18 | End: 2020-11-18

## 2020-11-18 RX ORDER — SODIUM CHLORIDE 0.9 % (FLUSH) 0.9 %
10 SYRINGE (ML) INJECTION
Status: CANCELLED | OUTPATIENT
Start: 2020-11-18

## 2020-11-18 RX ORDER — HEPARIN 100 UNIT/ML
500 SYRINGE INTRAVENOUS
Status: COMPLETED | OUTPATIENT
Start: 2020-11-18 | End: 2020-11-18

## 2020-11-18 RX ORDER — HEPARIN 100 UNIT/ML
500 SYRINGE INTRAVENOUS
Status: CANCELLED | OUTPATIENT
Start: 2020-11-18

## 2020-11-18 RX ADMIN — SODIUM CHLORIDE 10 ML: 9 INJECTION, SOLUTION INTRAMUSCULAR; INTRAVENOUS; SUBCUTANEOUS at 03:11

## 2020-11-18 RX ADMIN — HEPARIN 500 UNITS: 100 SYRINGE at 03:11

## 2020-12-01 ENCOUNTER — PATIENT MESSAGE (OUTPATIENT)
Dept: HEMATOLOGY/ONCOLOGY | Facility: CLINIC | Age: 70
End: 2020-12-01

## 2020-12-28 ENCOUNTER — INFUSION (OUTPATIENT)
Dept: INFUSION THERAPY | Facility: HOSPITAL | Age: 70
End: 2020-12-28
Attending: INTERNAL MEDICINE
Payer: MEDICARE

## 2020-12-28 VITALS
BODY MASS INDEX: 24.34 KG/M2 | DIASTOLIC BLOOD PRESSURE: 87 MMHG | OXYGEN SATURATION: 97 % | HEART RATE: 76 BPM | RESPIRATION RATE: 18 BRPM | SYSTOLIC BLOOD PRESSURE: 143 MMHG | WEIGHT: 150.81 LBS | TEMPERATURE: 97 F

## 2020-12-28 DIAGNOSIS — C83.35 DIFFUSE LARGE B-CELL LYMPHOMA OF LYMPH NODES OF INGUINAL REGION: ICD-10-CM

## 2020-12-28 DIAGNOSIS — D47.2 MGUS (MONOCLONAL GAMMOPATHY OF UNKNOWN SIGNIFICANCE): Primary | ICD-10-CM

## 2020-12-28 PROCEDURE — A4216 STERILE WATER/SALINE, 10 ML: HCPCS | Performed by: INTERNAL MEDICINE

## 2020-12-28 PROCEDURE — 25000003 PHARM REV CODE 250: Performed by: INTERNAL MEDICINE

## 2020-12-28 PROCEDURE — 63600175 PHARM REV CODE 636 W HCPCS: Performed by: INTERNAL MEDICINE

## 2020-12-28 PROCEDURE — 96523 IRRIG DRUG DELIVERY DEVICE: CPT

## 2020-12-28 RX ORDER — HEPARIN 100 UNIT/ML
500 SYRINGE INTRAVENOUS
Status: CANCELLED | OUTPATIENT
Start: 2020-12-28

## 2020-12-28 RX ORDER — SODIUM CHLORIDE 0.9 % (FLUSH) 0.9 %
10 SYRINGE (ML) INJECTION
Status: COMPLETED | OUTPATIENT
Start: 2020-12-28 | End: 2020-12-28

## 2020-12-28 RX ORDER — SODIUM CHLORIDE 0.9 % (FLUSH) 0.9 %
10 SYRINGE (ML) INJECTION
Status: CANCELLED | OUTPATIENT
Start: 2020-12-28

## 2020-12-28 RX ORDER — HEPARIN 100 UNIT/ML
500 SYRINGE INTRAVENOUS
Status: COMPLETED | OUTPATIENT
Start: 2020-12-28 | End: 2020-12-28

## 2020-12-28 RX ADMIN — HEPARIN 500 UNITS: 100 SYRINGE at 03:12

## 2020-12-28 RX ADMIN — SODIUM CHLORIDE 10 ML: 9 INJECTION, SOLUTION INTRAMUSCULAR; INTRAVENOUS; SUBCUTANEOUS at 03:12

## 2021-01-07 ENCOUNTER — PATIENT MESSAGE (OUTPATIENT)
Dept: ADMINISTRATIVE | Facility: OTHER | Age: 71
End: 2021-01-07

## 2021-01-08 ENCOUNTER — TELEPHONE (OUTPATIENT)
Dept: HEMATOLOGY/ONCOLOGY | Facility: CLINIC | Age: 71
End: 2021-01-08

## 2021-01-11 ENCOUNTER — LAB VISIT (OUTPATIENT)
Dept: LAB | Facility: HOSPITAL | Age: 71
End: 2021-01-11
Attending: INTERNAL MEDICINE
Payer: MEDICARE

## 2021-01-11 DIAGNOSIS — E03.9 MYXEDEMA HEART DISEASE: Primary | ICD-10-CM

## 2021-01-11 DIAGNOSIS — I51.9 MYXEDEMA HEART DISEASE: Primary | ICD-10-CM

## 2021-01-11 LAB — TSH SERPL DL<=0.005 MIU/L-ACNC: 1.27 UIU/ML (ref 0.34–5.6)

## 2021-01-11 PROCEDURE — 84443 ASSAY THYROID STIM HORMONE: CPT

## 2021-01-11 PROCEDURE — 36415 COLL VENOUS BLD VENIPUNCTURE: CPT

## 2021-01-12 ENCOUNTER — TELEPHONE (OUTPATIENT)
Dept: FAMILY MEDICINE | Facility: CLINIC | Age: 71
End: 2021-01-12

## 2021-01-13 ENCOUNTER — TELEPHONE (OUTPATIENT)
Dept: FAMILY MEDICINE | Facility: CLINIC | Age: 71
End: 2021-01-13

## 2021-01-13 DIAGNOSIS — Z91.89 AT INCREASED RISK OF EXPOSURE TO COVID-19 VIRUS: Primary | ICD-10-CM

## 2021-01-22 ENCOUNTER — PATIENT MESSAGE (OUTPATIENT)
Dept: ADMINISTRATIVE | Facility: OTHER | Age: 71
End: 2021-01-22

## 2021-02-08 ENCOUNTER — LAB VISIT (OUTPATIENT)
Dept: LAB | Facility: HOSPITAL | Age: 71
End: 2021-02-08
Attending: INTERNAL MEDICINE
Payer: MEDICARE

## 2021-02-08 DIAGNOSIS — D47.2 MGUS (MONOCLONAL GAMMOPATHY OF UNKNOWN SIGNIFICANCE): ICD-10-CM

## 2021-02-08 DIAGNOSIS — C83.35 DIFFUSE LARGE B-CELL LYMPHOMA OF LYMPH NODES OF INGUINAL REGION: ICD-10-CM

## 2021-02-08 LAB
ALBUMIN SERPL BCP-MCNC: 4.4 G/DL (ref 3.5–5.2)
ALP SERPL-CCNC: 64 U/L (ref 55–135)
ALT SERPL W/O P-5'-P-CCNC: 18 U/L (ref 10–44)
ANION GAP SERPL CALC-SCNC: 6 MMOL/L (ref 8–16)
AST SERPL-CCNC: 19 U/L (ref 10–40)
BASOPHILS # BLD AUTO: 0.05 K/UL (ref 0–0.2)
BASOPHILS NFR BLD: 1 % (ref 0–1.9)
BILIRUB SERPL-MCNC: 0.9 MG/DL (ref 0.1–1)
BUN SERPL-MCNC: 17 MG/DL (ref 8–23)
CALCIUM SERPL-MCNC: 8.8 MG/DL (ref 8.7–10.5)
CHLORIDE SERPL-SCNC: 102 MMOL/L (ref 95–110)
CO2 SERPL-SCNC: 27 MMOL/L (ref 23–29)
CREAT SERPL-MCNC: 0.6 MG/DL (ref 0.5–1.4)
DIFFERENTIAL METHOD: ABNORMAL
EOSINOPHIL # BLD AUTO: 0.1 K/UL (ref 0–0.5)
EOSINOPHIL NFR BLD: 2.7 % (ref 0–8)
ERYTHROCYTE [DISTWIDTH] IN BLOOD BY AUTOMATED COUNT: 12.4 % (ref 11.5–14.5)
EST. GFR  (AFRICAN AMERICAN): >60 ML/MIN/1.73 M^2
EST. GFR  (NON AFRICAN AMERICAN): >60 ML/MIN/1.73 M^2
GLUCOSE SERPL-MCNC: 82 MG/DL (ref 70–110)
HCT VFR BLD AUTO: 40.9 % (ref 37–48.5)
HGB BLD-MCNC: 13.5 G/DL (ref 12–16)
IMM GRANULOCYTES # BLD AUTO: 0.02 K/UL (ref 0–0.04)
IMM GRANULOCYTES NFR BLD AUTO: 0.4 % (ref 0–0.5)
LYMPHOCYTES # BLD AUTO: 2 K/UL (ref 1–4.8)
LYMPHOCYTES NFR BLD: 37.3 % (ref 18–48)
MCH RBC QN AUTO: 31.8 PG (ref 27–31)
MCHC RBC AUTO-ENTMCNC: 33 G/DL (ref 32–36)
MCV RBC AUTO: 97 FL (ref 82–98)
MONOCYTES # BLD AUTO: 0.4 K/UL (ref 0.3–1)
MONOCYTES NFR BLD: 7.6 % (ref 4–15)
NEUTROPHILS # BLD AUTO: 2.7 K/UL (ref 1.8–7.7)
NEUTROPHILS NFR BLD: 51 % (ref 38–73)
NRBC BLD-RTO: 0 /100 WBC
PLATELET # BLD AUTO: 185 K/UL (ref 150–350)
PMV BLD AUTO: 8.7 FL (ref 9.2–12.9)
POTASSIUM SERPL-SCNC: 4 MMOL/L (ref 3.5–5.1)
PROT SERPL-MCNC: 8 G/DL (ref 6–8.4)
RBC # BLD AUTO: 4.24 M/UL (ref 4–5.4)
SODIUM SERPL-SCNC: 135 MMOL/L (ref 136–145)
WBC # BLD AUTO: 5.26 K/UL (ref 3.9–12.7)

## 2021-02-08 PROCEDURE — 80053 COMPREHEN METABOLIC PANEL: CPT

## 2021-02-08 PROCEDURE — 84165 PROTEIN E-PHORESIS SERUM: CPT

## 2021-02-08 PROCEDURE — 85025 COMPLETE CBC W/AUTO DIFF WBC: CPT

## 2021-02-08 PROCEDURE — 83883 ASSAY NEPHELOMETRY NOT SPEC: CPT

## 2021-02-08 PROCEDURE — 36415 COLL VENOUS BLD VENIPUNCTURE: CPT

## 2021-02-08 PROCEDURE — 82784 ASSAY IGA/IGD/IGG/IGM EACH: CPT | Mod: 91

## 2021-02-10 ENCOUNTER — OFFICE VISIT (OUTPATIENT)
Dept: HEMATOLOGY/ONCOLOGY | Facility: CLINIC | Age: 71
End: 2021-02-10
Payer: MEDICARE

## 2021-02-10 VITALS
HEART RATE: 76 BPM | DIASTOLIC BLOOD PRESSURE: 84 MMHG | SYSTOLIC BLOOD PRESSURE: 146 MMHG | WEIGHT: 149.81 LBS | BODY MASS INDEX: 24.18 KG/M2 | RESPIRATION RATE: 18 BRPM | TEMPERATURE: 98 F

## 2021-02-10 DIAGNOSIS — D47.2 MGUS (MONOCLONAL GAMMOPATHY OF UNKNOWN SIGNIFICANCE): ICD-10-CM

## 2021-02-10 DIAGNOSIS — C83.35 DIFFUSE LARGE B-CELL LYMPHOMA OF LYMPH NODES OF INGUINAL REGION: ICD-10-CM

## 2021-02-10 LAB
IGA SERPL-MCNC: 147 MG/DL (ref 87–352)
IGG SERPL-MCNC: 1778 MG/DL (ref 586–1602)
IGM SERPL-MCNC: 67 MG/DL (ref 26–217)

## 2021-02-10 PROCEDURE — 99213 OFFICE O/P EST LOW 20 MIN: CPT | Mod: S$GLB,,, | Performed by: INTERNAL MEDICINE

## 2021-02-10 PROCEDURE — 99213 PR OFFICE/OUTPT VISIT, EST, LEVL III, 20-29 MIN: ICD-10-PCS | Mod: S$GLB,,, | Performed by: INTERNAL MEDICINE

## 2021-02-11 LAB
ALBUMIN SERPL ELPH-MCNC: 3.7 G/DL (ref 2.9–4.4)
ALBUMIN/GLOB SERPL: 1.1 {RATIO} (ref 0.7–1.7)
ALPHA1 GLOB SERPL ELPH-MCNC: 0.2 G/DL (ref 0–0.4)
ALPHA2 GLOB SERPL ELPH-MCNC: 0.6 G/DL (ref 0.4–1)
B-GLOBULIN SERPL ELPH-MCNC: 1 G/DL (ref 0.7–1.3)
GAMMA GLOB SERPL ELPH-MCNC: 1.6 G/DL (ref 0.4–1.8)
GLOBULIN SER CALC-MCNC: 3.3 G/DL (ref 2.2–3.9)
LABORATORY COMMENT REPORT: ABNORMAL
M PROTEIN SERPL ELPH-MCNC: 1.2 G/DL
PROT SERPL-MCNC: 7 G/DL (ref 6–8.5)

## 2021-02-12 ENCOUNTER — INFUSION (OUTPATIENT)
Dept: INFUSION THERAPY | Facility: HOSPITAL | Age: 71
End: 2021-02-12
Attending: INTERNAL MEDICINE
Payer: MEDICARE

## 2021-02-12 VITALS
HEART RATE: 92 BPM | TEMPERATURE: 97 F | BODY MASS INDEX: 24.65 KG/M2 | RESPIRATION RATE: 18 BRPM | OXYGEN SATURATION: 97 % | WEIGHT: 152.69 LBS | DIASTOLIC BLOOD PRESSURE: 74 MMHG | SYSTOLIC BLOOD PRESSURE: 110 MMHG

## 2021-02-12 DIAGNOSIS — D47.2 MGUS (MONOCLONAL GAMMOPATHY OF UNKNOWN SIGNIFICANCE): Primary | ICD-10-CM

## 2021-02-12 DIAGNOSIS — C83.35 DIFFUSE LARGE B-CELL LYMPHOMA OF LYMPH NODES OF INGUINAL REGION: ICD-10-CM

## 2021-02-12 PROCEDURE — A4216 STERILE WATER/SALINE, 10 ML: HCPCS | Performed by: INTERNAL MEDICINE

## 2021-02-12 PROCEDURE — 25000003 PHARM REV CODE 250: Performed by: INTERNAL MEDICINE

## 2021-02-12 PROCEDURE — 63600175 PHARM REV CODE 636 W HCPCS: Performed by: INTERNAL MEDICINE

## 2021-02-12 PROCEDURE — 96523 IRRIG DRUG DELIVERY DEVICE: CPT

## 2021-02-12 RX ORDER — HEPARIN 100 UNIT/ML
500 SYRINGE INTRAVENOUS
Status: COMPLETED | OUTPATIENT
Start: 2021-02-12 | End: 2021-02-12

## 2021-02-12 RX ORDER — SODIUM CHLORIDE 0.9 % (FLUSH) 0.9 %
10 SYRINGE (ML) INJECTION
Status: CANCELLED | OUTPATIENT
Start: 2021-02-12

## 2021-02-12 RX ORDER — SODIUM CHLORIDE 0.9 % (FLUSH) 0.9 %
10 SYRINGE (ML) INJECTION
Status: COMPLETED | OUTPATIENT
Start: 2021-02-12 | End: 2021-02-12

## 2021-02-12 RX ORDER — HEPARIN 100 UNIT/ML
500 SYRINGE INTRAVENOUS
Status: CANCELLED | OUTPATIENT
Start: 2021-02-12

## 2021-02-12 RX ADMIN — SODIUM CHLORIDE, PRESERVATIVE FREE 10 ML: 5 INJECTION INTRAVENOUS at 08:02

## 2021-02-12 RX ADMIN — HEPARIN 500 UNITS: 100 SYRINGE at 08:02

## 2021-02-22 ENCOUNTER — OFFICE VISIT (OUTPATIENT)
Dept: RHEUMATOLOGY | Facility: CLINIC | Age: 71
End: 2021-02-22
Payer: MEDICARE

## 2021-02-22 VITALS
TEMPERATURE: 98 F | SYSTOLIC BLOOD PRESSURE: 136 MMHG | WEIGHT: 153.31 LBS | BODY MASS INDEX: 24.74 KG/M2 | DIASTOLIC BLOOD PRESSURE: 84 MMHG

## 2021-02-22 DIAGNOSIS — M05.739 RHEUMATOID ARTHRITIS INVOLVING WRIST WITH POSITIVE RHEUMATOID FACTOR, UNSPECIFIED LATERALITY: Primary | ICD-10-CM

## 2021-02-22 DIAGNOSIS — M85.89 OSTEOPENIA OF MULTIPLE SITES: ICD-10-CM

## 2021-02-22 PROCEDURE — 99213 OFFICE O/P EST LOW 20 MIN: CPT | Mod: S$GLB,,, | Performed by: INTERNAL MEDICINE

## 2021-02-22 PROCEDURE — 99213 PR OFFICE/OUTPT VISIT, EST, LEVL III, 20-29 MIN: ICD-10-PCS | Mod: S$GLB,,, | Performed by: INTERNAL MEDICINE

## 2021-03-19 LAB
KAPPA LC FREE SER-MCNC: 18.8 MG/L (ref 3.3–19.4)
KAPPA LC FREE/LAMBDA FREE SER: NORMAL {RATIO}
LAMBDA LC FREE SERPL-MCNC: 11.4 MG/L (ref 5.7–26.3)

## 2021-03-22 ENCOUNTER — INFUSION (OUTPATIENT)
Dept: INFUSION THERAPY | Facility: HOSPITAL | Age: 71
End: 2021-03-22
Attending: INTERNAL MEDICINE
Payer: MEDICARE

## 2021-03-22 VITALS
RESPIRATION RATE: 18 BRPM | HEART RATE: 68 BPM | TEMPERATURE: 98 F | WEIGHT: 152.19 LBS | DIASTOLIC BLOOD PRESSURE: 83 MMHG | OXYGEN SATURATION: 99 % | SYSTOLIC BLOOD PRESSURE: 128 MMHG | BODY MASS INDEX: 24.57 KG/M2

## 2021-03-22 DIAGNOSIS — C83.35 DIFFUSE LARGE B-CELL LYMPHOMA OF LYMPH NODES OF INGUINAL REGION: ICD-10-CM

## 2021-03-22 DIAGNOSIS — D47.2 MGUS (MONOCLONAL GAMMOPATHY OF UNKNOWN SIGNIFICANCE): Primary | ICD-10-CM

## 2021-03-22 PROCEDURE — 96523 IRRIG DRUG DELIVERY DEVICE: CPT

## 2021-03-22 PROCEDURE — 63600175 PHARM REV CODE 636 W HCPCS: Performed by: INTERNAL MEDICINE

## 2021-03-22 PROCEDURE — A4216 STERILE WATER/SALINE, 10 ML: HCPCS | Performed by: INTERNAL MEDICINE

## 2021-03-22 PROCEDURE — 25000003 PHARM REV CODE 250: Performed by: INTERNAL MEDICINE

## 2021-03-22 RX ORDER — SODIUM CHLORIDE 0.9 % (FLUSH) 0.9 %
10 SYRINGE (ML) INJECTION
Status: CANCELLED | OUTPATIENT
Start: 2021-03-22

## 2021-03-22 RX ORDER — HEPARIN 100 UNIT/ML
500 SYRINGE INTRAVENOUS
Status: CANCELLED | OUTPATIENT
Start: 2021-03-22

## 2021-03-22 RX ORDER — SODIUM CHLORIDE 0.9 % (FLUSH) 0.9 %
10 SYRINGE (ML) INJECTION
Status: COMPLETED | OUTPATIENT
Start: 2021-03-22 | End: 2021-03-22

## 2021-03-22 RX ORDER — HEPARIN 100 UNIT/ML
500 SYRINGE INTRAVENOUS
Status: COMPLETED | OUTPATIENT
Start: 2021-03-22 | End: 2021-03-22

## 2021-03-22 RX ADMIN — SODIUM CHLORIDE, PRESERVATIVE FREE 10 ML: 5 INJECTION INTRAVENOUS at 11:03

## 2021-03-22 RX ADMIN — HEPARIN 500 UNITS: 100 SYRINGE at 11:03

## 2021-04-07 ENCOUNTER — OFFICE VISIT (OUTPATIENT)
Dept: ORTHOPEDICS | Facility: CLINIC | Age: 71
End: 2021-04-07
Payer: MEDICARE

## 2021-04-07 VITALS
DIASTOLIC BLOOD PRESSURE: 82 MMHG | WEIGHT: 152 LBS | HEART RATE: 78 BPM | SYSTOLIC BLOOD PRESSURE: 124 MMHG | BODY MASS INDEX: 24.43 KG/M2 | HEIGHT: 66 IN

## 2021-04-07 DIAGNOSIS — M51.36 DEGENERATIVE LUMBAR DISC: ICD-10-CM

## 2021-04-07 DIAGNOSIS — M41.26 OTHER IDIOPATHIC SCOLIOSIS, LUMBAR REGION: Primary | ICD-10-CM

## 2021-04-07 DIAGNOSIS — Z85.72 HISTORY OF LYMPHOMA: ICD-10-CM

## 2021-04-07 PROCEDURE — 99203 PR OFFICE/OUTPT VISIT, NEW, LEVL III, 30-44 MIN: ICD-10-PCS | Mod: S$GLB,,, | Performed by: ORTHOPAEDIC SURGERY

## 2021-04-07 PROCEDURE — 99203 OFFICE O/P NEW LOW 30 MIN: CPT | Mod: S$GLB,,, | Performed by: ORTHOPAEDIC SURGERY

## 2021-04-29 ENCOUNTER — PATIENT MESSAGE (OUTPATIENT)
Dept: RESEARCH | Facility: HOSPITAL | Age: 71
End: 2021-04-29

## 2021-05-11 ENCOUNTER — INFUSION (OUTPATIENT)
Dept: INFUSION THERAPY | Facility: HOSPITAL | Age: 71
End: 2021-05-11
Attending: INTERNAL MEDICINE
Payer: MEDICARE

## 2021-05-11 VITALS
DIASTOLIC BLOOD PRESSURE: 64 MMHG | WEIGHT: 156 LBS | HEIGHT: 66 IN | BODY MASS INDEX: 25.07 KG/M2 | RESPIRATION RATE: 20 BRPM | HEART RATE: 85 BPM | TEMPERATURE: 98 F | OXYGEN SATURATION: 97 % | SYSTOLIC BLOOD PRESSURE: 118 MMHG

## 2021-05-11 DIAGNOSIS — D47.2 MGUS (MONOCLONAL GAMMOPATHY OF UNKNOWN SIGNIFICANCE): Primary | ICD-10-CM

## 2021-05-11 DIAGNOSIS — C83.35 DIFFUSE LARGE B-CELL LYMPHOMA OF LYMPH NODES OF INGUINAL REGION: ICD-10-CM

## 2021-05-11 PROCEDURE — A4216 STERILE WATER/SALINE, 10 ML: HCPCS | Performed by: INTERNAL MEDICINE

## 2021-05-11 PROCEDURE — 63600175 PHARM REV CODE 636 W HCPCS: Performed by: INTERNAL MEDICINE

## 2021-05-11 PROCEDURE — 96523 IRRIG DRUG DELIVERY DEVICE: CPT

## 2021-05-11 PROCEDURE — 25000003 PHARM REV CODE 250: Performed by: INTERNAL MEDICINE

## 2021-05-11 RX ORDER — HEPARIN 100 UNIT/ML
500 SYRINGE INTRAVENOUS
Status: COMPLETED | OUTPATIENT
Start: 2021-05-11 | End: 2021-05-11

## 2021-05-11 RX ORDER — SODIUM CHLORIDE 0.9 % (FLUSH) 0.9 %
10 SYRINGE (ML) INJECTION
Status: COMPLETED | OUTPATIENT
Start: 2021-05-11 | End: 2021-05-11

## 2021-05-11 RX ORDER — HEPARIN 100 UNIT/ML
500 SYRINGE INTRAVENOUS
Status: CANCELLED | OUTPATIENT
Start: 2021-05-11

## 2021-05-11 RX ORDER — SODIUM CHLORIDE 0.9 % (FLUSH) 0.9 %
10 SYRINGE (ML) INJECTION
Status: CANCELLED | OUTPATIENT
Start: 2021-05-11

## 2021-05-11 RX ADMIN — SODIUM CHLORIDE, PRESERVATIVE FREE 10 ML: 5 INJECTION INTRAVENOUS at 02:05

## 2021-05-11 RX ADMIN — HEPARIN 500 UNITS: 100 SYRINGE at 02:05

## 2021-05-27 ENCOUNTER — HOSPITAL ENCOUNTER (OUTPATIENT)
Dept: RADIOLOGY | Facility: HOSPITAL | Age: 71
Discharge: HOME | End: 2021-05-27
Attending: INTERNAL MEDICINE
Payer: MEDICARE

## 2021-05-27 VITALS — BODY MASS INDEX: 23.78 KG/M2 | HEIGHT: 66 IN | WEIGHT: 148 LBS

## 2021-05-27 DIAGNOSIS — C83.35 DIFFUSE LARGE B-CELL LYMPHOMA OF LYMPH NODES OF INGUINAL REGION: ICD-10-CM

## 2021-05-27 LAB — GLUCOSE SERPL-MCNC: 88 MG/DL (ref 70–110)

## 2021-05-27 PROCEDURE — 78815 PET IMAGE W/CT SKULL-THIGH: CPT | Mod: TC,PO,PS

## 2021-05-27 PROCEDURE — 82962 GLUCOSE BLOOD TEST: CPT | Mod: PO

## 2021-06-07 DIAGNOSIS — Z12.31 ENCOUNTER FOR SCREENING MAMMOGRAM FOR MALIGNANT NEOPLASM OF BREAST: Primary | ICD-10-CM

## 2021-06-08 ENCOUNTER — TELEPHONE (OUTPATIENT)
Dept: CARDIOLOGY | Facility: CLINIC | Age: 71
End: 2021-06-08

## 2021-06-09 ENCOUNTER — OFFICE VISIT (OUTPATIENT)
Dept: HEMATOLOGY/ONCOLOGY | Facility: CLINIC | Age: 71
End: 2021-06-09
Payer: MEDICARE

## 2021-06-09 VITALS
TEMPERATURE: 98 F | SYSTOLIC BLOOD PRESSURE: 145 MMHG | HEART RATE: 73 BPM | RESPIRATION RATE: 18 BRPM | WEIGHT: 151.38 LBS | BODY MASS INDEX: 24.44 KG/M2 | DIASTOLIC BLOOD PRESSURE: 85 MMHG

## 2021-06-09 DIAGNOSIS — C83.35 DIFFUSE LARGE B-CELL LYMPHOMA OF LYMPH NODES OF INGUINAL REGION: ICD-10-CM

## 2021-06-09 DIAGNOSIS — D47.2 MGUS (MONOCLONAL GAMMOPATHY OF UNKNOWN SIGNIFICANCE): ICD-10-CM

## 2021-06-09 PROCEDURE — 99213 OFFICE O/P EST LOW 20 MIN: CPT | Mod: S$GLB,,, | Performed by: INTERNAL MEDICINE

## 2021-06-09 PROCEDURE — 99213 PR OFFICE/OUTPT VISIT, EST, LEVL III, 20-29 MIN: ICD-10-PCS | Mod: S$GLB,,, | Performed by: INTERNAL MEDICINE

## 2021-06-21 ENCOUNTER — INFUSION (OUTPATIENT)
Dept: INFUSION THERAPY | Facility: HOSPITAL | Age: 71
End: 2021-06-21
Attending: INTERNAL MEDICINE
Payer: MEDICARE

## 2021-06-21 VITALS
RESPIRATION RATE: 16 BRPM | HEART RATE: 80 BPM | WEIGHT: 153.88 LBS | TEMPERATURE: 98 F | DIASTOLIC BLOOD PRESSURE: 84 MMHG | BODY MASS INDEX: 24.73 KG/M2 | HEIGHT: 66 IN | SYSTOLIC BLOOD PRESSURE: 130 MMHG

## 2021-06-21 DIAGNOSIS — C83.35 DIFFUSE LARGE B-CELL LYMPHOMA OF LYMPH NODES OF INGUINAL REGION: ICD-10-CM

## 2021-06-21 DIAGNOSIS — D47.2 MGUS (MONOCLONAL GAMMOPATHY OF UNKNOWN SIGNIFICANCE): Primary | ICD-10-CM

## 2021-06-21 PROCEDURE — 96523 IRRIG DRUG DELIVERY DEVICE: CPT

## 2021-06-21 PROCEDURE — 63600175 PHARM REV CODE 636 W HCPCS: Performed by: INTERNAL MEDICINE

## 2021-06-21 RX ORDER — HEPARIN 100 UNIT/ML
500 SYRINGE INTRAVENOUS
Status: CANCELLED | OUTPATIENT
Start: 2021-06-21

## 2021-06-21 RX ORDER — SODIUM CHLORIDE 0.9 % (FLUSH) 0.9 %
10 SYRINGE (ML) INJECTION
Status: CANCELLED | OUTPATIENT
Start: 2021-06-21

## 2021-06-21 RX ORDER — HEPARIN 100 UNIT/ML
500 SYRINGE INTRAVENOUS
Status: COMPLETED | OUTPATIENT
Start: 2021-06-21 | End: 2021-06-21

## 2021-06-21 RX ADMIN — HEPARIN 500 UNITS: 100 SYRINGE at 01:06

## 2021-06-22 ENCOUNTER — OFFICE VISIT (OUTPATIENT)
Dept: RHEUMATOLOGY | Facility: CLINIC | Age: 71
End: 2021-06-22
Payer: MEDICARE

## 2021-06-22 VITALS — WEIGHT: 154.31 LBS | BODY MASS INDEX: 24.9 KG/M2 | DIASTOLIC BLOOD PRESSURE: 85 MMHG | SYSTOLIC BLOOD PRESSURE: 131 MMHG

## 2021-06-22 DIAGNOSIS — E78.00 HYPERCHOLESTEROLEMIA: ICD-10-CM

## 2021-06-22 DIAGNOSIS — M05.739 RHEUMATOID ARTHRITIS INVOLVING WRIST WITH POSITIVE RHEUMATOID FACTOR, UNSPECIFIED LATERALITY: Primary | ICD-10-CM

## 2021-06-22 PROCEDURE — 99213 OFFICE O/P EST LOW 20 MIN: CPT | Mod: S$GLB,,, | Performed by: INTERNAL MEDICINE

## 2021-06-22 PROCEDURE — 99213 PR OFFICE/OUTPT VISIT, EST, LEVL III, 20-29 MIN: ICD-10-PCS | Mod: S$GLB,,, | Performed by: INTERNAL MEDICINE

## 2021-06-23 ENCOUNTER — HOSPITAL ENCOUNTER (OUTPATIENT)
Dept: RADIOLOGY | Facility: HOSPITAL | Age: 71
Discharge: HOME | End: 2021-06-23
Attending: SPECIALIST
Payer: MEDICARE

## 2021-06-23 ENCOUNTER — HOSPITAL ENCOUNTER (OUTPATIENT)
Dept: RADIOLOGY | Facility: HOSPITAL | Age: 71
Discharge: HOME | End: 2021-06-23
Attending: INTERNAL MEDICINE
Payer: MEDICARE

## 2021-06-23 DIAGNOSIS — M85.89 OSTEOPENIA OF MULTIPLE SITES: ICD-10-CM

## 2021-06-23 DIAGNOSIS — M05.739 RHEUMATOID ARTHRITIS INVOLVING WRIST WITH POSITIVE RHEUMATOID FACTOR, UNSPECIFIED LATERALITY: ICD-10-CM

## 2021-06-23 DIAGNOSIS — Z12.31 ENCOUNTER FOR SCREENING MAMMOGRAM FOR MALIGNANT NEOPLASM OF BREAST: ICD-10-CM

## 2021-06-23 PROCEDURE — 77080 DXA BONE DENSITY AXIAL: CPT | Mod: TC,PO

## 2021-06-23 PROCEDURE — 77067 SCR MAMMO BI INCL CAD: CPT | Mod: TC,PO

## 2021-07-07 ENCOUNTER — OFFICE VISIT (OUTPATIENT)
Dept: CARDIOLOGY | Facility: CLINIC | Age: 71
End: 2021-07-07
Payer: MEDICARE

## 2021-07-07 VITALS
BODY MASS INDEX: 24.82 KG/M2 | HEART RATE: 81 BPM | DIASTOLIC BLOOD PRESSURE: 88 MMHG | OXYGEN SATURATION: 96 % | WEIGHT: 153.81 LBS | SYSTOLIC BLOOD PRESSURE: 158 MMHG

## 2021-07-07 DIAGNOSIS — R59.0 INGUINAL ADENOPATHY: ICD-10-CM

## 2021-07-07 DIAGNOSIS — I11.9 HYPERTENSIVE HEART DISEASE WITHOUT HEART FAILURE: ICD-10-CM

## 2021-07-07 DIAGNOSIS — N83.202 CYST OF LEFT OVARY: ICD-10-CM

## 2021-07-07 DIAGNOSIS — Z95.5 PRESENCE OF CORONARY ANGIOPLASTY IMPLANT AND GRAFT: ICD-10-CM

## 2021-07-07 DIAGNOSIS — I10 HYPERTENSION, UNSPECIFIED TYPE: Primary | ICD-10-CM

## 2021-07-07 DIAGNOSIS — C83.35 DIFFUSE LARGE B-CELL LYMPHOMA OF LYMPH NODES OF INGUINAL REGION: ICD-10-CM

## 2021-07-07 DIAGNOSIS — M19.032 ARTHRITIS OF LEFT WRIST: ICD-10-CM

## 2021-07-07 DIAGNOSIS — R03.0 ELEVATED BLOOD PRESSURE, SITUATIONAL: ICD-10-CM

## 2021-07-07 PROCEDURE — 93005 EKG 12-LEAD: ICD-10-PCS | Mod: S$GLB,,, | Performed by: INTERNAL MEDICINE

## 2021-07-07 PROCEDURE — 93010 EKG 12-LEAD: ICD-10-PCS | Mod: S$PBB,,, | Performed by: GENERAL PRACTICE

## 2021-07-07 PROCEDURE — 93010 ELECTROCARDIOGRAM REPORT: CPT | Mod: S$PBB,,, | Performed by: GENERAL PRACTICE

## 2021-07-07 PROCEDURE — 99214 PR OFFICE/OUTPT VISIT, EST, LEVL IV, 30-39 MIN: ICD-10-PCS | Mod: S$GLB,,, | Performed by: INTERNAL MEDICINE

## 2021-07-07 PROCEDURE — 99214 OFFICE O/P EST MOD 30 MIN: CPT | Mod: S$GLB,,, | Performed by: INTERNAL MEDICINE

## 2021-07-07 PROCEDURE — 93005 ELECTROCARDIOGRAM TRACING: CPT | Mod: S$GLB,,, | Performed by: INTERNAL MEDICINE

## 2021-07-14 ENCOUNTER — TELEPHONE (OUTPATIENT)
Dept: CARDIOLOGY | Facility: CLINIC | Age: 71
End: 2021-07-14

## 2021-07-16 ENCOUNTER — HOSPITAL ENCOUNTER (OUTPATIENT)
Dept: CARDIOLOGY | Facility: CLINIC | Age: 71
Discharge: HOME | End: 2021-07-16
Attending: INTERNAL MEDICINE
Payer: MEDICARE

## 2021-07-16 ENCOUNTER — HOSPITAL ENCOUNTER (OUTPATIENT)
Dept: RADIOLOGY | Facility: CLINIC | Age: 71
Discharge: HOME | End: 2021-07-16
Attending: INTERNAL MEDICINE
Payer: MEDICARE

## 2021-07-16 VITALS — HEIGHT: 66 IN | WEIGHT: 153 LBS | BODY MASS INDEX: 24.59 KG/M2

## 2021-07-16 DIAGNOSIS — Z95.5 PRESENCE OF CORONARY ANGIOPLASTY IMPLANT AND GRAFT: ICD-10-CM

## 2021-07-16 DIAGNOSIS — I11.9 HYPERTENSIVE HEART DISEASE WITHOUT HEART FAILURE: ICD-10-CM

## 2021-07-16 DIAGNOSIS — R03.0 ELEVATED BLOOD PRESSURE, SITUATIONAL: ICD-10-CM

## 2021-07-16 LAB
AORTIC VALVE CUSP SEPERATION: 2 CM
AV INDEX (PROSTH): 0.77
AV MEAN GRADIENT: 3 MMHG
AV PEAK GRADIENT: 3 MMHG
AV VALVE AREA: 2.65 CM2
AV VELOCITY RATIO: 0.86
BSA FOR ECHO PROCEDURE: 1.8 M2
CV ECHO LV RWT: 0.74 CM
CV STRESS BASE HR: 69 BPM
DIASTOLIC BLOOD PRESSURE: 76 MMHG
DOP CALC AO PEAK VEL: 0.86 M/S
DOP CALC AO VTI: 18 CM
DOP CALC LVOT AREA: 3.5 CM2
DOP CALC LVOT DIAMETER: 2.1 CM
DOP CALC LVOT PEAK VEL: 0.74 M/S
DOP CALC LVOT STROKE VOLUME: 47.77 CM3
DOP CALCLVOT PEAK VEL VTI: 13.8 CM
E WAVE DECELERATION TIME: 158 MS
E/A RATIO: 0.81
E/E' RATIO: 8.75 M/S
ECHO LV POSTERIOR WALL: 1.19 CM (ref 0.6–1.1)
EJECTION FRACTION- HIGH: 65 %
EJECTION FRACTION: 65 %
END DIASTOLIC INDEX-HIGH: 158 ML/M2
END DIASTOLIC INDEX-LOW: 94 ML/M2
END SYSTOLIC INDEX-HIGH: 71 ML/M2
END SYSTOLIC INDEX-LOW: 33 ML/M2
FRACTIONAL SHORTENING: 35 % (ref 28–44)
INTERVENTRICULAR SEPTUM: 0.9 CM (ref 0.6–1.1)
IVRT: 116 MS
LEFT ATRIUM SIZE: 3.1 CM
LEFT INTERNAL DIMENSION IN SYSTOLE: 2.09 CM (ref 2.1–4)
LEFT VENTRICLE MASS INDEX: 54 G/M2
LEFT VENTRICULAR INTERNAL DIMENSION IN DIASTOLE: 3.2 CM (ref 3.5–6)
LEFT VENTRICULAR MASS: 96.5 G
LV LATERAL E/E' RATIO: 8.75 M/S
LV SEPTAL E/E' RATIO: 8.75 M/S
MV PEAK A VEL: 0.86 M/S
MV PEAK E VEL: 0.7 M/S
MV STENOSIS PRESSURE HALF TIME: 70 MS
MV VALVE AREA P 1/2 METHOD: 3.14 CM2
NUC STRESS DIASTOLIC VOLUME INDEX: 46
NUC STRESS EJECTION FRACTION: 76 %
NUC STRESS SYSTOLIC VOLUME INDEX: 11
OHS CV CPX 1 MINUTE RECOVERY HEART RATE: 132 BPM
OHS CV CPX 85 PERCENT MAX PREDICTED HEART RATE MALE: 124
OHS CV CPX ESTIMATED METS: 7
OHS CV CPX MAX PREDICTED HEART RATE: 146
OHS CV CPX PATIENT IS FEMALE: 1
OHS CV CPX PATIENT IS MALE: 0
OHS CV CPX PEAK DIASTOLIC BLOOD PRESSURE: 88 MMHG
OHS CV CPX PEAK HEAR RATE: 152 BPM
OHS CV CPX PEAK RATE PRESSURE PRODUCT: NORMAL
OHS CV CPX PEAK SYSTOLIC BLOOD PRESSURE: 162 MMHG
OHS CV CPX PERCENT MAX PREDICTED HEART RATE ACHIEVED: 104
OHS CV CPX RATE PRESSURE PRODUCT PRESENTING: 8418
PISA TR MAX VEL: 2.28 M/S
RETIRED EF AND QEF - SEE NOTES: 53 %
RIGHT VENTRICULAR END-DIASTOLIC DIMENSION: 2.27 CM
STRESS ECHO POST EXERCISE DUR MIN: 4 MINUTES
STRESS ECHO POST EXERCISE DUR SEC: 16 SECONDS
SYSTOLIC BLOOD PRESSURE: 122 MMHG
TDI LATERAL: 0.08 M/S
TDI SEPTAL: 0.08 M/S
TDI: 0.08 M/S
TR MAX PG: 21 MMHG

## 2021-07-16 PROCEDURE — 78452 STRESS TEST WITH MYOCARDIAL PERFUSION (CUPID ONLY): ICD-10-PCS | Mod: S$GLB,,, | Performed by: INTERNAL MEDICINE

## 2021-07-16 PROCEDURE — 93306 TTE W/DOPPLER COMPLETE: CPT | Mod: S$GLB,,, | Performed by: INTERNAL MEDICINE

## 2021-07-16 PROCEDURE — 93015 STRESS TEST WITH MYOCARDIAL PERFUSION (CUPID ONLY): ICD-10-PCS | Mod: S$GLB,,, | Performed by: INTERNAL MEDICINE

## 2021-07-16 PROCEDURE — A9502 TC99M TETROFOSMIN: HCPCS | Mod: S$GLB,,, | Performed by: INTERNAL MEDICINE

## 2021-07-16 PROCEDURE — 93306 ECHO (CUPID ONLY): ICD-10-PCS | Mod: S$GLB,,, | Performed by: INTERNAL MEDICINE

## 2021-07-16 PROCEDURE — 93015 CV STRESS TEST SUPVJ I&R: CPT | Mod: S$GLB,,, | Performed by: INTERNAL MEDICINE

## 2021-07-16 PROCEDURE — 78452 HT MUSCLE IMAGE SPECT MULT: CPT | Mod: S$GLB,,, | Performed by: INTERNAL MEDICINE

## 2021-07-16 PROCEDURE — A9502 STRESS TEST WITH MYOCARDIAL PERFUSION (CUPID ONLY): ICD-10-PCS | Mod: S$GLB,,, | Performed by: INTERNAL MEDICINE

## 2021-07-21 RX ORDER — SODIUM CHLORIDE 0.9 % (FLUSH) 0.9 %
10 SYRINGE (ML) INJECTION
Status: CANCELLED | OUTPATIENT
Start: 2021-07-21

## 2021-07-21 RX ORDER — HEPARIN 100 UNIT/ML
500 SYRINGE INTRAVENOUS
Status: CANCELLED | OUTPATIENT
Start: 2021-07-21

## 2021-07-26 ENCOUNTER — LAB VISIT (OUTPATIENT)
Dept: LAB | Facility: HOSPITAL | Age: 71
End: 2021-07-26
Attending: INTERNAL MEDICINE
Payer: MEDICARE

## 2021-07-26 DIAGNOSIS — E78.00 HYPERCHOLESTEROLEMIA: ICD-10-CM

## 2021-07-26 DIAGNOSIS — M05.739 RHEUMATOID ARTHRITIS INVOLVING WRIST WITH POSITIVE RHEUMATOID FACTOR, UNSPECIFIED LATERALITY: ICD-10-CM

## 2021-07-26 LAB
ALBUMIN SERPL BCP-MCNC: 3.9 G/DL (ref 3.5–5.2)
ALP SERPL-CCNC: 56 U/L (ref 55–135)
ALT SERPL W/O P-5'-P-CCNC: 16 U/L (ref 10–44)
ANION GAP SERPL CALC-SCNC: 7 MMOL/L (ref 8–16)
AST SERPL-CCNC: 16 U/L (ref 10–40)
BASOPHILS # BLD AUTO: 0.05 K/UL (ref 0–0.2)
BASOPHILS NFR BLD: 1 % (ref 0–1.9)
BILIRUB SERPL-MCNC: 0.6 MG/DL (ref 0.1–1)
BUN SERPL-MCNC: 16 MG/DL (ref 8–23)
CALCIUM SERPL-MCNC: 9.2 MG/DL (ref 8.7–10.5)
CHLORIDE SERPL-SCNC: 107 MMOL/L (ref 95–110)
CHOLEST SERPL-MCNC: 194 MG/DL (ref 120–199)
CHOLEST/HDLC SERPL: 2.3 {RATIO} (ref 2–5)
CO2 SERPL-SCNC: 29 MMOL/L (ref 23–29)
CREAT SERPL-MCNC: 0.6 MG/DL (ref 0.5–1.4)
CRP SERPL-MCNC: 0.08 MG/DL
DIFFERENTIAL METHOD: ABNORMAL
EOSINOPHIL # BLD AUTO: 0.2 K/UL (ref 0–0.5)
EOSINOPHIL NFR BLD: 3.7 % (ref 0–8)
ERYTHROCYTE [DISTWIDTH] IN BLOOD BY AUTOMATED COUNT: 12.4 % (ref 11.5–14.5)
EST. GFR  (AFRICAN AMERICAN): >60 ML/MIN/1.73 M^2
EST. GFR  (NON AFRICAN AMERICAN): >60 ML/MIN/1.73 M^2
GLUCOSE SERPL-MCNC: 96 MG/DL (ref 70–110)
HCT VFR BLD AUTO: 42.8 % (ref 37–48.5)
HDLC SERPL-MCNC: 84 MG/DL (ref 40–75)
HDLC SERPL: 43.3 % (ref 20–50)
HGB BLD-MCNC: 14 G/DL (ref 12–16)
IMM GRANULOCYTES # BLD AUTO: 0.02 K/UL (ref 0–0.04)
IMM GRANULOCYTES NFR BLD AUTO: 0.4 % (ref 0–0.5)
LDLC SERPL CALC-MCNC: 102.6 MG/DL (ref 63–159)
LYMPHOCYTES # BLD AUTO: 1.3 K/UL (ref 1–4.8)
LYMPHOCYTES NFR BLD: 27.2 % (ref 18–48)
MCH RBC QN AUTO: 31.7 PG (ref 27–31)
MCHC RBC AUTO-ENTMCNC: 32.7 G/DL (ref 32–36)
MCV RBC AUTO: 97 FL (ref 82–98)
MONOCYTES # BLD AUTO: 0.3 K/UL (ref 0.3–1)
MONOCYTES NFR BLD: 6.5 % (ref 4–15)
NEUTROPHILS # BLD AUTO: 3 K/UL (ref 1.8–7.7)
NEUTROPHILS NFR BLD: 61.2 % (ref 38–73)
NONHDLC SERPL-MCNC: 110 MG/DL
NRBC BLD-RTO: 0 /100 WBC
PLATELET # BLD AUTO: 232 K/UL (ref 150–450)
PMV BLD AUTO: 10 FL (ref 9.2–12.9)
POTASSIUM SERPL-SCNC: 5 MMOL/L (ref 3.5–5.1)
PROT SERPL-MCNC: 7.7 G/DL (ref 6–8.4)
RBC # BLD AUTO: 4.41 M/UL (ref 4–5.4)
SODIUM SERPL-SCNC: 143 MMOL/L (ref 136–145)
TRIGL SERPL-MCNC: 37 MG/DL (ref 30–150)
WBC # BLD AUTO: 4.89 K/UL (ref 3.9–12.7)

## 2021-07-26 PROCEDURE — 85025 COMPLETE CBC W/AUTO DIFF WBC: CPT | Performed by: INTERNAL MEDICINE

## 2021-07-26 PROCEDURE — 80053 COMPREHEN METABOLIC PANEL: CPT | Performed by: INTERNAL MEDICINE

## 2021-07-26 PROCEDURE — 86140 C-REACTIVE PROTEIN: CPT | Performed by: INTERNAL MEDICINE

## 2021-07-26 PROCEDURE — 36415 COLL VENOUS BLD VENIPUNCTURE: CPT | Performed by: INTERNAL MEDICINE

## 2021-07-26 PROCEDURE — 80061 LIPID PANEL: CPT | Performed by: INTERNAL MEDICINE

## 2021-07-27 ENCOUNTER — INFUSION (OUTPATIENT)
Dept: INFUSION THERAPY | Facility: HOSPITAL | Age: 71
End: 2021-07-27
Attending: INTERNAL MEDICINE
Payer: MEDICARE

## 2021-07-27 VITALS
HEART RATE: 77 BPM | TEMPERATURE: 98 F | RESPIRATION RATE: 18 BRPM | SYSTOLIC BLOOD PRESSURE: 146 MMHG | HEIGHT: 66 IN | DIASTOLIC BLOOD PRESSURE: 86 MMHG | WEIGHT: 158.5 LBS | OXYGEN SATURATION: 97 % | BODY MASS INDEX: 25.47 KG/M2

## 2021-07-27 DIAGNOSIS — C83.35 DIFFUSE LARGE B-CELL LYMPHOMA OF LYMPH NODES OF INGUINAL REGION: ICD-10-CM

## 2021-07-27 DIAGNOSIS — D47.2 MGUS (MONOCLONAL GAMMOPATHY OF UNKNOWN SIGNIFICANCE): Primary | ICD-10-CM

## 2021-07-27 PROCEDURE — 25000003 PHARM REV CODE 250: Performed by: INTERNAL MEDICINE

## 2021-07-27 PROCEDURE — A4216 STERILE WATER/SALINE, 10 ML: HCPCS | Performed by: INTERNAL MEDICINE

## 2021-07-27 PROCEDURE — 63600175 PHARM REV CODE 636 W HCPCS: Performed by: INTERNAL MEDICINE

## 2021-07-27 PROCEDURE — 96523 IRRIG DRUG DELIVERY DEVICE: CPT

## 2021-07-27 RX ORDER — SODIUM CHLORIDE 0.9 % (FLUSH) 0.9 %
10 SYRINGE (ML) INJECTION
Status: COMPLETED | OUTPATIENT
Start: 2021-07-27 | End: 2021-07-27

## 2021-07-27 RX ORDER — HEPARIN 100 UNIT/ML
500 SYRINGE INTRAVENOUS
Status: CANCELLED | OUTPATIENT
Start: 2021-07-27

## 2021-07-27 RX ORDER — HEPARIN 100 UNIT/ML
500 SYRINGE INTRAVENOUS
Status: COMPLETED | OUTPATIENT
Start: 2021-07-27 | End: 2021-07-27

## 2021-07-27 RX ORDER — SODIUM CHLORIDE 0.9 % (FLUSH) 0.9 %
10 SYRINGE (ML) INJECTION
Status: CANCELLED | OUTPATIENT
Start: 2021-07-27

## 2021-07-27 RX ADMIN — SODIUM CHLORIDE, PRESERVATIVE FREE 10 ML: 5 INJECTION INTRAVENOUS at 08:07

## 2021-07-27 RX ADMIN — HEPARIN 500 UNITS: 100 SYRINGE at 08:07

## 2021-07-30 ENCOUNTER — OFFICE VISIT (OUTPATIENT)
Dept: CARDIOLOGY | Facility: CLINIC | Age: 71
End: 2021-07-30
Payer: MEDICARE

## 2021-07-30 VITALS
RESPIRATION RATE: 16 BRPM | HEIGHT: 66 IN | SYSTOLIC BLOOD PRESSURE: 128 MMHG | BODY MASS INDEX: 24.75 KG/M2 | OXYGEN SATURATION: 98 % | HEART RATE: 70 BPM | WEIGHT: 154 LBS | DIASTOLIC BLOOD PRESSURE: 80 MMHG

## 2021-07-30 DIAGNOSIS — C83.35 DIFFUSE LARGE B-CELL LYMPHOMA OF LYMPH NODES OF INGUINAL REGION: Primary | ICD-10-CM

## 2021-07-30 DIAGNOSIS — M05.79 RHEUMATOID ARTHRITIS INVOLVING MULTIPLE SITES WITH POSITIVE RHEUMATOID FACTOR: ICD-10-CM

## 2021-07-30 DIAGNOSIS — R03.0 ELEVATED BLOOD PRESSURE, SITUATIONAL: ICD-10-CM

## 2021-07-30 DIAGNOSIS — I10 HYPERTENSION, UNSPECIFIED TYPE: ICD-10-CM

## 2021-07-30 PROCEDURE — 99213 PR OFFICE/OUTPT VISIT, EST, LEVL III, 20-29 MIN: ICD-10-PCS | Mod: S$GLB,,, | Performed by: INTERNAL MEDICINE

## 2021-07-30 PROCEDURE — 99213 OFFICE O/P EST LOW 20 MIN: CPT | Mod: S$GLB,,, | Performed by: INTERNAL MEDICINE

## 2021-09-07 ENCOUNTER — INFUSION (OUTPATIENT)
Dept: INFUSION THERAPY | Facility: HOSPITAL | Age: 71
End: 2021-09-07
Attending: INTERNAL MEDICINE
Payer: MEDICARE

## 2021-09-07 VITALS
BODY MASS INDEX: 24.78 KG/M2 | HEART RATE: 80 BPM | WEIGHT: 153.5 LBS | TEMPERATURE: 97 F | SYSTOLIC BLOOD PRESSURE: 124 MMHG | DIASTOLIC BLOOD PRESSURE: 83 MMHG | OXYGEN SATURATION: 96 % | RESPIRATION RATE: 18 BRPM

## 2021-09-07 DIAGNOSIS — C83.35 DIFFUSE LARGE B-CELL LYMPHOMA OF LYMPH NODES OF INGUINAL REGION: ICD-10-CM

## 2021-09-07 DIAGNOSIS — D47.2 MGUS (MONOCLONAL GAMMOPATHY OF UNKNOWN SIGNIFICANCE): Primary | ICD-10-CM

## 2021-09-07 PROCEDURE — 63600175 PHARM REV CODE 636 W HCPCS: Performed by: INTERNAL MEDICINE

## 2021-09-07 PROCEDURE — 25000003 PHARM REV CODE 250: Performed by: INTERNAL MEDICINE

## 2021-09-07 PROCEDURE — A4216 STERILE WATER/SALINE, 10 ML: HCPCS | Performed by: INTERNAL MEDICINE

## 2021-09-07 PROCEDURE — 96523 IRRIG DRUG DELIVERY DEVICE: CPT

## 2021-09-07 RX ORDER — SODIUM CHLORIDE 0.9 % (FLUSH) 0.9 %
10 SYRINGE (ML) INJECTION
Status: CANCELLED | OUTPATIENT
Start: 2021-09-07

## 2021-09-07 RX ORDER — HEPARIN 100 UNIT/ML
500 SYRINGE INTRAVENOUS
Status: CANCELLED | OUTPATIENT
Start: 2021-09-07

## 2021-09-07 RX ORDER — HEPARIN 100 UNIT/ML
500 SYRINGE INTRAVENOUS
Status: COMPLETED | OUTPATIENT
Start: 2021-09-07 | End: 2021-09-07

## 2021-09-07 RX ORDER — SODIUM CHLORIDE 0.9 % (FLUSH) 0.9 %
10 SYRINGE (ML) INJECTION
Status: COMPLETED | OUTPATIENT
Start: 2021-09-07 | End: 2021-09-07

## 2021-09-07 RX ADMIN — SODIUM CHLORIDE, PRESERVATIVE FREE 10 ML: 5 INJECTION INTRAVENOUS at 10:09

## 2021-09-07 RX ADMIN — HEPARIN 500 UNITS: 100 SYRINGE at 10:09

## 2021-09-27 ENCOUNTER — LAB VISIT (OUTPATIENT)
Dept: LAB | Facility: HOSPITAL | Age: 71
End: 2021-09-27
Attending: INTERNAL MEDICINE
Payer: MEDICARE

## 2021-09-27 DIAGNOSIS — E03.9 PRIMARY HYPOTHYROIDISM: Primary | ICD-10-CM

## 2021-09-27 LAB
T4 FREE SERPL-MCNC: 0.77 NG/DL (ref 0.71–1.51)
TSH SERPL DL<=0.005 MIU/L-ACNC: 0.59 UIU/ML (ref 0.34–5.6)

## 2021-09-27 PROCEDURE — 36415 COLL VENOUS BLD VENIPUNCTURE: CPT | Performed by: INTERNAL MEDICINE

## 2021-09-27 PROCEDURE — 84443 ASSAY THYROID STIM HORMONE: CPT | Performed by: INTERNAL MEDICINE

## 2021-09-27 PROCEDURE — 84439 ASSAY OF FREE THYROXINE: CPT | Performed by: INTERNAL MEDICINE

## 2021-10-05 ENCOUNTER — PATIENT MESSAGE (OUTPATIENT)
Dept: HEMATOLOGY/ONCOLOGY | Facility: CLINIC | Age: 71
End: 2021-10-05

## 2021-10-06 ENCOUNTER — PATIENT MESSAGE (OUTPATIENT)
Dept: HEMATOLOGY/ONCOLOGY | Facility: CLINIC | Age: 71
End: 2021-10-06

## 2021-10-06 ENCOUNTER — TELEPHONE (OUTPATIENT)
Dept: HEMATOLOGY/ONCOLOGY | Facility: CLINIC | Age: 71
End: 2021-10-06

## 2021-10-07 ENCOUNTER — OFFICE VISIT (OUTPATIENT)
Dept: HEMATOLOGY/ONCOLOGY | Facility: CLINIC | Age: 71
End: 2021-10-07
Payer: MEDICARE

## 2021-10-07 VITALS
HEIGHT: 66 IN | BODY MASS INDEX: 24.93 KG/M2 | WEIGHT: 155.13 LBS | SYSTOLIC BLOOD PRESSURE: 142 MMHG | RESPIRATION RATE: 17 BRPM | DIASTOLIC BLOOD PRESSURE: 91 MMHG | HEART RATE: 80 BPM

## 2021-10-07 DIAGNOSIS — C83.35 DIFFUSE LARGE B-CELL LYMPHOMA OF LYMPH NODES OF INGUINAL REGION: Primary | ICD-10-CM

## 2021-10-07 DIAGNOSIS — M79.89 RIGHT LEG SWELLING: ICD-10-CM

## 2021-10-07 DIAGNOSIS — M06.9 RHEUMATOID ARTHRITIS, INVOLVING UNSPECIFIED SITE, UNSPECIFIED WHETHER RHEUMATOID FACTOR PRESENT: ICD-10-CM

## 2021-10-07 DIAGNOSIS — I89.0 LYMPHEDEMA OF LEFT LEG: ICD-10-CM

## 2021-10-07 DIAGNOSIS — D47.2 MGUS (MONOCLONAL GAMMOPATHY OF UNKNOWN SIGNIFICANCE): ICD-10-CM

## 2021-10-07 PROCEDURE — 99214 OFFICE O/P EST MOD 30 MIN: CPT | Mod: S$GLB,,, | Performed by: NURSE PRACTITIONER

## 2021-10-07 PROCEDURE — 99214 PR OFFICE/OUTPT VISIT, EST, LEVL IV, 30-39 MIN: ICD-10-PCS | Mod: S$GLB,,, | Performed by: NURSE PRACTITIONER

## 2021-10-18 ENCOUNTER — INFUSION (OUTPATIENT)
Dept: INFUSION THERAPY | Facility: HOSPITAL | Age: 71
End: 2021-10-18
Attending: INTERNAL MEDICINE
Payer: MEDICARE

## 2021-10-18 ENCOUNTER — HOSPITAL ENCOUNTER (OUTPATIENT)
Dept: RADIOLOGY | Facility: HOSPITAL | Age: 71
Discharge: HOME | End: 2021-10-18
Attending: INTERNAL MEDICINE
Payer: MEDICARE

## 2021-10-18 VITALS
HEIGHT: 66 IN | OXYGEN SATURATION: 97 % | WEIGHT: 155.19 LBS | BODY MASS INDEX: 24.94 KG/M2 | TEMPERATURE: 97 F | DIASTOLIC BLOOD PRESSURE: 82 MMHG | HEART RATE: 76 BPM | RESPIRATION RATE: 18 BRPM | SYSTOLIC BLOOD PRESSURE: 160 MMHG

## 2021-10-18 VITALS — WEIGHT: 151 LBS | HEIGHT: 66 IN | BODY MASS INDEX: 24.27 KG/M2

## 2021-10-18 DIAGNOSIS — D47.2 MGUS (MONOCLONAL GAMMOPATHY OF UNKNOWN SIGNIFICANCE): ICD-10-CM

## 2021-10-18 DIAGNOSIS — C83.35 DIFFUSE LARGE B-CELL LYMPHOMA OF LYMPH NODES OF INGUINAL REGION: ICD-10-CM

## 2021-10-18 DIAGNOSIS — D47.2 MGUS (MONOCLONAL GAMMOPATHY OF UNKNOWN SIGNIFICANCE): Primary | ICD-10-CM

## 2021-10-18 LAB — GLUCOSE SERPL-MCNC: 91 MG/DL (ref 70–110)

## 2021-10-18 PROCEDURE — 63600175 PHARM REV CODE 636 W HCPCS: Performed by: INTERNAL MEDICINE

## 2021-10-18 PROCEDURE — 25000003 PHARM REV CODE 250: Performed by: INTERNAL MEDICINE

## 2021-10-18 PROCEDURE — 96523 IRRIG DRUG DELIVERY DEVICE: CPT

## 2021-10-18 PROCEDURE — 78815 PET IMAGE W/CT SKULL-THIGH: CPT | Mod: TC,PO,PS

## 2021-10-18 PROCEDURE — A4216 STERILE WATER/SALINE, 10 ML: HCPCS | Performed by: INTERNAL MEDICINE

## 2021-10-18 RX ORDER — HEPARIN 100 UNIT/ML
500 SYRINGE INTRAVENOUS
Status: CANCELLED | OUTPATIENT
Start: 2021-10-18

## 2021-10-18 RX ORDER — HEPARIN 100 UNIT/ML
500 SYRINGE INTRAVENOUS
Status: COMPLETED | OUTPATIENT
Start: 2021-10-18 | End: 2021-10-18

## 2021-10-18 RX ORDER — SODIUM CHLORIDE 0.9 % (FLUSH) 0.9 %
10 SYRINGE (ML) INJECTION
Status: COMPLETED | OUTPATIENT
Start: 2021-10-18 | End: 2021-10-18

## 2021-10-18 RX ORDER — SODIUM CHLORIDE 0.9 % (FLUSH) 0.9 %
10 SYRINGE (ML) INJECTION
Status: CANCELLED | OUTPATIENT
Start: 2021-10-18

## 2021-10-18 RX ADMIN — SODIUM CHLORIDE 10 ML: 9 INJECTION INTRAMUSCULAR; INTRAVENOUS; SUBCUTANEOUS at 09:10

## 2021-10-18 RX ADMIN — HEPARIN 500 UNITS: 100 SYRINGE at 09:10

## 2021-10-21 ENCOUNTER — OFFICE VISIT (OUTPATIENT)
Dept: RHEUMATOLOGY | Facility: CLINIC | Age: 71
End: 2021-10-21
Payer: MEDICARE

## 2021-10-21 VITALS
DIASTOLIC BLOOD PRESSURE: 76 MMHG | WEIGHT: 156.38 LBS | SYSTOLIC BLOOD PRESSURE: 122 MMHG | BODY MASS INDEX: 25.24 KG/M2

## 2021-10-21 DIAGNOSIS — M05.739 RHEUMATOID ARTHRITIS INVOLVING WRIST WITH POSITIVE RHEUMATOID FACTOR, UNSPECIFIED LATERALITY: Primary | ICD-10-CM

## 2021-10-21 PROCEDURE — 99213 OFFICE O/P EST LOW 20 MIN: CPT | Mod: S$GLB,,, | Performed by: INTERNAL MEDICINE

## 2021-10-21 PROCEDURE — 99213 PR OFFICE/OUTPT VISIT, EST, LEVL III, 20-29 MIN: ICD-10-PCS | Mod: S$GLB,,, | Performed by: INTERNAL MEDICINE

## 2021-10-21 RX ORDER — HYDROXYCHLOROQUINE SULFATE 200 MG/1
200 TABLET, FILM COATED ORAL 2 TIMES DAILY
Qty: 180 TABLET | Refills: 1 | Status: SHIPPED | OUTPATIENT
Start: 2021-10-21 | End: 2022-06-20 | Stop reason: SDUPTHER

## 2021-10-21 RX ORDER — METHYLPREDNISOLONE 4 MG/1
TABLET ORAL
Qty: 21 EACH | Refills: 1 | Status: SHIPPED | OUTPATIENT
Start: 2021-10-21 | End: 2021-11-11

## 2021-11-26 ENCOUNTER — LAB VISIT (OUTPATIENT)
Dept: LAB | Facility: HOSPITAL | Age: 71
End: 2021-11-26
Attending: INTERNAL MEDICINE
Payer: MEDICARE

## 2021-11-26 DIAGNOSIS — D47.2 MGUS (MONOCLONAL GAMMOPATHY OF UNKNOWN SIGNIFICANCE): ICD-10-CM

## 2021-11-26 DIAGNOSIS — C83.35 DIFFUSE LARGE B-CELL LYMPHOMA OF LYMPH NODES OF INGUINAL REGION: ICD-10-CM

## 2021-11-26 LAB
ALBUMIN SERPL BCP-MCNC: 4.1 G/DL (ref 3.5–5.2)
ALP SERPL-CCNC: 52 U/L (ref 55–135)
ALT SERPL W/O P-5'-P-CCNC: 16 U/L (ref 10–44)
ANION GAP SERPL CALC-SCNC: 4 MMOL/L (ref 8–16)
AST SERPL-CCNC: 20 U/L (ref 10–40)
BASOPHILS # BLD AUTO: 0.05 K/UL (ref 0–0.2)
BASOPHILS NFR BLD: 0.9 % (ref 0–1.9)
BILIRUB SERPL-MCNC: 0.8 MG/DL (ref 0.1–1)
BUN SERPL-MCNC: 15 MG/DL (ref 8–23)
CALCIUM SERPL-MCNC: 9.4 MG/DL (ref 8.7–10.5)
CHLORIDE SERPL-SCNC: 106 MMOL/L (ref 95–110)
CO2 SERPL-SCNC: 29 MMOL/L (ref 23–29)
CREAT SERPL-MCNC: 0.8 MG/DL (ref 0.5–1.4)
DIFFERENTIAL METHOD: ABNORMAL
EOSINOPHIL # BLD AUTO: 0.2 K/UL (ref 0–0.5)
EOSINOPHIL NFR BLD: 3.9 % (ref 0–8)
ERYTHROCYTE [DISTWIDTH] IN BLOOD BY AUTOMATED COUNT: 12.5 % (ref 11.5–14.5)
EST. GFR  (AFRICAN AMERICAN): >60 ML/MIN/1.73 M^2
EST. GFR  (NON AFRICAN AMERICAN): >60 ML/MIN/1.73 M^2
GLUCOSE SERPL-MCNC: 105 MG/DL (ref 70–110)
HCT VFR BLD AUTO: 42.6 % (ref 37–48.5)
HGB BLD-MCNC: 14 G/DL (ref 12–16)
IMM GRANULOCYTES # BLD AUTO: 0.01 K/UL (ref 0–0.04)
IMM GRANULOCYTES NFR BLD AUTO: 0.2 % (ref 0–0.5)
LYMPHOCYTES # BLD AUTO: 1.6 K/UL (ref 1–4.8)
LYMPHOCYTES NFR BLD: 29.5 % (ref 18–48)
MCH RBC QN AUTO: 31.8 PG (ref 27–31)
MCHC RBC AUTO-ENTMCNC: 32.9 G/DL (ref 32–36)
MCV RBC AUTO: 97 FL (ref 82–98)
MONOCYTES # BLD AUTO: 0.3 K/UL (ref 0.3–1)
MONOCYTES NFR BLD: 6 % (ref 4–15)
NEUTROPHILS # BLD AUTO: 3.2 K/UL (ref 1.8–7.7)
NEUTROPHILS NFR BLD: 59.5 % (ref 38–73)
NRBC BLD-RTO: 0 /100 WBC
PLATELET # BLD AUTO: 230 K/UL (ref 150–450)
PMV BLD AUTO: 10.1 FL (ref 9.2–12.9)
POTASSIUM SERPL-SCNC: 5 MMOL/L (ref 3.5–5.1)
PROT SERPL-MCNC: 7.8 G/DL (ref 6–8.4)
RBC # BLD AUTO: 4.4 M/UL (ref 4–5.4)
SODIUM SERPL-SCNC: 139 MMOL/L (ref 136–145)
WBC # BLD AUTO: 5.36 K/UL (ref 3.9–12.7)

## 2021-11-26 PROCEDURE — 84165 PROTEIN E-PHORESIS SERUM: CPT | Performed by: INTERNAL MEDICINE

## 2021-11-26 PROCEDURE — 36415 COLL VENOUS BLD VENIPUNCTURE: CPT | Performed by: INTERNAL MEDICINE

## 2021-11-26 PROCEDURE — 84155 ASSAY OF PROTEIN SERUM: CPT | Mod: 59 | Performed by: INTERNAL MEDICINE

## 2021-11-26 PROCEDURE — 85025 COMPLETE CBC W/AUTO DIFF WBC: CPT | Performed by: INTERNAL MEDICINE

## 2021-11-26 PROCEDURE — 83883 ASSAY NEPHELOMETRY NOT SPEC: CPT | Performed by: INTERNAL MEDICINE

## 2021-11-26 PROCEDURE — 82784 ASSAY IGA/IGD/IGG/IGM EACH: CPT | Performed by: INTERNAL MEDICINE

## 2021-11-26 PROCEDURE — 80053 COMPREHEN METABOLIC PANEL: CPT | Performed by: INTERNAL MEDICINE

## 2021-11-30 LAB
IGA SERPL-MCNC: 173 MG/DL (ref 87–352)
IGG SERPL-MCNC: 1831 MG/DL (ref 586–1602)
IGM SERPL-MCNC: 61 MG/DL (ref 26–217)

## 2021-12-01 ENCOUNTER — OFFICE VISIT (OUTPATIENT)
Dept: HEMATOLOGY/ONCOLOGY | Facility: CLINIC | Age: 71
End: 2021-12-01
Payer: MEDICARE

## 2021-12-01 VITALS
SYSTOLIC BLOOD PRESSURE: 163 MMHG | WEIGHT: 155.81 LBS | BODY MASS INDEX: 25.04 KG/M2 | HEIGHT: 66 IN | RESPIRATION RATE: 18 BRPM | HEART RATE: 78 BPM | DIASTOLIC BLOOD PRESSURE: 94 MMHG

## 2021-12-01 DIAGNOSIS — D47.2 MGUS (MONOCLONAL GAMMOPATHY OF UNKNOWN SIGNIFICANCE): Primary | ICD-10-CM

## 2021-12-01 DIAGNOSIS — R59.1 LYMPHADENOPATHY: ICD-10-CM

## 2021-12-01 DIAGNOSIS — C83.35 DIFFUSE LARGE B-CELL LYMPHOMA OF LYMPH NODES OF INGUINAL REGION: ICD-10-CM

## 2021-12-01 PROCEDURE — 99214 PR OFFICE/OUTPT VISIT, EST, LEVL IV, 30-39 MIN: ICD-10-PCS | Mod: S$GLB,,, | Performed by: NURSE PRACTITIONER

## 2021-12-01 PROCEDURE — 99214 OFFICE O/P EST MOD 30 MIN: CPT | Mod: S$GLB,,, | Performed by: NURSE PRACTITIONER

## 2021-12-02 LAB
ALBUMIN SERPL ELPH-MCNC: 3.8 G/DL (ref 2.9–4.4)
ALBUMIN/GLOB SERPL: 1.1 {RATIO} (ref 0.7–1.7)
ALPHA1 GLOB SERPL ELPH-MCNC: 0.2 G/DL (ref 0–0.4)
ALPHA2 GLOB SERPL ELPH-MCNC: 0.6 G/DL (ref 0.4–1)
B-GLOBULIN SERPL ELPH-MCNC: 0.9 G/DL (ref 0.7–1.3)
GAMMA GLOB SERPL ELPH-MCNC: 1.7 G/DL (ref 0.4–1.8)
GLOBULIN SER CALC-MCNC: 3.4 G/DL (ref 2.2–3.9)
LABORATORY COMMENT REPORT: ABNORMAL
M PROTEIN SERPL ELPH-MCNC: 1.3 G/DL
PROT SERPL-MCNC: 7.2 G/DL (ref 6–8.5)

## 2021-12-02 NOTE — PROGRESS NOTES
[facility] RHEUMATOLOGY            PROGRESS NOTE      Subjective:       Patient ID:   NAME: Aurelia Marlow : 1950     68 y.o. female    Referring Doc: No ref. provider found  Other Physicians:    Chief Complaint:  Rheumatoid Arthritis      History of Present Illness:     Patient returns today for a regularly scheduled follow-up visit for   RA.    The patient is complaining of pain and swelling  of both wrists for the past week or so. She has been off methotrexate for 3-4 weeks due to leukopenia. No chest pains ,cough or shortness of breath. No gastrointestinal complaints.            ROS:   GEN:  No  fever, night sweats . weight is stable   No fatigue  SKIN: no rashes, no bruising, no ulcerations, no Raynaud's  HEENT: no HA's, No visual changes, no mucosal ulcers, no sicca symptoms,  CV:   no CP, SOB, PND, CHAUDHRY, no orthopnea, no palpitations  PULM: normal with no SOB, cough, hemoptysis, sputum or pleuritic pain  GI:  no abdominal pain, nausea, vomiting, constipation, diarrhea, melanotic stools, BRBPR, hematemesis, no dysphagia  :   no dysuria  NEURO: no paresthesias, headaches, visual disturbances, muscle weakness  MUSCULOSKELETAL:no joint swelling, prolonged AM stiffness, no back pain, no muscle pain  Allergies:  Review of patient's allergies indicates:  No Known Allergies    Medications:    Current Outpatient Medications:     folic acid (FOLVITE) 1 MG tablet, Take 1 tablet (1 mg total) by mouth once daily., Disp: 30 tablet, Rfl: 5    hydroxychloroquine (PLAQUENIL) 200 mg tablet, TAKE ONE TABLET BY MOUTH TWICE DAILY, Disp: 180 tablet, Rfl: 1    levothyroxine (SYNTHROID) 100 MCG tablet, Take 100 mcg by mouth once daily., Disp: , Rfl:     naproxen (EC NAPROSYN) 500 MG EC tablet, One po bid pc prn, Disp: 60 tablet, Rfl: 3    omeprazole (PRILOSEC) 10 MG capsule, Take 10 mg by mouth once daily., Disp: , Rfl:     methotrexate 2.5 MG Tab, TAKE 8 TABLET BY MOUTH ONCE A WEEK, Disp: 32 tablet, Rfl: 2     methylPREDNISolone (MEDROL DOSEPACK) 4 mg tablet, use as directed, Disp: 1 Package, Rfl: 1    PMHx/PSHx Updates:          Objective:     Vitals:  Blood pressure 128/82, weight 67.9 kg (149 lb 11.2 oz).    Physical Examination:   GEN: no apparent distress, comfortable; AAOx3  SKIN: no rashes,no ulceration, no Raynaud's, no petechiae, no SQ nodules,  HEAD: normal  EYES: no pallor, no icterusNECK: no masses, thyroid normal, trachea midline, no LAD/LN's, supple  CV: RRR with no murmur; l S1 and S2 reg. ,no gallop no rubs,   CHEST: Normal respiratory effort; CTAB; normal breath sounds; no wheeze or crackles  MUSC/Skeletal: ROM normal; no crepitus; wrist  joints with mild synovitis,  no deformities  No joint swelling or tenderness of PIP, MCP, elbow, shoulder, or knee joints  EXTREM: no clubbing, cyanosis, no edema,normal  pulses   NEURO: grossly intact; motor WNL; AAOx3; ,  PSYCH: normal mood, affect and behavior  LYMPH: normal cervical, supraclavicular          Labs:   Lab Results   Component Value Date    WBC 5.7 09/25/2018    HGB 12.7 09/25/2018    HCT 38.2 09/25/2018    MCV 94.8 09/25/2018     09/25/2018    CMP  @LASTLAB(NA,K,CL,CO2,GLU,BUN,Creatinine,Calcium,PROT,Albumin,Bilitot,Alkphos,AST,ALT,CRP,ESR,RF,CCP,SONJA,SSA,CPK,uric acid) )@  I have reviewed all available lab results and radiology reports.    Radiology/Diagnostic Studies:        Assessment/Plan:   (1) 68 y.o. female with diagnosis of rheumatoid arthritis.  Active with sl synovitis in wrists.  She's been off methotrexate for several weeks due to slight leukopenia. CBC from yesterday was within normal range. She can restart methotrexate at a lower dose : start 12.5 mg once a week.   Medrol Dosepack  Repeat CBC in 3-4 weeks.     Follow-up with me in 6 weeks                Discussion:     I have explained all of the above in detail and the patient understands all of the current recommendation(s). I have answered all questions to the best of my ability  and to their complete satisfaction.       The patient is to continue with the current management plan         RTC in  6 wks      Electronically signed by Adrián Hernandez MD         Alert

## 2021-12-10 LAB
KAPPA LC FREE SER-MCNC: 25.7 MG/L (ref 3.3–19.4)
KAPPA LC FREE/LAMBDA FREE SER: ABNORMAL {RATIO}
LAMBDA LC FREE SERPL-MCNC: 8.8 MG/L (ref 5.7–26.3)

## 2021-12-15 ENCOUNTER — INFUSION (OUTPATIENT)
Dept: INFUSION THERAPY | Facility: HOSPITAL | Age: 71
End: 2021-12-15
Attending: INTERNAL MEDICINE
Payer: MEDICARE

## 2021-12-15 VITALS
HEART RATE: 85 BPM | WEIGHT: 157 LBS | BODY MASS INDEX: 25.34 KG/M2 | SYSTOLIC BLOOD PRESSURE: 132 MMHG | OXYGEN SATURATION: 100 % | RESPIRATION RATE: 18 BRPM | DIASTOLIC BLOOD PRESSURE: 85 MMHG | TEMPERATURE: 98 F

## 2021-12-15 DIAGNOSIS — C83.35 DIFFUSE LARGE B-CELL LYMPHOMA OF LYMPH NODES OF INGUINAL REGION: ICD-10-CM

## 2021-12-15 DIAGNOSIS — D47.2 MGUS (MONOCLONAL GAMMOPATHY OF UNKNOWN SIGNIFICANCE): Primary | ICD-10-CM

## 2021-12-15 PROCEDURE — 96523 IRRIG DRUG DELIVERY DEVICE: CPT

## 2021-12-15 PROCEDURE — 25000003 PHARM REV CODE 250: Performed by: INTERNAL MEDICINE

## 2021-12-15 PROCEDURE — 63600175 PHARM REV CODE 636 W HCPCS: Performed by: INTERNAL MEDICINE

## 2021-12-15 PROCEDURE — A4216 STERILE WATER/SALINE, 10 ML: HCPCS | Performed by: INTERNAL MEDICINE

## 2021-12-15 RX ORDER — HEPARIN 100 UNIT/ML
500 SYRINGE INTRAVENOUS
Status: CANCELLED | OUTPATIENT
Start: 2021-12-15

## 2021-12-15 RX ORDER — SODIUM CHLORIDE 0.9 % (FLUSH) 0.9 %
10 SYRINGE (ML) INJECTION
Status: CANCELLED | OUTPATIENT
Start: 2021-12-15

## 2021-12-15 RX ORDER — HEPARIN 100 UNIT/ML
500 SYRINGE INTRAVENOUS
Status: COMPLETED | OUTPATIENT
Start: 2021-12-15 | End: 2021-12-15

## 2021-12-15 RX ORDER — SODIUM CHLORIDE 0.9 % (FLUSH) 0.9 %
10 SYRINGE (ML) INJECTION
Status: COMPLETED | OUTPATIENT
Start: 2021-12-15 | End: 2021-12-15

## 2021-12-15 RX ADMIN — SODIUM CHLORIDE, PRESERVATIVE FREE 10 ML: 5 INJECTION INTRAVENOUS at 01:12

## 2021-12-15 RX ADMIN — HEPARIN 500 UNITS: 100 SYRINGE at 01:12

## 2022-01-24 DIAGNOSIS — N83.202 LEFT OVARIAN CYST: Primary | ICD-10-CM

## 2022-01-31 ENCOUNTER — HOSPITAL ENCOUNTER (OUTPATIENT)
Dept: RADIOLOGY | Facility: HOSPITAL | Age: 72
Discharge: HOME | End: 2022-01-31
Attending: SPECIALIST
Payer: MEDICARE

## 2022-01-31 DIAGNOSIS — N83.202 LEFT OVARIAN CYST: ICD-10-CM

## 2022-01-31 PROCEDURE — 76830 TRANSVAGINAL US NON-OB: CPT | Mod: TC,PO

## 2022-02-24 ENCOUNTER — TELEPHONE (OUTPATIENT)
Dept: INFUSION THERAPY | Facility: HOSPITAL | Age: 72
End: 2022-02-24
Payer: MEDICARE

## 2022-03-09 ENCOUNTER — INFUSION (OUTPATIENT)
Dept: INFUSION THERAPY | Facility: HOSPITAL | Age: 72
End: 2022-03-09
Attending: INTERNAL MEDICINE
Payer: MEDICARE

## 2022-03-09 VITALS
WEIGHT: 155.5 LBS | BODY MASS INDEX: 24.99 KG/M2 | RESPIRATION RATE: 17 BRPM | HEART RATE: 85 BPM | DIASTOLIC BLOOD PRESSURE: 84 MMHG | HEIGHT: 66 IN | TEMPERATURE: 97 F | SYSTOLIC BLOOD PRESSURE: 135 MMHG

## 2022-03-09 DIAGNOSIS — C83.35 DIFFUSE LARGE B-CELL LYMPHOMA OF LYMPH NODES OF INGUINAL REGION: ICD-10-CM

## 2022-03-09 DIAGNOSIS — D47.2 MGUS (MONOCLONAL GAMMOPATHY OF UNKNOWN SIGNIFICANCE): Primary | ICD-10-CM

## 2022-03-09 PROCEDURE — 63600175 PHARM REV CODE 636 W HCPCS: Performed by: INTERNAL MEDICINE

## 2022-03-09 PROCEDURE — 96523 IRRIG DRUG DELIVERY DEVICE: CPT

## 2022-03-09 RX ORDER — HEPARIN 100 UNIT/ML
500 SYRINGE INTRAVENOUS
Status: CANCELLED | OUTPATIENT
Start: 2022-03-09

## 2022-03-09 RX ORDER — SODIUM CHLORIDE 0.9 % (FLUSH) 0.9 %
10 SYRINGE (ML) INJECTION
Status: DISCONTINUED | OUTPATIENT
Start: 2022-03-09 | End: 2022-03-09 | Stop reason: HOSPADM

## 2022-03-09 RX ORDER — SODIUM CHLORIDE 0.9 % (FLUSH) 0.9 %
10 SYRINGE (ML) INJECTION
Status: CANCELLED | OUTPATIENT
Start: 2022-03-09

## 2022-03-09 RX ORDER — HEPARIN 100 UNIT/ML
500 SYRINGE INTRAVENOUS
Status: COMPLETED | OUTPATIENT
Start: 2022-03-09 | End: 2022-03-09

## 2022-03-09 RX ADMIN — Medication 500 UNITS: at 03:03

## 2022-03-09 NOTE — PLAN OF CARE
Problem: Fatigue  Goal: Improved Activity Tolerance  Intervention: Promote Improved Energy  Flowsheets (Taken 3/9/2022 1939)  Fatigue Management: fatigue-related activity identified  Activity Management: Ambulated -L4

## 2022-04-05 ENCOUNTER — IMMUNIZATION (OUTPATIENT)
Dept: PHARMACY | Facility: CLINIC | Age: 72
End: 2022-04-05
Payer: MEDICARE

## 2022-04-05 DIAGNOSIS — Z23 NEED FOR VACCINATION: Primary | ICD-10-CM

## 2022-04-20 ENCOUNTER — INFUSION (OUTPATIENT)
Dept: INFUSION THERAPY | Facility: HOSPITAL | Age: 72
End: 2022-04-20
Attending: INTERNAL MEDICINE
Payer: MEDICARE

## 2022-04-20 VITALS
HEIGHT: 66 IN | TEMPERATURE: 98 F | BODY MASS INDEX: 24.83 KG/M2 | WEIGHT: 154.5 LBS | DIASTOLIC BLOOD PRESSURE: 82 MMHG | OXYGEN SATURATION: 99 % | HEART RATE: 73 BPM | SYSTOLIC BLOOD PRESSURE: 124 MMHG | RESPIRATION RATE: 18 BRPM

## 2022-04-20 DIAGNOSIS — D47.2 MGUS (MONOCLONAL GAMMOPATHY OF UNKNOWN SIGNIFICANCE): Primary | ICD-10-CM

## 2022-04-20 DIAGNOSIS — C83.35 DIFFUSE LARGE B-CELL LYMPHOMA OF LYMPH NODES OF INGUINAL REGION: ICD-10-CM

## 2022-04-20 PROCEDURE — 25000003 PHARM REV CODE 250: Performed by: INTERNAL MEDICINE

## 2022-04-20 PROCEDURE — A4216 STERILE WATER/SALINE, 10 ML: HCPCS | Performed by: INTERNAL MEDICINE

## 2022-04-20 PROCEDURE — 63600175 PHARM REV CODE 636 W HCPCS: Performed by: INTERNAL MEDICINE

## 2022-04-20 PROCEDURE — 96523 IRRIG DRUG DELIVERY DEVICE: CPT

## 2022-04-20 RX ORDER — HEPARIN 100 UNIT/ML
500 SYRINGE INTRAVENOUS
Status: COMPLETED | OUTPATIENT
Start: 2022-04-20 | End: 2022-04-20

## 2022-04-20 RX ORDER — HEPARIN 100 UNIT/ML
500 SYRINGE INTRAVENOUS
Status: CANCELLED | OUTPATIENT
Start: 2022-04-20

## 2022-04-20 RX ORDER — SODIUM CHLORIDE 0.9 % (FLUSH) 0.9 %
10 SYRINGE (ML) INJECTION
Status: CANCELLED | OUTPATIENT
Start: 2022-04-20

## 2022-04-20 RX ORDER — SODIUM CHLORIDE 0.9 % (FLUSH) 0.9 %
10 SYRINGE (ML) INJECTION
Status: COMPLETED | OUTPATIENT
Start: 2022-04-20 | End: 2022-04-20

## 2022-04-20 RX ADMIN — Medication 500 UNITS: at 02:04

## 2022-04-20 RX ADMIN — SODIUM CHLORIDE, PRESERVATIVE FREE 10 ML: 5 INJECTION INTRAVENOUS at 02:04

## 2022-04-20 NOTE — PLAN OF CARE
Problem: Fatigue  Goal: Improved Activity Tolerance  Outcome: Ongoing, Progressing  Intervention: Promote Improved Energy  Flowsheets (Taken 4/20/2022 0644)  Fatigue Management: frequent rest breaks encouraged  Sleep/Rest Enhancement:   regular sleep/rest pattern promoted   relaxation techniques promoted  Activity Management: Ambulated -L4

## 2022-05-24 ENCOUNTER — PATIENT MESSAGE (OUTPATIENT)
Dept: HEMATOLOGY/ONCOLOGY | Facility: CLINIC | Age: 72
End: 2022-05-24

## 2022-06-01 ENCOUNTER — INFUSION (OUTPATIENT)
Dept: INFUSION THERAPY | Facility: HOSPITAL | Age: 72
End: 2022-06-01
Attending: INTERNAL MEDICINE
Payer: MEDICARE

## 2022-06-01 VITALS
DIASTOLIC BLOOD PRESSURE: 80 MMHG | BODY MASS INDEX: 25.52 KG/M2 | RESPIRATION RATE: 18 BRPM | TEMPERATURE: 98 F | HEART RATE: 78 BPM | OXYGEN SATURATION: 98 % | SYSTOLIC BLOOD PRESSURE: 139 MMHG | WEIGHT: 158.13 LBS

## 2022-06-01 DIAGNOSIS — D47.2 MGUS (MONOCLONAL GAMMOPATHY OF UNKNOWN SIGNIFICANCE): Primary | ICD-10-CM

## 2022-06-01 DIAGNOSIS — C83.35 DIFFUSE LARGE B-CELL LYMPHOMA OF LYMPH NODES OF INGUINAL REGION: ICD-10-CM

## 2022-06-01 PROCEDURE — 96523 IRRIG DRUG DELIVERY DEVICE: CPT

## 2022-06-01 PROCEDURE — A4216 STERILE WATER/SALINE, 10 ML: HCPCS | Performed by: INTERNAL MEDICINE

## 2022-06-01 PROCEDURE — 25000003 PHARM REV CODE 250: Performed by: INTERNAL MEDICINE

## 2022-06-01 PROCEDURE — 63600175 PHARM REV CODE 636 W HCPCS: Performed by: INTERNAL MEDICINE

## 2022-06-01 RX ORDER — SODIUM CHLORIDE 0.9 % (FLUSH) 0.9 %
10 SYRINGE (ML) INJECTION
Status: CANCELLED | OUTPATIENT
Start: 2022-06-01

## 2022-06-01 RX ORDER — HEPARIN 100 UNIT/ML
500 SYRINGE INTRAVENOUS
Status: COMPLETED | OUTPATIENT
Start: 2022-06-01 | End: 2022-06-01

## 2022-06-01 RX ORDER — HEPARIN 100 UNIT/ML
500 SYRINGE INTRAVENOUS
Status: CANCELLED | OUTPATIENT
Start: 2022-06-01

## 2022-06-01 RX ORDER — SODIUM CHLORIDE 0.9 % (FLUSH) 0.9 %
10 SYRINGE (ML) INJECTION
Status: COMPLETED | OUTPATIENT
Start: 2022-06-01 | End: 2022-06-01

## 2022-06-01 RX ADMIN — SODIUM CHLORIDE, PRESERVATIVE FREE 10 ML: 5 INJECTION INTRAVENOUS at 01:06

## 2022-06-01 RX ADMIN — Medication 500 UNITS: at 01:06

## 2022-06-22 ENCOUNTER — HOSPITAL ENCOUNTER (OUTPATIENT)
Dept: RADIOLOGY | Facility: HOSPITAL | Age: 72
Discharge: HOME | End: 2022-06-22
Attending: NURSE PRACTITIONER
Payer: MEDICARE

## 2022-06-22 VITALS — BODY MASS INDEX: 24.91 KG/M2 | WEIGHT: 155 LBS | HEIGHT: 66 IN

## 2022-06-22 DIAGNOSIS — C83.35 DIFFUSE LARGE B-CELL LYMPHOMA OF LYMPH NODES OF INGUINAL REGION: ICD-10-CM

## 2022-06-22 LAB — GLUCOSE SERPL-MCNC: 94 MG/DL (ref 70–110)

## 2022-06-22 PROCEDURE — 78815 PET IMAGE W/CT SKULL-THIGH: CPT | Mod: TC,PO,PS

## 2022-06-27 ENCOUNTER — LAB VISIT (OUTPATIENT)
Dept: LAB | Facility: HOSPITAL | Age: 72
End: 2022-06-27
Attending: NURSE PRACTITIONER
Payer: MEDICARE

## 2022-06-27 DIAGNOSIS — D47.2 MGUS (MONOCLONAL GAMMOPATHY OF UNKNOWN SIGNIFICANCE): ICD-10-CM

## 2022-06-27 LAB
ALBUMIN SERPL BCP-MCNC: 4 G/DL (ref 3.5–5.2)
ALP SERPL-CCNC: 50 U/L (ref 55–135)
ALT SERPL W/O P-5'-P-CCNC: 15 U/L (ref 10–44)
ANION GAP SERPL CALC-SCNC: 5 MMOL/L (ref 8–16)
AST SERPL-CCNC: 17 U/L (ref 10–40)
BILIRUB SERPL-MCNC: 0.6 MG/DL (ref 0.1–1)
BUN SERPL-MCNC: 16 MG/DL (ref 8–23)
CALCIUM SERPL-MCNC: 8.8 MG/DL (ref 8.7–10.5)
CHLORIDE SERPL-SCNC: 98 MMOL/L (ref 95–110)
CO2 SERPL-SCNC: 30 MMOL/L (ref 23–29)
CREAT SERPL-MCNC: 0.7 MG/DL (ref 0.5–1.4)
EST. GFR  (AFRICAN AMERICAN): >60 ML/MIN/1.73 M^2
EST. GFR  (NON AFRICAN AMERICAN): >60 ML/MIN/1.73 M^2
GLUCOSE SERPL-MCNC: 93 MG/DL (ref 70–110)
POTASSIUM SERPL-SCNC: 4.7 MMOL/L (ref 3.5–5.1)
PROT SERPL-MCNC: 7.7 G/DL (ref 6–8.4)
SODIUM SERPL-SCNC: 133 MMOL/L (ref 136–145)

## 2022-06-27 PROCEDURE — 84165 PROTEIN E-PHORESIS SERUM: CPT | Performed by: NURSE PRACTITIONER

## 2022-06-27 PROCEDURE — 80053 COMPREHEN METABOLIC PANEL: CPT | Performed by: NURSE PRACTITIONER

## 2022-06-27 PROCEDURE — 82784 ASSAY IGA/IGD/IGG/IGM EACH: CPT | Performed by: NURSE PRACTITIONER

## 2022-06-27 PROCEDURE — 83883 ASSAY NEPHELOMETRY NOT SPEC: CPT | Mod: 59 | Performed by: NURSE PRACTITIONER

## 2022-06-28 LAB
IGA SERPL-MCNC: 187 MG/DL (ref 87–352)
IGG SERPL-MCNC: 1943 MG/DL (ref 586–1602)
IGM SERPL-MCNC: 66 MG/DL (ref 26–217)
KAPPA LC FREE SER-MCNC: 21.7 MG/L (ref 3.3–19.4)
KAPPA LC FREE/LAMBDA FREE SER: 1.76 {RATIO} (ref 0.26–1.65)
LAMBDA LC FREE SERPL-MCNC: 12.3 MG/L (ref 5.7–26.3)

## 2022-06-29 ENCOUNTER — OFFICE VISIT (OUTPATIENT)
Dept: HEMATOLOGY/ONCOLOGY | Facility: CLINIC | Age: 72
End: 2022-06-29
Payer: MEDICARE

## 2022-06-29 VITALS
HEIGHT: 66 IN | SYSTOLIC BLOOD PRESSURE: 123 MMHG | HEART RATE: 75 BPM | RESPIRATION RATE: 18 BRPM | DIASTOLIC BLOOD PRESSURE: 74 MMHG | WEIGHT: 156.81 LBS | BODY MASS INDEX: 25.2 KG/M2 | TEMPERATURE: 97 F

## 2022-06-29 DIAGNOSIS — C83.35 DIFFUSE LARGE B-CELL LYMPHOMA OF LYMPH NODES OF INGUINAL REGION: ICD-10-CM

## 2022-06-29 LAB
ALBUMIN SERPL ELPH-MCNC: 3.8 G/DL (ref 2.9–4.4)
ALBUMIN/GLOB SERPL: 1.1 {RATIO} (ref 0.7–1.7)
ALPHA1 GLOB SERPL ELPH-MCNC: 0.2 G/DL (ref 0–0.4)
ALPHA2 GLOB SERPL ELPH-MCNC: 0.6 G/DL (ref 0.4–1)
B-GLOBULIN SERPL ELPH-MCNC: 1 G/DL (ref 0.7–1.3)
GAMMA GLOB SERPL ELPH-MCNC: 1.8 G/DL (ref 0.4–1.8)
GLOBULIN SER CALC-MCNC: 3.6 G/DL (ref 2.2–3.9)
LABORATORY COMMENT REPORT: ABNORMAL
M PROTEIN SERPL ELPH-MCNC: 1.3 G/DL
PROT SERPL-MCNC: 7.4 G/DL (ref 6–8.5)

## 2022-06-29 PROCEDURE — 99213 PR OFFICE/OUTPT VISIT, EST, LEVL III, 20-29 MIN: ICD-10-PCS | Mod: S$GLB,,, | Performed by: INTERNAL MEDICINE

## 2022-06-29 PROCEDURE — 99213 OFFICE O/P EST LOW 20 MIN: CPT | Mod: S$GLB,,, | Performed by: INTERNAL MEDICINE

## 2022-06-29 NOTE — ASSESSMENT & PLAN NOTE
Patient is doing well and she appears PADMINI at this time.  PET scan is negative and she has no new concerning signs or symptoms of recurrence.  Note is made of hyst done and path is negative.      I will plan on PET scan again and visit in January and provided this is negative, will do yearly thereafter to year 5 then complete.  Patient is in agreement with this.      RTC in 6 months.

## 2022-06-29 NOTE — PROGRESS NOTES
PROGRESS NOTE    Subjective:       Patient ID: Aurelia Marlow is a 71 y.o. female.    Dx [facility] type- 8/21/2019  Inguinal LN biopsy    PET with bone marrow uptake throughout appendicular and axial skeleton    Bone marrow biopsy positive for B-cell lymphoma    Stage ARELIS    R-CHOP x 6  10/28/2019-2/10/2020    10/6/2020:  PET negative    5/16/2022:  LATOYA/BSO  Path negative for malignancy    6/22/2022:  PET negative    Chief Complaint:  No chief complaint on file.  lymphoma and MGUS follow up    History of Present Illness:   Aurelia Marlow is a 71 y.o. female who presents for follow up of lymphoma    Patient is feeling well at this time.  No new complaints.  No fever, NS, wt. Loss.        Family and Social history reviewed and is unchanged from 9/27/2019      ROS:  Review of Systems   Constitutional: Negative for appetite change, fever and unexpected weight change.   HENT: Negative for mouth sores.    Eyes: Negative for visual disturbance.   Respiratory: Negative for cough and shortness of breath.    Cardiovascular: Negative for chest pain and leg swelling.   Gastrointestinal: Negative for abdominal pain, blood in stool and diarrhea.   Genitourinary: Negative for frequency and hematuria.   Musculoskeletal: Negative for back pain.   Skin: Negative for rash.   Neurological: Negative for headaches.   Hematological: Negative for adenopathy.   Psychiatric/Behavioral: The patient is not nervous/anxious.           Current Outpatient Medications:     augmented betamethasone dipropionate (DIPROLENE-AF) 0.05 % cream, aaa bid prn paronychia, Disp: 50 g, Rfl: 3    cholecalciferol, vitamin D3, (VITAMIN D3) 50 mcg (2,000 unit) Tab, once daily., Disp: , Rfl:     diphenhydrAMINE (BENADRYL) 25 mg capsule, Take 1 capsule (25 mg total) by mouth every 6 (six) hours as needed for Itching or Allergies., Disp: 20 capsule, Rfl: 0    fluticasone propionate (FLONASE) 50  "mcg/actuation nasal spray, daily as needed., Disp: , Rfl:     hydrOXYchloroQUINE (PLAQUENIL) 200 mg tablet, TAKE 1 TABLET BY MOUTH TWICE A DAY, Disp: 180 tablet, Rfl: 1    levothyroxine (SYNTHROID) 100 MCG tablet, Take 100 mcg by mouth once daily., Disp: , Rfl:     lidocaine-prilocaine (EMLA) cream, Apply topically as needed., Disp: 25 g, Rfl: 2    naproxen (EC NAPROSYN) 500 MG EC tablet, TAKE 1 TABLET BY MOUTH TWICE A DAY AFTER MEALS AS NEEDED, Disp: 60 tablet, Rfl: 0    ondansetron (ZOFRAN) 8 MG tablet, Take 8 mg by mouth every 8 (eight) hours as needed., Disp: , Rfl: 6    promethazine (PHENERGAN) 25 MG tablet, Take 25 mg by mouth every 6 (six) hours as needed., Disp: , Rfl: 6    senna-docusate 8.6-50 mg (PERICOLACE) 8.6-50 mg per tablet, Take 1 tablet by mouth 2 (two) times daily., Disp: , Rfl:     famotidine (PEPCID) 20 MG tablet, Take 1 tablet (20 mg total) by mouth 2 (two) times daily. (Patient taking differently: Take 20 mg by mouth 2 (two) times daily as needed.), Disp: 20 tablet, Rfl: 0    HYDROcodone-acetaminophen (NORCO) 5-325 mg per tablet, Take 1 tablet by mouth every 6 (six) hours as needed for Pain., Disp: 20 tablet, Rfl: 0    Current Facility-Administered Medications:     lidocaine (PF) 10 mg/ml (1%) injection 10 mg, 1 mL, Intradermal, Once, Kermit Martinez MD        Objective:       Physical Examination:     /74   Pulse 75   Temp 97.3 °F (36.3 °C)   Resp 18   Ht 5' 6" (1.676 m)   Wt 71.1 kg (156 lb 12.8 oz)   BMI 25.31 kg/m²     Physical Exam  Constitutional:       Appearance: She is well-developed.   HENT:      Head: Normocephalic and atraumatic.      Right Ear: External ear normal.      Left Ear: External ear normal.   Eyes:      Conjunctiva/sclera: Conjunctivae normal.      Pupils: Pupils are equal, round, and reactive to light.   Neck:      Thyroid: No thyromegaly.      Trachea: No tracheal deviation.   Cardiovascular:      Rate and Rhythm: Normal rate and regular rhythm. "      Heart sounds: Normal heart sounds.   Pulmonary:      Effort: Pulmonary effort is normal.      Breath sounds: Normal breath sounds.   Abdominal:      General: Bowel sounds are normal. There is no distension.      Palpations: Abdomen is soft. There is no mass.      Tenderness: There is no abdominal tenderness.   Skin:     Findings: No rash.   Neurological:      Comments: Neuro intact througout   Psychiatric:         Behavior: Behavior normal.         Thought Content: Thought content normal.         Judgment: Judgment normal.          Labs:   Recent Results (from the past 336 hour(s))   CBC auto differential    Collection Time: 06/27/22  8:59 AM   Result Value Ref Range    WBC 5.34 3.90 - 12.70 K/uL    Hemoglobin 14.1 12.0 - 16.0 g/dL    Hematocrit 42.5 37.0 - 48.5 %    Platelets 227 150 - 450 K/uL     CMP  Sodium   Date Value Ref Range Status   06/27/2022 133 (L) 136 - 145 mmol/L Final   06/11/2019 139 134 - 144 mmol/L      Potassium   Date Value Ref Range Status   06/27/2022 4.7 3.5 - 5.1 mmol/L Final     Chloride   Date Value Ref Range Status   06/27/2022 98 95 - 110 mmol/L Final   06/11/2019 104 98 - 110 mmol/L      CO2   Date Value Ref Range Status   06/27/2022 30 (H) 23 - 29 mmol/L Final     Glucose   Date Value Ref Range Status   06/27/2022 93 70 - 110 mg/dL Final   06/11/2019 86 70 - 99 mg/dL      BUN   Date Value Ref Range Status   06/27/2022 16 8 - 23 mg/dL Final     Creatinine   Date Value Ref Range Status   06/27/2022 0.7 0.5 - 1.4 mg/dL Final   06/11/2019 0.72 0.60 - 1.40 mg/dL      Calcium   Date Value Ref Range Status   06/27/2022 8.8 8.7 - 10.5 mg/dL Final     Total Protein   Date Value Ref Range Status   06/27/2022 7.4 6.0 - 8.5 g/dL Final   06/27/2022 7.7 6.0 - 8.4 g/dL Final     Albumin   Date Value Ref Range Status   06/27/2022 4.0 3.5 - 5.2 g/dL Final   06/11/2019 3.8 3.1 - 4.7 g/dL      Total Bilirubin   Date Value Ref Range Status   06/27/2022 0.6 0.1 - 1.0 mg/dL Final     Comment:     For  infants and newborns, interpretation of results should be based  on gestational age, weight and in agreement with clinical  observations.    Premature Infant recommended reference ranges:  Up to 24 hours.............<8.0 mg/dL  Up to 48 hours............<12.0 mg/dL  3-5 days..................<15.0 mg/dL  6-29 days.................<15.0 mg/dL       Alkaline Phosphatase   Date Value Ref Range Status   06/27/2022 50 (L) 55 - 135 U/L Final     AST   Date Value Ref Range Status   06/27/2022 17 10 - 40 U/L Final     ALT   Date Value Ref Range Status   06/27/2022 15 10 - 44 U/L Final     Anion Gap   Date Value Ref Range Status   06/27/2022 5 (L) 8 - 16 mmol/L Final     eGFR if    Date Value Ref Range Status   06/27/2022 >60.0 >60 mL/min/1.73 m^2 Final     eGFR if non    Date Value Ref Range Status   06/27/2022 >60.0 >60 mL/min/1.73 m^2 Final     Comment:     Calculation used to obtain the estimated glomerular filtration  rate (eGFR) is the CKD-EPI equation.        No results found for: CEA  No results found for: PSA        Assessment/Plan:     Problem List Items Addressed This Visit     Diffuse large B-cell lymphoma of lymph nodes of inguinal region[facility]     Patient is doing well and she appears PADMINI at this time.  PET scan is negative and she has no new concerning signs or symptoms of recurrence.  Note is made of hyst done and path is negative.      I will plan on PET scan again and visit in January and provided this is negative, will do yearly thereafter to year 5 then complete.  Patient is in agreement with this.      RTC in 6 months.            Relevant Orders    NM PET CT Routine Skull to Mid Thigh    CBC Auto Differential    Comprehensive Metabolic Panel          Discussion:     Follow up in about 6 months (around 12/29/2022).      Electronically signed by Pb Fry

## 2022-07-14 ENCOUNTER — INFUSION (OUTPATIENT)
Dept: INFUSION THERAPY | Facility: HOSPITAL | Age: 72
End: 2022-07-14
Attending: INTERNAL MEDICINE
Payer: MEDICARE

## 2022-07-14 VITALS
OXYGEN SATURATION: 97 % | DIASTOLIC BLOOD PRESSURE: 75 MMHG | BODY MASS INDEX: 25.47 KG/M2 | HEART RATE: 80 BPM | SYSTOLIC BLOOD PRESSURE: 131 MMHG | HEIGHT: 66 IN | WEIGHT: 158.5 LBS | TEMPERATURE: 97 F | RESPIRATION RATE: 18 BRPM

## 2022-07-14 DIAGNOSIS — C83.35 DIFFUSE LARGE B-CELL LYMPHOMA OF LYMPH NODES OF INGUINAL REGION: ICD-10-CM

## 2022-07-14 DIAGNOSIS — D47.2 MGUS (MONOCLONAL GAMMOPATHY OF UNKNOWN SIGNIFICANCE): Primary | ICD-10-CM

## 2022-07-14 PROCEDURE — A4216 STERILE WATER/SALINE, 10 ML: HCPCS | Performed by: INTERNAL MEDICINE

## 2022-07-14 PROCEDURE — 96523 IRRIG DRUG DELIVERY DEVICE: CPT

## 2022-07-14 PROCEDURE — 63600175 PHARM REV CODE 636 W HCPCS: Performed by: INTERNAL MEDICINE

## 2022-07-14 PROCEDURE — 25000003 PHARM REV CODE 250: Performed by: INTERNAL MEDICINE

## 2022-07-14 RX ORDER — SODIUM CHLORIDE 0.9 % (FLUSH) 0.9 %
10 SYRINGE (ML) INJECTION
Status: COMPLETED | OUTPATIENT
Start: 2022-07-14 | End: 2022-07-14

## 2022-07-14 RX ORDER — HEPARIN 100 UNIT/ML
500 SYRINGE INTRAVENOUS
Status: CANCELLED | OUTPATIENT
Start: 2022-07-14

## 2022-07-14 RX ORDER — SODIUM CHLORIDE 0.9 % (FLUSH) 0.9 %
10 SYRINGE (ML) INJECTION
Status: CANCELLED | OUTPATIENT
Start: 2022-07-14

## 2022-07-14 RX ORDER — HEPARIN 100 UNIT/ML
500 SYRINGE INTRAVENOUS
Status: COMPLETED | OUTPATIENT
Start: 2022-07-14 | End: 2022-07-14

## 2022-07-14 RX ADMIN — HEPARIN 500 UNITS: 100 SYRINGE at 09:07

## 2022-07-14 RX ADMIN — SODIUM CHLORIDE, PRESERVATIVE FREE 10 ML: 5 INJECTION INTRAVENOUS at 09:07

## 2022-07-14 NOTE — PLAN OF CARE
Problem: Infection  Goal: Absence of Infection Signs and Symptoms  Outcome: Ongoing, Progressing  Intervention: Prevent or Manage Infection  Flowsheets (Taken 7/14/2022 0900)  Infection Management: aseptic technique maintained  Isolation Precautions: precautions maintained

## 2022-08-10 ENCOUNTER — HOSPITAL ENCOUNTER (OUTPATIENT)
Dept: RADIOLOGY | Facility: HOSPITAL | Age: 72
Discharge: HOME | End: 2022-08-10
Attending: SPECIALIST
Payer: MEDICARE

## 2022-08-10 DIAGNOSIS — Z12.31 ENCOUNTER FOR SCREENING MAMMOGRAM FOR MALIGNANT NEOPLASM OF BREAST: ICD-10-CM

## 2022-08-10 DIAGNOSIS — Z12.31 ENCOUNTER FOR SCREENING MAMMOGRAM FOR MALIGNANT NEOPLASM OF BREAST: Primary | ICD-10-CM

## 2022-08-10 PROCEDURE — 77067 SCR MAMMO BI INCL CAD: CPT | Mod: TC,PO

## 2022-08-10 PROCEDURE — 77063 BREAST TOMOSYNTHESIS BI: CPT | Mod: TC,PO

## 2022-09-06 RX ORDER — HEPARIN 100 UNIT/ML
500 SYRINGE INTRAVENOUS
Status: CANCELLED | OUTPATIENT
Start: 2022-09-06

## 2022-09-06 RX ORDER — SODIUM CHLORIDE 0.9 % (FLUSH) 0.9 %
10 SYRINGE (ML) INJECTION
Status: CANCELLED | OUTPATIENT
Start: 2022-09-06

## 2022-09-07 ENCOUNTER — INFUSION (OUTPATIENT)
Dept: INFUSION THERAPY | Facility: HOSPITAL | Age: 72
End: 2022-09-07
Attending: INTERNAL MEDICINE
Payer: MEDICARE

## 2022-09-07 VITALS
DIASTOLIC BLOOD PRESSURE: 83 MMHG | HEIGHT: 66 IN | OXYGEN SATURATION: 99 % | BODY MASS INDEX: 25.54 KG/M2 | SYSTOLIC BLOOD PRESSURE: 148 MMHG | TEMPERATURE: 98 F | WEIGHT: 158.88 LBS | RESPIRATION RATE: 18 BRPM | HEART RATE: 71 BPM

## 2022-09-07 DIAGNOSIS — D47.2 MGUS (MONOCLONAL GAMMOPATHY OF UNKNOWN SIGNIFICANCE): Primary | ICD-10-CM

## 2022-09-07 DIAGNOSIS — C83.35 DIFFUSE LARGE B-CELL LYMPHOMA OF LYMPH NODES OF INGUINAL REGION: ICD-10-CM

## 2022-09-07 PROCEDURE — 25000003 PHARM REV CODE 250: Performed by: INTERNAL MEDICINE

## 2022-09-07 PROCEDURE — 63600175 PHARM REV CODE 636 W HCPCS: Performed by: INTERNAL MEDICINE

## 2022-09-07 PROCEDURE — 96523 IRRIG DRUG DELIVERY DEVICE: CPT

## 2022-09-07 PROCEDURE — A4216 STERILE WATER/SALINE, 10 ML: HCPCS | Performed by: INTERNAL MEDICINE

## 2022-09-07 RX ORDER — SODIUM CHLORIDE 0.9 % (FLUSH) 0.9 %
10 SYRINGE (ML) INJECTION
Status: COMPLETED | OUTPATIENT
Start: 2022-09-07 | End: 2022-09-07

## 2022-09-07 RX ORDER — HEPARIN 100 UNIT/ML
500 SYRINGE INTRAVENOUS
Status: COMPLETED | OUTPATIENT
Start: 2022-09-07 | End: 2022-09-07

## 2022-09-07 RX ORDER — SODIUM CHLORIDE 0.9 % (FLUSH) 0.9 %
10 SYRINGE (ML) INJECTION
Status: CANCELLED | OUTPATIENT
Start: 2022-09-07

## 2022-09-07 RX ORDER — HEPARIN 100 UNIT/ML
500 SYRINGE INTRAVENOUS
Status: CANCELLED | OUTPATIENT
Start: 2022-09-07

## 2022-09-07 RX ADMIN — HEPARIN 500 UNITS: 100 SYRINGE at 02:09

## 2022-09-07 RX ADMIN — SODIUM CHLORIDE, PRESERVATIVE FREE 10 ML: 5 INJECTION INTRAVENOUS at 02:09

## 2022-09-27 ENCOUNTER — LAB VISIT (OUTPATIENT)
Dept: LAB | Facility: HOSPITAL | Age: 72
End: 2022-09-27
Attending: INTERNAL MEDICINE
Payer: MEDICARE

## 2022-09-27 DIAGNOSIS — E89.0 POSTSURGICAL HYPOTHYROIDISM: Primary | ICD-10-CM

## 2022-09-27 LAB
T4 FREE SERPL-MCNC: 0.98 NG/DL (ref 0.71–1.51)
TSH SERPL DL<=0.005 MIU/L-ACNC: 0.72 UIU/ML (ref 0.34–5.6)

## 2022-09-27 PROCEDURE — 36415 COLL VENOUS BLD VENIPUNCTURE: CPT | Performed by: INTERNAL MEDICINE

## 2022-09-27 PROCEDURE — 84439 ASSAY OF FREE THYROXINE: CPT | Performed by: INTERNAL MEDICINE

## 2022-09-27 PROCEDURE — 84443 ASSAY THYROID STIM HORMONE: CPT | Performed by: INTERNAL MEDICINE

## 2022-10-03 DIAGNOSIS — E04.2 NONTOXIC MULTINODULAR GOITER: Primary | ICD-10-CM

## 2022-10-19 ENCOUNTER — INFUSION (OUTPATIENT)
Dept: INFUSION THERAPY | Facility: HOSPITAL | Age: 72
End: 2022-10-19
Attending: INTERNAL MEDICINE
Payer: MEDICARE

## 2022-10-19 VITALS
DIASTOLIC BLOOD PRESSURE: 83 MMHG | SYSTOLIC BLOOD PRESSURE: 142 MMHG | WEIGHT: 157.13 LBS | RESPIRATION RATE: 16 BRPM | BODY MASS INDEX: 25.25 KG/M2 | OXYGEN SATURATION: 97 % | HEART RATE: 73 BPM | HEIGHT: 66 IN | TEMPERATURE: 97 F

## 2022-10-19 DIAGNOSIS — C83.35 DIFFUSE LARGE B-CELL LYMPHOMA OF LYMPH NODES OF INGUINAL REGION: ICD-10-CM

## 2022-10-19 DIAGNOSIS — D47.2 MGUS (MONOCLONAL GAMMOPATHY OF UNKNOWN SIGNIFICANCE): Primary | ICD-10-CM

## 2022-10-19 PROCEDURE — 96523 IRRIG DRUG DELIVERY DEVICE: CPT

## 2022-10-19 PROCEDURE — A4216 STERILE WATER/SALINE, 10 ML: HCPCS | Performed by: INTERNAL MEDICINE

## 2022-10-19 PROCEDURE — 63600175 PHARM REV CODE 636 W HCPCS: Performed by: INTERNAL MEDICINE

## 2022-10-19 PROCEDURE — 25000003 PHARM REV CODE 250: Performed by: INTERNAL MEDICINE

## 2022-10-19 RX ORDER — SODIUM CHLORIDE 0.9 % (FLUSH) 0.9 %
10 SYRINGE (ML) INJECTION
Status: COMPLETED | OUTPATIENT
Start: 2022-10-19 | End: 2022-10-19

## 2022-10-19 RX ORDER — SODIUM CHLORIDE 0.9 % (FLUSH) 0.9 %
10 SYRINGE (ML) INJECTION
Status: CANCELLED | OUTPATIENT
Start: 2022-10-19

## 2022-10-19 RX ORDER — HEPARIN 100 UNIT/ML
500 SYRINGE INTRAVENOUS
Status: COMPLETED | OUTPATIENT
Start: 2022-10-19 | End: 2022-10-19

## 2022-10-19 RX ORDER — HEPARIN 100 UNIT/ML
500 SYRINGE INTRAVENOUS
Status: CANCELLED | OUTPATIENT
Start: 2022-10-19

## 2022-10-19 RX ADMIN — HEPARIN 500 UNITS: 100 SYRINGE at 03:10

## 2022-10-19 RX ADMIN — SODIUM CHLORIDE, PRESERVATIVE FREE 10 ML: 5 INJECTION INTRAVENOUS at 03:10

## 2022-10-19 NOTE — PLAN OF CARE
Problem: Fatigue  Goal: Improved Activity Tolerance  Outcome: Ongoing, Progressing  Intervention: Promote Improved Energy  Flowsheets (Taken 10/19/2022 0535)  Fatigue Management: frequent rest breaks encouraged  Sleep/Rest Enhancement:   regular sleep/rest pattern promoted   relaxation techniques promoted  Activity Management: Ambulated -L4

## 2022-12-07 ENCOUNTER — INFUSION (OUTPATIENT)
Dept: INFUSION THERAPY | Facility: HOSPITAL | Age: 72
End: 2022-12-07
Attending: INTERNAL MEDICINE
Payer: MEDICARE

## 2022-12-07 VITALS
BODY MASS INDEX: 25.29 KG/M2 | SYSTOLIC BLOOD PRESSURE: 128 MMHG | DIASTOLIC BLOOD PRESSURE: 84 MMHG | RESPIRATION RATE: 18 BRPM | WEIGHT: 157.38 LBS | TEMPERATURE: 98 F | OXYGEN SATURATION: 97 % | HEIGHT: 66 IN | HEART RATE: 74 BPM

## 2022-12-07 DIAGNOSIS — C83.35 DIFFUSE LARGE B-CELL LYMPHOMA OF LYMPH NODES OF INGUINAL REGION: ICD-10-CM

## 2022-12-07 DIAGNOSIS — D47.2 MGUS (MONOCLONAL GAMMOPATHY OF UNKNOWN SIGNIFICANCE): Primary | ICD-10-CM

## 2022-12-07 PROCEDURE — 63600175 PHARM REV CODE 636 W HCPCS: Performed by: INTERNAL MEDICINE

## 2022-12-07 PROCEDURE — 25000003 PHARM REV CODE 250: Performed by: INTERNAL MEDICINE

## 2022-12-07 PROCEDURE — A4216 STERILE WATER/SALINE, 10 ML: HCPCS | Performed by: INTERNAL MEDICINE

## 2022-12-07 PROCEDURE — 96523 IRRIG DRUG DELIVERY DEVICE: CPT

## 2022-12-07 RX ORDER — SODIUM CHLORIDE 0.9 % (FLUSH) 0.9 %
10 SYRINGE (ML) INJECTION
Status: CANCELLED | OUTPATIENT
Start: 2022-12-07

## 2022-12-07 RX ORDER — HEPARIN 100 UNIT/ML
500 SYRINGE INTRAVENOUS
Status: COMPLETED | OUTPATIENT
Start: 2022-12-07 | End: 2022-12-07

## 2022-12-07 RX ORDER — HEPARIN 100 UNIT/ML
500 SYRINGE INTRAVENOUS
Status: CANCELLED | OUTPATIENT
Start: 2022-12-07

## 2022-12-07 RX ORDER — SODIUM CHLORIDE 0.9 % (FLUSH) 0.9 %
10 SYRINGE (ML) INJECTION
Status: COMPLETED | OUTPATIENT
Start: 2022-12-07 | End: 2022-12-07

## 2022-12-07 RX ADMIN — HEPARIN 500 UNITS: 100 SYRINGE at 03:12

## 2022-12-07 RX ADMIN — SODIUM CHLORIDE, PRESERVATIVE FREE 10 ML: 5 INJECTION INTRAVENOUS at 03:12

## 2022-12-20 ENCOUNTER — TELEPHONE (OUTPATIENT)
Dept: HEMATOLOGY/ONCOLOGY | Facility: CLINIC | Age: 72
End: 2022-12-20

## 2022-12-20 NOTE — TELEPHONE ENCOUNTER
Called patient for upcoming appointment 12/27/2022 and laboratory work requested, patient stated understanding.

## 2022-12-21 ENCOUNTER — LAB VISIT (OUTPATIENT)
Dept: LAB | Facility: HOSPITAL | Age: 72
End: 2022-12-21
Attending: INTERNAL MEDICINE
Payer: MEDICARE

## 2022-12-21 DIAGNOSIS — C83.35 DIFFUSE LARGE B-CELL LYMPHOMA OF LYMPH NODES OF INGUINAL REGION: ICD-10-CM

## 2022-12-21 LAB
ALBUMIN SERPL BCP-MCNC: 4 G/DL (ref 3.5–5.2)
ALP SERPL-CCNC: 58 U/L (ref 55–135)
ALT SERPL W/O P-5'-P-CCNC: 18 U/L (ref 10–44)
ANION GAP SERPL CALC-SCNC: 6 MMOL/L (ref 8–16)
AST SERPL-CCNC: 19 U/L (ref 10–40)
BASOPHILS # BLD AUTO: 0.06 K/UL (ref 0–0.2)
BASOPHILS NFR BLD: 0.9 % (ref 0–1.9)
BILIRUB SERPL-MCNC: 0.8 MG/DL (ref 0.1–1)
BUN SERPL-MCNC: 17 MG/DL (ref 8–23)
CALCIUM SERPL-MCNC: 9.5 MG/DL (ref 8.7–10.5)
CHLORIDE SERPL-SCNC: 103 MMOL/L (ref 95–110)
CO2 SERPL-SCNC: 28 MMOL/L (ref 23–29)
CREAT SERPL-MCNC: 0.7 MG/DL (ref 0.5–1.4)
DIFFERENTIAL METHOD: ABNORMAL
EOSINOPHIL # BLD AUTO: 0.1 K/UL (ref 0–0.5)
EOSINOPHIL NFR BLD: 1.6 % (ref 0–8)
ERYTHROCYTE [DISTWIDTH] IN BLOOD BY AUTOMATED COUNT: 12.2 % (ref 11.5–14.5)
EST. GFR  (NO RACE VARIABLE): >60 ML/MIN/1.73 M^2
GLUCOSE SERPL-MCNC: 92 MG/DL (ref 70–110)
HCT VFR BLD AUTO: 40.4 % (ref 37–48.5)
HGB BLD-MCNC: 13.6 G/DL (ref 12–16)
IMM GRANULOCYTES # BLD AUTO: 0.02 K/UL (ref 0–0.04)
IMM GRANULOCYTES NFR BLD AUTO: 0.3 % (ref 0–0.5)
LYMPHOCYTES # BLD AUTO: 2.2 K/UL (ref 1–4.8)
LYMPHOCYTES NFR BLD: 31.7 % (ref 18–48)
MCH RBC QN AUTO: 32 PG (ref 27–31)
MCHC RBC AUTO-ENTMCNC: 33.7 G/DL (ref 32–36)
MCV RBC AUTO: 95 FL (ref 82–98)
MONOCYTES # BLD AUTO: 0.4 K/UL (ref 0.3–1)
MONOCYTES NFR BLD: 5.2 % (ref 4–15)
NEUTROPHILS # BLD AUTO: 4.2 K/UL (ref 1.8–7.7)
NEUTROPHILS NFR BLD: 60.3 % (ref 38–73)
NRBC BLD-RTO: 0 /100 WBC
PLATELET # BLD AUTO: 219 K/UL (ref 150–450)
PMV BLD AUTO: 9.1 FL (ref 9.2–12.9)
POTASSIUM SERPL-SCNC: 4.6 MMOL/L (ref 3.5–5.1)
PROT SERPL-MCNC: 8.2 G/DL (ref 6–8.4)
RBC # BLD AUTO: 4.25 M/UL (ref 4–5.4)
SODIUM SERPL-SCNC: 137 MMOL/L (ref 136–145)
WBC # BLD AUTO: 6.9 K/UL (ref 3.9–12.7)

## 2022-12-21 PROCEDURE — 36415 COLL VENOUS BLD VENIPUNCTURE: CPT | Performed by: INTERNAL MEDICINE

## 2022-12-21 PROCEDURE — 80053 COMPREHEN METABOLIC PANEL: CPT | Performed by: INTERNAL MEDICINE

## 2022-12-21 PROCEDURE — 85025 COMPLETE CBC W/AUTO DIFF WBC: CPT | Performed by: INTERNAL MEDICINE

## 2022-12-27 ENCOUNTER — HOSPITAL ENCOUNTER (OUTPATIENT)
Dept: RADIOLOGY | Facility: HOSPITAL | Age: 72
Discharge: HOME | End: 2022-12-27
Attending: INTERNAL MEDICINE
Payer: MEDICARE

## 2022-12-27 VITALS — HEIGHT: 66 IN | WEIGHT: 150 LBS | BODY MASS INDEX: 24.11 KG/M2

## 2022-12-27 DIAGNOSIS — C83.35 DIFFUSE LARGE B-CELL LYMPHOMA OF LYMPH NODES OF INGUINAL REGION: ICD-10-CM

## 2022-12-27 LAB — GLUCOSE SERPL-MCNC: 93 MG/DL (ref 70–110)

## 2022-12-27 PROCEDURE — 82962 GLUCOSE BLOOD TEST: CPT | Mod: PO

## 2022-12-29 ENCOUNTER — LAB VISIT (OUTPATIENT)
Dept: LAB | Facility: HOSPITAL | Age: 72
End: 2022-12-29
Attending: INTERNAL MEDICINE
Payer: MEDICARE

## 2022-12-29 ENCOUNTER — OFFICE VISIT (OUTPATIENT)
Dept: HEMATOLOGY/ONCOLOGY | Facility: CLINIC | Age: 72
End: 2022-12-29
Payer: MEDICARE

## 2022-12-29 VITALS
DIASTOLIC BLOOD PRESSURE: 77 MMHG | HEART RATE: 77 BPM | WEIGHT: 155.5 LBS | BODY MASS INDEX: 25.1 KG/M2 | SYSTOLIC BLOOD PRESSURE: 141 MMHG | TEMPERATURE: 97 F

## 2022-12-29 DIAGNOSIS — D47.2 MGUS (MONOCLONAL GAMMOPATHY OF UNKNOWN SIGNIFICANCE): ICD-10-CM

## 2022-12-29 DIAGNOSIS — C83.35 DIFFUSE LARGE B-CELL LYMPHOMA OF LYMPH NODES OF INGUINAL REGION: ICD-10-CM

## 2022-12-29 PROCEDURE — 99213 OFFICE O/P EST LOW 20 MIN: CPT | Mod: S$GLB,,, | Performed by: INTERNAL MEDICINE

## 2022-12-29 PROCEDURE — 36415 COLL VENOUS BLD VENIPUNCTURE: CPT | Performed by: INTERNAL MEDICINE

## 2022-12-29 PROCEDURE — 84155 ASSAY OF PROTEIN SERUM: CPT | Performed by: INTERNAL MEDICINE

## 2022-12-29 PROCEDURE — 84165 PROTEIN E-PHORESIS SERUM: CPT | Performed by: INTERNAL MEDICINE

## 2022-12-29 PROCEDURE — 82784 ASSAY IGA/IGD/IGG/IGM EACH: CPT | Performed by: INTERNAL MEDICINE

## 2022-12-29 PROCEDURE — 83521 IG LIGHT CHAINS FREE EACH: CPT | Performed by: INTERNAL MEDICINE

## 2022-12-29 PROCEDURE — 99213 PR OFFICE/OUTPT VISIT, EST, LEVL III, 20-29 MIN: ICD-10-PCS | Mod: S$GLB,,, | Performed by: INTERNAL MEDICINE

## 2022-12-29 NOTE — ASSESSMENT & PLAN NOTE
Patient is due for these labs.  Will arrange for this to be done now.  Will see her again in six months with another set of these labs.

## 2022-12-29 NOTE — ASSESSMENT & PLAN NOTE
Patient is doing well and pet is negative.  She is marilee at at this time and is feeling well.  Will continue to scan but now on a yearly basis till year 5.  Discussed this today.

## 2022-12-29 NOTE — PROGRESS NOTES
PROGRESS NOTE    Subjective:       Patient ID: Aurelia Marlow is a 72 y.o. female.    Dx [facility] type- 8/21/2019  Inguinal LN biopsy    PET with bone marrow uptake throughout appendicular and axial skeleton    Bone marrow biopsy positive for B-cell lymphoma    Stage ARELIS    R-CHOP x 6  10/28/2019-2/10/2020    10/6/2020:  PET negative    5/16/2022:  LATOYA/BSO  Path negative for malignancy    6/22/2022:  PET negative    12/27/2022:  PET negative    Chief Complaint:  No chief complaint on file.  lymphoma and MGUS follow up    History of Present Illness:   Aurelia Marlow is a 72 y.o. female who presents for follow up of lymphoma    Ms. Marlow is doing well today and has no new complaints or problems.        Family and Social history reviewed and is unchanged from 9/27/2019      ROS:  Review of Systems   Constitutional:  Negative for appetite change, fever and unexpected weight change.   HENT:  Negative for mouth sores.    Eyes:  Negative for visual disturbance.   Respiratory:  Negative for cough and shortness of breath.    Cardiovascular:  Negative for chest pain and leg swelling.   Gastrointestinal:  Negative for abdominal pain, blood in stool and diarrhea.   Genitourinary:  Negative for frequency and hematuria.   Musculoskeletal:  Negative for back pain.   Skin:  Negative for rash.   Neurological:  Negative for headaches.   Hematological:  Negative for adenopathy.   Psychiatric/Behavioral:  The patient is not nervous/anxious.         Current Outpatient Medications:     augmented betamethasone dipropionate (DIPROLENE-AF) 0.05 % cream, aaa bid prn paronychia, Disp: 50 g, Rfl: 3    cholecalciferol, vitamin D3, (VITAMIN D3) 50 mcg (2,000 unit) Tab, once daily., Disp: , Rfl:     diphenhydrAMINE (BENADRYL) 25 mg capsule, Take 1 capsule (25 mg total) by mouth every 6 (six) hours as needed for Itching or Allergies., Disp: 20 capsule, Rfl: 0    famotidine  (PEPCID) 20 MG tablet, Take 1 tablet (20 mg total) by mouth 2 (two) times daily. (Patient taking differently: Take 20 mg by mouth 2 (two) times daily as needed.), Disp: 20 tablet, Rfl: 0    fluticasone propionate (FLONASE) 50 mcg/actuation nasal spray, daily as needed., Disp: , Rfl:     HYDROcodone-acetaminophen (NORCO) 5-325 mg per tablet, Take 1 tablet by mouth every 6 (six) hours as needed for Pain., Disp: 20 tablet, Rfl: 0    hydrOXYchloroQUINE (PLAQUENIL) 200 mg tablet, TAKE 1 TABLET BY MOUTH TWICE A DAY, Disp: 180 tablet, Rfl: 1    levothyroxine (SYNTHROID) 100 MCG tablet, Take 100 mcg by mouth once daily., Disp: , Rfl:     lidocaine-prilocaine (EMLA) cream, Apply topically as needed., Disp: 25 g, Rfl: 2    naproxen (EC NAPROSYN) 500 MG EC tablet, TAKE 1 TABLET BY MOUTH TWICE A DAY AFTER MEALS AS NEEDED, Disp: 60 tablet, Rfl: 0    ondansetron (ZOFRAN) 8 MG tablet, Take 8 mg by mouth every 8 (eight) hours as needed., Disp: , Rfl: 6    promethazine (PHENERGAN) 25 MG tablet, Take 25 mg by mouth every 6 (six) hours as needed., Disp: , Rfl: 6    senna-docusate 8.6-50 mg (PERICOLACE) 8.6-50 mg per tablet, Take 1 tablet by mouth 2 (two) times daily., Disp: , Rfl:     Current Facility-Administered Medications:     lidocaine (PF) 10 mg/ml (1%) injection 10 mg, 1 mL, Intradermal, Once, Kermit Martinez MD        Objective:       Physical Examination:     BP (!) 141/77   Pulse 77   Temp 97.4 °F (36.3 °C)   Wt 70.5 kg (155 lb 8 oz)   BMI 25.10 kg/m²     Physical Exam  Constitutional:       Appearance: She is well-developed.   HENT:      Head: Normocephalic and atraumatic.      Right Ear: External ear normal.      Left Ear: External ear normal.   Eyes:      Conjunctiva/sclera: Conjunctivae normal.      Pupils: Pupils are equal, round, and reactive to light.   Neck:      Thyroid: No thyromegaly.      Trachea: No tracheal deviation.   Cardiovascular:      Rate and Rhythm: Normal rate and regular rhythm.      Heart  sounds: Normal heart sounds.   Pulmonary:      Effort: Pulmonary effort is normal.      Breath sounds: Normal breath sounds.   Abdominal:      General: Bowel sounds are normal. There is no distension.      Palpations: Abdomen is soft. There is no mass.      Tenderness: There is no abdominal tenderness.   Skin:     Findings: No rash.   Neurological:      Comments: Neuro intact througout   Psychiatric:         Behavior: Behavior normal.         Thought Content: Thought content normal.         Judgment: Judgment normal.        Labs:   Recent Results (from the past 336 hour(s))   CBC Auto Differential    Collection Time: 12/21/22 11:53 AM   Result Value Ref Range    WBC 6.90 3.90 - 12.70 K/uL    Hemoglobin 13.6 12.0 - 16.0 g/dL    Hematocrit 40.4 37.0 - 48.5 %    Platelets 219 150 - 450 K/uL     CMP  Sodium   Date Value Ref Range Status   12/21/2022 137 136 - 145 mmol/L Final   06/11/2019 139 134 - 144 mmol/L      Potassium   Date Value Ref Range Status   12/21/2022 4.6 3.5 - 5.1 mmol/L Final     Chloride   Date Value Ref Range Status   12/21/2022 103 95 - 110 mmol/L Final   06/11/2019 104 98 - 110 mmol/L      CO2   Date Value Ref Range Status   12/21/2022 28 23 - 29 mmol/L Final     Glucose   Date Value Ref Range Status   12/21/2022 92 70 - 110 mg/dL Final   06/11/2019 86 70 - 99 mg/dL      BUN   Date Value Ref Range Status   12/21/2022 17 8 - 23 mg/dL Final     Creatinine   Date Value Ref Range Status   12/21/2022 0.7 0.5 - 1.4 mg/dL Final   06/11/2019 0.72 0.60 - 1.40 mg/dL      Calcium   Date Value Ref Range Status   12/21/2022 9.5 8.7 - 10.5 mg/dL Final     Total Protein   Date Value Ref Range Status   12/21/2022 8.2 6.0 - 8.4 g/dL Final     Albumin   Date Value Ref Range Status   12/21/2022 4.0 3.5 - 5.2 g/dL Final   06/11/2019 3.8 3.1 - 4.7 g/dL      Total Bilirubin   Date Value Ref Range Status   12/21/2022 0.8 0.1 - 1.0 mg/dL Final     Comment:     For infants and newborns, interpretation of results should be  based  on gestational age, weight and in agreement with clinical  observations.    Premature Infant recommended reference ranges:  Up to 24 hours.............<8.0 mg/dL  Up to 48 hours............<12.0 mg/dL  3-5 days..................<15.0 mg/dL  6-29 days.................<15.0 mg/dL       Alkaline Phosphatase   Date Value Ref Range Status   12/21/2022 58 55 - 135 U/L Final     AST   Date Value Ref Range Status   12/21/2022 19 10 - 40 U/L Final     ALT   Date Value Ref Range Status   12/21/2022 18 10 - 44 U/L Final     Anion Gap   Date Value Ref Range Status   12/21/2022 6 (L) 8 - 16 mmol/L Final     eGFR if    Date Value Ref Range Status   06/27/2022 >60.0 >60 mL/min/1.73 m^2 Final     eGFR if non    Date Value Ref Range Status   06/27/2022 >60.0 >60 mL/min/1.73 m^2 Final     Comment:     Calculation used to obtain the estimated glomerular filtration  rate (eGFR) is the CKD-EPI equation.        No results found for: CEA  No results found for: PSA        Assessment/Plan:     Problem List Items Addressed This Visit       MGUS (monoclonal gammopathy of unknown significance)     Patient is due for these labs.  Will arrange for this to be done now.  Will see her again in six months with another set of these labs.           Relevant Orders    Immunoglobulin Free LT Chains Blood    Immunoglobulins (IgG, IgA, IgM) Quantitative    Protein Electrophoresis, Serum    CBC Auto Differential    Comprehensive Metabolic Panel    Immunoglobulin Free LT Chains Blood    Protein Electrophoresis, Serum    Immunoglobulins (IgG, IgA, IgM) Quantitative    Diffuse large B-cell lymphoma of lymph nodes of inguinal region[facility]     Patient is doing well and pet is negative.  She is marilee at at this time and is feeling well.  Will continue to scan but now on a yearly basis till year 5.  Discussed this today.                Discussion:     Follow up in about 6 months (around  6/29/2023).      Electronically signed by Pb Fry

## 2022-12-30 LAB
IGA SERPL-MCNC: 188 MG/DL (ref 87–352)
IGG SERPL-MCNC: 1966 MG/DL (ref 586–1602)
IGM SERPL-MCNC: 68 MG/DL (ref 26–217)
KAPPA LC FREE SER-MCNC: 26.6 MG/L (ref 3.3–19.4)
KAPPA LC FREE/LAMBDA FREE SER: 2.99 {RATIO} (ref 0.26–1.65)
LAMBDA LC FREE SERPL-MCNC: 8.9 MG/L (ref 5.7–26.3)

## 2022-12-31 LAB
ALBUMIN SERPL ELPH-MCNC: 3.7 G/DL (ref 2.9–4.4)
ALBUMIN/GLOB SERPL: 1 {RATIO} (ref 0.7–1.7)
ALPHA1 GLOB SERPL ELPH-MCNC: 0.2 G/DL (ref 0–0.4)
ALPHA2 GLOB SERPL ELPH-MCNC: 0.7 G/DL (ref 0.4–1)
B-GLOBULIN SERPL ELPH-MCNC: 1 G/DL (ref 0.7–1.3)
GAMMA GLOB SERPL ELPH-MCNC: 1.8 G/DL (ref 0.4–1.8)
GLOBULIN SER CALC-MCNC: 3.7 G/DL (ref 2.2–3.9)
LABORATORY COMMENT REPORT: ABNORMAL
M PROTEIN SERPL ELPH-MCNC: 1.4 G/DL
PROT SERPL-MCNC: 7.4 G/DL (ref 6–8.5)

## 2023-01-02 ENCOUNTER — PATIENT MESSAGE (OUTPATIENT)
Dept: HEMATOLOGY/ONCOLOGY | Facility: CLINIC | Age: 73
End: 2023-01-02

## 2023-01-18 ENCOUNTER — INFUSION (OUTPATIENT)
Dept: INFUSION THERAPY | Facility: HOSPITAL | Age: 73
End: 2023-01-18
Attending: INTERNAL MEDICINE
Payer: MEDICARE

## 2023-01-18 VITALS
TEMPERATURE: 98 F | RESPIRATION RATE: 17 BRPM | SYSTOLIC BLOOD PRESSURE: 118 MMHG | OXYGEN SATURATION: 97 % | DIASTOLIC BLOOD PRESSURE: 80 MMHG | WEIGHT: 154.69 LBS | BODY MASS INDEX: 24.86 KG/M2 | HEIGHT: 66 IN | HEART RATE: 77 BPM

## 2023-01-18 DIAGNOSIS — D47.2 MGUS (MONOCLONAL GAMMOPATHY OF UNKNOWN SIGNIFICANCE): Primary | ICD-10-CM

## 2023-01-18 DIAGNOSIS — C83.35 DIFFUSE LARGE B-CELL LYMPHOMA OF LYMPH NODES OF INGUINAL REGION: ICD-10-CM

## 2023-01-18 PROCEDURE — 96523 IRRIG DRUG DELIVERY DEVICE: CPT

## 2023-01-18 PROCEDURE — 25000003 PHARM REV CODE 250: Performed by: INTERNAL MEDICINE

## 2023-01-18 PROCEDURE — 63600175 PHARM REV CODE 636 W HCPCS: Performed by: INTERNAL MEDICINE

## 2023-01-18 PROCEDURE — A4216 STERILE WATER/SALINE, 10 ML: HCPCS | Performed by: INTERNAL MEDICINE

## 2023-01-18 RX ORDER — HEPARIN 100 UNIT/ML
500 SYRINGE INTRAVENOUS
Status: COMPLETED | OUTPATIENT
Start: 2023-01-18 | End: 2023-01-18

## 2023-01-18 RX ORDER — SODIUM CHLORIDE 0.9 % (FLUSH) 0.9 %
10 SYRINGE (ML) INJECTION
Status: CANCELLED | OUTPATIENT
Start: 2023-01-18

## 2023-01-18 RX ORDER — HEPARIN 100 UNIT/ML
500 SYRINGE INTRAVENOUS
Status: CANCELLED | OUTPATIENT
Start: 2023-01-18

## 2023-01-18 RX ORDER — SODIUM CHLORIDE 0.9 % (FLUSH) 0.9 %
10 SYRINGE (ML) INJECTION
Status: COMPLETED | OUTPATIENT
Start: 2023-01-18 | End: 2023-01-18

## 2023-01-18 RX ADMIN — HEPARIN 500 UNITS: 100 SYRINGE at 02:01

## 2023-01-18 RX ADMIN — SODIUM CHLORIDE, PRESERVATIVE FREE 10 ML: 5 INJECTION INTRAVENOUS at 02:01

## 2023-01-18 NOTE — PLAN OF CARE
Problem: Infection  Goal: Absence of Infection Signs and Symptoms  Outcome: Ongoing, Progressing  Intervention: Prevent or Manage Infection  Flowsheets (Taken 1/18/2023 3284)  Infection Management: aseptic technique maintained  Isolation Precautions: precautions maintained

## 2023-01-20 ENCOUNTER — PATIENT MESSAGE (OUTPATIENT)
Dept: HEMATOLOGY/ONCOLOGY | Facility: CLINIC | Age: 73
End: 2023-01-20

## 2023-01-23 ENCOUNTER — TELEPHONE (OUTPATIENT)
Dept: HEMATOLOGY/ONCOLOGY | Facility: CLINIC | Age: 73
End: 2023-01-23

## 2023-01-23 DIAGNOSIS — M06.9 RHEUMATOID ARTHRITIS, INVOLVING UNSPECIFIED SITE, UNSPECIFIED WHETHER RHEUMATOID FACTOR PRESENT: Primary | ICD-10-CM

## 2023-01-23 DIAGNOSIS — M19.032 ARTHRITIS OF LEFT WRIST: ICD-10-CM

## 2023-02-13 ENCOUNTER — TELEPHONE (OUTPATIENT)
Dept: FAMILY MEDICINE | Facility: CLINIC | Age: 73
End: 2023-02-13

## 2023-02-13 ENCOUNTER — OFFICE VISIT (OUTPATIENT)
Dept: FAMILY MEDICINE | Facility: CLINIC | Age: 73
End: 2023-02-13
Payer: MEDICARE

## 2023-02-13 ENCOUNTER — PATIENT MESSAGE (OUTPATIENT)
Dept: FAMILY MEDICINE | Facility: CLINIC | Age: 73
End: 2023-02-13

## 2023-02-13 DIAGNOSIS — D47.2 MGUS (MONOCLONAL GAMMOPATHY OF UNKNOWN SIGNIFICANCE): ICD-10-CM

## 2023-02-13 DIAGNOSIS — I10 HYPERTENSION, UNSPECIFIED TYPE: ICD-10-CM

## 2023-02-13 DIAGNOSIS — M06.9 RHEUMATOID ARTHRITIS, INVOLVING UNSPECIFIED SITE, UNSPECIFIED WHETHER RHEUMATOID FACTOR PRESENT: Primary | ICD-10-CM

## 2023-02-13 DIAGNOSIS — E03.9 HYPOTHYROIDISM, UNSPECIFIED TYPE: ICD-10-CM

## 2023-02-13 PROCEDURE — 99203 PR OFFICE/OUTPT VISIT, NEW, LEVL III, 30-44 MIN: ICD-10-PCS | Mod: 95,,, | Performed by: PHYSICIAN ASSISTANT

## 2023-02-13 PROCEDURE — 99203 OFFICE O/P NEW LOW 30 MIN: CPT | Mod: 95,,, | Performed by: PHYSICIAN ASSISTANT

## 2023-02-13 RX ORDER — HYDROXYCHLOROQUINE SULFATE 200 MG/1
200 TABLET, FILM COATED ORAL 2 TIMES DAILY
Qty: 180 TABLET | Refills: 0 | Status: SHIPPED | OUTPATIENT
Start: 2023-02-13 | End: 2023-05-10 | Stop reason: SDUPTHER

## 2023-02-13 NOTE — PROGRESS NOTES
Subjective:       Patient ID: Aurelia Marlow is a 72 y.o. female.    Chief Complaint: No chief complaint on file.    The patient location is: [place]  The chief complaint leading to consultation is: rheumatology referral    Visit type: audiovisual    Face to Face time with patient: 10 minutes  20 minutes of total time spent on the encounter, which includes face to face time and non-face to face time preparing to see the patient (eg, review of tests), Obtaining and/or reviewing separately obtained history, Documenting clinical information in the electronic or other health record, Independently interpreting results (not separately reported) and communicating results to the patient/family/caregiver, or Care coordination (not separately reported).         Each patient to whom he or she provides medical services by telemedicine is:  (1) informed of the relationship between the physician and patient and the respective role of any other health care provider with respect to management of the patient; and (2) notified that he or she may decline to receive medical services by telemedicine and may withdraw from such care at any time.    Notes:   Ms. Marlow is a 72 year old female with HTN and RA who presents to clinic today requesting referral to rheumatologist. The patient was previously a patient of Dr. Hernandez who has now retired. The patient was diagnosed with RA 10 years ago. The patient reports she had labs that revealed evidence of severe disease. The patient was started on plaquenil and methotrexate. The patient was then diagnosed with lymphoma in 2019 and her medications were stopped. The patient is currently on plaquenil but not methotrexate. The patient is followed by Dr. Nieto, hematologist. The patient reports she is up to date on her eye exam with Dr. MACEY Riddle. The patient is followed by endocrinologist, Dr. Pardo and GYN, Dr. Russ. Patient reports overall she is feeling well and she is very active,  exercising 4-5 times per week.       Review of patient's allergies indicates:   Allergen Reactions    Contrast media Rash    Gadobutrol Hives, Itching and Swelling         Current Outpatient Medications:     augmented betamethasone dipropionate (DIPROLENE-AF) 0.05 % cream, aaa bid prn paronychia, Disp: 50 g, Rfl: 3    cholecalciferol, vitamin D3, (VITAMIN D3) 50 mcg (2,000 unit) Tab, once daily., Disp: , Rfl:     diphenhydrAMINE (BENADRYL) 25 mg capsule, Take 1 capsule (25 mg total) by mouth every 6 (six) hours as needed for Itching or Allergies., Disp: 20 capsule, Rfl: 0    fluticasone propionate (FLONASE) 50 mcg/actuation nasal spray, daily as needed., Disp: , Rfl:     hydrOXYchloroQUINE (PLAQUENIL) 200 mg tablet, Take 1 tablet (200 mg total) by mouth 2 (two) times daily., Disp: 180 tablet, Rfl: 0    levothyroxine (SYNTHROID) 100 MCG tablet, Take 100 mcg by mouth once daily., Disp: , Rfl:     lidocaine-prilocaine (EMLA) cream, Apply topically as needed., Disp: 25 g, Rfl: 2    naproxen (EC NAPROSYN) 500 MG EC tablet, TAKE 1 TABLET BY MOUTH TWICE A DAY AFTER MEALS AS NEEDED, Disp: 60 tablet, Rfl: 0    ondansetron (ZOFRAN) 8 MG tablet, Take 8 mg by mouth every 8 (eight) hours as needed., Disp: , Rfl: 6    promethazine (PHENERGAN) 25 MG tablet, Take 25 mg by mouth every 6 (six) hours as needed., Disp: , Rfl: 6  No current facility-administered medications for this visit.    Lab Results   Component Value Date    WBC 6.90 12/21/2022    HGB 13.6 12/21/2022    HCT 40.4 12/21/2022     12/21/2022    CHOL 194 07/26/2021    TRIG 37 07/26/2021    HDL 84 (H) 07/26/2021    ALT 18 12/21/2022    AST 19 12/21/2022     12/21/2022    K 4.6 12/21/2022     12/21/2022    CREATININE 0.7 12/21/2022    BUN 17 12/21/2022    CO2 28 12/21/2022    TSH 0.720 09/27/2022    INR 1.0 05/13/2022       Review of Systems   Constitutional:  Negative for activity change and unexpected weight change.   HENT:  Negative for hearing loss,  rhinorrhea and trouble swallowing.    Eyes:  Negative for discharge and visual disturbance.   Respiratory:  Negative for chest tightness and wheezing.    Cardiovascular:  Negative for chest pain and palpitations.   Gastrointestinal:  Negative for blood in stool, constipation, diarrhea and vomiting.   Endocrine: Negative for polydipsia and polyuria.   Genitourinary:  Negative for difficulty urinating, dysuria, hematuria and menstrual problem.   Musculoskeletal:  Negative for arthralgias, joint swelling and neck pain.   Neurological:  Negative for weakness and headaches.   Psychiatric/Behavioral:  Negative for confusion and dysphoric mood.      Objective:      Physical Exam  Constitutional:       General: She is not in acute distress.     Appearance: Normal appearance.   HENT:      Head: Normocephalic and atraumatic.   Pulmonary:      Effort: Pulmonary effort is normal. No respiratory distress.   Neurological:      Mental Status: She is alert and oriented to person, place, and time.      Cranial Nerves: No cranial nerve deficit.   Psychiatric:         Behavior: Behavior normal.       Assessment:       1. Rheumatoid arthritis, involving unspecified site, unspecified whether rheumatoid factor present    2. Hypertension, unspecified type    3. MGUS (monoclonal gammopathy of unknown significance)    4. Hypothyroidism, unspecified type          Plan:     Diagnoses and all orders for this visit:    Rheumatoid arthritis, involving unspecified site, unspecified whether rheumatoid factor present  -     hydrOXYchloroQUINE (PLAQUENIL) 200 mg tablet; Take 1 tablet (200 mg total) by mouth 2 (two) times daily. Refilled for 3 months until rheumatology appt  -     Ambulatory referral/consult to Rheumatology; Future    Hypertension, unspecified type  stable  MGUS (monoclonal gammopathy of unknown significance)  Follow up hematology   Hypothyroidism, unspecified type  Follow up endocrinology      Patient to drop of lab work to our  office so we can scan into chart  Establish care with Dr. Mcintosh in 3-6 months.

## 2023-02-13 NOTE — TELEPHONE ENCOUNTER
Please call patient/ message patient with number to schedule rheumatology. Please provide [place], [place], and [facility] numbers. Patient also needs follow up with Dr. Galindo to establish care as PCP in next 3-6 months.

## 2023-02-13 NOTE — PATIENT INSTRUCTIONS
Deshaun Moses,     If you are due for any health screening(s) below please notify me so we can arrange them to be ordered and scheduled to maintain your health. Most healthy patients complete it. Don't lose out on improving your health.     Tests to Keep You Healthy    Mammogram: Met on 8/10/2022  Colon Cancer Screening: Met on 10/26/2022  Last Blood Pressure <= 139/89 (7/30/2021): NO

## 2023-02-27 ENCOUNTER — TELEPHONE (OUTPATIENT)
Dept: RHEUMATOLOGY | Facility: CLINIC | Age: 73
End: 2023-02-27
Payer: MEDICARE

## 2023-02-27 NOTE — TELEPHONE ENCOUNTER
----- Message from Edil Barnes sent at 2/27/2023 12:26 PM CST -----  Contact: pt at [phone]  Type: Needs Medical Advice  Who Called:  Pt  Best Call Back Number: [phone]  Additional Information: Needs an appointment to be put on the waitlist. She has a referral in the system ready to be scheduled from.

## 2023-03-01 ENCOUNTER — PATIENT MESSAGE (OUTPATIENT)
Dept: RHEUMATOLOGY | Facility: CLINIC | Age: 73
End: 2023-03-01
Payer: MEDICARE

## 2023-03-01 ENCOUNTER — INFUSION (OUTPATIENT)
Dept: INFUSION THERAPY | Facility: HOSPITAL | Age: 73
End: 2023-03-01
Attending: INTERNAL MEDICINE
Payer: MEDICARE

## 2023-03-01 VITALS
DIASTOLIC BLOOD PRESSURE: 74 MMHG | HEIGHT: 66 IN | WEIGHT: 152.69 LBS | TEMPERATURE: 97 F | OXYGEN SATURATION: 98 % | BODY MASS INDEX: 24.54 KG/M2 | RESPIRATION RATE: 17 BRPM | HEART RATE: 67 BPM | SYSTOLIC BLOOD PRESSURE: 118 MMHG

## 2023-03-01 DIAGNOSIS — C83.35 DIFFUSE LARGE B-CELL LYMPHOMA OF LYMPH NODES OF INGUINAL REGION: ICD-10-CM

## 2023-03-01 DIAGNOSIS — D47.2 MGUS (MONOCLONAL GAMMOPATHY OF UNKNOWN SIGNIFICANCE): Primary | ICD-10-CM

## 2023-03-01 PROCEDURE — 25000003 PHARM REV CODE 250: Performed by: INTERNAL MEDICINE

## 2023-03-01 PROCEDURE — 96523 IRRIG DRUG DELIVERY DEVICE: CPT

## 2023-03-01 PROCEDURE — A4216 STERILE WATER/SALINE, 10 ML: HCPCS | Performed by: INTERNAL MEDICINE

## 2023-03-01 PROCEDURE — 63600175 PHARM REV CODE 636 W HCPCS: Performed by: INTERNAL MEDICINE

## 2023-03-01 RX ORDER — HEPARIN 100 UNIT/ML
500 SYRINGE INTRAVENOUS
Status: CANCELLED | OUTPATIENT
Start: 2023-03-01

## 2023-03-01 RX ORDER — SODIUM CHLORIDE 0.9 % (FLUSH) 0.9 %
10 SYRINGE (ML) INJECTION
Status: CANCELLED | OUTPATIENT
Start: 2023-03-01

## 2023-03-01 RX ORDER — SODIUM CHLORIDE 0.9 % (FLUSH) 0.9 %
10 SYRINGE (ML) INJECTION
Status: COMPLETED | OUTPATIENT
Start: 2023-03-01 | End: 2023-03-01

## 2023-03-01 RX ORDER — HEPARIN 100 UNIT/ML
500 SYRINGE INTRAVENOUS
Status: COMPLETED | OUTPATIENT
Start: 2023-03-01 | End: 2023-03-01

## 2023-03-01 RX ADMIN — SODIUM CHLORIDE, PRESERVATIVE FREE 10 ML: 5 INJECTION INTRAVENOUS at 02:03

## 2023-03-01 RX ADMIN — HEPARIN 500 UNITS: 100 SYRINGE at 02:03

## 2023-03-01 NOTE — PLAN OF CARE
Problem: Infection  Goal: Absence of Infection Signs and Symptoms  Outcome: Ongoing, Progressing  Intervention: Prevent or Manage Infection  Flowsheets (Taken 3/1/2023 1403)  Infection Management: aseptic technique maintained  Isolation Precautions: precautions maintained

## 2023-04-12 ENCOUNTER — INFUSION (OUTPATIENT)
Dept: INFUSION THERAPY | Facility: HOSPITAL | Age: 73
End: 2023-04-12
Attending: INTERNAL MEDICINE
Payer: MEDICARE

## 2023-04-12 VITALS
SYSTOLIC BLOOD PRESSURE: 134 MMHG | DIASTOLIC BLOOD PRESSURE: 76 MMHG | RESPIRATION RATE: 17 BRPM | BODY MASS INDEX: 24.43 KG/M2 | HEIGHT: 66 IN | HEART RATE: 85 BPM | WEIGHT: 152 LBS | OXYGEN SATURATION: 98 % | TEMPERATURE: 98 F

## 2023-04-12 DIAGNOSIS — C83.35 DIFFUSE LARGE B-CELL LYMPHOMA OF LYMPH NODES OF INGUINAL REGION: ICD-10-CM

## 2023-04-12 DIAGNOSIS — D47.2 MGUS (MONOCLONAL GAMMOPATHY OF UNKNOWN SIGNIFICANCE): Primary | ICD-10-CM

## 2023-04-12 PROCEDURE — 25000003 PHARM REV CODE 250: Performed by: INTERNAL MEDICINE

## 2023-04-12 PROCEDURE — 63600175 PHARM REV CODE 636 W HCPCS: Performed by: INTERNAL MEDICINE

## 2023-04-12 PROCEDURE — A4216 STERILE WATER/SALINE, 10 ML: HCPCS | Performed by: INTERNAL MEDICINE

## 2023-04-12 PROCEDURE — 96523 IRRIG DRUG DELIVERY DEVICE: CPT

## 2023-04-12 RX ORDER — HEPARIN 100 UNIT/ML
500 SYRINGE INTRAVENOUS
Status: CANCELLED | OUTPATIENT
Start: 2023-04-12

## 2023-04-12 RX ORDER — SODIUM CHLORIDE 0.9 % (FLUSH) 0.9 %
10 SYRINGE (ML) INJECTION
Status: COMPLETED | OUTPATIENT
Start: 2023-04-12 | End: 2023-04-12

## 2023-04-12 RX ORDER — SODIUM CHLORIDE 0.9 % (FLUSH) 0.9 %
10 SYRINGE (ML) INJECTION
Status: CANCELLED | OUTPATIENT
Start: 2023-04-12

## 2023-04-12 RX ORDER — HEPARIN 100 UNIT/ML
500 SYRINGE INTRAVENOUS
Status: COMPLETED | OUTPATIENT
Start: 2023-04-12 | End: 2023-04-12

## 2023-04-12 RX ADMIN — HEPARIN 500 UNITS: 100 SYRINGE at 01:04

## 2023-04-12 RX ADMIN — SODIUM CHLORIDE, PRESERVATIVE FREE 10 ML: 5 INJECTION INTRAVENOUS at 01:04

## 2023-04-12 NOTE — PLAN OF CARE
Problem: Infection  Goal: Absence of Infection Signs and Symptoms  Outcome: Ongoing, Progressing  Intervention: Prevent or Manage Infection  Flowsheets (Taken 4/12/2023 7695)  Infection Management: aseptic technique maintained  Isolation Precautions: precautions maintained

## 2023-05-10 ENCOUNTER — HOSPITAL ENCOUNTER (OUTPATIENT)
Dept: RADIOLOGY | Facility: HOSPITAL | Age: 73
Discharge: HOME | End: 2023-05-10
Attending: INTERNAL MEDICINE
Payer: MEDICARE

## 2023-05-10 ENCOUNTER — OFFICE VISIT (OUTPATIENT)
Dept: RHEUMATOLOGY | Facility: CLINIC | Age: 73
End: 2023-05-10
Payer: MEDICARE

## 2023-05-10 VITALS
BODY MASS INDEX: 24.11 KG/M2 | SYSTOLIC BLOOD PRESSURE: 146 MMHG | HEART RATE: 70 BPM | DIASTOLIC BLOOD PRESSURE: 85 MMHG | HEIGHT: 66 IN | WEIGHT: 150 LBS

## 2023-05-10 DIAGNOSIS — E55.9 VITAMIN D INSUFFICIENCY: ICD-10-CM

## 2023-05-10 DIAGNOSIS — D70.1 CHEMOTHERAPY-INDUCED NEUTROPENIA: ICD-10-CM

## 2023-05-10 DIAGNOSIS — C83.35 DIFFUSE LARGE B-CELL LYMPHOMA OF LYMPH NODES OF INGUINAL REGION: ICD-10-CM

## 2023-05-10 DIAGNOSIS — M15.9 PRIMARY OSTEOARTHRITIS INVOLVING MULTIPLE JOINTS: ICD-10-CM

## 2023-05-10 DIAGNOSIS — M06.9 RHEUMATOID ARTHRITIS, INVOLVING UNSPECIFIED SITE, UNSPECIFIED WHETHER RHEUMATOID FACTOR PRESENT: Primary | ICD-10-CM

## 2023-05-10 DIAGNOSIS — I10 HYPERTENSION, UNSPECIFIED TYPE: ICD-10-CM

## 2023-05-10 DIAGNOSIS — T45.1X5A CHEMOTHERAPY-INDUCED NEUTROPENIA: ICD-10-CM

## 2023-05-10 DIAGNOSIS — M25.551 PAIN OF RIGHT HIP: ICD-10-CM

## 2023-05-10 DIAGNOSIS — D47.2 MGUS (MONOCLONAL GAMMOPATHY OF UNKNOWN SIGNIFICANCE): ICD-10-CM

## 2023-05-10 DIAGNOSIS — M06.9 RHEUMATOID ARTHRITIS, INVOLVING UNSPECIFIED SITE, UNSPECIFIED WHETHER RHEUMATOID FACTOR PRESENT: ICD-10-CM

## 2023-05-10 PROCEDURE — 99999 PR PBB SHADOW E&M-EST. PATIENT-LVL V: ICD-10-PCS | Mod: PBBFAC,,, | Performed by: INTERNAL MEDICINE

## 2023-05-10 PROCEDURE — 73521 X-RAY EXAM HIPS BI 2 VIEWS: CPT | Mod: 26,,, | Performed by: RADIOLOGY

## 2023-05-10 PROCEDURE — 73130 X-RAY EXAM OF HAND: CPT | Mod: 26,50,, | Performed by: RADIOLOGY

## 2023-05-10 PROCEDURE — 3008F PR BODY MASS INDEX (BMI) DOCUMENTED: ICD-10-PCS | Mod: CPTII,S$GLB,, | Performed by: INTERNAL MEDICINE

## 2023-05-10 PROCEDURE — 73630 X-RAY EXAM OF FOOT: CPT | Mod: TC,50

## 2023-05-10 PROCEDURE — 1101F PR PT FALLS ASSESS DOC 0-1 FALLS W/OUT INJ PAST YR: ICD-10-PCS | Mod: CPTII,S$GLB,, | Performed by: INTERNAL MEDICINE

## 2023-05-10 PROCEDURE — 73630 XR FOOT COMPLETE 3 VIEW BILATERAL: ICD-10-PCS | Mod: 26,50,, | Performed by: RADIOLOGY

## 2023-05-10 PROCEDURE — 1159F MED LIST DOCD IN RCRD: CPT | Mod: CPTII,S$GLB,, | Performed by: INTERNAL MEDICINE

## 2023-05-10 PROCEDURE — 73130 X-RAY EXAM OF HAND: CPT | Mod: TC,50

## 2023-05-10 PROCEDURE — 1101F PT FALLS ASSESS-DOCD LE1/YR: CPT | Mod: CPTII,S$GLB,, | Performed by: INTERNAL MEDICINE

## 2023-05-10 PROCEDURE — 3077F SYST BP >= 140 MM HG: CPT | Mod: CPTII,S$GLB,, | Performed by: INTERNAL MEDICINE

## 2023-05-10 PROCEDURE — 99204 PR OFFICE/OUTPT VISIT, NEW, LEVL IV, 45-59 MIN: ICD-10-PCS | Mod: S$GLB,,, | Performed by: INTERNAL MEDICINE

## 2023-05-10 PROCEDURE — 1126F AMNT PAIN NOTED NONE PRSNT: CPT | Mod: CPTII,S$GLB,, | Performed by: INTERNAL MEDICINE

## 2023-05-10 PROCEDURE — 73130 XR HAND COMPLETE 3 VIEWS BILATERAL: ICD-10-PCS | Mod: 26,50,, | Performed by: RADIOLOGY

## 2023-05-10 PROCEDURE — 73521 X-RAY EXAM HIPS BI 2 VIEWS: CPT | Mod: TC

## 2023-05-10 PROCEDURE — 3288F FALL RISK ASSESSMENT DOCD: CPT | Mod: CPTII,S$GLB,, | Performed by: INTERNAL MEDICINE

## 2023-05-10 PROCEDURE — 3079F PR MOST RECENT DIASTOLIC BLOOD PRESSURE 80-89 MM HG: ICD-10-PCS | Mod: CPTII,S$GLB,, | Performed by: INTERNAL MEDICINE

## 2023-05-10 PROCEDURE — 3077F PR MOST RECENT SYSTOLIC BLOOD PRESSURE >= 140 MM HG: ICD-10-PCS | Mod: CPTII,S$GLB,, | Performed by: INTERNAL MEDICINE

## 2023-05-10 PROCEDURE — 99204 OFFICE O/P NEW MOD 45 MIN: CPT | Mod: S$GLB,,, | Performed by: INTERNAL MEDICINE

## 2023-05-10 PROCEDURE — 3079F DIAST BP 80-89 MM HG: CPT | Mod: CPTII,S$GLB,, | Performed by: INTERNAL MEDICINE

## 2023-05-10 PROCEDURE — 73630 X-RAY EXAM OF FOOT: CPT | Mod: 26,50,, | Performed by: RADIOLOGY

## 2023-05-10 PROCEDURE — 1159F PR MEDICATION LIST DOCUMENTED IN MEDICAL RECORD: ICD-10-PCS | Mod: CPTII,S$GLB,, | Performed by: INTERNAL MEDICINE

## 2023-05-10 PROCEDURE — 3008F BODY MASS INDEX DOCD: CPT | Mod: CPTII,S$GLB,, | Performed by: INTERNAL MEDICINE

## 2023-05-10 PROCEDURE — 99999 PR PBB SHADOW E&M-EST. PATIENT-LVL V: CPT | Mod: PBBFAC,,, | Performed by: INTERNAL MEDICINE

## 2023-05-10 PROCEDURE — 3288F PR FALLS RISK ASSESSMENT DOCUMENTED: ICD-10-PCS | Mod: CPTII,S$GLB,, | Performed by: INTERNAL MEDICINE

## 2023-05-10 PROCEDURE — 1126F PR PAIN SEVERITY QUANTIFIED, NO PAIN PRESENT: ICD-10-PCS | Mod: CPTII,S$GLB,, | Performed by: INTERNAL MEDICINE

## 2023-05-10 PROCEDURE — 73521 XR HIPS BILATERAL 2 VIEW INCL AP PELVIS: ICD-10-PCS | Mod: 26,,, | Performed by: RADIOLOGY

## 2023-05-10 RX ORDER — HYDROXYCHLOROQUINE SULFATE 200 MG/1
200 TABLET, FILM COATED ORAL 2 TIMES DAILY
Qty: 180 TABLET | Refills: 0 | Status: SHIPPED | OUTPATIENT
Start: 2023-05-10 | End: 2023-06-20 | Stop reason: SDUPTHER

## 2023-05-10 NOTE — PROGRESS NOTES
RHEUMATOLOGY OUTPATIENT CLINIC NOTE    5/10/2023    Attending Rheumatologist: Wicho Collazo  Primary Care Provider/Physician Requesting Consultation: Primary Doctor No   Chief Complaint/Reason For Consultation:  Rheumatoid Arthritis      Subjective:     Aurelia Marlow is a 72 y.o. White female with RA hx to establish care.    Main concern of Rt side groin pain.  Adherence w/ HCQ.  Denies recent RA flares    Review of Systems   Constitutional:  Negative for fever.   HENT:          Denies sicca sx   Eyes:  Negative for photophobia and pain.   Respiratory:  Negative for shortness of breath.    Gastrointestinal:         No hx of diverticulosis   Genitourinary:  Negative for hematuria.   Musculoskeletal:  Positive for joint pain. Negative for back pain.   Skin:  Negative for rash.        Denies RP   Neurological:  Negative for focal weakness, weakness and headaches.   Endo/Heme/Allergies:         No hx of dvt/pe     Chronic comorbid conditions affecting medical decision making today:  Past Medical History:   Diagnosis Date    Diffuse large B-cell lymphoma of lymph nodes of inguinal region 10/01/2019    Dr Martinez    GERD (gastroesophageal reflux disease) 2009    on meds    Hx antineoplastic chemotherapy     Ovarian cyst, left 05/2022    Paronychia of finger     Rheumatoid arthritis 2013    resolved    Thyroid disease 1990    on synthroid     Past Surgical History:   Procedure Laterality Date    BIOPSY OF INGUINAL LYMPH NODE Left 9/18/2019    Procedure: BIOPSY, LYMPH NODE, INGUINAL;  Surgeon: Kermit Martinez MD;  Location: [facility] OR;  Service: General;  Laterality: Left;    BONE MARROW BIOPSY N/A 10/10/2019    Procedure: BIOPSY, BONE MARROW;  Surgeon: Arely Diagnostic Provider;  Location: [facility] OR;  Service: Interventional Radiology;  Laterality: N/A;    ECHOCARDIOGRAPHY  09/30/2019    EXCISION RHEUMATOID NODULE Left 2012    FOOT    INSERTION OF TUNNELED CENTRAL VENOUS CATHETER (CVC) WITH SUBCUTANEOUS PORT Left 10/2/2019     Procedure: [id number];  Surgeon: Kermit Martinez MD;  Location: [facility] OR;  Service: General;  Laterality: Left;    ROBOT-ASSISTED LAPAROSCOPIC HYSTERECTOMY N/A 2022    Procedure: ROBOTIC HYSTERECTOMY;  Surgeon: Diana Wolfe MD;  Location: [facility] OR;  Service: OB/GYN;  Laterality: N/A;    ROBOT-ASSISTED LAPAROSCOPIC SALPINGO-OOPHORECTOMY Bilateral 2022    Procedure: ROBOTIC SALPINGO-OOPHORECTOMY;  Surgeon: Diana Wolfe MD;  Location: [facility] OR;  Service: OB/GYN;  Laterality: Bilateral;    SAMPLING OF MULTIPLE LYMPH NODES      L INGUINAL     THYROIDECTOMY      partial    Dr. Harvey     Family History   Problem Relation Age of Onset    Heart disease Mother     Hypertension Mother     Kidney disease Mother         dialysis    Heart disease Father     Early death Father 59    No Known Problems Maternal Grandmother     No Known Problems Maternal Grandfather     No Known Problems Paternal Grandmother     No Known Problems Paternal Grandfather     Diabetes Neg Hx      Social History     Tobacco Use   Smoking Status Former    Packs/day: 1.00    Years: 40.00    Pack years: 40.00    Types: Cigarettes    Quit date:     Years since quittin.3   Smokeless Tobacco Never       Current Outpatient Medications:     augmented betamethasone dipropionate (DIPROLENE-AF) 0.05 % cream, aaa bid prn paronychia, Disp: 50 g, Rfl: 3    cholecalciferol, vitamin D3, (VITAMIN D3) 50 mcg (2,000 unit) Tab, once daily., Disp: , Rfl:     diphenhydrAMINE (BENADRYL) 25 mg capsule, Take 1 capsule (25 mg total) by mouth every 6 (six) hours as needed for Itching or Allergies., Disp: 20 capsule, Rfl: 0    fluticasone propionate (FLONASE) 50 mcg/actuation nasal spray, daily as needed., Disp: , Rfl:     hydrOXYchloroQUINE (PLAQUENIL) 200 mg tablet, Take 1 tablet (200 mg total) by mouth 2 (two) times daily., Disp: 180 tablet, Rfl: 0    levothyroxine (SYNTHROID) 100 MCG tablet, Take 100 mcg by mouth once daily.,  Disp: , Rfl:     lidocaine-prilocaine (EMLA) cream, Apply topically as needed., Disp: 25 g, Rfl: 2    naproxen (EC NAPROSYN) 500 MG EC tablet, TAKE 1 TABLET BY MOUTH TWICE A DAY AFTER MEALS AS NEEDED, Disp: 60 tablet, Rfl: 0    ondansetron (ZOFRAN) 8 MG tablet, Take 8 mg by mouth every 8 (eight) hours as needed., Disp: , Rfl: 6    promethazine (PHENERGAN) 25 MG tablet, Take 25 mg by mouth every 6 (six) hours as needed., Disp: , Rfl: 6     Objective:     Vitals:    05/10/23 1009   BP: (!) 146/85   Pulse: 70     Physical Exam   Eyes: Conjunctivae are normal.   Pulmonary/Chest: Effort normal. No respiratory distress.   Musculoskeletal:         General: Deformity present. No swelling or tenderness. Normal range of motion.   Neurological: She displays no weakness.   Skin: No rash noted.     Reviewed available old and all outside pertinent medical records available.    All lab results personally reviewed and interpreted by me.       ASSESSMENT      Encounter Diagnoses   Name Primary?    Rheumatoid arthritis, involving unspecified site, unspecified whether rheumatoid factor present Yes    Hypertension, unspecified type     Diffuse large B-cell lymphoma of lymph nodes of inguinal region-[facility]     Chemotherapy-induced neutropenia     MGUS (monoclonal gammopathy of unknown significance)     Primary osteoarthritis involving multiple joints     Vitamin D insufficiency     Pain of right hip       PLAN     CDAI; low disease activity.  Main concern of rt groin pain w/ mechanical pattern.  Adherence w/ HCQ for several years, following w/ eye clinic.  No squeeze tenderness or active synovitis on exam.  No rashes or reproducible weakness.  Labs w/o concerning cytopenias or CI to DMARDs.  DJD changes on imaging.  PET scan late 12/20222 w/o evidence of lymphoma recurrence.  Marginal erosive changes previously documented.  XR repeat to establish newer baseline.  Plan to c/w HCQ unchanged.  XR and PT referral for hip pain.   Consider lyrica, patient will inform clinic if would like to try medication for pain relief.  Labs w/ RA serologies close to f/u visit.     Wicho Collazo M.D.

## 2023-05-24 ENCOUNTER — INFUSION (OUTPATIENT)
Dept: INFUSION THERAPY | Facility: HOSPITAL | Age: 73
End: 2023-05-24
Attending: INTERNAL MEDICINE
Payer: MEDICARE

## 2023-05-24 VITALS
SYSTOLIC BLOOD PRESSURE: 127 MMHG | WEIGHT: 152.31 LBS | OXYGEN SATURATION: 99 % | BODY MASS INDEX: 24.48 KG/M2 | RESPIRATION RATE: 18 BRPM | TEMPERATURE: 97 F | HEART RATE: 77 BPM | HEIGHT: 66 IN | DIASTOLIC BLOOD PRESSURE: 85 MMHG

## 2023-05-24 DIAGNOSIS — C83.35 DIFFUSE LARGE B-CELL LYMPHOMA OF LYMPH NODES OF INGUINAL REGION: ICD-10-CM

## 2023-05-24 DIAGNOSIS — D47.2 MGUS (MONOCLONAL GAMMOPATHY OF UNKNOWN SIGNIFICANCE): Primary | ICD-10-CM

## 2023-05-24 PROCEDURE — 25000003 PHARM REV CODE 250: Performed by: INTERNAL MEDICINE

## 2023-05-24 PROCEDURE — 63600175 PHARM REV CODE 636 W HCPCS: Performed by: INTERNAL MEDICINE

## 2023-05-24 PROCEDURE — A4216 STERILE WATER/SALINE, 10 ML: HCPCS | Performed by: INTERNAL MEDICINE

## 2023-05-24 PROCEDURE — 96523 IRRIG DRUG DELIVERY DEVICE: CPT

## 2023-05-24 RX ORDER — SODIUM CHLORIDE 0.9 % (FLUSH) 0.9 %
10 SYRINGE (ML) INJECTION
Status: CANCELLED | OUTPATIENT
Start: 2023-05-24

## 2023-05-24 RX ORDER — HEPARIN 100 UNIT/ML
500 SYRINGE INTRAVENOUS
Status: COMPLETED | OUTPATIENT
Start: 2023-05-24 | End: 2023-05-24

## 2023-05-24 RX ORDER — SODIUM CHLORIDE 0.9 % (FLUSH) 0.9 %
10 SYRINGE (ML) INJECTION
Status: COMPLETED | OUTPATIENT
Start: 2023-05-24 | End: 2023-05-24

## 2023-05-24 RX ORDER — HEPARIN 100 UNIT/ML
500 SYRINGE INTRAVENOUS
Status: CANCELLED | OUTPATIENT
Start: 2023-05-24

## 2023-05-24 RX ADMIN — SODIUM CHLORIDE, PRESERVATIVE FREE 10 ML: 5 INJECTION INTRAVENOUS at 01:05

## 2023-05-24 RX ADMIN — HEPARIN 500 UNITS: 100 SYRINGE at 01:05

## 2023-06-09 ENCOUNTER — LAB VISIT (OUTPATIENT)
Dept: LAB | Facility: HOSPITAL | Age: 73
End: 2023-06-09
Attending: INTERNAL MEDICINE
Payer: MEDICARE

## 2023-06-09 DIAGNOSIS — D47.2 MGUS (MONOCLONAL GAMMOPATHY OF UNKNOWN SIGNIFICANCE): ICD-10-CM

## 2023-06-09 LAB
ALBUMIN SERPL BCP-MCNC: 4.1 G/DL (ref 3.5–5.2)
ALP SERPL-CCNC: 56 U/L (ref 55–135)
ALT SERPL W/O P-5'-P-CCNC: 15 U/L (ref 10–44)
ANION GAP SERPL CALC-SCNC: 5 MMOL/L (ref 8–16)
AST SERPL-CCNC: 21 U/L (ref 10–40)
BASOPHILS # BLD AUTO: 0.06 K/UL (ref 0–0.2)
BASOPHILS NFR BLD: 1.2 % (ref 0–1.9)
BILIRUB SERPL-MCNC: 0.5 MG/DL (ref 0.1–1)
BUN SERPL-MCNC: 14 MG/DL (ref 8–23)
CALCIUM SERPL-MCNC: 9.2 MG/DL (ref 8.7–10.5)
CHLORIDE SERPL-SCNC: 103 MMOL/L (ref 95–110)
CO2 SERPL-SCNC: 28 MMOL/L (ref 23–29)
CREAT SERPL-MCNC: 0.7 MG/DL (ref 0.5–1.4)
DIFFERENTIAL METHOD: ABNORMAL
EOSINOPHIL # BLD AUTO: 0.1 K/UL (ref 0–0.5)
EOSINOPHIL NFR BLD: 1.4 % (ref 0–8)
ERYTHROCYTE [DISTWIDTH] IN BLOOD BY AUTOMATED COUNT: 12.1 % (ref 11.5–14.5)
EST. GFR  (NO RACE VARIABLE): >60 ML/MIN/1.73 M^2
GLUCOSE SERPL-MCNC: 81 MG/DL (ref 70–110)
HCT VFR BLD AUTO: 45.5 % (ref 37–48.5)
HGB BLD-MCNC: 14.4 G/DL (ref 12–16)
IGA SERPL-MCNC: 188 MG/DL (ref 40–350)
IGG SERPL-MCNC: 1957 MG/DL (ref 650–1600)
IGM SERPL-MCNC: 63 MG/DL (ref 50–300)
IMM GRANULOCYTES # BLD AUTO: 0.02 K/UL (ref 0–0.04)
IMM GRANULOCYTES NFR BLD AUTO: 0.4 % (ref 0–0.5)
LYMPHOCYTES # BLD AUTO: 1.8 K/UL (ref 1–4.8)
LYMPHOCYTES NFR BLD: 36.8 % (ref 18–48)
MCH RBC QN AUTO: 31.4 PG (ref 27–31)
MCHC RBC AUTO-ENTMCNC: 31.6 G/DL (ref 32–36)
MCV RBC AUTO: 99 FL (ref 82–98)
MONOCYTES # BLD AUTO: 0.3 K/UL (ref 0.3–1)
MONOCYTES NFR BLD: 7 % (ref 4–15)
NEUTROPHILS # BLD AUTO: 2.6 K/UL (ref 1.8–7.7)
NEUTROPHILS NFR BLD: 53.2 % (ref 38–73)
NRBC BLD-RTO: 0 /100 WBC
PLATELET # BLD AUTO: 228 K/UL (ref 150–450)
PMV BLD AUTO: 10.5 FL (ref 9.2–12.9)
POTASSIUM SERPL-SCNC: 4.3 MMOL/L (ref 3.5–5.1)
PROT SERPL-MCNC: 8 G/DL (ref 6–8.4)
RBC # BLD AUTO: 4.58 M/UL (ref 4–5.4)
SODIUM SERPL-SCNC: 136 MMOL/L (ref 136–145)
WBC # BLD AUTO: 4.84 K/UL (ref 3.9–12.7)

## 2023-06-09 PROCEDURE — 85025 COMPLETE CBC W/AUTO DIFF WBC: CPT | Mod: 91 | Performed by: INTERNAL MEDICINE

## 2023-06-09 PROCEDURE — 84165 PROTEIN E-PHORESIS SERUM: CPT | Mod: 26,,, | Performed by: PATHOLOGY

## 2023-06-09 PROCEDURE — 80053 COMPREHEN METABOLIC PANEL: CPT | Mod: 91 | Performed by: INTERNAL MEDICINE

## 2023-06-09 PROCEDURE — 84165 PROTEIN E-PHORESIS SERUM: CPT | Performed by: INTERNAL MEDICINE

## 2023-06-09 PROCEDURE — 83521 IG LIGHT CHAINS FREE EACH: CPT | Mod: 59 | Performed by: INTERNAL MEDICINE

## 2023-06-09 PROCEDURE — 82784 ASSAY IGA/IGD/IGG/IGM EACH: CPT | Performed by: INTERNAL MEDICINE

## 2023-06-09 PROCEDURE — 84165 PATHOLOGIST INTERPRETATION SPE: ICD-10-PCS | Mod: 26,,, | Performed by: PATHOLOGY

## 2023-06-09 PROCEDURE — 36415 COLL VENOUS BLD VENIPUNCTURE: CPT | Mod: PO | Performed by: INTERNAL MEDICINE

## 2023-06-12 DIAGNOSIS — M06.9 RHEUMATOID ARTHRITIS, INVOLVING UNSPECIFIED SITE, UNSPECIFIED WHETHER RHEUMATOID FACTOR PRESENT: ICD-10-CM

## 2023-06-12 LAB
ALBUMIN SERPL ELPH-MCNC: 4.49 G/DL (ref 3.35–5.55)
ALPHA1 GLOB SERPL ELPH-MCNC: 0.24 G/DL (ref 0.17–0.41)
ALPHA2 GLOB SERPL ELPH-MCNC: 0.6 G/DL (ref 0.43–0.99)
B-GLOBULIN SERPL ELPH-MCNC: 0.78 G/DL (ref 0.5–1.1)
GAMMA GLOB SERPL ELPH-MCNC: 1.69 G/DL (ref 0.67–1.58)
KAPPA LC SER QL IA: 2.51 MG/DL (ref 0.33–1.94)
KAPPA LC/LAMBDA SER IA: 3.14 (ref 0.26–1.65)
LAMBDA LC SER QL IA: 0.8 MG/DL (ref 0.57–2.63)
PROT SERPL-MCNC: 7.8 G/DL (ref 6–8.4)

## 2023-06-13 LAB — PATHOLOGIST INTERPRETATION SPE: NORMAL

## 2023-06-13 RX ORDER — HYDROXYCHLOROQUINE SULFATE 200 MG/1
200 TABLET, FILM COATED ORAL 2 TIMES DAILY
Qty: 180 TABLET | Refills: 0 | OUTPATIENT
Start: 2023-06-13

## 2023-06-20 ENCOUNTER — TELEPHONE (OUTPATIENT)
Dept: RHEUMATOLOGY | Facility: CLINIC | Age: 73
End: 2023-06-20
Payer: MEDICARE

## 2023-06-20 NOTE — TELEPHONE ENCOUNTER
----- Message from Beatrice Chowdhury sent at 6/20/2023  9:32 AM CDT -----  Contact: omid  Patient is calling to see if her results has been reviewed so she can get her medication prescribed. Please call her back at [phone].          Thanks  DD

## 2023-06-20 NOTE — TELEPHONE ENCOUNTER
"Attempted to return patients phone call. Left v/m for patient to call back at earliest convenience.    Jeannette Aguayo (Allye), [facility]  Rheumatology Department    "

## 2023-06-20 NOTE — TELEPHONE ENCOUNTER
----- Message from Lynda Franco sent at 6/20/2023  1:06 PM CDT -----  Contact: Aurelia  .Type:  Patient Returning Call    Who Called:Aurelia  Who Left Message for Patient:Nurse  Does the patient know what this is regarding?:return call  Would the patient rather a call back or a response via [facility]? call  Best Call Back Number:.[phone]    Additional Information: returning call

## 2023-06-21 RX ORDER — HYDROXYCHLOROQUINE SULFATE 200 MG/1
200 TABLET, FILM COATED ORAL 2 TIMES DAILY
Qty: 180 TABLET | Refills: 1 | Status: SHIPPED | OUTPATIENT
Start: 2023-06-21 | End: 2023-12-18

## 2023-06-21 RX ORDER — NAPROXEN 500 MG/1
TABLET ORAL
Qty: 60 TABLET | Refills: 0 | OUTPATIENT
Start: 2023-06-21

## 2023-06-28 ENCOUNTER — INFUSION (OUTPATIENT)
Dept: INFUSION THERAPY | Facility: HOSPITAL | Age: 73
End: 2023-06-28
Attending: INTERNAL MEDICINE
Payer: MEDICARE

## 2023-06-28 VITALS
OXYGEN SATURATION: 97 % | WEIGHT: 150.88 LBS | HEART RATE: 74 BPM | SYSTOLIC BLOOD PRESSURE: 129 MMHG | TEMPERATURE: 97 F | RESPIRATION RATE: 17 BRPM | BODY MASS INDEX: 24.25 KG/M2 | DIASTOLIC BLOOD PRESSURE: 74 MMHG | HEIGHT: 66 IN

## 2023-06-28 DIAGNOSIS — C83.35 DIFFUSE LARGE B-CELL LYMPHOMA OF LYMPH NODES OF INGUINAL REGION: ICD-10-CM

## 2023-06-28 DIAGNOSIS — D47.2 MGUS (MONOCLONAL GAMMOPATHY OF UNKNOWN SIGNIFICANCE): Primary | ICD-10-CM

## 2023-06-28 PROCEDURE — 96523 IRRIG DRUG DELIVERY DEVICE: CPT

## 2023-06-28 PROCEDURE — 25000003 PHARM REV CODE 250: Performed by: INTERNAL MEDICINE

## 2023-06-28 PROCEDURE — A4216 STERILE WATER/SALINE, 10 ML: HCPCS | Performed by: INTERNAL MEDICINE

## 2023-06-28 PROCEDURE — 63600175 PHARM REV CODE 636 W HCPCS: Performed by: INTERNAL MEDICINE

## 2023-06-28 RX ORDER — SODIUM CHLORIDE 0.9 % (FLUSH) 0.9 %
10 SYRINGE (ML) INJECTION
Status: COMPLETED | OUTPATIENT
Start: 2023-06-28 | End: 2023-06-28

## 2023-06-28 RX ORDER — SODIUM CHLORIDE 0.9 % (FLUSH) 0.9 %
10 SYRINGE (ML) INJECTION
Status: CANCELLED | OUTPATIENT
Start: 2023-06-28

## 2023-06-28 RX ORDER — HEPARIN 100 UNIT/ML
500 SYRINGE INTRAVENOUS
Status: CANCELLED | OUTPATIENT
Start: 2023-06-28

## 2023-06-28 RX ORDER — HEPARIN 100 UNIT/ML
500 SYRINGE INTRAVENOUS
Status: COMPLETED | OUTPATIENT
Start: 2023-06-28 | End: 2023-06-28

## 2023-06-28 RX ADMIN — SODIUM CHLORIDE, PRESERVATIVE FREE 10 ML: 5 INJECTION INTRAVENOUS at 02:06

## 2023-06-28 RX ADMIN — HEPARIN 500 UNITS: 100 SYRINGE at 02:06

## 2023-07-06 ENCOUNTER — OFFICE VISIT (OUTPATIENT)
Dept: HEMATOLOGY/ONCOLOGY | Facility: CLINIC | Age: 73
End: 2023-07-06
Payer: MEDICARE

## 2023-07-06 VITALS
WEIGHT: 143 LBS | SYSTOLIC BLOOD PRESSURE: 159 MMHG | DIASTOLIC BLOOD PRESSURE: 72 MMHG | BODY MASS INDEX: 22.98 KG/M2 | HEART RATE: 80 BPM | HEIGHT: 66 IN | RESPIRATION RATE: 18 BRPM | TEMPERATURE: 97 F

## 2023-07-06 DIAGNOSIS — C83.35 DIFFUSE LARGE B-CELL LYMPHOMA OF LYMPH NODES OF INGUINAL REGION: ICD-10-CM

## 2023-07-06 DIAGNOSIS — D47.2 MGUS (MONOCLONAL GAMMOPATHY OF UNKNOWN SIGNIFICANCE): ICD-10-CM

## 2023-07-06 PROCEDURE — 99213 OFFICE O/P EST LOW 20 MIN: CPT | Mod: S$GLB,,, | Performed by: INTERNAL MEDICINE

## 2023-07-06 PROCEDURE — 3288F FALL RISK ASSESSMENT DOCD: CPT | Mod: CPTII,S$GLB,, | Performed by: INTERNAL MEDICINE

## 2023-07-06 PROCEDURE — 3288F PR FALLS RISK ASSESSMENT DOCUMENTED: ICD-10-PCS | Mod: CPTII,S$GLB,, | Performed by: INTERNAL MEDICINE

## 2023-07-06 PROCEDURE — 1159F PR MEDICATION LIST DOCUMENTED IN MEDICAL RECORD: ICD-10-PCS | Mod: CPTII,S$GLB,, | Performed by: INTERNAL MEDICINE

## 2023-07-06 PROCEDURE — 3077F PR MOST RECENT SYSTOLIC BLOOD PRESSURE >= 140 MM HG: ICD-10-PCS | Mod: CPTII,S$GLB,, | Performed by: INTERNAL MEDICINE

## 2023-07-06 PROCEDURE — 1126F PR PAIN SEVERITY QUANTIFIED, NO PAIN PRESENT: ICD-10-PCS | Mod: CPTII,S$GLB,, | Performed by: INTERNAL MEDICINE

## 2023-07-06 PROCEDURE — 99213 PR OFFICE/OUTPT VISIT, EST, LEVL III, 20-29 MIN: ICD-10-PCS | Mod: S$GLB,,, | Performed by: INTERNAL MEDICINE

## 2023-07-06 PROCEDURE — 3078F PR MOST RECENT DIASTOLIC BLOOD PRESSURE < 80 MM HG: ICD-10-PCS | Mod: CPTII,S$GLB,, | Performed by: INTERNAL MEDICINE

## 2023-07-06 PROCEDURE — 3078F DIAST BP <80 MM HG: CPT | Mod: CPTII,S$GLB,, | Performed by: INTERNAL MEDICINE

## 2023-07-06 PROCEDURE — 3008F BODY MASS INDEX DOCD: CPT | Mod: CPTII,S$GLB,, | Performed by: INTERNAL MEDICINE

## 2023-07-06 PROCEDURE — 1159F MED LIST DOCD IN RCRD: CPT | Mod: CPTII,S$GLB,, | Performed by: INTERNAL MEDICINE

## 2023-07-06 PROCEDURE — 1101F PT FALLS ASSESS-DOCD LE1/YR: CPT | Mod: CPTII,S$GLB,, | Performed by: INTERNAL MEDICINE

## 2023-07-06 PROCEDURE — 1101F PR PT FALLS ASSESS DOC 0-1 FALLS W/OUT INJ PAST YR: ICD-10-PCS | Mod: CPTII,S$GLB,, | Performed by: INTERNAL MEDICINE

## 2023-07-06 PROCEDURE — 1126F AMNT PAIN NOTED NONE PRSNT: CPT | Mod: CPTII,S$GLB,, | Performed by: INTERNAL MEDICINE

## 2023-07-06 PROCEDURE — 3077F SYST BP >= 140 MM HG: CPT | Mod: CPTII,S$GLB,, | Performed by: INTERNAL MEDICINE

## 2023-07-06 PROCEDURE — 3008F PR BODY MASS INDEX (BMI) DOCUMENTED: ICD-10-PCS | Mod: CPTII,S$GLB,, | Performed by: INTERNAL MEDICINE

## 2023-07-06 NOTE — PROGRESS NOTES
PROGRESS NOTE    Subjective:       Patient ID: Aurelia Marlow is a 72 y.o. female.    Dx DLBCL-[facility]inal [facility] type- 8/21/2019  Inguinal LN biopsy    PET with bone marrow uptake throughout appendicular and axial skeleton    Bone marrow biopsy positive for B-cell lymphoma    Stage ARELIS    R-CHOP x 6  10/28/2019-2/10/2020    10/6/2020:  PET negative    5/16/2022:  LATOYA/BSO  Path negative for malignancy    6/22/2022:  PET negative    12/27/2022:  PET negative    Chief Complaint:  No chief complaint on file.  lymphoma and MGUS follow up    History of Present Illness:   Aurelia Marlow is a 72 y.o. female who presents for follow up of lymphoma    Ms. Marlow is doing well today and has no new complaints or problems.        Family and Social history reviewed and is unchanged from 9/27/2019      ROS:  Review of Systems   Constitutional:  Negative for appetite change, fever and unexpected weight change.   HENT:  Negative for mouth sores.    Eyes:  Negative for visual disturbance.   Respiratory:  Negative for cough and shortness of breath.    Cardiovascular:  Negative for chest pain and leg swelling.   Gastrointestinal:  Negative for abdominal pain, blood in stool and diarrhea.   Genitourinary:  Negative for frequency and hematuria.   Musculoskeletal:  Negative for back pain.   Skin:  Negative for rash.   Neurological:  Negative for headaches.   Hematological:  Negative for adenopathy.   Psychiatric/Behavioral:  The patient is not nervous/anxious.         Current Outpatient Medications:     augmented betamethasone dipropionate (DIPROLENE-AF) 0.05 % cream, aaa bid prn paronychia, Disp: 50 g, Rfl: 3    cholecalciferol, vitamin D3, (VITAMIN D3) 50 mcg (2,000 unit) Tab, once daily., Disp: , Rfl:     diphenhydrAMINE (BENADRYL) 25 mg capsule, Take 1 capsule (25 mg total) by mouth every 6 (six) hours as needed for Itching or Allergies., Disp: 20 capsule, Rfl: 0    fluticasone  "propionate (FLONASE) 50 mcg/actuation nasal spray, daily as needed., Disp: , Rfl:     hydrOXYchloroQUINE (PLAQUENIL) 200 mg tablet, Take 1 tablet (200 mg total) by mouth 2 (two) times daily., Disp: 180 tablet, Rfl: 1    levothyroxine (SYNTHROID) 100 MCG tablet, Take 100 mcg by mouth once daily., Disp: , Rfl:     lidocaine-prilocaine (EMLA) cream, Apply topically as needed., Disp: 25 g, Rfl: 2    naproxen (EC NAPROSYN) 500 MG EC tablet, TAKE 1 TABLET BY MOUTH TWICE A DAY AFTER MEALS AS NEEDED, Disp: 60 tablet, Rfl: 0    ondansetron (ZOFRAN) 8 MG tablet, Take 8 mg by mouth every 8 (eight) hours as needed., Disp: , Rfl: 6    promethazine (PHENERGAN) 25 MG tablet, Take 25 mg by mouth every 6 (six) hours as needed., Disp: , Rfl: 6        Objective:       Physical Examination:     BP (!) 159/72   Pulse 80   Temp 97.4 °F (36.3 °C)   Resp 18   Ht 5' 6" (1.676 m)   Wt 64.9 kg (143 lb)   BMI 23.08 kg/m²     Physical Exam  Constitutional:       Appearance: She is well-developed.   HENT:      Head: Normocephalic and atraumatic.      Right Ear: External ear normal.      Left Ear: External ear normal.   Eyes:      Conjunctiva/sclera: Conjunctivae normal.      Pupils: Pupils are equal, round, and reactive to light.   Neck:      Thyroid: No thyromegaly.      Trachea: No tracheal deviation.   Cardiovascular:      Rate and Rhythm: Normal rate and regular rhythm.      Heart sounds: Normal heart sounds.   Pulmonary:      Effort: Pulmonary effort is normal.      Breath sounds: Normal breath sounds.   Abdominal:      General: Bowel sounds are normal. There is no distension.      Palpations: Abdomen is soft. There is no mass.      Tenderness: There is no abdominal tenderness.   Skin:     Findings: No rash.   Neurological:      Comments: Neuro intact througout   Psychiatric:         Behavior: Behavior normal.         Thought Content: Thought content normal.         Judgment: Judgment normal.        Labs:   No results found for this " or any previous visit (from the past 336 hour(s)).    CMP  Sodium   Date Value Ref Range Status   06/09/2023 136 136 - 145 mmol/L Final   06/09/2023 136 136 - 145 mmol/L Final   06/11/2019 139 134 - 144 mmol/L      Potassium   Date Value Ref Range Status   06/09/2023 4.3 3.5 - 5.1 mmol/L Final   06/09/2023 4.2 3.5 - 5.1 mmol/L Final     Chloride   Date Value Ref Range Status   06/09/2023 103 95 - 110 mmol/L Final   06/09/2023 101 95 - 110 mmol/L Final   06/11/2019 104 98 - 110 mmol/L      CO2   Date Value Ref Range Status   06/09/2023 28 23 - 29 mmol/L Final   06/09/2023 29 23 - 29 mmol/L Final     Glucose   Date Value Ref Range Status   06/09/2023 81 70 - 110 mg/dL Final   06/09/2023 81 70 - 110 mg/dL Final   06/11/2019 86 70 - 99 mg/dL      BUN   Date Value Ref Range Status   06/09/2023 14 8 - 23 mg/dL Final   06/09/2023 14 8 - 23 mg/dL Final     Creatinine   Date Value Ref Range Status   06/09/2023 0.7 0.5 - 1.4 mg/dL Final   06/09/2023 0.8 0.5 - 1.4 mg/dL Final   06/11/2019 0.72 0.60 - 1.40 mg/dL      Calcium   Date Value Ref Range Status   06/09/2023 9.2 8.7 - 10.5 mg/dL Final   06/09/2023 9.6 8.7 - 10.5 mg/dL Final     Total Protein   Date Value Ref Range Status   06/09/2023 8.0 6.0 - 8.4 g/dL Final   06/09/2023 8.1 6.0 - 8.4 g/dL Final     Albumin   Date Value Ref Range Status   06/09/2023 4.1 3.5 - 5.2 g/dL Final   06/09/2023 4.2 3.5 - 5.2 g/dL Final   06/11/2019 3.8 3.1 - 4.7 g/dL      Total Bilirubin   Date Value Ref Range Status   06/09/2023 0.5 0.1 - 1.0 mg/dL Final     Comment:     For infants and newborns, interpretation of results should be based  on gestational age, weight and in agreement with clinical  observations.    Premature Infant recommended reference ranges:  Up to 24 hours.............<8.0 mg/dL  Up to 48 hours............<12.0 mg/dL  3-5 days..................<15.0 mg/dL  6-29 days.................<15.0 mg/dL     06/09/2023 0.5 0.1 - 1.0 mg/dL Final     Comment:     For infants and  newborns, interpretation of results should be based  on gestational age, weight and in agreement with clinical  observations.    Premature Infant recommended reference ranges:  Up to 24 hours.............<8.0 mg/dL  Up to 48 hours............<12.0 mg/dL  3-5 days..................<15.0 mg/dL  6-29 days.................<15.0 mg/dL       Alkaline Phosphatase   Date Value Ref Range Status   06/09/2023 56 55 - 135 U/L Final   06/09/2023 57 55 - 135 U/L Final     AST   Date Value Ref Range Status   06/09/2023 21 10 - 40 U/L Final   06/09/2023 20 10 - 40 U/L Final     ALT   Date Value Ref Range Status   06/09/2023 15 10 - 44 U/L Final   06/09/2023 16 10 - 44 U/L Final     Anion Gap   Date Value Ref Range Status   06/09/2023 5 (L) 8 - 16 mmol/L Final   06/09/2023 6 (L) 8 - 16 mmol/L Final     eGFR if    Date Value Ref Range Status   06/27/2022 >60.0 >60 mL/min/1.73 m^2 Final     eGFR if non    Date Value Ref Range Status   06/27/2022 >60.0 >60 mL/min/1.73 m^2 Final     Comment:     Calculation used to obtain the estimated glomerular filtration  rate (eGFR) is the CKD-EPI equation.        No results found for: CEA  No results found for: PSA        Assessment/Plan:     Problem List Items Addressed This Visit       MGUS (monoclonal gammopathy of unknown significance)     Labs are stable and she has no sign of advancing disease.  Will continue to monitor and discussed this as well.           Relevant Orders    CBC Auto Differential    Comprehensive Metabolic Panel    Protein Electrophoresis, Serum    Immunoglobulins (IgG, IgA, IgM) Quantitative    Immunoglobulin Free LT Chains Blood    Diffuse large B-cell lymphoma of lymph nodes of inguinal region-[facility]     Patient is doing well and appears PADMINI.  Will be due for yearly PET in December of this year and will arrange this.  Discussed today.          Relevant Orders    NM PET CT Routine Skull to Mid Thigh       Discussion:     Follow up  in about 6 months (around 1/6/2024).      Electronically signed by Pb Fry

## 2023-07-06 NOTE — ASSESSMENT & PLAN NOTE
Labs are stable and she has no sign of advancing disease.  Will continue to monitor and discussed this as well.

## 2023-07-06 NOTE — ASSESSMENT & PLAN NOTE
Patient is doing well and appears PADMINI.  Will be due for yearly PET in December of this year and will arrange this.  Discussed today.

## 2023-08-09 ENCOUNTER — INFUSION (OUTPATIENT)
Dept: INFUSION THERAPY | Facility: HOSPITAL | Age: 73
End: 2023-08-09
Attending: INTERNAL MEDICINE
Payer: MEDICARE

## 2023-08-09 VITALS
HEIGHT: 66 IN | BODY MASS INDEX: 24.75 KG/M2 | TEMPERATURE: 97 F | DIASTOLIC BLOOD PRESSURE: 84 MMHG | WEIGHT: 154 LBS | SYSTOLIC BLOOD PRESSURE: 132 MMHG | OXYGEN SATURATION: 97 % | RESPIRATION RATE: 17 BRPM | HEART RATE: 86 BPM

## 2023-08-09 DIAGNOSIS — D47.2 MGUS (MONOCLONAL GAMMOPATHY OF UNKNOWN SIGNIFICANCE): Primary | ICD-10-CM

## 2023-08-09 DIAGNOSIS — C83.35 DIFFUSE LARGE B-CELL LYMPHOMA OF LYMPH NODES OF INGUINAL REGION: ICD-10-CM

## 2023-08-09 PROCEDURE — 63600175 PHARM REV CODE 636 W HCPCS: Performed by: INTERNAL MEDICINE

## 2023-08-09 PROCEDURE — 25000003 PHARM REV CODE 250: Performed by: INTERNAL MEDICINE

## 2023-08-09 PROCEDURE — 96523 IRRIG DRUG DELIVERY DEVICE: CPT

## 2023-08-09 PROCEDURE — A4216 STERILE WATER/SALINE, 10 ML: HCPCS | Performed by: INTERNAL MEDICINE

## 2023-08-09 RX ORDER — SODIUM CHLORIDE 0.9 % (FLUSH) 0.9 %
10 SYRINGE (ML) INJECTION
Status: CANCELLED | OUTPATIENT
Start: 2023-08-09

## 2023-08-09 RX ORDER — HEPARIN 100 UNIT/ML
500 SYRINGE INTRAVENOUS
Status: CANCELLED | OUTPATIENT
Start: 2023-08-09

## 2023-08-09 RX ORDER — SODIUM CHLORIDE 0.9 % (FLUSH) 0.9 %
10 SYRINGE (ML) INJECTION
Status: COMPLETED | OUTPATIENT
Start: 2023-08-09 | End: 2023-08-09

## 2023-08-09 RX ORDER — HEPARIN 100 UNIT/ML
500 SYRINGE INTRAVENOUS
Status: COMPLETED | OUTPATIENT
Start: 2023-08-09 | End: 2023-08-09

## 2023-08-09 RX ADMIN — HEPARIN 500 UNITS: 100 SYRINGE at 01:08

## 2023-08-09 RX ADMIN — SODIUM CHLORIDE, PRESERVATIVE FREE 10 ML: 5 INJECTION INTRAVENOUS at 01:08

## 2023-09-20 ENCOUNTER — INFUSION (OUTPATIENT)
Dept: INFUSION THERAPY | Facility: HOSPITAL | Age: 73
End: 2023-09-20
Attending: INTERNAL MEDICINE
Payer: MEDICARE

## 2023-09-20 VITALS
SYSTOLIC BLOOD PRESSURE: 126 MMHG | WEIGHT: 150.38 LBS | DIASTOLIC BLOOD PRESSURE: 86 MMHG | TEMPERATURE: 97 F | HEIGHT: 66 IN | BODY MASS INDEX: 24.17 KG/M2 | OXYGEN SATURATION: 98 % | HEART RATE: 79 BPM | RESPIRATION RATE: 18 BRPM

## 2023-09-20 DIAGNOSIS — E89.0 POSTSURGICAL HYPOTHYROIDISM: ICD-10-CM

## 2023-09-20 DIAGNOSIS — C83.35 DIFFUSE LARGE B-CELL LYMPHOMA OF LYMPH NODES OF INGUINAL REGION: ICD-10-CM

## 2023-09-20 DIAGNOSIS — D47.2 MGUS (MONOCLONAL GAMMOPATHY OF UNKNOWN SIGNIFICANCE): Primary | ICD-10-CM

## 2023-09-20 LAB
T4 FREE SERPL-MCNC: 0.89 NG/DL (ref 0.71–1.51)
TSH SERPL DL<=0.005 MIU/L-ACNC: 0.84 UIU/ML (ref 0.34–5.6)

## 2023-09-20 PROCEDURE — 63600175 PHARM REV CODE 636 W HCPCS: Performed by: INTERNAL MEDICINE

## 2023-09-20 PROCEDURE — 25000003 PHARM REV CODE 250: Performed by: INTERNAL MEDICINE

## 2023-09-20 PROCEDURE — 84443 ASSAY THYROID STIM HORMONE: CPT | Performed by: INTERNAL MEDICINE

## 2023-09-20 PROCEDURE — 84439 ASSAY OF FREE THYROXINE: CPT | Performed by: INTERNAL MEDICINE

## 2023-09-20 PROCEDURE — A4216 STERILE WATER/SALINE, 10 ML: HCPCS | Performed by: INTERNAL MEDICINE

## 2023-09-20 PROCEDURE — 36591 DRAW BLOOD OFF VENOUS DEVICE: CPT

## 2023-09-20 RX ORDER — HEPARIN 100 UNIT/ML
500 SYRINGE INTRAVENOUS
Status: CANCELLED | OUTPATIENT
Start: 2023-09-20

## 2023-09-20 RX ORDER — SODIUM CHLORIDE 0.9 % (FLUSH) 0.9 %
10 SYRINGE (ML) INJECTION
Status: CANCELLED | OUTPATIENT
Start: 2023-09-20

## 2023-09-20 RX ORDER — HEPARIN 100 UNIT/ML
500 SYRINGE INTRAVENOUS
Status: COMPLETED | OUTPATIENT
Start: 2023-09-20 | End: 2023-09-20

## 2023-09-20 RX ORDER — SODIUM CHLORIDE 0.9 % (FLUSH) 0.9 %
10 SYRINGE (ML) INJECTION
Status: COMPLETED | OUTPATIENT
Start: 2023-09-20 | End: 2023-09-20

## 2023-09-20 RX ADMIN — SODIUM CHLORIDE, PRESERVATIVE FREE 10 ML: 5 INJECTION INTRAVENOUS at 10:09

## 2023-09-20 RX ADMIN — HEPARIN 500 UNITS: 100 SYRINGE at 10:09

## 2023-09-25 ENCOUNTER — HOSPITAL ENCOUNTER (OUTPATIENT)
Dept: RADIOLOGY | Facility: HOSPITAL | Age: 73
Discharge: HOME | End: 2023-09-25
Attending: INTERNAL MEDICINE
Payer: MEDICARE

## 2023-09-25 DIAGNOSIS — E04.2 NONTOXIC MULTINODULAR GOITER: ICD-10-CM

## 2023-09-25 PROCEDURE — 76536 US EXAM OF HEAD AND NECK: CPT | Mod: TC,PO

## 2023-09-28 ENCOUNTER — PATIENT MESSAGE (OUTPATIENT)
Dept: CARDIOLOGY | Facility: CLINIC | Age: 73
End: 2023-09-28
Payer: MEDICARE

## 2023-10-04 ENCOUNTER — OFFICE VISIT (OUTPATIENT)
Dept: CARDIOLOGY | Facility: CLINIC | Age: 73
End: 2023-10-04
Payer: MEDICARE

## 2023-10-04 VITALS
DIASTOLIC BLOOD PRESSURE: 84 MMHG | SYSTOLIC BLOOD PRESSURE: 141 MMHG | HEIGHT: 66 IN | HEART RATE: 81 BPM | WEIGHT: 151 LBS | BODY MASS INDEX: 24.27 KG/M2

## 2023-10-04 DIAGNOSIS — C83.35 DIFFUSE LARGE B-CELL LYMPHOMA OF LYMPH NODES OF INGUINAL REGION: ICD-10-CM

## 2023-10-04 DIAGNOSIS — R59.0 INGUINAL ADENOPATHY: ICD-10-CM

## 2023-10-04 DIAGNOSIS — R03.0 ELEVATED BLOOD PRESSURE, SITUATIONAL: ICD-10-CM

## 2023-10-04 DIAGNOSIS — I10 PRIMARY HYPERTENSION: ICD-10-CM

## 2023-10-04 DIAGNOSIS — I10 PRIMARY HYPERTENSION: Primary | ICD-10-CM

## 2023-10-04 DIAGNOSIS — R59.0 INGUINAL ADENOPATHY: Primary | ICD-10-CM

## 2023-10-04 PROCEDURE — 99999 PR PBB SHADOW E&M-EST. PATIENT-LVL III: ICD-10-PCS | Mod: PBBFAC,,, | Performed by: INTERNAL MEDICINE

## 2023-10-04 PROCEDURE — 1160F PR REVIEW ALL MEDS BY PRESCRIBER/CLIN PHARMACIST DOCUMENTED: ICD-10-PCS | Mod: CPTII,S$GLB,, | Performed by: INTERNAL MEDICINE

## 2023-10-04 PROCEDURE — 3079F PR MOST RECENT DIASTOLIC BLOOD PRESSURE 80-89 MM HG: ICD-10-PCS | Mod: CPTII,S$GLB,, | Performed by: INTERNAL MEDICINE

## 2023-10-04 PROCEDURE — 1126F AMNT PAIN NOTED NONE PRSNT: CPT | Mod: CPTII,S$GLB,, | Performed by: INTERNAL MEDICINE

## 2023-10-04 PROCEDURE — 93005 ELECTROCARDIOGRAM TRACING: CPT | Mod: PO

## 2023-10-04 PROCEDURE — 3077F PR MOST RECENT SYSTOLIC BLOOD PRESSURE >= 140 MM HG: ICD-10-PCS | Mod: CPTII,S$GLB,, | Performed by: INTERNAL MEDICINE

## 2023-10-04 PROCEDURE — 99214 PR OFFICE/OUTPT VISIT, EST, LEVL IV, 30-39 MIN: ICD-10-PCS | Mod: S$GLB,,, | Performed by: INTERNAL MEDICINE

## 2023-10-04 PROCEDURE — 1126F PR PAIN SEVERITY QUANTIFIED, NO PAIN PRESENT: ICD-10-PCS | Mod: CPTII,S$GLB,, | Performed by: INTERNAL MEDICINE

## 2023-10-04 PROCEDURE — 93010 ELECTROCARDIOGRAM REPORT: CPT | Mod: S$GLB,,, | Performed by: INTERNAL MEDICINE

## 2023-10-04 PROCEDURE — 3079F DIAST BP 80-89 MM HG: CPT | Mod: CPTII,S$GLB,, | Performed by: INTERNAL MEDICINE

## 2023-10-04 PROCEDURE — 3288F PR FALLS RISK ASSESSMENT DOCUMENTED: ICD-10-PCS | Mod: CPTII,S$GLB,, | Performed by: INTERNAL MEDICINE

## 2023-10-04 PROCEDURE — 3077F SYST BP >= 140 MM HG: CPT | Mod: CPTII,S$GLB,, | Performed by: INTERNAL MEDICINE

## 2023-10-04 PROCEDURE — 99214 OFFICE O/P EST MOD 30 MIN: CPT | Mod: S$GLB,,, | Performed by: INTERNAL MEDICINE

## 2023-10-04 PROCEDURE — 1159F PR MEDICATION LIST DOCUMENTED IN MEDICAL RECORD: ICD-10-PCS | Mod: CPTII,S$GLB,, | Performed by: INTERNAL MEDICINE

## 2023-10-04 PROCEDURE — 93010 EKG 12-LEAD: ICD-10-PCS | Mod: S$GLB,,, | Performed by: INTERNAL MEDICINE

## 2023-10-04 PROCEDURE — 1101F PR PT FALLS ASSESS DOC 0-1 FALLS W/OUT INJ PAST YR: ICD-10-PCS | Mod: CPTII,S$GLB,, | Performed by: INTERNAL MEDICINE

## 2023-10-04 PROCEDURE — 3008F PR BODY MASS INDEX (BMI) DOCUMENTED: ICD-10-PCS | Mod: CPTII,S$GLB,, | Performed by: INTERNAL MEDICINE

## 2023-10-04 PROCEDURE — 1160F RVW MEDS BY RX/DR IN RCRD: CPT | Mod: CPTII,S$GLB,, | Performed by: INTERNAL MEDICINE

## 2023-10-04 PROCEDURE — 99999 PR PBB SHADOW E&M-EST. PATIENT-LVL III: CPT | Mod: PBBFAC,,, | Performed by: INTERNAL MEDICINE

## 2023-10-04 PROCEDURE — 3008F BODY MASS INDEX DOCD: CPT | Mod: CPTII,S$GLB,, | Performed by: INTERNAL MEDICINE

## 2023-10-04 PROCEDURE — 1159F MED LIST DOCD IN RCRD: CPT | Mod: CPTII,S$GLB,, | Performed by: INTERNAL MEDICINE

## 2023-10-04 PROCEDURE — 3288F FALL RISK ASSESSMENT DOCD: CPT | Mod: CPTII,S$GLB,, | Performed by: INTERNAL MEDICINE

## 2023-10-04 PROCEDURE — 1101F PT FALLS ASSESS-DOCD LE1/YR: CPT | Mod: CPTII,S$GLB,, | Performed by: INTERNAL MEDICINE

## 2023-10-04 NOTE — PROGRESS NOTES
Subjective:    Patient ID:  Aurelia Marlow is a 73 y.o. female patient here for evaluation Establish Care (Failed PVD assessment with home care NP)      History of Present Illness:  Follow-up.  Last visit about 2 years ago.  Patient presented with calcium noted on CT scan.  Follow-up nuclear and echo at that time were unremarkable.  Recent screening for peripheral vascular disease abnormal with JENIFER.  Overall no definite symptoms suggest claudication.  Mild swelling left.  History of large B-cell lymphoma, inguinal region resolved.             Review of patient's allergies indicates:   Allergen Reactions    Contrast media Rash       Past Medical History:   Diagnosis Date    Diffuse large B-cell lymphoma of lymph nodes of inguinal region 10/01/2019    Dr Martinez    GERD (gastroesophageal reflux disease) 2009    on meds    Hx antineoplastic chemotherapy     Ovarian cyst, left 05/2022    Paronychia of finger     Rheumatoid arthritis 2013    resolved    Thyroid disease 1990    on synthroid     Past Surgical History:   Procedure Laterality Date    BIOPSY OF INGUINAL LYMPH NODE Left 9/18/2019    Procedure: BIOPSY, LYMPH NODE, INGUINAL;  Surgeon: Kermit Martinez MD;  Location: [facility];  Service: General;  Laterality: Left;    BONE MARROW BIOPSY N/A 10/10/2019    Procedure: BIOPSY, BONE MARROW;  Surgeon: Arely Diagnostic Provider;  Location: [facility];  Service: Interventional Radiology;  Laterality: N/A;    ECHOCARDIOGRAPHY  09/30/2019    EXCISION RHEUMATOID NODULE Left 2012    FOOT    INSERTION OF TUNNELED CENTRAL VENOUS CATHETER (CVC) WITH SUBCUTANEOUS PORT Left 10/2/2019    Procedure: [id number];  Surgeon: Kermit Martinez MD;  Location: [facility] OR;  Service: General;  Laterality: Left;    ROBOT-ASSISTED LAPAROSCOPIC HYSTERECTOMY N/A 5/16/2022    Procedure: ROBOTIC HYSTERECTOMY;  Surgeon: Diana Wolfe MD;  Location: [facility] OR;  Service: OB/GYN;  Laterality: N/A;    ROBOT-ASSISTED LAPAROSCOPIC  SALPINGO-OOPHORECTOMY Bilateral 2022    Procedure: ROBOTIC SALPINGO-OOPHORECTOMY;  Surgeon: Diana Wolfe MD;  Location: [facility] OR;  Service: OB/GYN;  Laterality: Bilateral;    SAMPLING OF MULTIPLE LYMPH NODES      L INGUINAL     THYROIDECTOMY      partial    Dr. Harvey     Social History     Tobacco Use    Smoking status: Former     Current packs/day: 0.00     Average packs/day: 1 pack/day for 40.0 years (40.0 ttl pk-yrs)     Types: Cigarettes     Start date:      Quit date:      Years since quittin.7    Smokeless tobacco: Never   Substance Use Topics    Alcohol use: Yes     Alcohol/week: 7.0 standard drinks of alcohol     Types: 7 Glasses of wine per week     Comment: weekly    Drug use: No        Review of Systems:    As noted in HPI in addition      REVIEW OF SYSTEMS  Review of Systems   Constitutional: Negative for decreased appetite, diaphoresis, night sweats, weight gain and weight loss.   HENT:  Negative for nosebleeds and odynophagia.    Eyes:  Negative for double vision and photophobia.   Cardiovascular:  Negative for chest pain, claudication, cyanosis, dyspnea on exertion, irregular heartbeat, leg swelling, near-syncope, orthopnea, palpitations, paroxysmal nocturnal dyspnea and syncope.   Respiratory:  Negative for cough, hemoptysis, shortness of breath and wheezing.    Hematologic/Lymphatic: Negative for adenopathy.   Skin:  Negative for flushing, skin cancer and suspicious lesions.   Musculoskeletal:  Negative for gout, myalgias and neck pain.   Gastrointestinal:  Negative for abdominal pain, heartburn, hematemesis and hematochezia.   Genitourinary:  Negative for bladder incontinence, hesitancy and nocturia.   Neurological:  Negative for focal weakness, headaches, light-headedness and paresthesias.   Psychiatric/Behavioral:  Negative for memory loss and substance abuse.               Objective:        Vitals:    10/04/23 1326   BP: (!) 141/84   Pulse: 81       Lab Results    Component Value Date    WBC 4.84 06/09/2023    WBC 4.83 06/09/2023    HGB 14.4 06/09/2023    HGB 14.3 06/09/2023    HCT 45.5 06/09/2023    HCT 44.5 06/09/2023     06/09/2023     06/09/2023    CHOL 194 07/26/2021    TRIG 37 07/26/2021    HDL 84 (H) 07/26/2021    ALT 15 06/09/2023    ALT 16 06/09/2023    AST 21 06/09/2023    AST 20 06/09/2023     06/09/2023     06/09/2023    K 4.3 06/09/2023    K 4.2 06/09/2023     06/09/2023     06/09/2023    CREATININE 0.7 06/09/2023    CREATININE 0.8 06/09/2023    BUN 14 06/09/2023    BUN 14 06/09/2023    CO2 28 06/09/2023    CO2 29 06/09/2023    TSH 0.840 09/20/2023    INR 1.0 05/13/2022        ECHOCARDIOGRAM RESULTS  Results for orders placed during the hospital encounter of 07/16/21    Echo    Interpretation Summary  · Concentric remodeling and normal systolic function.  · The estimated ejection fraction is 65%.  · Grade I left ventricular diastolic dysfunction.  · Normal right ventricular size with normal right ventricular systolic function.  · Mild mitral regurgitation.        CURRENT/PREVIOUS VISIT EKG  Results for orders placed or performed during the hospital encounter of 05/13/22   EKG 12-lead    Collection Time: 05/13/22 10:08 AM    Narrative    Test Reason : N83.202,    Vent. Rate : 076 BPM     Atrial Rate : 076 BPM     P-R Int : 142 ms          QRS Dur : 074 ms      QT Int : 404 ms       P-R-T Axes : 040 006 057 degrees     QTc Int : 454 ms    Normal sinus rhythm  Normal ECG  When compared with ECG of 07-JUL-2021 12:28,  No significant change was found  Confirmed by Fadi Jacome MD ([id number]) on 5/16/2022 8:58:36 AM    Referred By: PORTER BLANCA           Confirmed By:Fadi Jacome MD     No valid procedures specified.   Results for orders placed during the hospital encounter of 07/16/21    Nuclear Stress - Cardiology Interpreted    Interpretation Summary    The gated perfusion images showed an ejection fraction of % at rest. The  gated perfusion images showed an ejection fraction of 76% post stress. Normal ejection fraction is greater than 53%.    The EKG portion of this study is negative for ischemia.    The patient reported no chest pain during the stress test.    No valid procedures specified.    PHYSICAL EXAM  CONSTITUTIONAL: Well built, well nourished in no apparent distress  NECK: no carotid bruit, no JVD  LUNGS: CTA  CHEST WALL: no tenderness,  HEART: regular rate and rhythm, S1, S2 normal, no murmur, click, rub or gallop   ABDOMEN: soft, non-tender; bowel sounds normal; no masses,  no organomegaly  EXTREMITIES: Extremities normal, no edema, no calf tenderness noted.  Pulses are 2+ in the right and left femoral, 2+ in the right and left popliteal area, 1+ left dorsalis pedis posterior tib, 2+ right dorsalis pedis anterior tib pulse , no vascular bruits.  NEURO: AAO X 3    I HAVE REVIEWED :    The vital signs, nurses notes, and all the pertinent radiology and labs.         Current Outpatient Medications   Medication Instructions    augmented betamethasone dipropionate (DIPROLENE-AF) 0.05 % cream aaa bid prn paronychia    cholecalciferol, vitamin D3, (VITAMIN D3) 50 mcg (2,000 unit) Tab Daily    diphenhydrAMINE (BENADRYL) 25 mg, Oral, Every 6 hours PRN    fluticasone propionate (FLONASE) 50 mcg/actuation nasal spray Daily PRN    hydroxychloroquine (PLAQUENIL) 200 mg, Oral, 2 times daily    levothyroxine (SYNTHROID) 100 mcg, Oral, Daily    lidocaine-prilocaine (EMLA) cream Topical (Top), As needed (PRN)    naproxen (EC NAPROSYN) 500 MG EC tablet TAKE 1 TABLET BY MOUTH TWICE A DAY AFTER MEALS AS NEEDED    ondansetron (ZOFRAN) 8 mg, Oral, Every 8 hours PRN    promethazine (PHENERGAN) 25 mg, Oral, Every 6 hours PRN          Assessment:   Remote history of calcium positive found on routine CT in the past.  Follow-up noninvasive cardiac assessment negative for ischemic or structural heart issues 2021.    Recent abnormal findings on lower  extremity JENIFER screening PID.    History of large B-cell lymphoma, nodes of the right inguinal region, resolved.    Plan:   Lower extremity arterial duplex ultrasound, call results of normal.        No follow-ups on file.

## 2023-10-10 ENCOUNTER — CLINICAL SUPPORT (OUTPATIENT)
Dept: CARDIOLOGY | Facility: HOSPITAL | Age: 73
End: 2023-10-10
Attending: INTERNAL MEDICINE
Payer: MEDICARE

## 2023-10-10 DIAGNOSIS — I10 PRIMARY HYPERTENSION: ICD-10-CM

## 2023-10-10 DIAGNOSIS — R59.0 INGUINAL ADENOPATHY: ICD-10-CM

## 2023-10-10 PROCEDURE — 93925 LOWER EXTREMITY STUDY: CPT | Mod: PO

## 2023-10-10 PROCEDURE — 93925 LOWER EXTREMITY STUDY: CPT | Mod: 26,,, | Performed by: INTERNAL MEDICINE

## 2023-10-10 PROCEDURE — 93925 CV US DOPPLER ARTERIAL LEGS BILATERAL (CUPID ONLY): ICD-10-PCS | Mod: 26,,, | Performed by: INTERNAL MEDICINE

## 2023-10-11 LAB
LEFT ANT TIBIAL SYS PSV: 59 CM/S
LEFT CFA PSV: 102 CM/S
LEFT PERONEAL SYS PSV: 65 CM/S
LEFT POPLITEAL PSV: 64 CM/S
LEFT POST TIBIAL SYS PSV: 78 CM/S
LEFT PROFUNDA SYS PSV: 73 CM/S
LEFT SUPER FEMORAL DIST SYS PSV: 80 CM/S
LEFT SUPER FEMORAL MID SYS PSV: 96 CM/S
LEFT SUPER FEMORAL OSTIAL SYS PSV: 90 CM/S
LEFT SUPER FEMORAL PROX SYS PSV: 99 CM/S
LEFT TIB/PER TRUNK SYS PSV: 102 CM/S
OHS CV LEFT LOWER EXTREMITY ABI (NO CALC): 1.13
OHS CV RIGHT ABI LOWER EXTREMITY (NO CALC): 1.09
RIGHT ANT TIBIAL SYS PSV: 43 CM/S
RIGHT CFA PSV: 92 CM/S
RIGHT PERONEAL SYS PSV: 50 CM/S
RIGHT POPLITEAL PSV: 65 CM/S
RIGHT POST TIBIAL SYS PSV: 73 CM/S
RIGHT PROFUNDA SYS PSV: 93 CM/S
RIGHT SUPER FEMORAL DIST SYS PSV: 76 CM/S
RIGHT SUPER FEMORAL MID SYS PSV: 94 CM/S
RIGHT SUPER FEMORAL OSTIAL SYS PSV: 88 CM/S
RIGHT SUPER FEMORAL PROX SYS PSV: 97 CM/S
RIGHT TIB/PER TRUNK SYS PSV: 93 CM/S

## 2023-11-01 ENCOUNTER — INFUSION (OUTPATIENT)
Dept: INFUSION THERAPY | Facility: HOSPITAL | Age: 73
End: 2023-11-01
Attending: INTERNAL MEDICINE
Payer: MEDICARE

## 2023-11-01 VITALS
BODY MASS INDEX: 24.33 KG/M2 | HEART RATE: 67 BPM | TEMPERATURE: 98 F | RESPIRATION RATE: 18 BRPM | OXYGEN SATURATION: 99 % | SYSTOLIC BLOOD PRESSURE: 146 MMHG | DIASTOLIC BLOOD PRESSURE: 84 MMHG | WEIGHT: 151.38 LBS | HEIGHT: 66 IN

## 2023-11-01 DIAGNOSIS — D47.2 MGUS (MONOCLONAL GAMMOPATHY OF UNKNOWN SIGNIFICANCE): Primary | ICD-10-CM

## 2023-11-01 DIAGNOSIS — C83.35 DIFFUSE LARGE B-CELL LYMPHOMA OF LYMPH NODES OF INGUINAL REGION: ICD-10-CM

## 2023-11-01 PROCEDURE — 96523 IRRIG DRUG DELIVERY DEVICE: CPT

## 2023-11-01 PROCEDURE — 63600175 PHARM REV CODE 636 W HCPCS: Performed by: INTERNAL MEDICINE

## 2023-11-01 RX ORDER — HEPARIN 100 UNIT/ML
500 SYRINGE INTRAVENOUS
Status: CANCELLED | OUTPATIENT
Start: 2023-11-01

## 2023-11-01 RX ORDER — HEPARIN 100 UNIT/ML
500 SYRINGE INTRAVENOUS
Status: COMPLETED | OUTPATIENT
Start: 2023-11-01 | End: 2023-11-01

## 2023-11-01 RX ORDER — SODIUM CHLORIDE 0.9 % (FLUSH) 0.9 %
10 SYRINGE (ML) INJECTION
Status: DISCONTINUED | OUTPATIENT
Start: 2023-11-01 | End: 2023-11-01 | Stop reason: HOSPADM

## 2023-11-01 RX ORDER — SODIUM CHLORIDE 0.9 % (FLUSH) 0.9 %
10 SYRINGE (ML) INJECTION
Status: CANCELLED | OUTPATIENT
Start: 2023-11-01

## 2023-11-01 RX ADMIN — HEPARIN 500 UNITS: 100 SYRINGE at 01:11

## 2023-11-10 ENCOUNTER — HOSPITAL ENCOUNTER (OUTPATIENT)
Dept: RADIOLOGY | Facility: HOSPITAL | Age: 73
Discharge: HOME | End: 2023-11-10
Attending: SPECIALIST
Payer: MEDICARE

## 2023-11-10 DIAGNOSIS — Z12.31 ENCOUNTER FOR SCREENING MAMMOGRAM FOR BREAST CANCER: ICD-10-CM

## 2023-11-10 PROCEDURE — 77067 SCR MAMMO BI INCL CAD: CPT | Mod: TC,PO

## 2023-12-11 ENCOUNTER — OFFICE VISIT (OUTPATIENT)
Dept: URGENT CARE | Facility: CLINIC | Age: 73
End: 2023-12-11
Payer: MEDICARE

## 2023-12-11 VITALS
HEART RATE: 87 BPM | DIASTOLIC BLOOD PRESSURE: 81 MMHG | SYSTOLIC BLOOD PRESSURE: 128 MMHG | RESPIRATION RATE: 20 BRPM | TEMPERATURE: 98 F | WEIGHT: 151 LBS | BODY MASS INDEX: 24.27 KG/M2 | HEIGHT: 66 IN | OXYGEN SATURATION: 97 %

## 2023-12-11 DIAGNOSIS — J20.8 ACUTE BACTERIAL BRONCHITIS: ICD-10-CM

## 2023-12-11 DIAGNOSIS — B96.89 ACUTE BACTERIAL BRONCHITIS: ICD-10-CM

## 2023-12-11 DIAGNOSIS — J34.9 SINUS PROBLEM: Primary | ICD-10-CM

## 2023-12-11 LAB
CTP QC/QA: YES
CTP QC/QA: YES
FLUAV AG NPH QL: NEGATIVE
FLUBV AG NPH QL: NEGATIVE
SARS-COV-2 AG RESP QL IA.RAPID: NEGATIVE

## 2023-12-11 PROCEDURE — 99214 OFFICE O/P EST MOD 30 MIN: CPT | Mod: S$GLB,,, | Performed by: NURSE PRACTITIONER

## 2023-12-11 PROCEDURE — 99214 PR OFFICE/OUTPT VISIT, EST, LEVL IV, 30-39 MIN: ICD-10-PCS | Mod: S$GLB,,, | Performed by: NURSE PRACTITIONER

## 2023-12-11 PROCEDURE — 87811 SARS CORONAVIRUS 2 ANTIGEN POCT, MANUAL READ: ICD-10-PCS | Mod: QW,S$GLB,, | Performed by: NURSE PRACTITIONER

## 2023-12-11 PROCEDURE — 87804 POCT INFLUENZA A/B: ICD-10-PCS | Mod: QW,,, | Performed by: NURSE PRACTITIONER

## 2023-12-11 PROCEDURE — 87804 INFLUENZA ASSAY W/OPTIC: CPT | Mod: QW,,, | Performed by: NURSE PRACTITIONER

## 2023-12-11 PROCEDURE — 87811 SARS-COV-2 COVID19 W/OPTIC: CPT | Mod: QW,S$GLB,, | Performed by: NURSE PRACTITIONER

## 2023-12-11 RX ORDER — ALBUTEROL SULFATE 90 UG/1
2 AEROSOL, METERED RESPIRATORY (INHALATION) EVERY 6 HOURS PRN
Qty: 8 G | Refills: 0 | Status: SHIPPED | OUTPATIENT
Start: 2023-12-11 | End: 2024-01-03

## 2023-12-11 RX ORDER — ACETAMINOPHEN 500 MG
1000 TABLET ORAL
Status: DISCONTINUED | OUTPATIENT
Start: 2023-12-11 | End: 2023-12-11

## 2023-12-11 RX ORDER — AZITHROMYCIN 250 MG/1
TABLET, FILM COATED ORAL
Qty: 6 TABLET | Refills: 0 | Status: SHIPPED | OUTPATIENT
Start: 2023-12-11 | End: 2024-03-07

## 2023-12-11 RX ORDER — OSELTAMIVIR PHOSPHATE 75 MG/1
75 CAPSULE ORAL 2 TIMES DAILY
Qty: 10 CAPSULE | Refills: 0 | Status: SHIPPED | OUTPATIENT
Start: 2023-12-11 | End: 2023-12-11 | Stop reason: CLARIF

## 2023-12-11 NOTE — PROGRESS NOTES
"Subjective:      Patient ID: Aurelia Marlow is a 73 y.o. female.    Vitals:  height is 5' 6" (1.676 m) and weight is 68.5 kg (151 lb). Her temperature is 98.1 °F (36.7 °C). Her blood pressure is 128/81 and her pulse is 87. Her respiration is 20 and oxygen saturation is 97%.     Chief Complaint: Sinus Problem    Got sick on cruise. Home covid test negative.  got sick first. Pt notes cough.     Sinus Problem  Episode onset: x's 4 days ago. Associated symptoms include sinus pressure and sneezing. Treatments tried: NSAID's. The treatment provided moderate relief.       HENT:  Positive for sinus pressure.    Allergic/Immunologic: Positive for sneezing.      Objective:     Physical Exam   Neck: Neck supple.   Cardiovascular: Normal rate, regular rhythm and normal heart sounds.   Pulmonary/Chest: Effort normal. She has wheezes. She has rhonchi (bases).   Abdominal: Normal appearance.   Neurological: She is alert.   Vitals reviewed.      Assessment:     1. Sinus problem    2. Acute bacterial bronchitis        Plan:       Sinus problem  -     SARS Coronavirus 2 Antigen, POCT Manual Read  -     POCT Influenza A/B Rapid Antigen    Acute bacterial bronchitis    Other orders  -     azithromycin (Z-BRE) 250 MG tablet; Take 2 tablets by mouth on day 1; Take 1 tablet by mouth on days 2-5  Dispense: 6 tablet; Refill: 0  -     albuterol (PROAIR HFA) 90 mcg/actuation inhaler; Inhale 2 puffs into the lungs every 6 (six) hours as needed for Wheezing or Shortness of Breath (cough spasm). Rescue  Dispense: 8 g; Refill: 0  -     Discontinue: acetaminophen tablet 1,000 mg  -     Discontinue: oseltamivir (TAMIFLU) 75 MG capsule; Take 1 capsule (75 mg total) by mouth 2 (two) times daily. for 5 days  Dispense: 10 capsule; Refill: 0                  Called pt. Tamiflu sent in error.   Bacterial bronchitis zpak and proair.   Covid and flu negative.   "

## 2023-12-12 NOTE — PATIENT INSTRUCTIONS
Claritin(loratadine), Allegra(fexofenadine), or zyrtec(cetirizine) for post nasal drip. Take in the pm.   Mucinex DM for day time cough.

## 2023-12-13 ENCOUNTER — INFUSION (OUTPATIENT)
Dept: INFUSION THERAPY | Facility: HOSPITAL | Age: 73
End: 2023-12-13
Attending: INTERNAL MEDICINE
Payer: MEDICARE

## 2023-12-13 VITALS
RESPIRATION RATE: 18 BRPM | BODY MASS INDEX: 24.33 KG/M2 | SYSTOLIC BLOOD PRESSURE: 130 MMHG | TEMPERATURE: 97 F | OXYGEN SATURATION: 97 % | HEART RATE: 81 BPM | WEIGHT: 151.38 LBS | DIASTOLIC BLOOD PRESSURE: 83 MMHG | HEIGHT: 66 IN

## 2023-12-13 DIAGNOSIS — C83.35 DIFFUSE LARGE B-CELL LYMPHOMA OF LYMPH NODES OF INGUINAL REGION: Primary | ICD-10-CM

## 2023-12-13 DIAGNOSIS — D47.2 MGUS (MONOCLONAL GAMMOPATHY OF UNKNOWN SIGNIFICANCE): ICD-10-CM

## 2023-12-13 PROCEDURE — 63600175 PHARM REV CODE 636 W HCPCS: Performed by: INTERNAL MEDICINE

## 2023-12-13 PROCEDURE — 96523 IRRIG DRUG DELIVERY DEVICE: CPT

## 2023-12-13 RX ORDER — HEPARIN 100 UNIT/ML
500 SYRINGE INTRAVENOUS
Status: CANCELLED | OUTPATIENT
Start: 2023-12-13

## 2023-12-13 RX ORDER — HEPARIN 100 UNIT/ML
500 SYRINGE INTRAVENOUS
Status: COMPLETED | OUTPATIENT
Start: 2023-12-13 | End: 2023-12-13

## 2023-12-13 RX ORDER — SODIUM CHLORIDE 0.9 % (FLUSH) 0.9 %
10 SYRINGE (ML) INJECTION
Status: DISCONTINUED | OUTPATIENT
Start: 2023-12-13 | End: 2023-12-13 | Stop reason: HOSPADM

## 2023-12-13 RX ORDER — SODIUM CHLORIDE 0.9 % (FLUSH) 0.9 %
10 SYRINGE (ML) INJECTION
Status: CANCELLED | OUTPATIENT
Start: 2023-12-13

## 2023-12-13 RX ADMIN — HEPARIN 500 UNITS: 100 SYRINGE at 02:12

## 2024-01-03 RX ORDER — ALBUTEROL SULFATE 90 UG/1
2 AEROSOL, METERED RESPIRATORY (INHALATION) EVERY 6 HOURS PRN
Qty: 8 G | Refills: 1 | Status: SHIPPED | OUTPATIENT
Start: 2024-01-03 | End: 2024-02-22

## 2024-01-04 ENCOUNTER — LAB VISIT (OUTPATIENT)
Dept: LAB | Facility: HOSPITAL | Age: 74
End: 2024-01-04
Attending: INTERNAL MEDICINE
Payer: MEDICARE

## 2024-01-04 DIAGNOSIS — D47.2 MGUS (MONOCLONAL GAMMOPATHY OF UNKNOWN SIGNIFICANCE): ICD-10-CM

## 2024-01-04 LAB
ALBUMIN SERPL BCP-MCNC: 3.9 G/DL (ref 3.5–5.2)
ALP SERPL-CCNC: 47 U/L (ref 55–135)
ALT SERPL W/O P-5'-P-CCNC: 12 U/L (ref 10–44)
ANION GAP SERPL CALC-SCNC: 5 MMOL/L (ref 8–16)
AST SERPL-CCNC: 17 U/L (ref 10–40)
BASOPHILS # BLD AUTO: 0.06 K/UL (ref 0–0.2)
BASOPHILS NFR BLD: 0.9 % (ref 0–1.9)
BILIRUB SERPL-MCNC: 0.5 MG/DL (ref 0.1–1)
BUN SERPL-MCNC: 19 MG/DL (ref 8–23)
CALCIUM SERPL-MCNC: 8.8 MG/DL (ref 8.7–10.5)
CHLORIDE SERPL-SCNC: 103 MMOL/L (ref 95–110)
CO2 SERPL-SCNC: 27 MMOL/L (ref 23–29)
CREAT SERPL-MCNC: 0.8 MG/DL (ref 0.5–1.4)
DIFFERENTIAL METHOD BLD: ABNORMAL
EOSINOPHIL # BLD AUTO: 0.1 K/UL (ref 0–0.5)
EOSINOPHIL NFR BLD: 1.5 % (ref 0–8)
ERYTHROCYTE [DISTWIDTH] IN BLOOD BY AUTOMATED COUNT: 12.2 % (ref 11.5–14.5)
EST. GFR  (NO RACE VARIABLE): >60 ML/MIN/1.73 M^2
GLUCOSE SERPL-MCNC: 88 MG/DL (ref 70–110)
HCT VFR BLD AUTO: 38.9 % (ref 37–48.5)
HGB BLD-MCNC: 13.2 G/DL (ref 12–16)
IMM GRANULOCYTES # BLD AUTO: 0.02 K/UL (ref 0–0.04)
IMM GRANULOCYTES NFR BLD AUTO: 0.3 % (ref 0–0.5)
LYMPHOCYTES # BLD AUTO: 2.1 K/UL (ref 1–4.8)
LYMPHOCYTES NFR BLD: 31.6 % (ref 18–48)
MCH RBC QN AUTO: 31.7 PG (ref 27–31)
MCHC RBC AUTO-ENTMCNC: 33.9 G/DL (ref 32–36)
MCV RBC AUTO: 93 FL (ref 82–98)
MONOCYTES # BLD AUTO: 0.5 K/UL (ref 0.3–1)
MONOCYTES NFR BLD: 6.7 % (ref 4–15)
NEUTROPHILS # BLD AUTO: 4 K/UL (ref 1.8–7.7)
NEUTROPHILS NFR BLD: 59 % (ref 38–73)
NRBC BLD-RTO: 0 /100 WBC
PLATELET # BLD AUTO: 228 K/UL (ref 150–450)
PMV BLD AUTO: 9.6 FL (ref 9.2–12.9)
POTASSIUM SERPL-SCNC: 4.6 MMOL/L (ref 3.5–5.1)
PROT SERPL-MCNC: 7.4 G/DL (ref 6–8.4)
RBC # BLD AUTO: 4.17 M/UL (ref 4–5.4)
SODIUM SERPL-SCNC: 135 MMOL/L (ref 136–145)
WBC # BLD AUTO: 6.75 K/UL (ref 3.9–12.7)

## 2024-01-04 PROCEDURE — 84165 PROTEIN E-PHORESIS SERUM: CPT | Performed by: INTERNAL MEDICINE

## 2024-01-04 PROCEDURE — 82784 ASSAY IGA/IGD/IGG/IGM EACH: CPT | Mod: 91 | Performed by: INTERNAL MEDICINE

## 2024-01-04 PROCEDURE — 80053 COMPREHEN METABOLIC PANEL: CPT | Performed by: INTERNAL MEDICINE

## 2024-01-04 PROCEDURE — 36415 COLL VENOUS BLD VENIPUNCTURE: CPT | Performed by: INTERNAL MEDICINE

## 2024-01-04 PROCEDURE — 83521 IG LIGHT CHAINS FREE EACH: CPT | Mod: 59 | Performed by: INTERNAL MEDICINE

## 2024-01-04 PROCEDURE — 85025 COMPLETE CBC W/AUTO DIFF WBC: CPT | Performed by: INTERNAL MEDICINE

## 2024-01-06 LAB
ALBUMIN SERPL ELPH-MCNC: 3.5 G/DL (ref 2.9–4.4)
ALBUMIN/GLOB SERPL: 0.9 {RATIO} (ref 0.7–1.7)
ALPHA1 GLOB SERPL ELPH-MCNC: 0.2 G/DL (ref 0–0.4)
ALPHA2 GLOB SERPL ELPH-MCNC: 0.7 G/DL (ref 0.4–1)
B-GLOBULIN SERPL ELPH-MCNC: 1 G/DL (ref 0.7–1.3)
GAMMA GLOB SERPL ELPH-MCNC: 1.8 G/DL (ref 0.4–1.8)
GLOBULIN SER CALC-MCNC: 3.7 G/DL (ref 2.2–3.9)
IGA SERPL-MCNC: 190 MG/DL (ref 64–422)
IGG SERPL-MCNC: 1881 MG/DL (ref 586–1602)
IGM SERPL-MCNC: 66 MG/DL (ref 26–217)
KAPPA LC FREE SER-MCNC: 23.4 MG/L (ref 3.3–19.4)
KAPPA LC FREE/LAMBDA FREE SER: 2.82 {RATIO} (ref 0.26–1.65)
LABORATORY COMMENT REPORT: ABNORMAL
LAMBDA LC FREE SERPL-MCNC: 8.3 MG/L (ref 5.7–26.3)
M PROTEIN SERPL ELPH-MCNC: 1.4 G/DL
PROT SERPL-MCNC: 7.2 G/DL (ref 6–8.5)

## 2024-01-08 ENCOUNTER — HOSPITAL ENCOUNTER (OUTPATIENT)
Dept: RADIOLOGY | Facility: HOSPITAL | Age: 74
Discharge: HOME | End: 2024-01-08
Attending: INTERNAL MEDICINE
Payer: MEDICARE

## 2024-01-08 VITALS — HEIGHT: 66 IN | WEIGHT: 145 LBS | BODY MASS INDEX: 23.3 KG/M2

## 2024-01-08 DIAGNOSIS — C83.35 DIFFUSE LARGE B-CELL LYMPHOMA OF LYMPH NODES OF INGUINAL REGION: ICD-10-CM

## 2024-01-08 LAB — GLUCOSE SERPL-MCNC: 74 MG/DL (ref 70–110)

## 2024-01-08 PROCEDURE — 78815 PET IMAGE W/CT SKULL-THIGH: CPT | Mod: TC,PO

## 2024-01-08 PROCEDURE — 82962 GLUCOSE BLOOD TEST: CPT | Mod: PO

## 2024-01-09 ENCOUNTER — OFFICE VISIT (OUTPATIENT)
Dept: HEMATOLOGY/ONCOLOGY | Facility: CLINIC | Age: 74
End: 2024-01-09
Payer: MEDICARE

## 2024-01-09 DIAGNOSIS — C83.35 DIFFUSE LARGE B-CELL LYMPHOMA OF LYMPH NODES OF INGUINAL REGION: Primary | ICD-10-CM

## 2024-01-09 DIAGNOSIS — D47.2 MGUS (MONOCLONAL GAMMOPATHY OF UNKNOWN SIGNIFICANCE): ICD-10-CM

## 2024-01-09 PROCEDURE — 99213 OFFICE O/P EST LOW 20 MIN: CPT | Mod: 95,,, | Performed by: INTERNAL MEDICINE

## 2024-01-09 NOTE — ASSESSMENT & PLAN NOTE
Patient is doing well and appears PADMINI at this time.  Her PET scan is negative and at this point we will repeat this in one year.  Discussed this today.

## 2024-01-09 NOTE — ASSESSMENT & PLAN NOTE
Labs are stable and she has no sign of progression to myeloma at this time.  Will continue to check this on a six month basis.

## 2024-01-09 NOTE — PROGRESS NOTES
PROGRESS NOTE    Subjective:       Patient ID: Aurelia Marlow is a 73 y.o. female.    [facility]x [facility] type- 8/21/2019  Inguinal LN biopsy    PET with bone marrow uptake throughout appendicular and axial skeleton    Bone marrow biopsy positive for B-cell lymphoma    Stage ARELIS    R-CHOP x 6  10/28/2019-2/10/2020    10/6/2020:  PET negative    5/16/2022:  LATOYA/BSO  Path negative for malignancy    6/22/2022:  PET negative    12/27/2022:  PET negative    The patient location is: Home  Visit type: Virtual visit with synchronous audio and video due to covid 19 pandemic crisis.  Time Spent 10 min--total time with chart review and documentation 22 min        Chief Complaint:  No chief complaint on file.  lymphoma and MGUS follow up    History of Present Illness:   Aurelia Marlow is a 73 y.o. female who presents for follow up of lymphoma    Ms. Marlow is doing well today and has no new complaints or problems.        Family and Social history reviewed and is unchanged from 9/27/2019      ROS:         Current Outpatient Medications:     albuterol (PROVENTIL/VENTOLIN HFA) 90 mcg/actuation inhaler, INHALE 2 PUFFS INTO THE LUNGS EVERY 6 (SIX) HOURS AS NEEDED FOR WHEEZING OR SHORTNESS OF BREATH (COUGH SPASM). RESCUE, Disp: 8 g, Rfl: 1    augmented betamethasone dipropionate (DIPROLENE-AF) 0.05 % cream, aaa bid prn paronychia, Disp: 50 g, Rfl: 3    azithromycin (Z-BRE) 250 MG tablet, Take 2 tablets by mouth on day 1; Take 1 tablet by mouth on days 2-5, Disp: 6 tablet, Rfl: 0    cholecalciferol, vitamin D3, (VITAMIN D3) 50 mcg (2,000 unit) Tab, once daily., Disp: , Rfl:     diphenhydrAMINE (BENADRYL) 25 mg capsule, Take 1 capsule (25 mg total) by mouth every 6 (six) hours as needed for Itching or Allergies., Disp: 20 capsule, Rfl: 0    fluticasone propionate (FLONASE) 50 mcg/actuation nasal spray, daily as needed., Disp: , Rfl:     levothyroxine (SYNTHROID) 100 MCG  tablet, Take 100 mcg by mouth once daily., Disp: , Rfl:     lidocaine-prilocaine (EMLA) cream, Apply topically as needed., Disp: 25 g, Rfl: 2    naproxen (EC NAPROSYN) 500 MG EC tablet, TAKE 1 TABLET BY MOUTH TWICE A DAY AFTER MEALS AS NEEDED, Disp: 60 tablet, Rfl: 0    ondansetron (ZOFRAN) 8 MG tablet, Take 8 mg by mouth every 8 (eight) hours as needed., Disp: , Rfl: 6    promethazine (PHENERGAN) 25 MG tablet, Take 25 mg by mouth every 6 (six) hours as needed., Disp: , Rfl: 6        Objective:       Physical Examination:     There were no vitals taken for this visit.    Physical Exam  Constitutional:       Appearance: Normal appearance. She is well-developed.   Eyes:      Conjunctiva/sclera: Conjunctivae normal.   Neck:      Thyroid: No thyromegaly.      Trachea: No tracheal deviation.   Pulmonary:      Effort: Pulmonary effort is normal.   Neurological:      Mental Status: She is oriented to person, place, and time.      Comments: Neuro intact througout   Psychiatric:         Mood and Affect: Mood normal.         Behavior: Behavior normal.         Thought Content: Thought content normal.         Judgment: Judgment normal.          Labs:   Recent Results (from the past 336 hour(s))   CBC Auto Differential    Collection Time: 01/04/24  1:51 PM   Result Value Ref Range    WBC 6.75 3.90 - 12.70 K/uL    Hemoglobin 13.2 12.0 - 16.0 g/dL    Hematocrit 38.9 37.0 - 48.5 %    Platelets 228 150 - 450 K/uL       CMP  Sodium   Date Value Ref Range Status   01/04/2024 135 (L) 136 - 145 mmol/L Final   06/11/2019 139 134 - 144 mmol/L      Potassium   Date Value Ref Range Status   01/04/2024 4.6 3.5 - 5.1 mmol/L Final     Chloride   Date Value Ref Range Status   01/04/2024 103 95 - 110 mmol/L Final   06/11/2019 104 98 - 110 mmol/L      CO2   Date Value Ref Range Status   01/04/2024 27 23 - 29 mmol/L Final     Glucose   Date Value Ref Range Status   01/04/2024 88 70 - 110 mg/dL Final   06/11/2019 86 70 - 99 mg/dL      BUN   Date  "Value Ref Range Status   01/04/2024 19 8 - 23 mg/dL Final     Creatinine   Date Value Ref Range Status   01/04/2024 0.8 0.5 - 1.4 mg/dL Final   06/11/2019 0.72 0.60 - 1.40 mg/dL      Calcium   Date Value Ref Range Status   01/04/2024 8.8 8.7 - 10.5 mg/dL Final     Total Protein   Date Value Ref Range Status   01/04/2024 7.2 6.0 - 8.5 g/dL Final     Albumin   Date Value Ref Range Status   01/04/2024 3.9 3.5 - 5.2 g/dL Final   06/11/2019 3.8 3.1 - 4.7 g/dL      Total Bilirubin   Date Value Ref Range Status   01/04/2024 0.5 0.1 - 1.0 mg/dL Final     Comment:     For infants and newborns, interpretation of results should be based  on gestational age, weight and in agreement with clinical  observations.    Premature Infant recommended reference ranges:  Up to 24 hours.............<8.0 mg/dL  Up to 48 hours............<12.0 mg/dL  3-5 days..................<15.0 mg/dL  6-29 days.................<15.0 mg/dL       Alkaline Phosphatase   Date Value Ref Range Status   01/04/2024 47 (L) 55 - 135 U/L Final     AST   Date Value Ref Range Status   01/04/2024 17 10 - 40 U/L Final     ALT   Date Value Ref Range Status   01/04/2024 12 10 - 44 U/L Final     Anion Gap   Date Value Ref Range Status   01/04/2024 5 (L) 8 - 16 mmol/L Final     eGFR if    Date Value Ref Range Status   06/27/2022 >60.0 >60 mL/min/1.73 m^2 Final     eGFR if non    Date Value Ref Range Status   06/27/2022 >60.0 >60 mL/min/1.73 m^2 Final     Comment:     Calculation used to obtain the estimated glomerular filtration  rate (eGFR) is the CKD-EPI equation.        No results found for: "CEA"  No results found for: "PSA"        Assessment/Plan:     Problem List Items Addressed This Visit       MGUS (monoclonal gammopathy of unknown significance)     Labs are stable and she has no sign of progression to myeloma at this time.  Will continue to check this on a six month basis.           Relevant Orders    CBC Auto Differential    " Comprehensive Metabolic Panel    Protein Electrophoresis, Serum    Immunoglobulins (IgG, IgA, IgM) Quantitative    Diffuse large B-cell lymphoma of lymph nodes of inguinal region[facility]     Patient is doing well and appears PADMINI at this time.  Her PET scan is negative and at this point we will repeat this in one year.  Discussed this today.           Discussion:     Follow up in about 6 months (around 7/9/2024).      Electronically signed by Pb Fry

## 2024-01-24 ENCOUNTER — INFUSION (OUTPATIENT)
Dept: INFUSION THERAPY | Facility: HOSPITAL | Age: 74
End: 2024-01-24
Attending: INTERNAL MEDICINE
Payer: MEDICARE

## 2024-01-24 VITALS
BODY MASS INDEX: 24.49 KG/M2 | TEMPERATURE: 98 F | RESPIRATION RATE: 18 BRPM | OXYGEN SATURATION: 100 % | HEIGHT: 66 IN | DIASTOLIC BLOOD PRESSURE: 79 MMHG | SYSTOLIC BLOOD PRESSURE: 129 MMHG | HEART RATE: 79 BPM | WEIGHT: 152.38 LBS

## 2024-01-24 DIAGNOSIS — D47.2 MGUS (MONOCLONAL GAMMOPATHY OF UNKNOWN SIGNIFICANCE): ICD-10-CM

## 2024-01-24 DIAGNOSIS — C83.35 DIFFUSE LARGE B-CELL LYMPHOMA OF LYMPH NODES OF INGUINAL REGION: Primary | ICD-10-CM

## 2024-01-24 PROCEDURE — 96523 IRRIG DRUG DELIVERY DEVICE: CPT

## 2024-01-24 PROCEDURE — 25000003 PHARM REV CODE 250: Performed by: INTERNAL MEDICINE

## 2024-01-24 PROCEDURE — 63600175 PHARM REV CODE 636 W HCPCS: Performed by: INTERNAL MEDICINE

## 2024-01-24 PROCEDURE — A4216 STERILE WATER/SALINE, 10 ML: HCPCS | Performed by: INTERNAL MEDICINE

## 2024-01-24 RX ORDER — HEPARIN 100 UNIT/ML
500 SYRINGE INTRAVENOUS
Status: CANCELLED | OUTPATIENT
Start: 2024-01-24

## 2024-01-24 RX ORDER — SODIUM CHLORIDE 0.9 % (FLUSH) 0.9 %
10 SYRINGE (ML) INJECTION
Status: COMPLETED | OUTPATIENT
Start: 2024-01-24 | End: 2024-01-24

## 2024-01-24 RX ORDER — SODIUM CHLORIDE 0.9 % (FLUSH) 0.9 %
10 SYRINGE (ML) INJECTION
Status: CANCELLED | OUTPATIENT
Start: 2024-01-24

## 2024-01-24 RX ORDER — HEPARIN 100 UNIT/ML
500 SYRINGE INTRAVENOUS
Status: COMPLETED | OUTPATIENT
Start: 2024-01-24 | End: 2024-01-24

## 2024-01-24 RX ADMIN — SODIUM CHLORIDE, PRESERVATIVE FREE 10 ML: 5 INJECTION INTRAVENOUS at 01:01

## 2024-01-24 RX ADMIN — HEPARIN 500 UNITS: 100 SYRINGE at 01:01

## 2024-02-22 RX ORDER — ALBUTEROL SULFATE 90 UG/1
2 AEROSOL, METERED RESPIRATORY (INHALATION) EVERY 6 HOURS PRN
Qty: 8 G | Refills: 1 | Status: SHIPPED | OUTPATIENT
Start: 2024-02-22

## 2024-03-06 ENCOUNTER — INFUSION (OUTPATIENT)
Dept: INFUSION THERAPY | Facility: HOSPITAL | Age: 74
End: 2024-03-06
Attending: INTERNAL MEDICINE
Payer: MEDICARE

## 2024-03-06 VITALS
HEIGHT: 66 IN | HEART RATE: 74 BPM | OXYGEN SATURATION: 98 % | BODY MASS INDEX: 24.78 KG/M2 | WEIGHT: 154.19 LBS | TEMPERATURE: 97 F | SYSTOLIC BLOOD PRESSURE: 120 MMHG | RESPIRATION RATE: 18 BRPM | DIASTOLIC BLOOD PRESSURE: 77 MMHG

## 2024-03-06 DIAGNOSIS — D47.2 MGUS (MONOCLONAL GAMMOPATHY OF UNKNOWN SIGNIFICANCE): ICD-10-CM

## 2024-03-06 DIAGNOSIS — C83.35 DIFFUSE LARGE B-CELL LYMPHOMA OF LYMPH NODES OF INGUINAL REGION: Primary | ICD-10-CM

## 2024-03-06 PROCEDURE — 63600175 PHARM REV CODE 636 W HCPCS: Performed by: INTERNAL MEDICINE

## 2024-03-06 PROCEDURE — 96523 IRRIG DRUG DELIVERY DEVICE: CPT

## 2024-03-06 RX ORDER — HEPARIN 100 UNIT/ML
500 SYRINGE INTRAVENOUS
Status: CANCELLED | OUTPATIENT
Start: 2024-03-06

## 2024-03-06 RX ORDER — HEPARIN 100 UNIT/ML
500 SYRINGE INTRAVENOUS
Status: COMPLETED | OUTPATIENT
Start: 2024-03-06 | End: 2024-03-06

## 2024-03-06 RX ORDER — SODIUM CHLORIDE 0.9 % (FLUSH) 0.9 %
10 SYRINGE (ML) INJECTION
Status: CANCELLED | OUTPATIENT
Start: 2024-03-06

## 2024-03-06 RX ORDER — SODIUM CHLORIDE 0.9 % (FLUSH) 0.9 %
10 SYRINGE (ML) INJECTION
Status: DISCONTINUED | OUTPATIENT
Start: 2024-03-06 | End: 2024-03-06 | Stop reason: HOSPADM

## 2024-03-06 RX ADMIN — HEPARIN 500 UNITS: 100 SYRINGE at 12:03

## 2024-03-07 ENCOUNTER — OFFICE VISIT (OUTPATIENT)
Dept: FAMILY MEDICINE | Facility: CLINIC | Age: 74
End: 2024-03-07
Payer: MEDICARE

## 2024-03-07 VITALS
SYSTOLIC BLOOD PRESSURE: 102 MMHG | DIASTOLIC BLOOD PRESSURE: 58 MMHG | BODY MASS INDEX: 24.45 KG/M2 | TEMPERATURE: 98 F | HEART RATE: 83 BPM | HEIGHT: 66 IN | RESPIRATION RATE: 17 BRPM | WEIGHT: 152.13 LBS | OXYGEN SATURATION: 96 %

## 2024-03-07 DIAGNOSIS — E03.9 HYPOTHYROIDISM, UNSPECIFIED TYPE: ICD-10-CM

## 2024-03-07 DIAGNOSIS — T45.1X5A CHEMOTHERAPY-INDUCED NEUTROPENIA: ICD-10-CM

## 2024-03-07 DIAGNOSIS — Z00.00 ENCOUNTER FOR PREVENTIVE HEALTH EXAMINATION: Primary | ICD-10-CM

## 2024-03-07 DIAGNOSIS — D47.2 MGUS (MONOCLONAL GAMMOPATHY OF UNKNOWN SIGNIFICANCE): ICD-10-CM

## 2024-03-07 DIAGNOSIS — E04.1 THYROID NODULE: ICD-10-CM

## 2024-03-07 DIAGNOSIS — I51.89 DIASTOLIC DYSFUNCTION: ICD-10-CM

## 2024-03-07 DIAGNOSIS — D70.1 CHEMOTHERAPY-INDUCED NEUTROPENIA: ICD-10-CM

## 2024-03-07 DIAGNOSIS — E78.2 MIXED HYPERLIPIDEMIA: ICD-10-CM

## 2024-03-07 DIAGNOSIS — I70.0 CALCIFICATION OF AORTA: ICD-10-CM

## 2024-03-07 DIAGNOSIS — M06.9 RHEUMATOID ARTHRITIS, INVOLVING UNSPECIFIED SITE, UNSPECIFIED WHETHER RHEUMATOID FACTOR PRESENT: ICD-10-CM

## 2024-03-07 PROCEDURE — 99214 OFFICE O/P EST MOD 30 MIN: CPT | Mod: S$GLB,,, | Performed by: STUDENT IN AN ORGANIZED HEALTH CARE EDUCATION/TRAINING PROGRAM

## 2024-03-07 PROCEDURE — 3008F BODY MASS INDEX DOCD: CPT | Mod: CPTII,S$GLB,, | Performed by: STUDENT IN AN ORGANIZED HEALTH CARE EDUCATION/TRAINING PROGRAM

## 2024-03-07 PROCEDURE — 3074F SYST BP LT 130 MM HG: CPT | Mod: CPTII,S$GLB,, | Performed by: STUDENT IN AN ORGANIZED HEALTH CARE EDUCATION/TRAINING PROGRAM

## 2024-03-07 PROCEDURE — G2211 COMPLEX E/M VISIT ADD ON: HCPCS | Mod: S$GLB,,, | Performed by: STUDENT IN AN ORGANIZED HEALTH CARE EDUCATION/TRAINING PROGRAM

## 2024-03-07 PROCEDURE — 1159F MED LIST DOCD IN RCRD: CPT | Mod: CPTII,S$GLB,, | Performed by: STUDENT IN AN ORGANIZED HEALTH CARE EDUCATION/TRAINING PROGRAM

## 2024-03-07 PROCEDURE — 1126F AMNT PAIN NOTED NONE PRSNT: CPT | Mod: CPTII,S$GLB,, | Performed by: STUDENT IN AN ORGANIZED HEALTH CARE EDUCATION/TRAINING PROGRAM

## 2024-03-07 PROCEDURE — 99999 PR PBB SHADOW E&M-EST. PATIENT-LVL IV: CPT | Mod: PBBFAC,,, | Performed by: STUDENT IN AN ORGANIZED HEALTH CARE EDUCATION/TRAINING PROGRAM

## 2024-03-07 PROCEDURE — 3288F FALL RISK ASSESSMENT DOCD: CPT | Mod: CPTII,S$GLB,, | Performed by: STUDENT IN AN ORGANIZED HEALTH CARE EDUCATION/TRAINING PROGRAM

## 2024-03-07 PROCEDURE — 1101F PT FALLS ASSESS-DOCD LE1/YR: CPT | Mod: CPTII,S$GLB,, | Performed by: STUDENT IN AN ORGANIZED HEALTH CARE EDUCATION/TRAINING PROGRAM

## 2024-03-07 PROCEDURE — 3078F DIAST BP <80 MM HG: CPT | Mod: CPTII,S$GLB,, | Performed by: STUDENT IN AN ORGANIZED HEALTH CARE EDUCATION/TRAINING PROGRAM

## 2024-03-07 RX ORDER — HYDROXYCHLOROQUINE SULFATE 200 MG/1
200 TABLET, FILM COATED ORAL 2 TIMES DAILY
COMMUNITY
Start: 2012-01-05 | End: 2024-05-29

## 2024-03-07 NOTE — PROGRESS NOTES
[facility] Note    Subjective:    The HPI and pertinent ROS is included in the Diagnostic Impression Remarks section at the end of the note. Please see below for further details. Chief complaint is at end of note.     Aurelia is a pleasant intelligent patient who is here for evaluation.     Modified Medications    No medications on file       Data reviewed    274}  Previous medical records reviewed and summarized in plan section at end of note.      If you are due for any health screening(s) below please notify me so we can arrange them to be ordered and scheduled. Most healthy patients at your age complete them, but you are free to accept or refuse. If you can't do it, I'll definitely understand. If you can, I'd certainly appreciate it!     Tests to Keep You Healthy    Mammogram: Met on 11/10/2023  Colon Cancer Screening: Met on 10/26/2022  Last Blood Pressure <= 139/89 (3/7/2024): NO      The following portions of the patient's history were reviewed and updated as appropriate: allergies, current medications, past family history, past medical history, past social history, past surgical history and problem list.    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  She  has a past surgical history that includes Thyroidectomy (1990); EXCISION RHEUMATOID NODULE (Left, 2012); Biopsy of inguinal lymph node (Left, 9/18/2019); Echocardiography (09/30/2019); Insertion of tunneled central venous catheter (CVC) with subcutaneous port (Left, 10/2/2019); Sampling of multiple lymph nodes; Bone marrow biopsy (N/A, 10/10/2019); Robot-assisted laparoscopic hysterectomy (N/A, 5/16/2022); and Robot-assisted laparoscopic salpingo-oophorectomy (Bilateral, 5/16/2022).    She   "reports that she quit smoking about 17 years ago. Her smoking use included cigarettes. She started smoking about 57 years ago. She has a 40.0 pack-year smoking history. She has been exposed to tobacco smoke. She has never used smokeless tobacco. She reports current alcohol use of about 7.0 standard drinks of alcohol per week. She reports that she does not use drugs.  She family history includes Early death (age of onset: 59) in her father; Heart disease in her father and mother; Hypertension in her mother; Kidney disease in her mother; No Known Problems in her maternal grandfather, maternal grandmother, paternal grandfather, and paternal grandmother.    Review of patient's allergies indicates:   Allergen Reactions    Contrast media Rash       Tobacco Use: Medium Risk (3/7/2024)    Patient History     Smoking Tobacco Use: Former     Smokeless Tobacco Use: Never     Passive Exposure: Past     Physical Examination  General appearance: alert, cooperative, no distress  Neck: no thyromegaly, no neck stiffness  Lungs: clear to auscultation, no wheezes, rales or rhonchi, symmetric air entry  Heart: normal rate, regular rhythm, normal S1, S2, no murmurs, rubs, clicks or gallops  Abdomen: soft, nontender, nondistended, no rigidity, rebound, or guarding.   Back: no point tenderness over spine  Extremities: peripheral pulses normal, no unilateral leg swelling or calf tenderness   Neurological:alert, oriented, normal speech, no new focal findings or movement disorder noted from baseline    BP Readings from Last 3 Encounters:   03/07/24 (!) 102/58   03/06/24 120/77   01/24/24 129/79     Wt Readings from Last 3 Encounters:   03/07/24 69 kg (152 lb 1.9 oz)   03/06/24 69.9 kg (154 lb 3.2 oz)   01/24/24 69.1 kg (152 lb 6.4 oz)     BP (!) 102/58 (BP Location: Right arm, Patient Position: Sitting, BP Method: Large (Manual))   Pulse 83   Temp 97.7 °F (36.5 °C) (Oral)   Resp 17   Ht 5' 6" (1.676 m)   Wt 69 kg (152 lb 1.9 oz)   SpO2 " "96%   BMI 24.55 kg/m²       274}  Laboratory: I have reviewed old labs below:       274}    Lab Results   Component Value Date    WBC 6.75 01/04/2024    HGB 13.2 01/04/2024    HCT 38.9 01/04/2024    MCV 93 01/04/2024     01/04/2024     (L) 01/04/2024    K 4.6 01/04/2024     01/04/2024    CALCIUM 8.8 01/04/2024    CO2 27 01/04/2024    GLU 88 01/04/2024    BUN 19 01/04/2024    CREATININE 0.8 01/04/2024    EGFRNORACEVR >60.0 01/04/2024    ANIONGAP 5 (L) 01/04/2024    PROT 7.2 01/04/2024    ALBUMIN 3.9 01/04/2024    BILITOT 0.5 01/04/2024    ALKPHOS 47 (L) 01/04/2024    ALT 12 01/04/2024    AST 17 01/04/2024    INR 1.0 05/13/2022    CHOL 194 07/26/2021    TRIG 37 07/26/2021    HDL 84 (H) 07/26/2021    LDLCALC 102.6 07/26/2021    TSH 0.840 09/20/2023      Lab reviewed by me: Particular labs of significance that I will monitor, workup, or treat to improve are mentioned below in diagnostic impression remarks.        Imaging/EKG: I have reviewed the pertinent results and my findings are noted in remarks.     274}    CC:   Chief Complaint   Patient presents with    Annual Exam    Establish Care    Arthritis           274}    Assessment/Plan  Aurelia Marlow is a 73 y.o. female who presents to clinic with:  1. Encounter for preventive health examination    2. Rheumatoid arthritis, involving unspecified site, unspecified whether rheumatoid factor present    3. Hypothyroidism, unspecified type    4. MGUS (monoclonal gammopathy of unknown significance)    5. Mixed hyperlipidemia    6. Diastolic dysfunction    7. Thyroid nodule    8. Calcification of aorta    9. Chemotherapy-induced neutropenia          274}  Diagnostic Impression Remarks + HPI     Documentation entered by me for this encounter may have been done in part using speech-recognition technology. Although I have made an effort to ensure accuracy, "sound like" errors may exist and should be interpreted in context.       Preventive-will get blood work " and follow-up on this doing well.    RA stable only takes nsaid very infreq monitor    Calcifications of aorta- stable \and healthy diet monitor follow lipid levels will get apo b and nmr get ascvd risk score. Pt discussed calcium score with cards and she did nto want to start a medication   MGUS stable monitor    Thyroid nodule monitor see endo    HLD recheck monitor will get apo b and nmr    Hypothyroidism-stable monitor blood work continue current dose   Chemo induced neutropenia monitor     This is the extent of this pleasant patient's concerns at this present time. She did not feel chest pain upon exertion, dyspnea, nausea, vomiting, diaphoresis, or syncope. No pleuritic chest pain, unilateral leg swelling, calf tenderness, or calf pain. Negative for unintentional weight loss night sweats, hematuria, and fevers.     Additional workup planned: see labs ordered below.    See below for labs and meds ordered with associated diagnosis      1. Encounter for preventive health examination    2. Rheumatoid arthritis, involving unspecified site, unspecified whether rheumatoid factor present    3. Hypothyroidism, unspecified type    4. MGUS (monoclonal gammopathy of unknown significance)    5. Mixed hyperlipidemia  - Lipid Panel; Future  - Apolipoprotein B; Future  - Lipoprotein Analysis, by NMR; Future    6. Diastolic dysfunction    7. Thyroid nodule    8. Calcification of aorta    9. Chemotherapy-induced neutropenia      Torsten Galindo MD      274}    If you are due for any health screening(s) below please notify me so we can arrange them to be ordered and scheduled. Most healthy patients at your age complete them, but you are free to accept or refuse.     If you can't do it, I'll definitely understand. If you can, I'd certainly appreciate it!   Tests to Keep You Healthy    Mammogram: Met on 11/10/2023  Colon Cancer Screening: Met on 10/26/2022  Last Blood Pressure <= 139/89 (3/7/2024): NO

## 2024-03-24 DIAGNOSIS — M06.9 RHEUMATOID ARTHRITIS, UNSPECIFIED: ICD-10-CM

## 2024-03-25 RX ORDER — HYDROXYCHLOROQUINE SULFATE 200 MG/1
200 TABLET, FILM COATED ORAL 2 TIMES DAILY
Qty: 180 TABLET | Refills: 1 | OUTPATIENT
Start: 2024-03-25

## 2024-04-17 ENCOUNTER — INFUSION (OUTPATIENT)
Dept: INFUSION THERAPY | Facility: HOSPITAL | Age: 74
End: 2024-04-17
Attending: INTERNAL MEDICINE
Payer: MEDICARE

## 2024-04-17 VITALS
RESPIRATION RATE: 18 BRPM | WEIGHT: 153.38 LBS | TEMPERATURE: 98 F | BODY MASS INDEX: 24.65 KG/M2 | DIASTOLIC BLOOD PRESSURE: 75 MMHG | HEART RATE: 66 BPM | OXYGEN SATURATION: 98 % | SYSTOLIC BLOOD PRESSURE: 128 MMHG | HEIGHT: 66 IN

## 2024-04-17 DIAGNOSIS — D47.2 MGUS (MONOCLONAL GAMMOPATHY OF UNKNOWN SIGNIFICANCE): ICD-10-CM

## 2024-04-17 DIAGNOSIS — C83.35 DIFFUSE LARGE B-CELL LYMPHOMA OF LYMPH NODES OF INGUINAL REGION: Primary | ICD-10-CM

## 2024-04-17 PROCEDURE — A4216 STERILE WATER/SALINE, 10 ML: HCPCS | Performed by: INTERNAL MEDICINE

## 2024-04-17 PROCEDURE — 63600175 PHARM REV CODE 636 W HCPCS: Performed by: INTERNAL MEDICINE

## 2024-04-17 PROCEDURE — 96523 IRRIG DRUG DELIVERY DEVICE: CPT

## 2024-04-17 PROCEDURE — 25000003 PHARM REV CODE 250: Performed by: INTERNAL MEDICINE

## 2024-04-17 RX ORDER — HEPARIN 100 UNIT/ML
500 SYRINGE INTRAVENOUS
Status: CANCELLED | OUTPATIENT
Start: 2024-04-17

## 2024-04-17 RX ORDER — HEPARIN 100 UNIT/ML
500 SYRINGE INTRAVENOUS
Status: COMPLETED | OUTPATIENT
Start: 2024-04-17 | End: 2024-04-17

## 2024-04-17 RX ORDER — SODIUM CHLORIDE 0.9 % (FLUSH) 0.9 %
10 SYRINGE (ML) INJECTION
Status: CANCELLED | OUTPATIENT
Start: 2024-04-17

## 2024-04-17 RX ORDER — SODIUM CHLORIDE 0.9 % (FLUSH) 0.9 %
10 SYRINGE (ML) INJECTION
Status: COMPLETED | OUTPATIENT
Start: 2024-04-17 | End: 2024-04-17

## 2024-04-17 RX ADMIN — SODIUM CHLORIDE, PRESERVATIVE FREE 10 ML: 5 INJECTION INTRAVENOUS at 03:04

## 2024-04-17 RX ADMIN — HEPARIN 500 UNITS: 100 SYRINGE at 03:04

## 2024-05-16 ENCOUNTER — TELEPHONE (OUTPATIENT)
Dept: FAMILY MEDICINE | Facility: CLINIC | Age: 74
End: 2024-05-16
Payer: MEDICARE

## 2024-05-16 NOTE — TELEPHONE ENCOUNTER
----- Message from Torsten Galindo MD sent at 5/16/2024  6:51 AM CDT -----  Regarding: lab work due  Please set up the pt for labwork is due thx

## 2024-05-21 ENCOUNTER — LAB VISIT (OUTPATIENT)
Dept: LAB | Facility: HOSPITAL | Age: 74
End: 2024-05-21
Attending: STUDENT IN AN ORGANIZED HEALTH CARE EDUCATION/TRAINING PROGRAM
Payer: MEDICARE

## 2024-05-21 DIAGNOSIS — E78.2 MIXED HYPERLIPIDEMIA: ICD-10-CM

## 2024-05-21 LAB
CHOLEST SERPL-MCNC: 194 MG/DL (ref 120–199)
CHOLEST/HDLC SERPL: 2.7 {RATIO} (ref 2–5)
HDLC SERPL-MCNC: 71 MG/DL (ref 40–75)
HDLC SERPL: 36.6 % (ref 20–50)
LDLC SERPL CALC-MCNC: 112.6 MG/DL (ref 63–159)
NONHDLC SERPL-MCNC: 123 MG/DL
TRIGL SERPL-MCNC: 52 MG/DL (ref 30–150)

## 2024-05-21 PROCEDURE — 36415 COLL VENOUS BLD VENIPUNCTURE: CPT | Performed by: STUDENT IN AN ORGANIZED HEALTH CARE EDUCATION/TRAINING PROGRAM

## 2024-05-21 PROCEDURE — 82172 ASSAY OF APOLIPOPROTEIN: CPT | Performed by: STUDENT IN AN ORGANIZED HEALTH CARE EDUCATION/TRAINING PROGRAM

## 2024-05-21 PROCEDURE — 80061 LIPID PANEL: CPT | Mod: 91 | Performed by: STUDENT IN AN ORGANIZED HEALTH CARE EDUCATION/TRAINING PROGRAM

## 2024-05-21 PROCEDURE — 80061 LIPID PANEL: CPT | Performed by: STUDENT IN AN ORGANIZED HEALTH CARE EDUCATION/TRAINING PROGRAM

## 2024-05-21 PROCEDURE — 30000890 LABCORP MISCELLANEOUS TEST: Performed by: STUDENT IN AN ORGANIZED HEALTH CARE EDUCATION/TRAINING PROGRAM

## 2024-05-23 LAB
CHOLEST SERPL-MCNC: 195 MG/DL (ref 100–199)
HDL SERPL-SCNC: 36.4 UMOL/L
HDLC SERPL-MCNC: 76 MG/DL
LABCORP MISC TEST CODE: NORMAL
LABCORP MISC TEST NAME: NORMAL
LABCORP MISCELLANEOUS TEST: NORMAL
LDL SERPL QN: 21.1 NM
LDL SERPL-SCNC: 1063 NMOL/L
LDL SMALL SERPL-SCNC: <90 NMOL/L
LDLC SERPL CALC-MCNC: 109 MG/DL (ref 0–99)
LP-IR SCORE SERPL: <25
TRIGL SERPL-MCNC: 52 MG/DL (ref 0–149)

## 2024-05-29 ENCOUNTER — INFUSION (OUTPATIENT)
Dept: INFUSION THERAPY | Facility: HOSPITAL | Age: 74
End: 2024-05-29
Attending: INTERNAL MEDICINE
Payer: MEDICARE

## 2024-05-29 VITALS
TEMPERATURE: 98 F | HEART RATE: 65 BPM | WEIGHT: 155.38 LBS | OXYGEN SATURATION: 97 % | BODY MASS INDEX: 24.97 KG/M2 | DIASTOLIC BLOOD PRESSURE: 86 MMHG | HEIGHT: 66 IN | RESPIRATION RATE: 18 BRPM | SYSTOLIC BLOOD PRESSURE: 149 MMHG

## 2024-05-29 DIAGNOSIS — C83.35 DIFFUSE LARGE B-CELL LYMPHOMA OF LYMPH NODES OF INGUINAL REGION: Primary | ICD-10-CM

## 2024-05-29 DIAGNOSIS — D47.2 MGUS (MONOCLONAL GAMMOPATHY OF UNKNOWN SIGNIFICANCE): ICD-10-CM

## 2024-05-29 PROCEDURE — 96523 IRRIG DRUG DELIVERY DEVICE: CPT

## 2024-05-29 PROCEDURE — A4216 STERILE WATER/SALINE, 10 ML: HCPCS | Performed by: INTERNAL MEDICINE

## 2024-05-29 PROCEDURE — 63600175 PHARM REV CODE 636 W HCPCS: Performed by: INTERNAL MEDICINE

## 2024-05-29 PROCEDURE — 25000003 PHARM REV CODE 250: Performed by: INTERNAL MEDICINE

## 2024-05-29 RX ORDER — HEPARIN 100 UNIT/ML
500 SYRINGE INTRAVENOUS
OUTPATIENT
Start: 2024-05-29

## 2024-05-29 RX ORDER — HEPARIN 100 UNIT/ML
500 SYRINGE INTRAVENOUS
Status: COMPLETED | OUTPATIENT
Start: 2024-05-29 | End: 2024-05-29

## 2024-05-29 RX ORDER — SODIUM CHLORIDE 0.9 % (FLUSH) 0.9 %
10 SYRINGE (ML) INJECTION
Status: COMPLETED | OUTPATIENT
Start: 2024-05-29 | End: 2024-05-29

## 2024-05-29 RX ORDER — HYDROXYCHLOROQUINE SULFATE 200 MG/1
200 TABLET, FILM COATED ORAL 2 TIMES DAILY
Qty: 180 TABLET | Refills: 1 | Status: SHIPPED | OUTPATIENT
Start: 2024-05-29

## 2024-05-29 RX ORDER — SODIUM CHLORIDE 0.9 % (FLUSH) 0.9 %
10 SYRINGE (ML) INJECTION
OUTPATIENT
Start: 2024-05-29

## 2024-05-29 RX ADMIN — HEPARIN 500 UNITS: 100 SYRINGE at 02:05

## 2024-05-29 RX ADMIN — SODIUM CHLORIDE, PRESERVATIVE FREE 10 ML: 5 INJECTION INTRAVENOUS at 02:05

## 2024-05-29 NOTE — PLAN OF CARE
Problem: Infection  Goal: Absence of Infection Signs and Symptoms  Outcome: Progressing  Intervention: Prevent or Manage Infection  Flowsheets (Taken 5/29/2024 1610)  Infection Management: aseptic technique maintained

## 2024-07-02 ENCOUNTER — LAB VISIT (OUTPATIENT)
Dept: LAB | Facility: HOSPITAL | Age: 74
End: 2024-07-02
Attending: INTERNAL MEDICINE
Payer: MEDICARE

## 2024-07-02 DIAGNOSIS — D47.2 MGUS (MONOCLONAL GAMMOPATHY OF UNKNOWN SIGNIFICANCE): ICD-10-CM

## 2024-07-02 LAB
ALBUMIN SERPL BCP-MCNC: 4 G/DL (ref 3.5–5.2)
ALP SERPL-CCNC: 58 U/L (ref 55–135)
ALT SERPL W/O P-5'-P-CCNC: 14 U/L (ref 10–44)
ANION GAP SERPL CALC-SCNC: 5 MMOL/L (ref 8–16)
AST SERPL-CCNC: 14 U/L (ref 10–40)
BASOPHILS # BLD AUTO: 0.04 K/UL (ref 0–0.2)
BASOPHILS NFR BLD: 0.6 % (ref 0–1.9)
BILIRUB SERPL-MCNC: 0.6 MG/DL (ref 0.1–1)
BUN SERPL-MCNC: 11 MG/DL (ref 8–23)
CALCIUM SERPL-MCNC: 8.9 MG/DL (ref 8.7–10.5)
CHLORIDE SERPL-SCNC: 102 MMOL/L (ref 95–110)
CO2 SERPL-SCNC: 29 MMOL/L (ref 23–29)
CREAT SERPL-MCNC: 0.7 MG/DL (ref 0.5–1.4)
DIFFERENTIAL METHOD BLD: ABNORMAL
EOSINOPHIL # BLD AUTO: 0.1 K/UL (ref 0–0.5)
EOSINOPHIL NFR BLD: 1.2 % (ref 0–8)
ERYTHROCYTE [DISTWIDTH] IN BLOOD BY AUTOMATED COUNT: 12.4 % (ref 11.5–14.5)
EST. GFR  (NO RACE VARIABLE): >60 ML/MIN/1.73 M^2
GLUCOSE SERPL-MCNC: 92 MG/DL (ref 70–110)
HCT VFR BLD AUTO: 40.9 % (ref 37–48.5)
HGB BLD-MCNC: 13.6 G/DL (ref 12–16)
IMM GRANULOCYTES # BLD AUTO: 0.02 K/UL (ref 0–0.04)
IMM GRANULOCYTES NFR BLD AUTO: 0.3 % (ref 0–0.5)
LYMPHOCYTES # BLD AUTO: 1.5 K/UL (ref 1–4.8)
LYMPHOCYTES NFR BLD: 21.9 % (ref 18–48)
MCH RBC QN AUTO: 31.6 PG (ref 27–31)
MCHC RBC AUTO-ENTMCNC: 33.3 G/DL (ref 32–36)
MCV RBC AUTO: 95 FL (ref 82–98)
MONOCYTES # BLD AUTO: 0.4 K/UL (ref 0.3–1)
MONOCYTES NFR BLD: 6.4 % (ref 4–15)
NEUTROPHILS # BLD AUTO: 4.7 K/UL (ref 1.8–7.7)
NEUTROPHILS NFR BLD: 69.6 % (ref 38–73)
NRBC BLD-RTO: 0 /100 WBC
PLATELET # BLD AUTO: 211 K/UL (ref 150–450)
PMV BLD AUTO: 9.4 FL (ref 9.2–12.9)
POTASSIUM SERPL-SCNC: 4.4 MMOL/L (ref 3.5–5.1)
PROT SERPL-MCNC: 7.6 G/DL (ref 6–8.4)
RBC # BLD AUTO: 4.3 M/UL (ref 4–5.4)
SODIUM SERPL-SCNC: 136 MMOL/L (ref 136–145)
WBC # BLD AUTO: 6.75 K/UL (ref 3.9–12.7)

## 2024-07-02 PROCEDURE — 85025 COMPLETE CBC W/AUTO DIFF WBC: CPT | Performed by: INTERNAL MEDICINE

## 2024-07-02 PROCEDURE — 82784 ASSAY IGA/IGD/IGG/IGM EACH: CPT | Performed by: INTERNAL MEDICINE

## 2024-07-02 PROCEDURE — 84155 ASSAY OF PROTEIN SERUM: CPT | Performed by: INTERNAL MEDICINE

## 2024-07-02 PROCEDURE — 84165 PROTEIN E-PHORESIS SERUM: CPT | Performed by: INTERNAL MEDICINE

## 2024-07-02 PROCEDURE — 36415 COLL VENOUS BLD VENIPUNCTURE: CPT | Performed by: INTERNAL MEDICINE

## 2024-07-02 PROCEDURE — 80053 COMPREHEN METABOLIC PANEL: CPT | Performed by: INTERNAL MEDICINE

## 2024-07-03 LAB
IGA SERPL-MCNC: 206 MG/DL (ref 64–422)
IGG SERPL-MCNC: 1893 MG/DL (ref 586–1602)
IGM SERPL-MCNC: 70 MG/DL (ref 26–217)

## 2024-07-05 ENCOUNTER — OFFICE VISIT (OUTPATIENT)
Dept: FAMILY MEDICINE | Facility: CLINIC | Age: 74
End: 2024-07-05
Payer: MEDICARE

## 2024-07-05 DIAGNOSIS — Z13.6 ENCOUNTER FOR SCREENING FOR CARDIOVASCULAR DISORDERS: ICD-10-CM

## 2024-07-05 DIAGNOSIS — C83.35 DIFFUSE LARGE B-CELL LYMPHOMA OF LYMPH NODES OF INGUINAL REGION: ICD-10-CM

## 2024-07-05 DIAGNOSIS — E03.9 HYPOTHYROIDISM, UNSPECIFIED TYPE: ICD-10-CM

## 2024-07-05 DIAGNOSIS — Z00.00 HEALTHCARE MAINTENANCE: ICD-10-CM

## 2024-07-05 DIAGNOSIS — I70.0 ATHEROSCLEROSIS OF AORTA: Primary | ICD-10-CM

## 2024-07-05 DIAGNOSIS — N95.2 VAGINAL ATROPHY: ICD-10-CM

## 2024-07-05 LAB
ALBUMIN SERPL ELPH-MCNC: 3.4 G/DL (ref 2.9–4.4)
ALBUMIN/GLOB SERPL: 0.9 {RATIO} (ref 0.7–1.7)
ALPHA1 GLOB SERPL ELPH-MCNC: 0.3 G/DL (ref 0–0.4)
ALPHA2 GLOB SERPL ELPH-MCNC: 0.8 G/DL (ref 0.4–1)
B-GLOBULIN SERPL ELPH-MCNC: 1.1 G/DL (ref 0.7–1.3)
GAMMA GLOB SERPL ELPH-MCNC: 1.7 G/DL (ref 0.4–1.8)
GLOBULIN SER CALC-MCNC: 4 G/DL (ref 2.2–3.9)
LABORATORY COMMENT REPORT: ABNORMAL
M PROTEIN SERPL ELPH-MCNC: 1.4 G/DL
PROT SERPL-MCNC: 7.4 G/DL (ref 6–8.5)

## 2024-07-05 RX ORDER — CONJUGATED ESTROGENS 0.62 MG/G
0.5 CREAM VAGINAL DAILY
Qty: 1 APPLICATOR | Refills: 5 | Status: SHIPPED | OUTPATIENT
Start: 2024-07-05 | End: 2025-07-05

## 2024-07-05 NOTE — PROGRESS NOTES
[facility] Primary Care Note    Subjective:    Chief Complaint:   Chief Complaint   Patient presents with    Hyperlipidemia      274}    The HPI and pertinent ROS is included in the Diagnostic Impression Remarks section at the end of the note. Please see below for further details.     Aurelia is a pleasant intelligent patient who is here for evaluation.     The following portions of the patient's history were reviewed and updated as appropriate: allergies, current medications, past family history, past medical history, past social history, past surgical history and problem list.    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  She  has a past surgical history that includes Thyroidectomy (1990); EXCISION RHEUMATOID NODULE (Left, 2012); Biopsy of inguinal lymph node (Left, 9/18/2019); Echocardiography (09/30/2019); Insertion of tunneled central venous catheter (CVC) with subcutaneous port (Left, 10/2/2019); Sampling of multiple lymph nodes; Bone marrow biopsy (N/A, 10/10/2019); Robot-assisted laparoscopic hysterectomy (N/A, 5/16/2022); and Robot-assisted laparoscopic salpingo-oophorectomy (Bilateral, 5/16/2022).  She  reports that she quit smoking about 17 years ago. Her smoking use included cigarettes. She started smoking about 57 years ago. She has a 40 pack-year smoking history. She has been exposed to tobacco smoke. She has never used smokeless tobacco. She reports current alcohol use of about 7.0 standard drinks of alcohol per week. She reports that she does not use drugs.  She family history includes Early death (age of onset: 59) in her father; Heart disease in her father and mother; Hypertension in her mother; Kidney disease in her mother; No Known Problems  "in her maternal grandfather, maternal grandmother, paternal grandfather, and paternal grandmother.    Review of patient's allergies indicates:   Allergen Reactions    Contrast media Rash       Physical Examination  There were no vitals taken for this visit.   274}  Wt Readings from Last 3 Encounters:   05/29/24 70.5 kg (155 lb 5.9 oz)   04/17/24 69.6 kg (153 lb 6.4 oz)   03/07/24 69 kg (152 lb 1.9 oz)     BP Readings from Last 3 Encounters:   05/29/24 (!) 149/86   04/17/24 128/75   03/07/24 (!) 102/58     Estimated body mass index is 25.08 kg/m² as calculated from the following:    Height as of 5/29/24: 5' 6" (1.676 m).    Weight as of 5/29/24: 70.5 kg (155 lb 5.9 oz).     CONSTITUTIONAL: No apparent distress. Does not appear acutely ill or septic. Appears adequately hydrated.  PULM: Breathing unlabored.  PSYCHIATRIC: Alert and conversant and grossly oriented. Mood is grossly neutral. Affect appropriate. Judgment and insight grossly intact.  Integument: normal coloration and turgor, no rashes, no suspicious skin lesions noted.    Data reviewed 274}  Previous medical records reviewed and summarized in HPI.     Laboratory  274}  I have reviewed old labs below:  Lab Results   Component Value Date    WBC 6.75 07/02/2024    HGB 13.6 07/02/2024    HCT 40.9 07/02/2024    MCV 95 07/02/2024     07/02/2024     07/02/2024    K 4.4 07/02/2024     07/02/2024    CALCIUM 8.9 07/02/2024    CO2 29 07/02/2024    GLU 92 07/02/2024    BUN 11 07/02/2024    CREATININE 0.7 07/02/2024    ANIONGAP 5 (L) 07/02/2024    ESTGFRAFRICA >60.0 06/27/2022    EGFRNONAA >60.0 06/27/2022    PROT 7.4 07/02/2024    ALBUMIN 4.0 07/02/2024    BILITOT 0.6 07/02/2024    ALKPHOS 58 07/02/2024    ALT 14 07/02/2024    AST 14 07/02/2024    INR 1.0 05/13/2022    CHOL 194 05/21/2024    TRIG 52 05/21/2024    HDL 71 05/21/2024    LDLCALC 112.6 05/21/2024    TSH 0.840 09/20/2023     Lab reviewed by me: Particular labs of significance that I will " monitor, workup, or treat to improve are mentioned below in diagnostic impression remarks.    The 10-year ASCVD risk score (Vish DICK, et al., 2019) is: 13.3%    Values used to calculate the score:      Age: 73 years      Sex: Female      Is Non- : No      Diabetic: No      Tobacco smoker: No      Systolic Blood Pressure: 149 mmHg      Is BP treated: No      HDL Cholesterol: 71 mg/dL      Total Cholesterol: 194 mg/dL    :[id number]}274}  Imaging/EKG: I have reviewed the pertinent results/findings and my personal findings are noted below in diagnostic impression remarks.  274}    CC:   Chief Complaint   Patient presents with    Hyperlipidemia        274}  Assessment/Plan  Aurelia Marlow is a 73 y.o. female who presents with:    1. Atherosclerosis of aorta    2. Healthcare maintenance    3. Diffuse large B-cell lymphoma of lymph nodes of inguinal region[facility]    4. Hypothyroidism, unspecified type    5. Encounter for screening for cardiovascular disorders    6. Vaginal atrophy        Diagnostic Impression Remarks + HPI     The patient location is:  [facility] [place]  The chief complaint leading to consultation is:   Chief Complaint   Patient presents with    Hyperlipidemia     Total time spent with patient: 20 minutes     Visit type: Virtual visit with synchronous audio and video  Each patient to whom he or she provides medical services by telemedicine is:  (1) informed of the relationship between the physician and patient and the respective role of any other health care provider with respect to management of the patient; and (2) notified that he or she may decline to receive medical services by telemedicine and may withdraw from such care at any time.    This service was not originating from a related E/M service provided within the previous 7 days nor will  to an E/M service or procedure within the next 24 hours or my soonest available appointment.  Prevailing standard of  "care was able to be met in this synchronous audio and video visit    Documentation entered by me for this encounter may have been done in part using speech-recognition technology. Although I have made an effort to ensure accuracy, "sound like" errors may exist and should be interpreted in context.     Athero aorta has calcium plaque seen had a normal apo b and normal small ldl particle number will get calcium scan discussed risk and benefit statin wants to get more information will compare her calcium scan from her previous old scan with shared decision-making   Hypothyroidism-stable monitor blood work continue current dose    Vaginal atrophy she took estrogen ring to prevent utis int eh past and worked well had a full hysterectomy will send in topical   Diffuse large cell b lymphoma stable blood work continue monitoring     This is the extent of the patient's complaints at this present time. She denies chest pain upon exertion, dyspnea, nausea, vomiting, diaphoresis, and syncope. No pleuritic chest pain, unilateral leg swelling, calf tenderness, or calf pain. Denies unintentional wt loss sweats and fevers.     Aurelia will return to clinic in a few months for further workup and reassessment or sooner as needed. She was instructed to call the clinic or go to the emergency department if her symptoms do not improve, worsens, or if new symptoms develop. As we discussed that symptoms could worsen over the next 24 hours she was advised that if any increased swelling, pain, or numbness arise to go immediately to the ED. Patient knows to call any time if an emergency arises. Shared decision making occurred and she verbalized understanding in agreement with this plan.      274}      She was counseled about the importance of cancer screening.     Medication Monitoring    In today's visit, monitoring for drug toxicity was accomplished. Proper use of medications was also discussed.     I discussed imaging findings, diagnosis, " possibilities, treatment options, medications, risks, and benefits. She had many questions regarding the options and long-term effects. All questions were answered. She expressed understanding after counseling regarding the diagnosis and recommendations. She was capable and demonstrated competence with understanding of these options. Shared decision making was performed resulting in her choosing the current treatment plan. Patient handout was given with instructions and recommendations. Advised the patient that if they become pregnant to alert us immediately to assess for medication changes.     I also discussed the importance of close follow up to discuss labs, change or modify her medications if needed, monitor side effects, and further evaluation of medical problems.     Additional workup planned: see labs ordered below.    See below for labs and meds ordered with associated diagnosis    1. Atherosclerosis of aorta    2. Healthcare maintenance    3. Diffuse large B-cell lymphoma of lymph nodes of inguinal region[facility]    4. Hypothyroidism, unspecified type    5. Encounter for screening for cardiovascular disorders  - CT Cardiac Scoring; Future    6. Vaginal atrophy  - conjugated estrogens (PREMARIN) vaginal cream; Place 0.5 g vaginally once daily.  Dispense: 1 applicator; Refill: 5       Medication List with Changes/Refills   New Medications    CONJUGATED ESTROGENS (PREMARIN) VAGINAL CREAM    Place 0.5 g vaginally once daily.   Current Medications    ALBUTEROL (PROVENTIL/VENTOLIN HFA) 90 MCG/ACTUATION INHALER    INHALE 2 PUFFS INTO THE LUNGS EVERY 6 (SIX) HOURS AS NEEDED FOR WHEEZING OR SHORTNESS OF BREATH (COUGH SPASM). RESCUE    AUGMENTED BETAMETHASONE DIPROPIONATE (DIPROLENE-AF) 0.05 % CREAM    aaa bid prn paronychia    CHOLECALCIFEROL, VITAMIN D3, (VITAMIN D3) 50 MCG (2,000 UNIT) TAB    once daily.    DIPHENHYDRAMINE (BENADRYL) 25 MG CAPSULE    Take 1 capsule (25 mg total) by mouth every 6 (six)  "hours as needed for Itching or Allergies.    FLUTICASONE PROPIONATE (FLONASE) 50 MCG/ACTUATION NASAL SPRAY    daily as needed.    HYDROXYCHLOROQUINE (PLAQUENIL) 200 MG TABLET    TAKE 1 TABLET BY MOUTH TWICE A DAY    LEVOTHYROXINE (SYNTHROID) 100 MCG TABLET    Take 100 mcg by mouth once daily.    LIDOCAINE-PRILOCAINE (EMLA) CREAM    Apply topically as needed.    NAPROXEN (EC NAPROSYN) 500 MG EC TABLET    TAKE 1 TABLET BY MOUTH TWICE A DAY AFTER MEALS AS NEEDED    ONDANSETRON (ZOFRAN) 8 MG TABLET    Take 8 mg by mouth every 8 (eight) hours as needed.    PROMETHAZINE (PHENERGAN) 25 MG TABLET    Take 25 mg by mouth every 6 (six) hours as needed.     Modified Medications    No medications on file       Torsten Galindo MD  07/05/2024     Documentation entered by me for this encounter may have been done in part using speech-recognition technology. Although I have made an effort to ensure accuracy, "sound like" errors may exist and should be interpreted in context.    If you are due for any health screening(s) below please notify me so we can arrange them to be ordered and scheduled to maintain your health. Most healthy patients at your age complete them, but you are free to accept or refuse.   All of your core healthy metrics are met.       "

## 2024-07-08 ENCOUNTER — TELEPHONE (OUTPATIENT)
Dept: FAMILY MEDICINE | Facility: CLINIC | Age: 74
End: 2024-07-08
Payer: MEDICARE

## 2024-07-08 NOTE — TELEPHONE ENCOUNTER
----- Message from Torsten Galindo MD sent at 7/5/2024 11:36 AM CDT -----  Regarding: calcium scan  Please set up calcium scan

## 2024-07-10 ENCOUNTER — OFFICE VISIT (OUTPATIENT)
Facility: CLINIC | Age: 74
End: 2024-07-10
Payer: MEDICARE

## 2024-07-10 ENCOUNTER — INFUSION (OUTPATIENT)
Dept: INFUSION THERAPY | Facility: HOSPITAL | Age: 74
End: 2024-07-10
Attending: INTERNAL MEDICINE
Payer: MEDICARE

## 2024-07-10 VITALS
OXYGEN SATURATION: 96 % | TEMPERATURE: 97 F | HEIGHT: 66 IN | HEART RATE: 79 BPM | WEIGHT: 154.5 LBS | DIASTOLIC BLOOD PRESSURE: 75 MMHG | RESPIRATION RATE: 16 BRPM | BODY MASS INDEX: 24.83 KG/M2 | SYSTOLIC BLOOD PRESSURE: 116 MMHG

## 2024-07-10 VITALS
SYSTOLIC BLOOD PRESSURE: 150 MMHG | DIASTOLIC BLOOD PRESSURE: 74 MMHG | WEIGHT: 154 LBS | TEMPERATURE: 99 F | HEART RATE: 81 BPM | BODY MASS INDEX: 24.86 KG/M2 | OXYGEN SATURATION: 97 %

## 2024-07-10 DIAGNOSIS — M06.9 RHEUMATOID ARTHRITIS, INVOLVING UNSPECIFIED SITE, UNSPECIFIED WHETHER RHEUMATOID FACTOR PRESENT: ICD-10-CM

## 2024-07-10 DIAGNOSIS — C83.35 DIFFUSE LARGE B-CELL LYMPHOMA OF LYMPH NODES OF INGUINAL REGION: Primary | ICD-10-CM

## 2024-07-10 DIAGNOSIS — D47.2 MGUS (MONOCLONAL GAMMOPATHY OF UNKNOWN SIGNIFICANCE): ICD-10-CM

## 2024-07-10 PROCEDURE — G2211 COMPLEX E/M VISIT ADD ON: HCPCS | Mod: S$GLB,,, | Performed by: INTERNAL MEDICINE

## 2024-07-10 PROCEDURE — 96523 IRRIG DRUG DELIVERY DEVICE: CPT

## 2024-07-10 PROCEDURE — 3078F DIAST BP <80 MM HG: CPT | Mod: CPTII,S$GLB,, | Performed by: INTERNAL MEDICINE

## 2024-07-10 PROCEDURE — 1160F RVW MEDS BY RX/DR IN RCRD: CPT | Mod: CPTII,S$GLB,, | Performed by: INTERNAL MEDICINE

## 2024-07-10 PROCEDURE — 99213 OFFICE O/P EST LOW 20 MIN: CPT | Mod: S$GLB,,, | Performed by: INTERNAL MEDICINE

## 2024-07-10 PROCEDURE — 1126F AMNT PAIN NOTED NONE PRSNT: CPT | Mod: CPTII,S$GLB,, | Performed by: INTERNAL MEDICINE

## 2024-07-10 PROCEDURE — 1101F PT FALLS ASSESS-DOCD LE1/YR: CPT | Mod: CPTII,S$GLB,, | Performed by: INTERNAL MEDICINE

## 2024-07-10 PROCEDURE — 3077F SYST BP >= 140 MM HG: CPT | Mod: CPTII,S$GLB,, | Performed by: INTERNAL MEDICINE

## 2024-07-10 PROCEDURE — 3288F FALL RISK ASSESSMENT DOCD: CPT | Mod: CPTII,S$GLB,, | Performed by: INTERNAL MEDICINE

## 2024-07-10 PROCEDURE — 3008F BODY MASS INDEX DOCD: CPT | Mod: CPTII,S$GLB,, | Performed by: INTERNAL MEDICINE

## 2024-07-10 PROCEDURE — 63600175 PHARM REV CODE 636 W HCPCS: Performed by: INTERNAL MEDICINE

## 2024-07-10 PROCEDURE — 99999 PR PBB SHADOW E&M-EST. PATIENT-LVL III: CPT | Mod: PBBFAC,,, | Performed by: INTERNAL MEDICINE

## 2024-07-10 PROCEDURE — 1159F MED LIST DOCD IN RCRD: CPT | Mod: CPTII,S$GLB,, | Performed by: INTERNAL MEDICINE

## 2024-07-10 RX ORDER — SODIUM CHLORIDE 0.9 % (FLUSH) 0.9 %
10 SYRINGE (ML) INJECTION
OUTPATIENT
Start: 2024-07-10

## 2024-07-10 RX ORDER — HEPARIN 100 UNIT/ML
500 SYRINGE INTRAVENOUS
Status: COMPLETED | OUTPATIENT
Start: 2024-07-10 | End: 2024-07-10

## 2024-07-10 RX ORDER — SODIUM CHLORIDE 0.9 % (FLUSH) 0.9 %
10 SYRINGE (ML) INJECTION
Status: DISCONTINUED | OUTPATIENT
Start: 2024-07-10 | End: 2024-07-10 | Stop reason: HOSPADM

## 2024-07-10 RX ORDER — HEPARIN 100 UNIT/ML
500 SYRINGE INTRAVENOUS
OUTPATIENT
Start: 2024-07-10

## 2024-07-10 RX ADMIN — HEPARIN 500 UNITS: 100 SYRINGE at 03:07

## 2024-07-10 NOTE — ASSESSMENT & PLAN NOTE
Patient doing well.  Labs unchanged and no sign of advancing towards malignancy.  Will continue to monitor.

## 2024-07-10 NOTE — ASSESSMENT & PLAN NOTE
Patient is doing well and is beyond four years disease free at this point.  She continues to have no symptoms of recurrence or sequelae of treatment of disease.  Will continue to monitor conservatively for now. No need for routine scanning.

## 2024-07-10 NOTE — PROGRESS NOTES
PROGRESS NOTE    Subjective:       Patient ID: Aurelia Marlow is a 73 y.o. female.    [facility]x [facility] type- 8/21/2019  Inguinal LN biopsy    PET with bone marrow uptake throughout appendicular and axial skeleton    Bone marrow biopsy positive for B-cell lymphoma    Stage ARELIS    R-CHOP x 6  10/28/2019-2/10/2020    10/6/2020:  PET negative    5/16/2022:  LATOYA/BSO  Path negative for malignancy    6/22/2022:  PET negative    12/27/2022:  PET negative      Chief Complaint:  No chief complaint on file.  lymphoma and MGUS follow up    History of Present Illness:   Aurelia Marlow is a 73 y.o. female who presents for follow up of lymphoma    Ms. Marlow is doing well today and has no new complaints or problems.        Family and Social history reviewed and is unchanged from 9/27/2019      ROS:         Current Outpatient Medications:     albuterol (PROVENTIL/VENTOLIN HFA) 90 mcg/actuation inhaler, INHALE 2 PUFFS INTO THE LUNGS EVERY 6 (SIX) HOURS AS NEEDED FOR WHEEZING OR SHORTNESS OF BREATH (COUGH SPASM). RESCUE, Disp: 8 g, Rfl: 1    augmented betamethasone dipropionate (DIPROLENE-AF) 0.05 % cream, aaa bid prn paronychia, Disp: 50 g, Rfl: 3    cholecalciferol, vitamin D3, (VITAMIN D3) 50 mcg (2,000 unit) Tab, once daily., Disp: , Rfl:     conjugated estrogens (PREMARIN) vaginal cream, Place 0.5 g vaginally once daily., Disp: 1 applicator, Rfl: 5    diphenhydrAMINE (BENADRYL) 25 mg capsule, Take 1 capsule (25 mg total) by mouth every 6 (six) hours as needed for Itching or Allergies., Disp: 20 capsule, Rfl: 0    fluticasone propionate (FLONASE) 50 mcg/actuation nasal spray, daily as needed., Disp: , Rfl:     hydroxychloroquine (PLAQUENIL) 200 mg tablet, TAKE 1 TABLET BY MOUTH TWICE A DAY, Disp: 180 tablet, Rfl: 1    levothyroxine (SYNTHROID) 100 MCG tablet, Take 100 mcg by mouth once daily., Disp: , Rfl:     lidocaine-prilocaine (EMLA) cream, Apply topically as  needed., Disp: 25 g, Rfl: 2    naproxen (EC NAPROSYN) 500 MG EC tablet, TAKE 1 TABLET BY MOUTH TWICE A DAY AFTER MEALS AS NEEDED, Disp: 60 tablet, Rfl: 0    ondansetron (ZOFRAN) 8 MG tablet, Take 8 mg by mouth every 8 (eight) hours as needed., Disp: , Rfl: 6    promethazine (PHENERGAN) 25 MG tablet, Take 25 mg by mouth every 6 (six) hours as needed., Disp: , Rfl: 6        Objective:       Physical Examination:     BP (!) 150/74   Pulse 81   Temp 98.6 °F (37 °C)   Wt 69.9 kg (154 lb)   SpO2 97%   BMI 24.86 kg/m²     Physical Exam  Constitutional:       Appearance: Normal appearance. She is well-developed.   Eyes:      Conjunctiva/sclera: Conjunctivae normal.   Neck:      Thyroid: No thyromegaly.      Trachea: No tracheal deviation.   Pulmonary:      Effort: Pulmonary effort is normal.   Neurological:      Mental Status: She is oriented to person, place, and time.      Comments: Neuro intact througout   Psychiatric:         Mood and Affect: Mood normal.         Behavior: Behavior normal.         Thought Content: Thought content normal.         Judgment: Judgment normal.          Labs:   Recent Results (from the past 336 hour(s))   CBC Auto Differential    Collection Time: 07/02/24 10:08 AM   Result Value Ref Range    WBC 6.75 3.90 - 12.70 K/uL    Hemoglobin 13.6 12.0 - 16.0 g/dL    Hematocrit 40.9 37.0 - 48.5 %    Platelets 211 150 - 450 K/uL       CMP  Sodium   Date Value Ref Range Status   07/02/2024 136 136 - 145 mmol/L Final   06/11/2019 139 134 - 144 mmol/L      Potassium   Date Value Ref Range Status   07/02/2024 4.4 3.5 - 5.1 mmol/L Final     Chloride   Date Value Ref Range Status   07/02/2024 102 95 - 110 mmol/L Final   06/11/2019 104 98 - 110 mmol/L      CO2   Date Value Ref Range Status   07/02/2024 29 23 - 29 mmol/L Final     Glucose   Date Value Ref Range Status   07/02/2024 92 70 - 110 mg/dL Final   06/11/2019 86 70 - 99 mg/dL      BUN   Date Value Ref Range Status   07/02/2024 11 8 - 23 mg/dL Final  "    Creatinine   Date Value Ref Range Status   07/02/2024 0.7 0.5 - 1.4 mg/dL Final   06/11/2019 0.72 0.60 - 1.40 mg/dL      Calcium   Date Value Ref Range Status   07/02/2024 8.9 8.7 - 10.5 mg/dL Final     Total Protein   Date Value Ref Range Status   07/02/2024 7.4 6.0 - 8.5 g/dL Final     Albumin   Date Value Ref Range Status   07/02/2024 4.0 3.5 - 5.2 g/dL Final   06/11/2019 3.8 3.1 - 4.7 g/dL      Total Bilirubin   Date Value Ref Range Status   07/02/2024 0.6 0.1 - 1.0 mg/dL Final     Comment:     For infants and newborns, interpretation of results should be based  on gestational age, weight and in agreement with clinical  observations.    Premature Infant recommended reference ranges:  Up to 24 hours.............<8.0 mg/dL  Up to 48 hours............<12.0 mg/dL  3-5 days..................<15.0 mg/dL  6-29 days.................<15.0 mg/dL       Alkaline Phosphatase   Date Value Ref Range Status   07/02/2024 58 55 - 135 U/L Final     AST   Date Value Ref Range Status   07/02/2024 14 10 - 40 U/L Final     ALT   Date Value Ref Range Status   07/02/2024 14 10 - 44 U/L Final     Anion Gap   Date Value Ref Range Status   07/02/2024 5 (L) 8 - 16 mmol/L Final     eGFR if    Date Value Ref Range Status   06/27/2022 >60.0 >60 mL/min/1.73 m^2 Final     eGFR if non    Date Value Ref Range Status   06/27/2022 >60.0 >60 mL/min/1.73 m^2 Final     Comment:     Calculation used to obtain the estimated glomerular filtration  rate (eGFR) is the CKD-EPI equation.        No results found for: "CEA"  No results found for: "PSA"        Assessment/Plan:     Problem List Items Addressed This Visit       MGUS (monoclonal gammopathy of unknown significance)     Patient doing well.  Labs unchanged and no sign of advancing towards malignancy.  Will continue to monitor.          Relevant Orders    Immunoglobulin Free LT Chains Blood    Diffuse large B-cell lymphoma of lymph nodes of inguinal region[facility] - [facility]     Patient is doing well and is beyond four years disease free at this point.  She continues to have no symptoms of recurrence or sequelae of treatment of disease.  Will continue to monitor conservatively for now. No need for routine scanning.           Relevant Orders    CBC Auto Differential    Comprehensive Metabolic Panel    Immunoglobulins (IgG, IgA, IgM) Quantitative    Protein Electrophoresis, Serum    Immunoglobulin Free LT Chains Blood       Discussion:     Follow up in about 6 months (around 1/10/2025).      Electronically signed by Pb Fry

## 2024-07-31 ENCOUNTER — PATIENT MESSAGE (OUTPATIENT)
Dept: RESEARCH | Facility: HOSPITAL | Age: 74
End: 2024-07-31
Payer: MEDICARE

## 2024-07-31 DIAGNOSIS — Z78.0 MENOPAUSE: ICD-10-CM

## 2024-08-21 ENCOUNTER — INFUSION (OUTPATIENT)
Dept: INFUSION THERAPY | Facility: HOSPITAL | Age: 74
End: 2024-08-21
Attending: INTERNAL MEDICINE
Payer: MEDICARE

## 2024-08-21 VITALS
OXYGEN SATURATION: 97 % | BODY MASS INDEX: 25.13 KG/M2 | RESPIRATION RATE: 18 BRPM | HEIGHT: 66 IN | TEMPERATURE: 97 F | SYSTOLIC BLOOD PRESSURE: 134 MMHG | HEART RATE: 71 BPM | DIASTOLIC BLOOD PRESSURE: 82 MMHG | WEIGHT: 156.38 LBS

## 2024-08-21 DIAGNOSIS — C83.35 DIFFUSE LARGE B-CELL LYMPHOMA OF LYMPH NODES OF INGUINAL REGION: Primary | ICD-10-CM

## 2024-08-21 DIAGNOSIS — D47.2 MGUS (MONOCLONAL GAMMOPATHY OF UNKNOWN SIGNIFICANCE): ICD-10-CM

## 2024-08-21 PROCEDURE — 25000003 PHARM REV CODE 250: Performed by: INTERNAL MEDICINE

## 2024-08-21 PROCEDURE — 63600175 PHARM REV CODE 636 W HCPCS: Performed by: INTERNAL MEDICINE

## 2024-08-21 PROCEDURE — A4216 STERILE WATER/SALINE, 10 ML: HCPCS | Performed by: INTERNAL MEDICINE

## 2024-08-21 PROCEDURE — 96523 IRRIG DRUG DELIVERY DEVICE: CPT

## 2024-08-21 RX ORDER — HEPARIN 100 UNIT/ML
500 SYRINGE INTRAVENOUS
Status: COMPLETED | OUTPATIENT
Start: 2024-08-21 | End: 2024-08-21

## 2024-08-21 RX ORDER — SODIUM CHLORIDE 0.9 % (FLUSH) 0.9 %
10 SYRINGE (ML) INJECTION
OUTPATIENT
Start: 2024-08-21

## 2024-08-21 RX ORDER — HEPARIN 100 UNIT/ML
500 SYRINGE INTRAVENOUS
OUTPATIENT
Start: 2024-08-21

## 2024-08-21 RX ORDER — SODIUM CHLORIDE 0.9 % (FLUSH) 0.9 %
10 SYRINGE (ML) INJECTION
Status: COMPLETED | OUTPATIENT
Start: 2024-08-21 | End: 2024-08-21

## 2024-08-21 RX ADMIN — SODIUM CHLORIDE, PRESERVATIVE FREE 10 ML: 5 INJECTION INTRAVENOUS at 03:08

## 2024-08-21 RX ADMIN — HEPARIN 500 UNITS: 100 SYRINGE at 03:08

## 2024-09-30 RX ORDER — HEPARIN 100 UNIT/ML
500 SYRINGE INTRAVENOUS
Status: CANCELLED | OUTPATIENT
Start: 2024-09-30

## 2024-09-30 RX ORDER — SODIUM CHLORIDE 0.9 % (FLUSH) 0.9 %
10 SYRINGE (ML) INJECTION
Status: CANCELLED | OUTPATIENT
Start: 2024-09-30

## 2024-10-02 ENCOUNTER — INFUSION (OUTPATIENT)
Dept: INFUSION THERAPY | Facility: HOSPITAL | Age: 74
End: 2024-10-02
Attending: INTERNAL MEDICINE
Payer: MEDICARE

## 2024-10-02 VITALS
HEIGHT: 66 IN | DIASTOLIC BLOOD PRESSURE: 65 MMHG | WEIGHT: 154.31 LBS | HEART RATE: 90 BPM | RESPIRATION RATE: 18 BRPM | BODY MASS INDEX: 24.8 KG/M2 | OXYGEN SATURATION: 97 % | TEMPERATURE: 98 F | SYSTOLIC BLOOD PRESSURE: 107 MMHG

## 2024-10-02 DIAGNOSIS — D47.2 MGUS (MONOCLONAL GAMMOPATHY OF UNKNOWN SIGNIFICANCE): ICD-10-CM

## 2024-10-02 DIAGNOSIS — C83.35 DIFFUSE LARGE B-CELL LYMPHOMA OF LYMPH NODES OF INGUINAL REGION: Primary | ICD-10-CM

## 2024-10-02 PROCEDURE — 63600175 PHARM REV CODE 636 W HCPCS: Performed by: INTERNAL MEDICINE

## 2024-10-02 PROCEDURE — 96523 IRRIG DRUG DELIVERY DEVICE: CPT

## 2024-10-02 RX ORDER — SODIUM CHLORIDE 0.9 % (FLUSH) 0.9 %
10 SYRINGE (ML) INJECTION
OUTPATIENT
Start: 2024-10-02

## 2024-10-02 RX ORDER — HEPARIN 100 UNIT/ML
500 SYRINGE INTRAVENOUS
OUTPATIENT
Start: 2024-10-02

## 2024-10-02 RX ORDER — HEPARIN 100 UNIT/ML
500 SYRINGE INTRAVENOUS
Status: COMPLETED | OUTPATIENT
Start: 2024-10-02 | End: 2024-10-02

## 2024-10-02 RX ORDER — SODIUM CHLORIDE 0.9 % (FLUSH) 0.9 %
10 SYRINGE (ML) INJECTION
Status: DISCONTINUED | OUTPATIENT
Start: 2024-10-02 | End: 2024-10-02 | Stop reason: HOSPADM

## 2024-10-02 RX ADMIN — HEPARIN 500 UNITS: 100 SYRINGE at 02:10

## 2024-10-18 ENCOUNTER — LAB VISIT (OUTPATIENT)
Dept: LAB | Facility: HOSPITAL | Age: 74
End: 2024-10-18
Attending: INTERNAL MEDICINE
Payer: MEDICARE

## 2024-10-18 DIAGNOSIS — E89.0 POSTSURGICAL HYPOTHYROIDISM: Primary | ICD-10-CM

## 2024-10-18 LAB
T4 FREE SERPL-MCNC: 0.73 NG/DL (ref 0.71–1.51)
TSH SERPL DL<=0.005 MIU/L-ACNC: 2.04 UIU/ML (ref 0.34–5.6)

## 2024-10-18 PROCEDURE — 84443 ASSAY THYROID STIM HORMONE: CPT | Performed by: INTERNAL MEDICINE

## 2024-10-18 PROCEDURE — 36415 COLL VENOUS BLD VENIPUNCTURE: CPT | Performed by: INTERNAL MEDICINE

## 2024-10-18 PROCEDURE — 84439 ASSAY OF FREE THYROXINE: CPT | Performed by: INTERNAL MEDICINE

## 2024-11-13 ENCOUNTER — INFUSION (OUTPATIENT)
Dept: INFUSION THERAPY | Facility: HOSPITAL | Age: 74
End: 2024-11-13
Attending: INTERNAL MEDICINE
Payer: MEDICARE

## 2024-11-13 VITALS
OXYGEN SATURATION: 99 % | WEIGHT: 154.81 LBS | HEART RATE: 76 BPM | DIASTOLIC BLOOD PRESSURE: 80 MMHG | SYSTOLIC BLOOD PRESSURE: 125 MMHG | TEMPERATURE: 97 F | RESPIRATION RATE: 18 BRPM | BODY MASS INDEX: 24.88 KG/M2 | HEIGHT: 66 IN

## 2024-11-13 DIAGNOSIS — C83.35 DIFFUSE LARGE B-CELL LYMPHOMA OF LYMPH NODES OF INGUINAL REGION: Primary | ICD-10-CM

## 2024-11-13 DIAGNOSIS — D47.2 MGUS (MONOCLONAL GAMMOPATHY OF UNKNOWN SIGNIFICANCE): ICD-10-CM

## 2024-11-13 PROCEDURE — 96523 IRRIG DRUG DELIVERY DEVICE: CPT

## 2024-11-13 PROCEDURE — A4216 STERILE WATER/SALINE, 10 ML: HCPCS | Performed by: INTERNAL MEDICINE

## 2024-11-13 PROCEDURE — 63600175 PHARM REV CODE 636 W HCPCS: Performed by: INTERNAL MEDICINE

## 2024-11-13 PROCEDURE — 25000003 PHARM REV CODE 250: Performed by: INTERNAL MEDICINE

## 2024-11-13 RX ORDER — SODIUM CHLORIDE 0.9 % (FLUSH) 0.9 %
10 SYRINGE (ML) INJECTION
OUTPATIENT
Start: 2024-11-13

## 2024-11-13 RX ORDER — HEPARIN 100 UNIT/ML
500 SYRINGE INTRAVENOUS
OUTPATIENT
Start: 2024-11-13

## 2024-11-13 RX ORDER — HEPARIN 100 UNIT/ML
500 SYRINGE INTRAVENOUS
Status: COMPLETED | OUTPATIENT
Start: 2024-11-13 | End: 2024-11-13

## 2024-11-13 RX ORDER — SODIUM CHLORIDE 0.9 % (FLUSH) 0.9 %
10 SYRINGE (ML) INJECTION
Status: COMPLETED | OUTPATIENT
Start: 2024-11-13 | End: 2024-11-13

## 2024-11-13 RX ADMIN — SODIUM CHLORIDE, PRESERVATIVE FREE 10 ML: 5 INJECTION INTRAVENOUS at 11:11

## 2024-11-13 RX ADMIN — HEPARIN 500 UNITS: 100 SYRINGE at 11:11

## 2024-11-13 NOTE — PLAN OF CARE
Problem: Fatigue  Goal: Improved Activity Tolerance  Outcome: Progressing  Intervention: Promote Improved Energy  Flowsheets (Taken 11/13/2024 1129)  Fatigue Management: frequent rest breaks encouraged  Sleep/Rest Enhancement: regular sleep/rest pattern promoted  Activity Management: Ambulated -L4  Environmental Support: calm environment promoted

## 2024-12-27 ENCOUNTER — PATIENT MESSAGE (OUTPATIENT)
Facility: CLINIC | Age: 74
End: 2024-12-27
Payer: MEDICARE

## 2024-12-27 ENCOUNTER — LAB VISIT (OUTPATIENT)
Dept: LAB | Facility: HOSPITAL | Age: 74
End: 2024-12-27
Attending: INTERNAL MEDICINE
Payer: MEDICARE

## 2024-12-27 DIAGNOSIS — M06.9 RHEUMATOID ARTHRITIS, INVOLVING UNSPECIFIED SITE, UNSPECIFIED WHETHER RHEUMATOID FACTOR PRESENT: ICD-10-CM

## 2024-12-27 DIAGNOSIS — D47.2 MGUS (MONOCLONAL GAMMOPATHY OF UNKNOWN SIGNIFICANCE): ICD-10-CM

## 2024-12-27 DIAGNOSIS — C83.35 DIFFUSE LARGE B-CELL LYMPHOMA OF LYMPH NODES OF INGUINAL REGION: ICD-10-CM

## 2024-12-27 LAB
ALBUMIN SERPL BCP-MCNC: 4.1 G/DL (ref 3.5–5.2)
ALP SERPL-CCNC: 47 U/L (ref 55–135)
ALT SERPL W/O P-5'-P-CCNC: 13 U/L (ref 10–44)
ANION GAP SERPL CALC-SCNC: 4 MMOL/L (ref 8–16)
AST SERPL-CCNC: 19 U/L (ref 10–40)
BASOPHILS # BLD AUTO: 0.05 K/UL (ref 0–0.2)
BASOPHILS NFR BLD: 0.7 % (ref 0–1.9)
BILIRUB SERPL-MCNC: 0.4 MG/DL (ref 0.1–1)
BUN SERPL-MCNC: 17 MG/DL (ref 8–23)
CALCIUM SERPL-MCNC: 8.7 MG/DL (ref 8.7–10.5)
CHLORIDE SERPL-SCNC: 103 MMOL/L (ref 95–110)
CO2 SERPL-SCNC: 31 MMOL/L (ref 23–29)
CREAT SERPL-MCNC: 0.7 MG/DL (ref 0.5–1.4)
CRP SERPL-MCNC: 0.1 MG/DL
DIFFERENTIAL METHOD BLD: ABNORMAL
EOSINOPHIL # BLD AUTO: 0.2 K/UL (ref 0–0.5)
EOSINOPHIL NFR BLD: 2.4 % (ref 0–8)
ERYTHROCYTE [DISTWIDTH] IN BLOOD BY AUTOMATED COUNT: 12.2 % (ref 11.5–14.5)
ERYTHROCYTE [SEDIMENTATION RATE] IN BLOOD BY WESTERGREN METHOD: 2 MM/HR (ref 0–20)
EST. GFR  (NO RACE VARIABLE): >60 ML/MIN/1.73 M^2
GLUCOSE SERPL-MCNC: 94 MG/DL (ref 70–110)
HCT VFR BLD AUTO: 40.6 % (ref 37–48.5)
HGB BLD-MCNC: 13.2 G/DL (ref 12–16)
IMM GRANULOCYTES # BLD AUTO: 0.03 K/UL (ref 0–0.04)
IMM GRANULOCYTES NFR BLD AUTO: 0.4 % (ref 0–0.5)
LYMPHOCYTES # BLD AUTO: 1.9 K/UL (ref 1–4.8)
LYMPHOCYTES NFR BLD: 27.2 % (ref 18–48)
MCH RBC QN AUTO: 31.4 PG (ref 27–31)
MCHC RBC AUTO-ENTMCNC: 32.5 G/DL (ref 32–36)
MCV RBC AUTO: 96 FL (ref 82–98)
MONOCYTES # BLD AUTO: 0.4 K/UL (ref 0.3–1)
MONOCYTES NFR BLD: 5.4 % (ref 4–15)
NEUTROPHILS # BLD AUTO: 4.5 K/UL (ref 1.8–7.7)
NEUTROPHILS NFR BLD: 63.9 % (ref 38–73)
NRBC BLD-RTO: 0 /100 WBC
PLATELET # BLD AUTO: 231 K/UL (ref 150–450)
PMV BLD AUTO: 9.3 FL (ref 9.2–12.9)
POTASSIUM SERPL-SCNC: 4.1 MMOL/L (ref 3.5–5.1)
PROT SERPL-MCNC: 7.5 G/DL (ref 6–8.4)
RBC # BLD AUTO: 4.21 M/UL (ref 4–5.4)
SODIUM SERPL-SCNC: 138 MMOL/L (ref 136–145)
WBC # BLD AUTO: 7.03 K/UL (ref 3.9–12.7)

## 2024-12-27 PROCEDURE — 85025 COMPLETE CBC W/AUTO DIFF WBC: CPT | Performed by: INTERNAL MEDICINE

## 2024-12-27 PROCEDURE — 82784 ASSAY IGA/IGD/IGG/IGM EACH: CPT | Mod: 91 | Performed by: INTERNAL MEDICINE

## 2024-12-27 PROCEDURE — 86140 C-REACTIVE PROTEIN: CPT | Performed by: INTERNAL MEDICINE

## 2024-12-27 PROCEDURE — 85651 RBC SED RATE NONAUTOMATED: CPT | Performed by: INTERNAL MEDICINE

## 2024-12-27 PROCEDURE — 80053 COMPREHEN METABOLIC PANEL: CPT | Performed by: INTERNAL MEDICINE

## 2024-12-27 PROCEDURE — 83521 IG LIGHT CHAINS FREE EACH: CPT | Mod: 59 | Performed by: INTERNAL MEDICINE

## 2024-12-27 PROCEDURE — 84155 ASSAY OF PROTEIN SERUM: CPT | Performed by: INTERNAL MEDICINE

## 2024-12-28 LAB
IGA SERPL-MCNC: 190 MG/DL (ref 64–422)
IGG SERPL-MCNC: 1764 MG/DL (ref 586–1602)
IGM SERPL-MCNC: 69 MG/DL (ref 26–217)

## 2024-12-30 LAB
KAPPA LC FREE SER-MCNC: 24.8 MG/L (ref 3.3–19.4)
KAPPA LC FREE SER-MCNC: 24.8 MG/L (ref 3.3–19.4)
KAPPA LC FREE/LAMBDA FREE SER: 2.58 {RATIO} (ref 0.26–1.65)
KAPPA LC FREE/LAMBDA FREE SER: 2.58 {RATIO} (ref 0.26–1.65)
LAMBDA LC FREE SERPL-MCNC: 9.6 MG/L (ref 5.7–26.3)
LAMBDA LC FREE SERPL-MCNC: 9.6 MG/L (ref 5.7–26.3)

## 2024-12-31 LAB
ALBUMIN SERPL ELPH-MCNC: 3.8 G/DL (ref 2.9–4.4)
ALBUMIN/GLOB SERPL: 1.2 {RATIO} (ref 0.7–1.7)
ALPHA1 GLOB SERPL ELPH-MCNC: 0.4 G/DL (ref 0–0.4)
ALPHA2 GLOB SERPL ELPH-MCNC: 0.5 G/DL (ref 0.4–1)
B-GLOBULIN SERPL ELPH-MCNC: 0.8 G/DL (ref 0.7–1.3)
GAMMA GLOB SERPL ELPH-MCNC: 1.5 G/DL (ref 0.4–1.8)
GLOBULIN SER CALC-MCNC: 3.2 G/DL (ref 2.2–3.9)
LABORATORY COMMENT REPORT: ABNORMAL
M PROTEIN SERPL ELPH-MCNC: 1.1 G/DL
PROT SERPL-MCNC: 7 G/DL (ref 6–8.5)

## 2025-01-07 ENCOUNTER — INFUSION (OUTPATIENT)
Dept: INFUSION THERAPY | Facility: HOSPITAL | Age: 75
End: 2025-01-07
Attending: INTERNAL MEDICINE
Payer: MEDICARE

## 2025-01-07 ENCOUNTER — OFFICE VISIT (OUTPATIENT)
Facility: CLINIC | Age: 75
End: 2025-01-07
Payer: MEDICARE

## 2025-01-07 VITALS
RESPIRATION RATE: 16 BRPM | WEIGHT: 155.31 LBS | BODY MASS INDEX: 25.07 KG/M2 | HEART RATE: 92 BPM | DIASTOLIC BLOOD PRESSURE: 71 MMHG | TEMPERATURE: 98 F | SYSTOLIC BLOOD PRESSURE: 146 MMHG

## 2025-01-07 VITALS
HEART RATE: 91 BPM | DIASTOLIC BLOOD PRESSURE: 86 MMHG | OXYGEN SATURATION: 96 % | WEIGHT: 155.38 LBS | TEMPERATURE: 97 F | HEIGHT: 66 IN | BODY MASS INDEX: 24.97 KG/M2 | SYSTOLIC BLOOD PRESSURE: 124 MMHG | RESPIRATION RATE: 18 BRPM

## 2025-01-07 DIAGNOSIS — Z85.72 HISTORY OF DIFFUSE LARGE B-CELL LYMPHOMA: Primary | ICD-10-CM

## 2025-01-07 DIAGNOSIS — D47.2 MGUS (MONOCLONAL GAMMOPATHY OF UNKNOWN SIGNIFICANCE): ICD-10-CM

## 2025-01-07 DIAGNOSIS — Z85.72 HISTORY OF LYMPHOMA: ICD-10-CM

## 2025-01-07 DIAGNOSIS — C83.35 DIFFUSE LARGE B-CELL LYMPHOMA OF LYMPH NODES OF INGUINAL REGION: ICD-10-CM

## 2025-01-07 DIAGNOSIS — C83.35 DIFFUSE LARGE B-CELL LYMPHOMA OF LYMPH NODES OF INGUINAL REGION: Primary | ICD-10-CM

## 2025-01-07 PROCEDURE — 96523 IRRIG DRUG DELIVERY DEVICE: CPT

## 2025-01-07 PROCEDURE — 99999 PR PBB SHADOW E&M-EST. PATIENT-LVL III: CPT | Mod: PBBFAC,,, | Performed by: INTERNAL MEDICINE

## 2025-01-07 PROCEDURE — 63600175 PHARM REV CODE 636 W HCPCS: Performed by: INTERNAL MEDICINE

## 2025-01-07 RX ORDER — SODIUM CHLORIDE 0.9 % (FLUSH) 0.9 %
10 SYRINGE (ML) INJECTION
OUTPATIENT
Start: 2025-01-07

## 2025-01-07 RX ORDER — SODIUM CHLORIDE 0.9 % (FLUSH) 0.9 %
10 SYRINGE (ML) INJECTION
Status: DISCONTINUED | OUTPATIENT
Start: 2025-01-07 | End: 2025-01-07 | Stop reason: HOSPADM

## 2025-01-07 RX ORDER — LIDOCAINE AND PRILOCAINE 25; 25 MG/G; MG/G
CREAM TOPICAL
Qty: 25 G | Refills: 2 | Status: SHIPPED | OUTPATIENT
Start: 2025-01-07

## 2025-01-07 RX ORDER — HEPARIN 100 UNIT/ML
500 SYRINGE INTRAVENOUS
Status: COMPLETED | OUTPATIENT
Start: 2025-01-07 | End: 2025-01-07

## 2025-01-07 RX ORDER — HEPARIN 100 UNIT/ML
500 SYRINGE INTRAVENOUS
OUTPATIENT
Start: 2025-01-07

## 2025-01-07 RX ADMIN — HEPARIN 500 UNITS: 100 SYRINGE at 01:01

## 2025-01-07 NOTE — PROGRESS NOTES
PROGRESS NOTE    Subjective:       Patient ID: Aurelia Marlow is a 74 y.o. female.    Dx [facility]BCL-[facility]inal center type- 8/21/2019  Inguinal LN biopsy    PET with bone marrow uptake throughout appendicular and axial skeleton    Bone marrow biopsy positive for B-cell lymphoma    Stage ARELIS    R-CHOP x 6  10/28/2019-2/10/2020    10/6/2020:  PET negative    5/16/2022:  LATOYA/BSO  Path negative for malignancy    6/22/2022:  PET negative    12/27/2022:  PET negative      Chief Complaint:  No chief complaint on file.  lymphoma and MGUS follow up    History of Present Illness:   Aurelia Marlow is a 74 y.o. female who presents for follow up of lymphoma    Ms. Marlow has no new complaints today.  Feeling well.       Family and Social history reviewed and is unchanged from 9/27/2019      ROS:         Current Outpatient Medications:     albuterol (PROVENTIL/VENTOLIN HFA) 90 mcg/actuation inhaler, INHALE 2 PUFFS INTO THE LUNGS EVERY 6 (SIX) HOURS AS NEEDED FOR WHEEZING OR SHORTNESS OF BREATH (COUGH SPASM). RESCUE, Disp: 8 g, Rfl: 1    augmented betamethasone dipropionate (DIPROLENE-AF) 0.05 % cream, aaa bid prn paronychia, Disp: 50 g, Rfl: 3    cholecalciferol, vitamin D3, (VITAMIN D3) 50 mcg (2,000 unit) Tab, once daily., Disp: , Rfl:     conjugated estrogens (PREMARIN) vaginal cream, Place 0.5 g vaginally once daily., Disp: 1 applicator, Rfl: 5    diphenhydrAMINE (BENADRYL) 25 mg capsule, Take 1 capsule (25 mg total) by mouth every 6 (six) hours as needed for Itching or Allergies., Disp: 20 capsule, Rfl: 0    fluticasone propionate (FLONASE) 50 mcg/actuation nasal spray, daily as needed., Disp: , Rfl:     hydroxychloroquine (PLAQUENIL) 200 mg tablet, TAKE 1 TABLET BY MOUTH TWICE A DAY, Disp: 180 tablet, Rfl: 1    levothyroxine (SYNTHROID) 100 MCG tablet, Take 100 mcg by mouth once daily., Disp: , Rfl:     naproxen (EC NAPROSYN) 500 MG EC tablet, TAKE 1 TABLET BY MOUTH TWICE A  DAY AFTER MEALS AS NEEDED, Disp: 60 tablet, Rfl: 0    ondansetron (ZOFRAN) 8 MG tablet, Take 8 mg by mouth every 8 (eight) hours as needed., Disp: , Rfl: 6    promethazine (PHENERGAN) 25 MG tablet, Take 25 mg by mouth every 6 (six) hours as needed., Disp: , Rfl: 6    LIDOcaine-prilocaine (EMLA) cream, Apply topically as needed., Disp: 25 g, Rfl: 2  No current facility-administered medications for this visit.        Objective:       Physical Examination:     BP (!) 146/71   Pulse 92   Temp 97.6 °F (36.4 °C)   Resp 16   Wt 70.4 kg (155 lb 4.8 oz)   BMI 25.07 kg/m²     Physical Exam  Constitutional:       Appearance: Normal appearance. She is well-developed.   Eyes:      General: No scleral icterus.     Conjunctiva/sclera: Conjunctivae normal.   Neck:      Thyroid: No thyromegaly.      Trachea: No tracheal deviation.   Cardiovascular:      Rate and Rhythm: Normal rate.   Pulmonary:      Effort: Pulmonary effort is normal.   Abdominal:      General: Abdomen is flat.   Neurological:      General: No focal deficit present.      Mental Status: She is alert and oriented to person, place, and time.      Comments: Neuro intact througout   Psychiatric:         Mood and Affect: Mood normal.         Behavior: Behavior normal.         Thought Content: Thought content normal.         Judgment: Judgment normal.          Labs:   Recent Results (from the past 2 weeks)   CBC Auto Differential    Collection Time: 12/27/24 12:29 PM   Result Value Ref Range    WBC 7.03 3.90 - 12.70 K/uL    Hemoglobin 13.2 12.0 - 16.0 g/dL    Hematocrit 40.6 37.0 - 48.5 %    Platelets 231 150 - 450 K/uL       CMP  Sodium   Date Value Ref Range Status   12/27/2024 138 136 - 145 mmol/L Final   06/11/2019 139 134 - 144 mmol/L      Potassium   Date Value Ref Range Status   12/27/2024 4.1 3.5 - 5.1 mmol/L Final     Chloride   Date Value Ref Range Status   12/27/2024 103 95 - 110 mmol/L Final   06/11/2019 104 98 - 110 mmol/L      CO2   Date Value Ref Range  "Status   12/27/2024 31 (H) 23 - 29 mmol/L Final     Glucose   Date Value Ref Range Status   12/27/2024 94 70 - 110 mg/dL Final   06/11/2019 86 70 - 99 mg/dL      BUN   Date Value Ref Range Status   12/27/2024 17 8 - 23 mg/dL Final     Creatinine   Date Value Ref Range Status   12/27/2024 0.7 0.5 - 1.4 mg/dL Final   06/11/2019 0.72 0.60 - 1.40 mg/dL      Calcium   Date Value Ref Range Status   12/27/2024 8.7 8.7 - 10.5 mg/dL Final     Total Protein   Date Value Ref Range Status   12/27/2024 7.5 6.0 - 8.4 g/dL Final   12/27/2024 7.0 6.0 - 8.5 g/dL Final     Albumin   Date Value Ref Range Status   12/27/2024 4.1 3.5 - 5.2 g/dL Final   06/11/2019 3.8 3.1 - 4.7 g/dL      Total Bilirubin   Date Value Ref Range Status   12/27/2024 0.4 0.1 - 1.0 mg/dL Final     Comment:     For infants and newborns, interpretation of results should be based  on gestational age, weight and in agreement with clinical  observations.    Premature Infant recommended reference ranges:  Up to 24 hours.............<8.0 mg/dL  Up to 48 hours............<12.0 mg/dL  3-5 days..................<15.0 mg/dL  6-29 days.................<15.0 mg/dL       Alkaline Phosphatase   Date Value Ref Range Status   12/27/2024 47 (L) 55 - 135 U/L Final     AST   Date Value Ref Range Status   12/27/2024 19 10 - 40 U/L Final     ALT   Date Value Ref Range Status   12/27/2024 13 10 - 44 U/L Final     Anion Gap   Date Value Ref Range Status   12/27/2024 4 (L) 8 - 16 mmol/L Final     eGFR if    Date Value Ref Range Status   06/27/2022 >60.0 >60 mL/min/1.73 m^2 Final     eGFR if non    Date Value Ref Range Status   06/27/2022 >60.0 >60 mL/min/1.73 m^2 Final     Comment:     Calculation used to obtain the estimated glomerular filtration  rate (eGFR) is the CKD-EPI equation.        No results found for: "CEA"  No results found for: "PSA"        Assessment/Plan:     Problem List Items Addressed This Visit       MGUS (monoclonal gammopathy of " unknown significance)     Counts all remains stable and there is no sign of progression to myeloma at this time.  Will continue to monitor with labs and follow up.          History of diffuse large B-cell lymphoma-completed therapy 2/2020 - Primary     Patient is doing well and appears PADMINI at this time.  No sign/symptoms of recurrence at this time.  Will continue to monitor with six month labs and follow up.          Relevant Medications    LIDOcaine-prilocaine (EMLA) cream    Other Relevant Orders    CBC Auto Differential    Comprehensive Metabolic Panel    Protein Electrophoresis, Serum    Immunoglobulin Free LT Chains Blood    Immunoglobulins (IgG, IgA, IgM) Quantitative     Other Visit Diagnoses       History of lymphoma        Diffuse large B-cell lymphoma of lymph nodes of inguinal region[facility]        Relevant Medications    LIDOcaine-prilocaine (EMLA) cream              Discussion:     Follow up in about 6 months (around 7/7/2025).      Electronically signed by Pb Fry

## 2025-02-18 ENCOUNTER — INFUSION (OUTPATIENT)
Dept: INFUSION THERAPY | Facility: HOSPITAL | Age: 75
End: 2025-02-18
Attending: INTERNAL MEDICINE
Payer: MEDICARE

## 2025-02-18 VITALS
HEART RATE: 76 BPM | TEMPERATURE: 97 F | RESPIRATION RATE: 17 BRPM | SYSTOLIC BLOOD PRESSURE: 123 MMHG | DIASTOLIC BLOOD PRESSURE: 88 MMHG | HEIGHT: 66 IN | WEIGHT: 157.69 LBS | OXYGEN SATURATION: 99 % | BODY MASS INDEX: 25.34 KG/M2

## 2025-02-18 DIAGNOSIS — D47.2 MGUS (MONOCLONAL GAMMOPATHY OF UNKNOWN SIGNIFICANCE): ICD-10-CM

## 2025-02-18 DIAGNOSIS — Z85.72 HISTORY OF DIFFUSE LARGE B-CELL LYMPHOMA: Primary | ICD-10-CM

## 2025-02-18 PROCEDURE — 96523 IRRIG DRUG DELIVERY DEVICE: CPT

## 2025-02-18 PROCEDURE — A4216 STERILE WATER/SALINE, 10 ML: HCPCS | Performed by: INTERNAL MEDICINE

## 2025-02-18 PROCEDURE — 25000003 PHARM REV CODE 250: Performed by: INTERNAL MEDICINE

## 2025-02-18 PROCEDURE — 63600175 PHARM REV CODE 636 W HCPCS: Performed by: INTERNAL MEDICINE

## 2025-02-18 RX ORDER — SODIUM CHLORIDE 0.9 % (FLUSH) 0.9 %
10 SYRINGE (ML) INJECTION
Status: COMPLETED | OUTPATIENT
Start: 2025-02-18 | End: 2025-02-18

## 2025-02-18 RX ORDER — SODIUM CHLORIDE 0.9 % (FLUSH) 0.9 %
10 SYRINGE (ML) INJECTION
OUTPATIENT
Start: 2025-02-18

## 2025-02-18 RX ORDER — HEPARIN 100 UNIT/ML
500 SYRINGE INTRAVENOUS
OUTPATIENT
Start: 2025-02-18

## 2025-02-18 RX ORDER — HEPARIN 100 UNIT/ML
500 SYRINGE INTRAVENOUS
Status: COMPLETED | OUTPATIENT
Start: 2025-02-18 | End: 2025-02-18

## 2025-02-18 RX ADMIN — HEPARIN 500 UNITS: 100 SYRINGE at 02:02

## 2025-02-18 RX ADMIN — SODIUM CHLORIDE, PRESERVATIVE FREE 10 ML: 5 INJECTION INTRAVENOUS at 02:02

## 2025-02-18 NOTE — PLAN OF CARE
Problem: Fatigue  Goal: Improved Activity Tolerance  Intervention: Promote Improved Energy  Flowsheets (Taken 2/18/2025 3829)  Fatigue Management: fatigue-related activity identified  Activity Management: Ambulated -L4

## 2025-02-27 ENCOUNTER — TELEPHONE (OUTPATIENT)
Dept: FAMILY MEDICINE | Facility: CLINIC | Age: 75
End: 2025-02-27
Payer: MEDICARE

## 2025-02-27 NOTE — TELEPHONE ENCOUNTER
"Spoke to pt about setting up her CT. Pt stated " I previously asked him if I could have that but I've changed my mind. I no longer want to have it done." Pt was informed to give us a call back if she changes her mind. Documented that pt declined in her order notes  "

## 2025-02-27 NOTE — TELEPHONE ENCOUNTER
----- Message from Torsten Galindo MD sent at 2/26/2025  6:15 PM CST -----  Regarding: calcium score  Please set up calcium score

## 2025-03-11 ENCOUNTER — PATIENT OUTREACH (OUTPATIENT)
Dept: ADMINISTRATIVE | Facility: HOSPITAL | Age: 75
End: 2025-03-11
Payer: MEDICARE

## 2025-03-13 ENCOUNTER — PATIENT MESSAGE (OUTPATIENT)
Dept: FAMILY MEDICINE | Facility: CLINIC | Age: 75
End: 2025-03-13
Payer: MEDICARE

## 2025-04-01 ENCOUNTER — INFUSION (OUTPATIENT)
Dept: INFUSION THERAPY | Facility: HOSPITAL | Age: 75
End: 2025-04-01
Attending: INTERNAL MEDICINE
Payer: MEDICARE

## 2025-04-01 VITALS
OXYGEN SATURATION: 97 % | HEIGHT: 66 IN | BODY MASS INDEX: 24.78 KG/M2 | WEIGHT: 154.19 LBS | TEMPERATURE: 97 F | HEART RATE: 67 BPM | DIASTOLIC BLOOD PRESSURE: 84 MMHG | RESPIRATION RATE: 17 BRPM | SYSTOLIC BLOOD PRESSURE: 142 MMHG

## 2025-04-01 DIAGNOSIS — Z85.72 HISTORY OF DIFFUSE LARGE B-CELL LYMPHOMA: Primary | ICD-10-CM

## 2025-04-01 DIAGNOSIS — D47.2 MGUS (MONOCLONAL GAMMOPATHY OF UNKNOWN SIGNIFICANCE): ICD-10-CM

## 2025-04-01 PROCEDURE — A4216 STERILE WATER/SALINE, 10 ML: HCPCS | Performed by: INTERNAL MEDICINE

## 2025-04-01 PROCEDURE — 25000003 PHARM REV CODE 250: Performed by: INTERNAL MEDICINE

## 2025-04-01 PROCEDURE — 96523 IRRIG DRUG DELIVERY DEVICE: CPT

## 2025-04-01 PROCEDURE — 63600175 PHARM REV CODE 636 W HCPCS: Performed by: INTERNAL MEDICINE

## 2025-04-01 RX ORDER — SODIUM CHLORIDE 0.9 % (FLUSH) 0.9 %
10 SYRINGE (ML) INJECTION
Status: COMPLETED | OUTPATIENT
Start: 2025-04-01 | End: 2025-04-01

## 2025-04-01 RX ORDER — SODIUM CHLORIDE 0.9 % (FLUSH) 0.9 %
10 SYRINGE (ML) INJECTION
OUTPATIENT
Start: 2025-04-01

## 2025-04-01 RX ORDER — HEPARIN 100 UNIT/ML
500 SYRINGE INTRAVENOUS
Status: COMPLETED | OUTPATIENT
Start: 2025-04-01 | End: 2025-04-01

## 2025-04-01 RX ORDER — HEPARIN 100 UNIT/ML
500 SYRINGE INTRAVENOUS
OUTPATIENT
Start: 2025-04-01

## 2025-04-01 RX ADMIN — Medication 10 ML: at 01:04

## 2025-04-01 RX ADMIN — HEPARIN 500 UNITS: 100 SYRINGE at 01:04

## 2025-05-14 ENCOUNTER — HOSPITAL ENCOUNTER (OUTPATIENT)
Dept: RADIOLOGY | Facility: CLINIC | Age: 75
Discharge: HOME | End: 2025-05-14
Attending: STUDENT IN AN ORGANIZED HEALTH CARE EDUCATION/TRAINING PROGRAM
Payer: MEDICARE

## 2025-05-14 DIAGNOSIS — Z12.31 ENCOUNTER FOR SCREENING MAMMOGRAM FOR BREAST CANCER: ICD-10-CM

## 2025-05-14 PROCEDURE — 77063 BREAST TOMOSYNTHESIS BI: CPT | Mod: 26,,, | Performed by: RADIOLOGY

## 2025-05-14 PROCEDURE — 77067 SCR MAMMO BI INCL CAD: CPT | Mod: 26,,, | Performed by: RADIOLOGY

## 2025-05-14 PROCEDURE — 77067 SCR MAMMO BI INCL CAD: CPT | Mod: TC,PO

## 2025-05-15 ENCOUNTER — RESULTS FOLLOW-UP (OUTPATIENT)
Dept: FAMILY MEDICINE | Facility: CLINIC | Age: 75
End: 2025-05-15

## 2025-05-20 NOTE — PROGRESS NOTES
Left message for patient after missed appointment on Monday. Per chart review patient was seen in the ED the day prior and into the early morning for asthma exacerbation. Asked patient to call us back with an update how they are doing and to re-schedule their post op visit.    Population Health Chart Review & Patient Outreach Details      Additional [facility] Notes:      BP Readings from Last 3 Encounters:   02/18/25 123/88   01/07/25 (!) 146/71   01/07/25 124/86         Non-compliant report chart audits for HYPERTENSION MANAGEMENT     Outreach to patient in reference to hypertension management           DUE FOR OFFICE VISIT.      PREVIOUS OFFICE VISIT WAS A VIRTUAL WITHOUT A BP READING.                 Updates Requested / Reviewed:        Health Maintenance Topics Overdue:        VBHM Score: 3     Osteoporosis Screening  Mammogram    Influenza Vaccine  Pneumonia Vaccine  Tetanus Vaccine  Shingles/Zoster Vaccine  RSV Vaccine                      Health Maintenance Topic(s) Outreach Outcomes & Actions Taken:    Blood Pressure - Outreach Outcomes & Actions Taken  : msg    Primary Care Appt - Outreach Outcomes & Actions Taken  : msg

## 2025-05-27 ENCOUNTER — INFUSION (OUTPATIENT)
Dept: INFUSION THERAPY | Facility: HOSPITAL | Age: 75
End: 2025-05-27
Attending: INTERNAL MEDICINE
Payer: MEDICARE

## 2025-05-27 VITALS
HEART RATE: 70 BPM | WEIGHT: 158.88 LBS | RESPIRATION RATE: 16 BRPM | DIASTOLIC BLOOD PRESSURE: 81 MMHG | OXYGEN SATURATION: 97 % | HEIGHT: 66 IN | SYSTOLIC BLOOD PRESSURE: 130 MMHG | BODY MASS INDEX: 25.54 KG/M2 | TEMPERATURE: 98 F

## 2025-05-27 DIAGNOSIS — Z85.72 HISTORY OF DIFFUSE LARGE B-CELL LYMPHOMA: Primary | ICD-10-CM

## 2025-05-27 DIAGNOSIS — D47.2 MGUS (MONOCLONAL GAMMOPATHY OF UNKNOWN SIGNIFICANCE): ICD-10-CM

## 2025-05-27 PROCEDURE — 63600175 PHARM REV CODE 636 W HCPCS: Performed by: INTERNAL MEDICINE

## 2025-05-27 PROCEDURE — 96523 IRRIG DRUG DELIVERY DEVICE: CPT

## 2025-05-27 PROCEDURE — A4216 STERILE WATER/SALINE, 10 ML: HCPCS | Performed by: INTERNAL MEDICINE

## 2025-05-27 PROCEDURE — 25000003 PHARM REV CODE 250: Performed by: INTERNAL MEDICINE

## 2025-05-27 RX ORDER — HEPARIN 100 UNIT/ML
500 SYRINGE INTRAVENOUS
OUTPATIENT
Start: 2025-05-27

## 2025-05-27 RX ORDER — HEPARIN 100 UNIT/ML
500 SYRINGE INTRAVENOUS
Status: COMPLETED | OUTPATIENT
Start: 2025-05-27 | End: 2025-05-27

## 2025-05-27 RX ORDER — SODIUM CHLORIDE 0.9 % (FLUSH) 0.9 %
10 SYRINGE (ML) INJECTION
OUTPATIENT
Start: 2025-05-27

## 2025-05-27 RX ORDER — SODIUM CHLORIDE 0.9 % (FLUSH) 0.9 %
10 SYRINGE (ML) INJECTION
Status: COMPLETED | OUTPATIENT
Start: 2025-05-27 | End: 2025-05-27

## 2025-05-27 RX ADMIN — SODIUM CHLORIDE, PRESERVATIVE FREE 10 ML: 5 INJECTION INTRAVENOUS at 03:05

## 2025-05-27 RX ADMIN — HEPARIN 500 UNITS: 100 SYRINGE at 03:05

## 2025-05-27 NOTE — PLAN OF CARE
Problem: Fatigue  Goal: Improved Activity Tolerance  Outcome: Progressing  Intervention: Promote Improved Energy  Flowsheets (Taken 5/27/2025 8679)  Fatigue Management: paced activity encouraged  Sleep/Rest Enhancement: regular sleep/rest pattern promoted  Activity Management: Ambulated -L4  Environmental Support: environmental consistency promoted

## 2025-06-07 ENCOUNTER — TELEPHONE (OUTPATIENT)
Dept: FAMILY MEDICINE | Facility: CLINIC | Age: 75
End: 2025-06-07
Payer: MEDICARE

## 2025-06-07 NOTE — TELEPHONE ENCOUNTER
Attempted to reach patient via phone in regards to scheduling annual visit with Dr. Galindo. Patient is on STATIN gap report. LVM to return call to clinic.

## 2025-06-27 ENCOUNTER — LAB VISIT (OUTPATIENT)
Dept: LAB | Facility: HOSPITAL | Age: 75
End: 2025-06-27
Attending: INTERNAL MEDICINE
Payer: MEDICARE

## 2025-06-27 DIAGNOSIS — Z85.72 HISTORY OF DIFFUSE LARGE B-CELL LYMPHOMA: ICD-10-CM

## 2025-06-27 LAB
ABSOLUTE EOSINOPHIL (SMH): 0.07 K/UL
ABSOLUTE MONOCYTE (SMH): 0.35 K/UL (ref 0.3–1)
ABSOLUTE NEUTROPHIL COUNT (SMH): 3.3 K/UL (ref 1.8–7.7)
ALBUMIN SERPL-MCNC: 4.3 G/DL (ref 3.5–5.2)
ALP SERPL-CCNC: 49 UNIT/L (ref 55–135)
ALT SERPL-CCNC: 11 UNIT/L (ref 10–44)
ANION GAP (SMH): 9 MMOL/L (ref 8–16)
AST SERPL-CCNC: 16 UNIT/L (ref 10–40)
BASOPHILS # BLD AUTO: 0.06 K/UL
BASOPHILS NFR BLD AUTO: 1.2 %
BILIRUB SERPL-MCNC: 0.6 MG/DL (ref 0.1–1)
BUN SERPL-MCNC: 14 MG/DL (ref 8–23)
CALCIUM SERPL-MCNC: 9.4 MG/DL (ref 8.7–10.5)
CHLORIDE SERPL-SCNC: 102 MMOL/L (ref 95–110)
CO2 SERPL-SCNC: 25 MMOL/L (ref 23–29)
CREAT SERPL-MCNC: 0.7 MG/DL (ref 0.5–1.4)
ERYTHROCYTE [DISTWIDTH] IN BLOOD BY AUTOMATED COUNT: 12.1 % (ref 11.5–14.5)
GFR SERPLBLD CREATININE-BSD FMLA CKD-EPI: >60 ML/MIN/1.73/M2
GLUCOSE SERPL-MCNC: 86 MG/DL (ref 70–110)
HCT VFR BLD AUTO: 43 % (ref 37–48.5)
HGB BLD-MCNC: 14.4 GM/DL (ref 12–16)
IMM GRANULOCYTES # BLD AUTO: 0.01 K/UL (ref 0–0.04)
IMM GRANULOCYTES NFR BLD AUTO: 0.2 % (ref 0–0.5)
LYMPHOCYTES # BLD AUTO: 1.47 K/UL (ref 1–4.8)
MCH RBC QN AUTO: 32.4 PG (ref 27–31)
MCHC RBC AUTO-ENTMCNC: 33.5 G/DL (ref 32–36)
MCV RBC AUTO: 97 FL (ref 82–98)
NUCLEATED RBC (/100WBC) (SMH): 0 /100 WBC
PLATELET # BLD AUTO: 253 K/UL (ref 150–450)
PMV BLD AUTO: 10.2 FL (ref 9.2–12.9)
POTASSIUM SERPL-SCNC: 3.9 MMOL/L (ref 3.5–5.1)
PROT SERPL-MCNC: 8 GM/DL (ref 6–8.4)
RBC # BLD AUTO: 4.45 M/UL (ref 4–5.4)
RELATIVE EOSINOPHIL (SMH): 1.3 % (ref 0–8)
RELATIVE LYMPHOCYTE (SMH): 28.2 % (ref 18–48)
RELATIVE MONOCYTE (SMH): 6.7 % (ref 4–15)
RELATIVE NEUTROPHIL (SMH): 62.4 % (ref 38–73)
SODIUM SERPL-SCNC: 136 MMOL/L (ref 136–145)
WBC # BLD AUTO: 5.21 K/UL (ref 3.9–12.7)

## 2025-06-27 PROCEDURE — 84155 ASSAY OF PROTEIN SERUM: CPT

## 2025-06-27 PROCEDURE — 82784 ASSAY IGA/IGD/IGG/IGM EACH: CPT | Mod: 59

## 2025-06-27 PROCEDURE — 85025 COMPLETE CBC W/AUTO DIFF WBC: CPT

## 2025-06-27 PROCEDURE — 83521 IG LIGHT CHAINS FREE EACH: CPT

## 2025-06-27 PROCEDURE — 36415 COLL VENOUS BLD VENIPUNCTURE: CPT

## 2025-06-28 LAB
IGA SERPL-MCNC: 215 MG/DL (ref 64–422)
IGG SERPL-MCNC: 2046 MG/DL (ref 586–1602)
IGM SERPL-MCNC: 74 MG/DL (ref 26–217)

## 2025-07-01 ENCOUNTER — TELEPHONE (OUTPATIENT)
Facility: CLINIC | Age: 75
End: 2025-07-01
Payer: MEDICARE

## 2025-07-01 LAB
ALBUMIN SERPL ELPH-MCNC: 3.5 G/DL (ref 2.9–4.4)
ALBUMIN/GLOB SERPL: 0.9 {RATIO} (ref 0.7–1.7)
ALPHA1 GLOB SERPL ELPH-MCNC: 0.3 G/DL (ref 0–0.4)
ALPHA2 GLOB SERPL ELPH-MCNC: 0.7 G/DL (ref 0.4–1)
B-GLOBULIN SERPL ELPH-MCNC: 1 G/DL (ref 0.7–1.3)
GAMMA GLOB SERPL ELPH-MCNC: 1.7 G/DL (ref 0.4–1.8)
GLOBULIN SER CALC-MCNC: 3.7 G/DL (ref 2.2–3.9)
KAPPA LC FREE SER-MCNC: 20.6 MG/L (ref 3.3–19.4)
KAPPA LC FREE/LAMBDA FREE SER: 2.17 {RATIO} (ref 0.26–1.65)
LABORATORY COMMENT REPORT: ABNORMAL
LAMBDA LC FREE SERPL-MCNC: 9.5 MG/L (ref 5.7–26.3)
M PROTEIN SERPL ELPH-MCNC: 1.3 G/DL
PROT SERPL-MCNC: 7.2 G/DL (ref 6–8.5)

## 2025-07-08 ENCOUNTER — INFUSION (OUTPATIENT)
Dept: INFUSION THERAPY | Facility: HOSPITAL | Age: 75
End: 2025-07-08
Attending: INTERNAL MEDICINE
Payer: MEDICARE

## 2025-07-08 ENCOUNTER — OFFICE VISIT (OUTPATIENT)
Facility: CLINIC | Age: 75
End: 2025-07-08
Payer: MEDICARE

## 2025-07-08 VITALS
BODY MASS INDEX: 25.25 KG/M2 | DIASTOLIC BLOOD PRESSURE: 69 MMHG | HEART RATE: 87 BPM | TEMPERATURE: 98 F | TEMPERATURE: 98 F | DIASTOLIC BLOOD PRESSURE: 69 MMHG | WEIGHT: 157.13 LBS | SYSTOLIC BLOOD PRESSURE: 115 MMHG | RESPIRATION RATE: 18 BRPM | SYSTOLIC BLOOD PRESSURE: 115 MMHG | RESPIRATION RATE: 18 BRPM | HEART RATE: 87 BPM | WEIGHT: 157.13 LBS | OXYGEN SATURATION: 97 % | BODY MASS INDEX: 25.36 KG/M2 | HEIGHT: 66 IN

## 2025-07-08 DIAGNOSIS — D47.2 MGUS (MONOCLONAL GAMMOPATHY OF UNKNOWN SIGNIFICANCE): ICD-10-CM

## 2025-07-08 DIAGNOSIS — D47.2 MGUS (MONOCLONAL GAMMOPATHY OF UNKNOWN SIGNIFICANCE): Primary | ICD-10-CM

## 2025-07-08 DIAGNOSIS — Z85.72 HISTORY OF DIFFUSE LARGE B-CELL LYMPHOMA: ICD-10-CM

## 2025-07-08 DIAGNOSIS — Z85.72 HISTORY OF DIFFUSE LARGE B-CELL LYMPHOMA: Primary | ICD-10-CM

## 2025-07-08 DIAGNOSIS — C83.35 DIFFUSE LARGE B-CELL LYMPHOMA OF LYMPH NODES OF INGUINAL REGION: ICD-10-CM

## 2025-07-08 PROCEDURE — A4216 STERILE WATER/SALINE, 10 ML: HCPCS | Performed by: INTERNAL MEDICINE

## 2025-07-08 PROCEDURE — 99999 PR PBB SHADOW E&M-EST. PATIENT-LVL III: CPT | Mod: PBBFAC,,, | Performed by: INTERNAL MEDICINE

## 2025-07-08 PROCEDURE — 96523 IRRIG DRUG DELIVERY DEVICE: CPT

## 2025-07-08 PROCEDURE — 25000003 PHARM REV CODE 250: Performed by: INTERNAL MEDICINE

## 2025-07-08 PROCEDURE — 63600175 PHARM REV CODE 636 W HCPCS: Performed by: INTERNAL MEDICINE

## 2025-07-08 RX ORDER — HEPARIN 100 UNIT/ML
500 SYRINGE INTRAVENOUS
Status: COMPLETED | OUTPATIENT
Start: 2025-07-08 | End: 2025-07-08

## 2025-07-08 RX ORDER — SODIUM CHLORIDE 0.9 % (FLUSH) 0.9 %
10 SYRINGE (ML) INJECTION
OUTPATIENT
Start: 2025-07-08

## 2025-07-08 RX ORDER — LIDOCAINE AND PRILOCAINE 25; 25 MG/G; MG/G
CREAM TOPICAL
Qty: 25 G | Refills: 2 | Status: SHIPPED | OUTPATIENT
Start: 2025-07-08

## 2025-07-08 RX ORDER — HEPARIN 100 UNIT/ML
500 SYRINGE INTRAVENOUS
OUTPATIENT
Start: 2025-07-08

## 2025-07-08 RX ORDER — SODIUM CHLORIDE 0.9 % (FLUSH) 0.9 %
10 SYRINGE (ML) INJECTION
Status: COMPLETED | OUTPATIENT
Start: 2025-07-08 | End: 2025-07-08

## 2025-07-08 RX ADMIN — Medication 10 ML: at 02:07

## 2025-07-08 RX ADMIN — HEPARIN 500 UNITS: 100 SYRINGE at 02:07

## 2025-07-08 NOTE — PLAN OF CARE
Problem: Fatigue  Goal: Improved Activity Tolerance  Outcome: Progressing  Intervention: Promote Improved Energy  Flowsheets (Taken 7/8/2025 0796)  Fatigue Management: paced activity encouraged  Sleep/Rest Enhancement: regular sleep/rest pattern promoted  Activity Management: Ambulated -L4  Environmental Support: environmental consistency promoted

## 2025-07-08 NOTE — ASSESSMENT & PLAN NOTE
Patient is doing well and her labs look good.  All MGUS labs are stable and there is no sign of progression towards myeloma.  Will continue to monitor with six month follow up.

## 2025-07-08 NOTE — ASSESSMENT & PLAN NOTE
Patient is doing well.  She has completed five years disease free and PADMINI.  Will continue to monitor conservatively.

## 2025-07-08 NOTE — PROGRESS NOTES
PROGRESS NOTE    Subjective:       Patient ID: Aurelia Marlow is a 74 y.o. female.    Dx DLBCL-germinal center type- 8/21/2019  Inguinal LN biopsy    PET with bone marrow uptake throughout appendicular and axial skeleton    Bone marrow biopsy positive for B-cell lymphoma    Stage ARELIS    R-CHOP x 6  10/28/2019-2/10/2020    10/6/2020:  PET negative    5/16/2022:  LATOYA/BSO  Path negative for malignancy    6/22/2022:  PET negative    12/27/2022:  PET negative      Chief Complaint:  No chief complaint on file.  lymphoma and MGUS follow up    History of Present Illness:   Aurelia Marlow is a 74 y.o. female who presents for follow up of lymphoma    Ms. Marlow has no new complaints today.  Feeling well.       Family and Social history reviewed and is unchanged from 9/27/2019      ROS:         Current Outpatient Medications:     albuterol (PROVENTIL/VENTOLIN HFA) 90 mcg/actuation inhaler, INHALE 2 PUFFS INTO THE LUNGS EVERY 6 (SIX) HOURS AS NEEDED FOR WHEEZING OR SHORTNESS OF BREATH (COUGH SPASM). RESCUE, Disp: 8 g, Rfl: 1    augmented betamethasone dipropionate (DIPROLENE-AF) 0.05 % cream, aaa bid prn paronychia, Disp: 50 g, Rfl: 3    cholecalciferol, vitamin D3, (VITAMIN D3) 50 mcg (2,000 unit) Tab, once daily., Disp: , Rfl:     diphenhydrAMINE (BENADRYL) 25 mg capsule, Take 1 capsule (25 mg total) by mouth every 6 (six) hours as needed for Itching or Allergies., Disp: 20 capsule, Rfl: 0    fluticasone propionate (FLONASE) 50 mcg/actuation nasal spray, daily as needed., Disp: , Rfl:     hydroxychloroquine (PLAQUENIL) 200 mg tablet, TAKE 1 TABLET BY MOUTH TWICE A DAY, Disp: 180 tablet, Rfl: 1    levothyroxine (SYNTHROID) 100 MCG tablet, Take 100 mcg by mouth once daily., Disp: , Rfl:     naproxen (EC NAPROSYN) 500 MG EC tablet, TAKE 1 TABLET BY MOUTH TWICE A DAY AFTER MEALS AS NEEDED, Disp: 60 tablet, Rfl: 0    ondansetron (ZOFRAN) 8 MG tablet, Take 8 mg by mouth  every 8 (eight) hours as needed., Disp: , Rfl: 6    promethazine (PHENERGAN) 25 MG tablet, Take 25 mg by mouth every 6 (six) hours as needed., Disp: , Rfl: 6    conjugated estrogens (PREMARIN) vaginal cream, Place 0.5 g vaginally once daily., Disp: 1 applicator, Rfl: 5    LIDOcaine-prilocaine (EMLA) cream, Apply topically as needed., Disp: 25 g, Rfl: 2        Objective:       Physical Examination:     /69   Pulse 87   Temp 97.8 °F (36.6 °C)   Resp 18   Wt 71.3 kg (157 lb 1.6 oz)   BMI 25.36 kg/m²     Physical Exam  Constitutional:       Appearance: Normal appearance. She is well-developed.   Eyes:      General: No scleral icterus.     Conjunctiva/sclera: Conjunctivae normal.   Neck:      Thyroid: No thyromegaly.      Trachea: No tracheal deviation.   Cardiovascular:      Rate and Rhythm: Normal rate.   Pulmonary:      Effort: Pulmonary effort is normal.   Abdominal:      General: Abdomen is flat.   Neurological:      General: No focal deficit present.      Mental Status: She is alert and oriented to person, place, and time.      Comments: Neuro intact througout   Psychiatric:         Mood and Affect: Mood normal.         Behavior: Behavior normal.         Thought Content: Thought content normal.         Judgment: Judgment normal.          Labs:   Recent Results (from the past 2 weeks)   CBC with Differential    Collection Time: 06/27/25 10:14 AM   Result Value Ref Range    WBC 5.21 3.90 - 12.70 K/uL    Hgb 14.4 12.0 - 16.0 gm/dL    Hct 43.0 37.0 - 48.5 %    Platelet Count 253 150 - 450 K/uL       CMP  Sodium   Date Value Ref Range Status   06/27/2025 136 136 - 145 mmol/L Final   06/11/2019 139 134 - 144 mmol/L      Potassium   Date Value Ref Range Status   06/27/2025 3.9 3.5 - 5.1 mmol/L Final     Chloride   Date Value Ref Range Status   06/27/2025 102 95 - 110 mmol/L Final   06/11/2019 104 98 - 110 mmol/L      CO2   Date Value Ref Range Status   06/27/2025 25 23 - 29 mmol/L Final     Glucose   Date Value  "Ref Range Status   06/27/2025 86 70 - 110 mg/dL Final   06/11/2019 86 70 - 99 mg/dL      BUN   Date Value Ref Range Status   06/27/2025 14 8 - 23 mg/dL Final     Creatinine   Date Value Ref Range Status   06/27/2025 0.7 0.5 - 1.4 mg/dL Final   06/11/2019 0.72 0.60 - 1.40 mg/dL      Calcium   Date Value Ref Range Status   06/27/2025 9.4 8.7 - 10.5 mg/dL Final     Protein Total   Date Value Ref Range Status   06/27/2025 8.0 6.0 - 8.4 gm/dL Final     Albumin   Date Value Ref Range Status   06/27/2025 4.3 3.5 - 5.2 g/dL Final   06/11/2019 3.8 3.1 - 4.7 g/dL      Bilirubin Total   Date Value Ref Range Status   06/27/2025 0.6 0.1 - 1.0 mg/dL Final     Comment:     For infants and newborns, interpretation of results should be based   on gestational age, weight and in agreement with clinical   observations.    Premature Infant recommended reference ranges:   0-24 hours:  <8.0 mg/dL   24-48 hours: <12.0 mg/dL   3-5 days:    <15.0 mg/dL   6-29 days:   <15.0 mg/dL     ALP   Date Value Ref Range Status   06/27/2025 49 (L) 55 - 135 unit/L Final     AST   Date Value Ref Range Status   06/27/2025 16 10 - 40 unit/L Final     ALT   Date Value Ref Range Status   06/27/2025 11 10 - 44 unit/L Final     Anion Gap   Date Value Ref Range Status   06/27/2025 9 8 - 16 mmol/L Final     eGFR if    Date Value Ref Range Status   06/27/2022 >60.0 >60 mL/min/1.73 m^2 Final     eGFR if non    Date Value Ref Range Status   06/27/2022 >60.0 >60 mL/min/1.73 m^2 Final     Comment:     Calculation used to obtain the estimated glomerular filtration  rate (eGFR) is the CKD-EPI equation.        No results found for: "CEA"  No results found for: "PSA"        Assessment/Plan:     Problem List Items Addressed This Visit       History of diffuse large B-cell lymphoma-completed therapy 2/2020    Patient is doing well.  She has completed five years disease free and PADMINI.  Will continue to monitor conservatively.          Relevant " Medications    LIDOcaine-prilocaine (EMLA) cream    MGUS (monoclonal gammopathy of unknown significance) - Primary    Patient is doing well and her labs look good.  All MGUS labs are stable and there is no sign of progression towards myeloma.  Will continue to monitor with six month follow up.          Relevant Orders    CBC Auto Differential    Comprehensive Metabolic Panel    Immunoglobulins (IgG, IgA, IgM) Quantitative    Immunoglobulin Free LT Chains Blood    Protein Electrophoresis, Serum     Other Visit Diagnoses         Diffuse large B-cell lymphoma of lymph nodes of inguinal region-[facility]        Relevant Medications    LIDOcaine-prilocaine (EMLA) cream    Other Relevant Orders    CBC Auto Differential    Comprehensive Metabolic Panel    Immunoglobulins (IgG, IgA, IgM) Quantitative    Immunoglobulin Free LT Chains Blood    Protein Electrophoresis, Serum              Discussion:     Follow up in about 6 months (around 1/8/2026).      Electronically signed by Pb Fry

## 2025-08-12 ENCOUNTER — INFUSION (OUTPATIENT)
Dept: INFUSION THERAPY | Facility: HOSPITAL | Age: 75
End: 2025-08-12
Attending: INTERNAL MEDICINE
Payer: MEDICARE

## 2025-08-12 VITALS
RESPIRATION RATE: 16 BRPM | TEMPERATURE: 98 F | WEIGHT: 154.81 LBS | HEIGHT: 66 IN | DIASTOLIC BLOOD PRESSURE: 82 MMHG | OXYGEN SATURATION: 97 % | SYSTOLIC BLOOD PRESSURE: 120 MMHG | HEART RATE: 78 BPM | BODY MASS INDEX: 24.88 KG/M2

## 2025-08-12 DIAGNOSIS — Z85.72 HISTORY OF DIFFUSE LARGE B-CELL LYMPHOMA: Primary | ICD-10-CM

## 2025-08-12 DIAGNOSIS — D47.2 MGUS (MONOCLONAL GAMMOPATHY OF UNKNOWN SIGNIFICANCE): ICD-10-CM

## 2025-08-12 PROCEDURE — A4216 STERILE WATER/SALINE, 10 ML: HCPCS | Performed by: INTERNAL MEDICINE

## 2025-08-12 PROCEDURE — 25000003 PHARM REV CODE 250: Performed by: INTERNAL MEDICINE

## 2025-08-12 PROCEDURE — 63600175 PHARM REV CODE 636 W HCPCS: Performed by: INTERNAL MEDICINE

## 2025-08-12 PROCEDURE — 96523 IRRIG DRUG DELIVERY DEVICE: CPT

## 2025-08-12 RX ORDER — SODIUM CHLORIDE 0.9 % (FLUSH) 0.9 %
10 SYRINGE (ML) INJECTION
OUTPATIENT
Start: 2025-08-12

## 2025-08-12 RX ORDER — HEPARIN 100 UNIT/ML
500 SYRINGE INTRAVENOUS
Status: COMPLETED | OUTPATIENT
Start: 2025-08-12 | End: 2025-08-12

## 2025-08-12 RX ORDER — SODIUM CHLORIDE 0.9 % (FLUSH) 0.9 %
10 SYRINGE (ML) INJECTION
Status: COMPLETED | OUTPATIENT
Start: 2025-08-12 | End: 2025-08-12

## 2025-08-12 RX ORDER — HEPARIN 100 UNIT/ML
500 SYRINGE INTRAVENOUS
OUTPATIENT
Start: 2025-08-12

## 2025-08-12 RX ADMIN — SODIUM CHLORIDE, PRESERVATIVE FREE 10 ML: 5 INJECTION INTRAVENOUS at 02:08

## 2025-08-12 RX ADMIN — HEPARIN 500 UNITS: 100 SYRINGE at 02:08

## 2025-08-20 ENCOUNTER — PATIENT MESSAGE (OUTPATIENT)
Dept: ADMINISTRATIVE | Facility: HOSPITAL | Age: 75
End: 2025-08-20
Payer: MEDICARE

## 2025-09-04 ENCOUNTER — E-CONSULT (OUTPATIENT)
Dept: PHARMACY | Facility: CLINIC | Age: 75
End: 2025-09-04
Payer: MEDICARE

## 2025-09-04 ENCOUNTER — OFFICE VISIT (OUTPATIENT)
Dept: FAMILY MEDICINE | Facility: CLINIC | Age: 75
End: 2025-09-04
Payer: MEDICARE

## 2025-09-04 VITALS
WEIGHT: 151.44 LBS | DIASTOLIC BLOOD PRESSURE: 80 MMHG | BODY MASS INDEX: 24.34 KG/M2 | SYSTOLIC BLOOD PRESSURE: 130 MMHG | RESPIRATION RATE: 15 BRPM | HEART RATE: 72 BPM | TEMPERATURE: 98 F | HEIGHT: 66 IN | OXYGEN SATURATION: 99 %

## 2025-09-04 DIAGNOSIS — R79.9 ABNORMAL FINDING OF BLOOD CHEMISTRY, UNSPECIFIED: ICD-10-CM

## 2025-09-04 DIAGNOSIS — E03.9 HYPOTHYROIDISM, UNSPECIFIED TYPE: ICD-10-CM

## 2025-09-04 DIAGNOSIS — Z00.00 HEALTHCARE MAINTENANCE: Primary | ICD-10-CM

## 2025-09-04 DIAGNOSIS — I10 PRIMARY HYPERTENSION: ICD-10-CM

## 2025-09-04 DIAGNOSIS — N95.2 VAGINAL ATROPHY: Primary | ICD-10-CM

## 2025-09-04 DIAGNOSIS — N95.2 VAGINAL ATROPHY: ICD-10-CM

## 2025-09-04 DIAGNOSIS — D47.2 MGUS (MONOCLONAL GAMMOPATHY OF UNKNOWN SIGNIFICANCE): ICD-10-CM

## 2025-09-04 DIAGNOSIS — M06.9 RHEUMATOID ARTHRITIS, INVOLVING UNSPECIFIED SITE, UNSPECIFIED WHETHER RHEUMATOID FACTOR PRESENT: ICD-10-CM

## 2025-09-04 DIAGNOSIS — Z00.00 ENCOUNTER FOR PREVENTIVE HEALTH EXAMINATION: ICD-10-CM

## 2025-09-04 PROCEDURE — 99999 PR PBB SHADOW E&M-EST. PATIENT-LVL IV: CPT | Mod: PBBFAC,,, | Performed by: STUDENT IN AN ORGANIZED HEALTH CARE EDUCATION/TRAINING PROGRAM

## 2025-09-04 RX ORDER — CONJUGATED ESTROGENS 0.62 MG/G
0.5 CREAM VAGINAL DAILY
Qty: 1 APPLICATOR | Refills: 5 | Status: SHIPPED | OUTPATIENT
Start: 2025-09-04 | End: 2026-09-04

## 2025-09-04 RX ORDER — ESTRADIOL 10 UG/1
TABLET, FILM COATED VAGINAL
Qty: 90 TABLET | Refills: 2 | Status: CANCELLED | OUTPATIENT
Start: 2025-09-04

## (undated) DEVICE — BAG DECANTER 10-102

## (undated) DEVICE — COVER LIGHT HANDLE LB53

## (undated) DEVICE — DRESSING POST OP MEPILEX AG 4X6  498300

## (undated) DEVICE — DRAPE LAPAROTOMY TRANSVERSE 89281

## (undated) DEVICE — SWABSTICK BENZOIN S42450

## (undated) DEVICE — SUTURE SILK 3-0 18 A184H

## (undated) DEVICE — TRAY GENERAL SURGERY

## (undated) DEVICE — DRESSING TELFA 2X3  1961

## (undated) DEVICE — SCRUB BACTOSHIELD 134439

## (undated) DEVICE — SUTURE MONOCRYL 4-0 27 SH MCP415H

## (undated) DEVICE — BLADE SCALPEL #11 371111

## (undated) DEVICE — SOLUTION IRRI NS BOTTLE 1000ML R5200-01

## (undated) DEVICE — GLOVE BIOGEL PI ULTRA TOUCH G GRAY SZ 7

## (undated) DEVICE — DRAPE C-ARM (FITS NEW C-ARM) 07-CA108

## (undated) DEVICE — SUTURE ETHILON 2-0 PS 18 585H

## (undated) DEVICE — DISSECTOR EXACT LIGASURE 20.6MM-21CM

## (undated) DEVICE — STERISTRIP 1/4 SKIN CLOSURE

## (undated) DEVICE — TRAY MINOR SLIDELL MEMORIAL HOSPITAL

## (undated) DEVICE — GLOVE BIOGEL PI  GOLD SZ 7

## (undated) DEVICE — SUTURE MONOCRYL 4-0 PS-2 27 MCP426H

## (undated) DEVICE — PAD BOVIE ADULT

## (undated) DEVICE — NEEDLE SAFETY ECLIPSE 25G 1-1/2IN 305767

## (undated) DEVICE — SUTURE SILK 2-0 SH 30 K833H

## (undated) DEVICE — PACK BASIC V 88151

## (undated) DEVICE — DRAIN ROUND 100CC 1/8 0073310

## (undated) DEVICE — UNDERGLOVE BIOGEL PI MICRO BLUE SZ 7

## (undated) DEVICE — SPONGE GAUZE 2X2 STERILE 2/PK 441204